# Patient Record
Sex: MALE | Race: BLACK OR AFRICAN AMERICAN | NOT HISPANIC OR LATINO
[De-identification: names, ages, dates, MRNs, and addresses within clinical notes are randomized per-mention and may not be internally consistent; named-entity substitution may affect disease eponyms.]

---

## 2017-01-24 ENCOUNTER — APPOINTMENT (OUTPATIENT)
Dept: NEUROLOGY | Facility: CLINIC | Age: 82
End: 2017-01-24

## 2017-01-25 ENCOUNTER — APPOINTMENT (OUTPATIENT)
Dept: NEUROLOGY | Facility: CLINIC | Age: 82
End: 2017-01-25

## 2017-02-03 LAB
INR PPP: 2.3
INR PPP: 3
PT BLD: 23.3

## 2017-03-24 LAB
INR PPP: 1.9
PT BLD: 18.7

## 2017-04-18 LAB
INR PPP: 2.2
PT BLD: 22.1

## 2017-05-23 ENCOUNTER — APPOINTMENT (OUTPATIENT)
Dept: HEART AND VASCULAR | Facility: CLINIC | Age: 82
End: 2017-05-23

## 2017-05-23 VITALS
SYSTOLIC BLOOD PRESSURE: 110 MMHG | HEART RATE: 56 BPM | OXYGEN SATURATION: 96 % | DIASTOLIC BLOOD PRESSURE: 70 MMHG | TEMPERATURE: 98.3 F

## 2017-05-23 LAB
INR PPP: 3.1 RATIO
POCT-PROTHROMBIN TIME: 36.9 SECS

## 2017-05-24 ENCOUNTER — CLINICAL ADVICE (OUTPATIENT)
Age: 82
End: 2017-05-24

## 2017-05-24 LAB
ALBUMIN SERPL ELPH-MCNC: 3.9 G/DL
ALP BLD-CCNC: 70 U/L
ALT SERPL-CCNC: 13 U/L
ANION GAP SERPL CALC-SCNC: 14 MMOL/L
AST SERPL-CCNC: 24 U/L
BASOPHILS # BLD AUTO: 0.03 K/UL
BASOPHILS NFR BLD AUTO: 0.5 %
BILIRUB SERPL-MCNC: 0.6 MG/DL
BUN SERPL-MCNC: 17 MG/DL
CALCIUM SERPL-MCNC: 10.2 MG/DL
CHLORIDE SERPL-SCNC: 103 MMOL/L
CHOLEST SERPL-MCNC: 154 MG/DL
CHOLEST/HDLC SERPL: 3.4 RATIO
CO2 SERPL-SCNC: 24 MMOL/L
CREAT SERPL-MCNC: 1.18 MG/DL
EOSINOPHIL # BLD AUTO: 0.22 K/UL
EOSINOPHIL NFR BLD AUTO: 3.4 %
GLUCOSE SERPL-MCNC: 106 MG/DL
HBA1C MFR BLD HPLC: 6 %
HCT VFR BLD CALC: 35.3 %
HDLC SERPL-MCNC: 45 MG/DL
HGB BLD-MCNC: 11.5 G/DL
IMM GRANULOCYTES NFR BLD AUTO: 0.2 %
LDLC SERPL CALC-MCNC: 85 MG/DL
LYMPHOCYTES # BLD AUTO: 2.1 K/UL
LYMPHOCYTES NFR BLD AUTO: 32.3 %
MAN DIFF?: NORMAL
MCHC RBC-ENTMCNC: 30.6 PG
MCHC RBC-ENTMCNC: 32.6 GM/DL
MCV RBC AUTO: 93.9 FL
MONOCYTES # BLD AUTO: 0.36 K/UL
MONOCYTES NFR BLD AUTO: 5.5 %
NEUTROPHILS # BLD AUTO: 3.78 K/UL
NEUTROPHILS NFR BLD AUTO: 58.1 %
PLATELET # BLD AUTO: 177 K/UL
POTASSIUM SERPL-SCNC: 4.7 MMOL/L
PROT SERPL-MCNC: 8 G/DL
RBC # BLD: 3.76 M/UL
RBC # FLD: 15.2 %
SODIUM SERPL-SCNC: 141 MMOL/L
TRIGL SERPL-MCNC: 118 MG/DL
TSH SERPL-ACNC: 3.43 UIU/ML
WBC # FLD AUTO: 6.5 K/UL

## 2017-06-09 ENCOUNTER — CLINICAL ADVICE (OUTPATIENT)
Age: 82
End: 2017-06-09

## 2017-06-09 LAB
INR PPP: 2
PT BLD: 20.7

## 2017-06-13 ENCOUNTER — OUTPATIENT (OUTPATIENT)
Dept: OUTPATIENT SERVICES | Facility: HOSPITAL | Age: 82
LOS: 1 days | End: 2017-06-13
Payer: MEDICARE

## 2017-06-13 PROCEDURE — 70551 MRI BRAIN STEM W/O DYE: CPT

## 2017-06-13 PROCEDURE — 72141 MRI NECK SPINE W/O DYE: CPT | Mod: 26

## 2017-06-13 PROCEDURE — 70551 MRI BRAIN STEM W/O DYE: CPT | Mod: 26

## 2017-06-13 PROCEDURE — 72141 MRI NECK SPINE W/O DYE: CPT

## 2017-07-13 ENCOUNTER — CLINICAL ADVICE (OUTPATIENT)
Age: 82
End: 2017-07-13

## 2017-07-13 LAB — INR PPP: 2.4

## 2017-07-25 ENCOUNTER — RX RENEWAL (OUTPATIENT)
Age: 82
End: 2017-07-25

## 2017-08-07 ENCOUNTER — RX RENEWAL (OUTPATIENT)
Age: 82
End: 2017-08-07

## 2017-08-22 ENCOUNTER — CLINICAL ADVICE (OUTPATIENT)
Age: 82
End: 2017-08-22

## 2017-09-06 ENCOUNTER — MEDICATION RENEWAL (OUTPATIENT)
Age: 82
End: 2017-09-06

## 2017-09-06 ENCOUNTER — RX RENEWAL (OUTPATIENT)
Age: 82
End: 2017-09-06

## 2017-09-08 ENCOUNTER — CLINICAL ADVICE (OUTPATIENT)
Age: 82
End: 2017-09-08

## 2017-09-08 LAB
INR PPP: 2.5
PT BLD: 25.5

## 2017-10-10 ENCOUNTER — APPOINTMENT (OUTPATIENT)
Dept: HEART AND VASCULAR | Facility: CLINIC | Age: 82
End: 2017-10-10
Payer: MEDICARE

## 2017-10-10 ENCOUNTER — MEDICATION RENEWAL (OUTPATIENT)
Age: 82
End: 2017-10-10

## 2017-10-10 VITALS
HEART RATE: 67 BPM | SYSTOLIC BLOOD PRESSURE: 132 MMHG | BODY MASS INDEX: 25.67 KG/M2 | HEIGHT: 74 IN | RESPIRATION RATE: 12 BRPM | DIASTOLIC BLOOD PRESSURE: 66 MMHG | OXYGEN SATURATION: 98 % | TEMPERATURE: 98.2 F | WEIGHT: 200 LBS

## 2017-10-10 DIAGNOSIS — E11.9 TYPE 2 DIABETES MELLITUS W/OUT COMPLICATIONS: ICD-10-CM

## 2017-10-10 PROCEDURE — 90662 IIV NO PRSV INCREASED AG IM: CPT

## 2017-10-10 PROCEDURE — 93306 TTE W/DOPPLER COMPLETE: CPT

## 2017-10-10 PROCEDURE — 99214 OFFICE O/P EST MOD 30 MIN: CPT | Mod: 25

## 2017-10-10 PROCEDURE — G0009: CPT

## 2017-10-10 PROCEDURE — 90732 PPSV23 VACC 2 YRS+ SUBQ/IM: CPT

## 2017-10-10 PROCEDURE — G0008: CPT

## 2017-10-10 PROCEDURE — 93880 EXTRACRANIAL BILAT STUDY: CPT | Mod: XE

## 2017-10-10 PROCEDURE — 36415 COLL VENOUS BLD VENIPUNCTURE: CPT

## 2017-10-11 LAB
25(OH)D3 SERPL-MCNC: 34.9 NG/ML
ALBUMIN SERPL ELPH-MCNC: 4.1 G/DL
ALP BLD-CCNC: 69 U/L
ALT SERPL-CCNC: 19 U/L
ANION GAP SERPL CALC-SCNC: 8 MMOL/L
AST SERPL-CCNC: 35 U/L
BASOPHILS # BLD AUTO: 0.02 K/UL
BASOPHILS NFR BLD AUTO: 0.4 %
BILIRUB SERPL-MCNC: 0.6 MG/DL
BUN SERPL-MCNC: 21 MG/DL
CALCIUM SERPL-MCNC: 9.8 MG/DL
CHLORIDE SERPL-SCNC: 101 MMOL/L
CHOLEST SERPL-MCNC: 171 MG/DL
CHOLEST/HDLC SERPL: 3.4 RATIO
CO2 SERPL-SCNC: 30 MMOL/L
CREAT SERPL-MCNC: 1.22 MG/DL
EOSINOPHIL # BLD AUTO: 0.22 K/UL
EOSINOPHIL NFR BLD AUTO: 4 %
GLUCOSE SERPL-MCNC: 101 MG/DL
HBA1C MFR BLD HPLC: 5.7 %
HCT VFR BLD CALC: 34.3 %
HDLC SERPL-MCNC: 51 MG/DL
HGB BLD-MCNC: 11.2 G/DL
IMM GRANULOCYTES NFR BLD AUTO: 0.2 %
INR PPP: 1.92 RATIO
LDLC SERPL CALC-MCNC: 91 MG/DL
LYMPHOCYTES # BLD AUTO: 1.77 K/UL
LYMPHOCYTES NFR BLD AUTO: 32.1 %
MAN DIFF?: NORMAL
MCHC RBC-ENTMCNC: 30.7 PG
MCHC RBC-ENTMCNC: 32.7 GM/DL
MCV RBC AUTO: 94 FL
MONOCYTES # BLD AUTO: 0.39 K/UL
MONOCYTES NFR BLD AUTO: 7.1 %
NEUTROPHILS # BLD AUTO: 3.11 K/UL
NEUTROPHILS NFR BLD AUTO: 56.2 %
PLATELET # BLD AUTO: 165 K/UL
POTASSIUM SERPL-SCNC: 4.1 MMOL/L
PROT SERPL-MCNC: 8.2 G/DL
PSA SERPL-MCNC: 0.02 NG/ML
PT BLD: 22 SEC
RBC # BLD: 3.65 M/UL
RBC # FLD: 15.4 %
SODIUM SERPL-SCNC: 139 MMOL/L
TRIGL SERPL-MCNC: 143 MG/DL
TSH SERPL-ACNC: 2.83 UIU/ML
WBC # FLD AUTO: 5.52 K/UL

## 2017-11-21 LAB
INR PPP: 2.8
PT BLD: 28.8

## 2018-01-10 ENCOUNTER — APPOINTMENT (OUTPATIENT)
Dept: HEART AND VASCULAR | Facility: CLINIC | Age: 83
End: 2018-01-10
Payer: MEDICARE

## 2018-01-10 VITALS
OXYGEN SATURATION: 97 % | WEIGHT: 186.03 LBS | HEART RATE: 69 BPM | TEMPERATURE: 97.3 F | HEIGHT: 74 IN | RESPIRATION RATE: 12 BRPM | DIASTOLIC BLOOD PRESSURE: 66 MMHG | BODY MASS INDEX: 23.87 KG/M2 | SYSTOLIC BLOOD PRESSURE: 108 MMHG

## 2018-01-10 PROCEDURE — 85610 PROTHROMBIN TIME: CPT | Mod: QW

## 2018-01-10 PROCEDURE — 99214 OFFICE O/P EST MOD 30 MIN: CPT | Mod: 25

## 2018-01-11 LAB
INR PPP: 2.8
PT BLD: 28.8

## 2018-03-01 LAB
INR PPP: 2.9
PT BLD: 29.1

## 2018-04-30 LAB
INR PPP: 2.5
PT BLD: 25.1

## 2018-06-25 LAB
INR PPP: 2.1
PT BLD: 21.2

## 2018-06-26 ENCOUNTER — APPOINTMENT (OUTPATIENT)
Dept: HEART AND VASCULAR | Facility: CLINIC | Age: 83
End: 2018-06-26

## 2018-07-10 ENCOUNTER — RX RENEWAL (OUTPATIENT)
Age: 83
End: 2018-07-10

## 2018-07-23 ENCOUNTER — RX RENEWAL (OUTPATIENT)
Age: 83
End: 2018-07-23

## 2018-07-24 ENCOUNTER — APPOINTMENT (OUTPATIENT)
Dept: HEART AND VASCULAR | Facility: CLINIC | Age: 83
End: 2018-07-24
Payer: MEDICARE

## 2018-07-24 VITALS
WEIGHT: 186 LBS | BODY MASS INDEX: 23.87 KG/M2 | RESPIRATION RATE: 12 BRPM | HEART RATE: 62 BPM | SYSTOLIC BLOOD PRESSURE: 120 MMHG | TEMPERATURE: 98.4 F | DIASTOLIC BLOOD PRESSURE: 64 MMHG | OXYGEN SATURATION: 99 % | HEIGHT: 74 IN

## 2018-07-24 DIAGNOSIS — R53.83 OTHER FATIGUE: ICD-10-CM

## 2018-07-24 LAB
INR PPP: 2.6 RATIO
POCT-PROTHROMBIN TIME: 31.2 SECS

## 2018-07-24 PROCEDURE — 85610 PROTHROMBIN TIME: CPT | Mod: QW

## 2018-07-24 PROCEDURE — 99214 OFFICE O/P EST MOD 30 MIN: CPT | Mod: 25

## 2018-07-24 PROCEDURE — 36415 COLL VENOUS BLD VENIPUNCTURE: CPT

## 2018-07-24 PROCEDURE — 86580 TB INTRADERMAL TEST: CPT

## 2018-07-24 PROCEDURE — 93000 ELECTROCARDIOGRAM COMPLETE: CPT

## 2018-07-25 LAB
25(OH)D3 SERPL-MCNC: 36 NG/ML
ALBUMIN SERPL ELPH-MCNC: 4.1 G/DL
ALP BLD-CCNC: 65 U/L
ALT SERPL-CCNC: 15 U/L
ANION GAP SERPL CALC-SCNC: 13 MMOL/L
AST SERPL-CCNC: 27 U/L
BASOPHILS # BLD AUTO: 0.02 K/UL
BASOPHILS NFR BLD AUTO: 0.3 %
BILIRUB SERPL-MCNC: 0.7 MG/DL
BUN SERPL-MCNC: 19 MG/DL
CALCIUM SERPL-MCNC: 9.7 MG/DL
CHLORIDE SERPL-SCNC: 100 MMOL/L
CHOLEST SERPL-MCNC: 155 MG/DL
CHOLEST/HDLC SERPL: 3.4 RATIO
CO2 SERPL-SCNC: 28 MMOL/L
CREAT SERPL-MCNC: 1.02 MG/DL
EOSINOPHIL # BLD AUTO: 0.26 K/UL
EOSINOPHIL NFR BLD AUTO: 4.5 %
FOLATE SERPL-MCNC: >20 NG/ML
GLUCOSE SERPL-MCNC: 96 MG/DL
HBA1C MFR BLD HPLC: 5.8 %
HCT VFR BLD CALC: 33.6 %
HDLC SERPL-MCNC: 46 MG/DL
HGB BLD-MCNC: 11.3 G/DL
IMM GRANULOCYTES NFR BLD AUTO: 0.2 %
LDLC SERPL CALC-MCNC: 87 MG/DL
LYMPHOCYTES # BLD AUTO: 2.24 K/UL
LYMPHOCYTES NFR BLD AUTO: 39 %
MAN DIFF?: NORMAL
MCHC RBC-ENTMCNC: 31.2 PG
MCHC RBC-ENTMCNC: 33.6 GM/DL
MCV RBC AUTO: 92.8 FL
MONOCYTES # BLD AUTO: 0.3 K/UL
MONOCYTES NFR BLD AUTO: 5.2 %
NEUTROPHILS # BLD AUTO: 2.91 K/UL
NEUTROPHILS NFR BLD AUTO: 50.8 %
PLATELET # BLD AUTO: 183 K/UL
POTASSIUM SERPL-SCNC: 4.4 MMOL/L
PROT SERPL-MCNC: 7.9 G/DL
PSA SERPL-MCNC: 0.02 NG/ML
RBC # BLD: 3.62 M/UL
RBC # FLD: 14.7 %
SODIUM SERPL-SCNC: 141 MMOL/L
TRIGL SERPL-MCNC: 111 MG/DL
TSH SERPL-ACNC: 3.45 UIU/ML
VIT B12 SERPL-MCNC: >2000 PG/ML
WBC # FLD AUTO: 5.74 K/UL

## 2018-07-26 ENCOUNTER — APPOINTMENT (OUTPATIENT)
Dept: INTERNAL MEDICINE | Facility: CLINIC | Age: 83
End: 2018-07-26
Payer: MEDICARE

## 2018-07-26 VITALS
WEIGHT: 180 LBS | OXYGEN SATURATION: 99 % | RESPIRATION RATE: 12 BRPM | HEART RATE: 64 BPM | HEIGHT: 74 IN | BODY MASS INDEX: 23.1 KG/M2 | DIASTOLIC BLOOD PRESSURE: 70 MMHG | TEMPERATURE: 97.4 F | SYSTOLIC BLOOD PRESSURE: 120 MMHG

## 2018-07-26 DIAGNOSIS — I65.29 OCCLUSION AND STENOSIS OF UNSPECIFIED CAROTID ARTERY: ICD-10-CM

## 2018-07-26 PROCEDURE — 99214 OFFICE O/P EST MOD 30 MIN: CPT

## 2018-09-04 ENCOUNTER — RX RENEWAL (OUTPATIENT)
Age: 83
End: 2018-09-04

## 2018-09-24 LAB
INR PPP: 2.1
PT BLD: 21.3

## 2018-11-26 VITALS
SYSTOLIC BLOOD PRESSURE: 182 MMHG | HEART RATE: 89 BPM | OXYGEN SATURATION: 98 % | TEMPERATURE: 99 F | RESPIRATION RATE: 18 BRPM | DIASTOLIC BLOOD PRESSURE: 73 MMHG

## 2018-11-26 LAB
APPEARANCE UR: CLEAR — SIGNIFICANT CHANGE UP
BACTERIA # UR AUTO: PRESENT /HPF
BILIRUB UR-MCNC: NEGATIVE — SIGNIFICANT CHANGE UP
COLOR SPEC: YELLOW — SIGNIFICANT CHANGE UP
DIFF PNL FLD: ABNORMAL
EPI CELLS # UR: SIGNIFICANT CHANGE UP /HPF (ref 0–5)
GLUCOSE UR QL: NEGATIVE — SIGNIFICANT CHANGE UP
KETONES UR-MCNC: NEGATIVE — SIGNIFICANT CHANGE UP
LEUKOCYTE ESTERASE UR-ACNC: NEGATIVE — SIGNIFICANT CHANGE UP
NITRITE UR-MCNC: NEGATIVE — SIGNIFICANT CHANGE UP
PH UR: 7 — SIGNIFICANT CHANGE UP (ref 5–8)
PROT UR-MCNC: NEGATIVE MG/DL — SIGNIFICANT CHANGE UP
RBC CASTS # UR COMP ASSIST: > 10 /HPF
SP GR SPEC: 1.02 — SIGNIFICANT CHANGE UP (ref 1–1.03)
TROPONIN T SERPL-MCNC: 0.06 NG/ML — CRITICAL HIGH (ref 0–0.01)
UROBILINOGEN FLD QL: 2 E.U./DL
WBC UR QL: < 5 /HPF — SIGNIFICANT CHANGE UP

## 2018-11-26 PROCEDURE — 70450 CT HEAD/BRAIN W/O DYE: CPT | Mod: 26

## 2018-11-26 PROCEDURE — 73630 X-RAY EXAM OF FOOT: CPT | Mod: 26,RT

## 2018-11-26 PROCEDURE — 72157 MRI CHEST SPINE W/O & W/DYE: CPT | Mod: 26

## 2018-11-26 PROCEDURE — 72158 MRI LUMBAR SPINE W/O & W/DYE: CPT | Mod: 26

## 2018-11-26 PROCEDURE — 73610 X-RAY EXAM OF ANKLE: CPT | Mod: 26,RT

## 2018-11-26 PROCEDURE — 72156 MRI NECK SPINE W/O & W/DYE: CPT | Mod: 26

## 2018-11-26 PROCEDURE — 93971 EXTREMITY STUDY: CPT | Mod: 26,RT

## 2018-11-26 RX ORDER — CEFAZOLIN SODIUM 1 G
1000 VIAL (EA) INJECTION ONCE
Qty: 0 | Refills: 0 | Status: COMPLETED | OUTPATIENT
Start: 2018-11-26 | End: 2018-11-26

## 2018-11-26 RX ORDER — ACETAMINOPHEN 500 MG
975 TABLET ORAL ONCE
Qty: 0 | Refills: 0 | Status: COMPLETED | OUTPATIENT
Start: 2018-11-26 | End: 2018-11-26

## 2018-11-26 RX ADMIN — Medication 975 MILLIGRAM(S): at 18:38

## 2018-11-26 RX ADMIN — Medication 975 MILLIGRAM(S): at 22:19

## 2018-11-26 RX ADMIN — Medication 1000 MILLIGRAM(S): at 21:15

## 2018-11-26 NOTE — ED PROVIDER NOTE - MUSCULOSKELETAL, MLM
Spine appears normal, neck/back nontender, R sided weakness at baseline, 2+ edema rle, 1+ edema lle, erythema and ttp R lateral foot and small shallow wound, dp 1+ bilat, lue/lle range of motion is not limited

## 2018-11-26 NOTE — ED PROVIDER NOTE - DIAGNOSTIC INTERPRETATION
ER Physician: Ada Director  INTERPRETATION:  foot, ankle - no acute fracture; no soft tissue swelling noted; normal bony alignment.

## 2018-11-26 NOTE — ED PROVIDER NOTE - PMH
Aphasia    CAD (coronary artery disease)    Carotid artery stenosis    Cervical stenosis of spine    CVA (cerebral vascular accident)    DM (diabetes mellitus)    HLD (hyperlipidemia)    Prostate cancer

## 2018-11-26 NOTE — ED ADULT NURSE NOTE - OTHER COMPLAINTS
Spouse reports she found patient on the floor this morning. Unknown LOC or head injury. Spouse reports patient is on Coumadin.

## 2018-11-26 NOTE — ED ADULT NURSE NOTE - NSIMPLEMENTINTERV_GEN_ALL_ED
Implemented All Universal Safety Interventions:  [place] to call system. Call bell, personal items and telephone within reach. Instruct patient to call for assistance. Room bathroom lighting operational. Non-slip footwear when patient is off stretcher. Physically safe environment: no spills, clutter or unnecessary equipment. Stretcher in lowest position, wheels locked, appropriate side rails in place.

## 2018-11-26 NOTE — ED PROVIDER NOTE - CARE PLAN
Principal Discharge DX:	Cellulitis of foot  Secondary Diagnosis:	Falls, initial encounter  Secondary Diagnosis:	Leg weakness

## 2018-11-26 NOTE — ED ADULT NURSE NOTE - OBJECTIVE STATEMENT
pt received in [facility] , Alert and active , pt has a history of dementia , CAD with multiple stents ,CVA with Right sided weakness (residual) , pt was found by wife in the bathroom on the floor , unknown etiology of fall , unknown LOC , no obvious injuries noted will continue to monitor

## 2018-11-26 NOTE — ED PROVIDER NOTE - MEDICAL DECISION MAKING DETAILS
Pt c/o mechanical fall w/o sig injury and recent falls x 4 this month.  ? 2/2 new cva (low suspicion), gen weakness/deconditioning, ? cervical stenosis, infection (afebrile).  Pt also c/o several days RLE pain/swelling w/o known injury but mild redness R lateral foot - ? cellulitis, injury, dvt.  Plan labs, ct head, ua, nsg (seen by Dr Phan in 2016 but refused surgery), xray foot/ankle, doppler, reassess.

## 2018-11-26 NOTE — ED PROVIDER NOTE - OBJECTIVE STATEMENT
82 yo male h/o DM, HLD, HTN, Mitral Regurg, Cervical Stenosis, prostate ca, CVA, aphasia, and CAD c/o 82 yo male h/o DM, HLD, HTN, Mitral Regurg, Cervical Stenosis, prostate ca, CVA w R sided weakness, carotid stenosis and CAD c/o fall.  Pt reports he lost his balance this am while in the bedroom walking w his walker.  He states he fell backwards and did not hit his head or lose consciousness.  No neck/back pain or injury, ext pain or injury from fall but notes R foot pain today w some swelling RLE x several days.  No change in vision/speech, new numbness, + worsening generalized and lle weakness and falls x 4 in 1 month.  No ha.  No cp, palpitations, sob.  No h/o dvt.  No fever.  Pt assisted up by family but unable to walk after on his own 2/2 increased lle weakness.

## 2018-11-26 NOTE — ED PROVIDER NOTE - PROGRESS NOTE DETAILS
Labs and ua neg, ct head w/o acute process, xray neg on my read; doppler pending.  NSG consulted 2/2 concern for cerv stenosis and h/o recommendation for surgery for pt's ataxia related to this - they request mri c/t/l w/o and w/ iv contrast and will eval.  MRI ordered.  Pt in doppler now. BOWEN: pt with high blood pressure, high cholesterol, diabetes, cervical stenosis increased falls. Took sign out from . Director. Pt had difficulty getting up after fall and difficulty walking. CT head neg UA neg. Two trop neg. NSG to eval, MRI of entire spine pending. [facility] to read MRI. NSG to evaluate. If surgical will admit to NSG, otherwise medicine for rehab placement. NSG in ed.  Pt pending mri result.  Pt signed out to Dr Guajardo and LEONARDO Sanford pending nsg eval, mri result.  If pt declined by nsg, plan admit to med for cellulitis, falls/weakness. as per nsg, pt has no acute changes on MRI pt and family not decided on surgery will admit to medicine for treatment of cellulitis and PT.

## 2018-11-27 ENCOUNTER — INPATIENT (INPATIENT)
Facility: HOSPITAL | Age: 83
LOS: 9 days | Discharge: NURSING, REHAB OR LONG-TERM CARE | DRG: 471 | End: 2018-12-07
Attending: NEUROLOGICAL SURGERY | Admitting: NEUROLOGICAL SURGERY
Payer: MEDICARE

## 2018-11-27 DIAGNOSIS — Z29.9 ENCOUNTER FOR PROPHYLACTIC MEASURES, UNSPECIFIED: ICD-10-CM

## 2018-11-27 DIAGNOSIS — R29.898 OTHER SYMPTOMS AND SIGNS INVOLVING THE MUSCULOSKELETAL SYSTEM: ICD-10-CM

## 2018-11-27 DIAGNOSIS — I25.10 ATHEROSCLEROTIC HEART DISEASE OF NATIVE CORONARY ARTERY WITHOUT ANGINA PECTORIS: ICD-10-CM

## 2018-11-27 DIAGNOSIS — L03.119 CELLULITIS OF UNSPECIFIED PART OF LIMB: ICD-10-CM
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
## 2018-11-27 DIAGNOSIS — E11.9 TYPE 2 DIABETES MELLITUS WITHOUT COMPLICATIONS: ICD-10-CM

## 2018-11-27 DIAGNOSIS — Z91.89 OTHER SPECIFIED PERSONAL RISK FACTORS, NOT ELSEWHERE CLASSIFIED: ICD-10-CM

## 2018-11-27 DIAGNOSIS — M48.02 SPINAL STENOSIS, CERVICAL REGION: ICD-10-CM

## 2018-11-27 DIAGNOSIS — I63.9 CEREBRAL INFARCTION, UNSPECIFIED: ICD-10-CM

## 2018-11-27 DIAGNOSIS — Z51.5 ENCOUNTER FOR PALLIATIVE CARE: ICD-10-CM

## 2018-11-27 DIAGNOSIS — I65.29 OCCLUSION AND STENOSIS OF UNSPECIFIED CAROTID ARTERY: ICD-10-CM

## 2018-11-27 LAB
ANION GAP SERPL CALC-SCNC: 13 MMOL/L — SIGNIFICANT CHANGE UP (ref 5–17)
APTT BLD: 32.5 SEC — SIGNIFICANT CHANGE UP (ref 27.5–36.3)
APTT BLD: 47.8 SEC — HIGH (ref 27.5–36.3)
BUN SERPL-MCNC: 24 MG/DL — HIGH (ref 7–23)
CALCIUM SERPL-MCNC: 9.2 MG/DL — SIGNIFICANT CHANGE UP (ref 8.4–10.5)
CHLORIDE SERPL-SCNC: 102 MMOL/L — SIGNIFICANT CHANGE UP (ref 96–108)
CK MB CFR SERPL CALC: 5.7 NG/ML — SIGNIFICANT CHANGE UP (ref 0–6.7)
CK SERPL-CCNC: 460 U/L — HIGH (ref 30–200)
CO2 SERPL-SCNC: 27 MMOL/L — SIGNIFICANT CHANGE UP (ref 22–31)
CREAT SERPL-MCNC: 1.06 MG/DL — SIGNIFICANT CHANGE UP (ref 0.5–1.3)
GLUCOSE BLDC GLUCOMTR-MCNC: 102 MG/DL — HIGH (ref 70–99)
GLUCOSE BLDC GLUCOMTR-MCNC: 138 MG/DL — HIGH (ref 70–99)
GLUCOSE BLDC GLUCOMTR-MCNC: 214 MG/DL — HIGH (ref 70–99)
GLUCOSE BLDC GLUCOMTR-MCNC: 219 MG/DL — HIGH (ref 70–99)
GLUCOSE BLDC GLUCOMTR-MCNC: 92 MG/DL — SIGNIFICANT CHANGE UP (ref 70–99)
GLUCOSE SERPL-MCNC: 104 MG/DL — HIGH (ref 70–99)
HBA1C BLD-MCNC: 5.8 % — HIGH (ref 4–5.6)
HCT VFR BLD CALC: 32.1 % — LOW (ref 39–50)
HGB BLD-MCNC: 10.5 G/DL — LOW (ref 13–17)
INR BLD: 1.83 — HIGH (ref 0.88–1.16)
INR PPP: 2.2
MAGNESIUM SERPL-MCNC: 1.8 MG/DL — SIGNIFICANT CHANGE UP (ref 1.6–2.6)
MCHC RBC-ENTMCNC: 30.4 PG — SIGNIFICANT CHANGE UP (ref 27–34)
MCHC RBC-ENTMCNC: 32.7 G/DL — SIGNIFICANT CHANGE UP (ref 32–36)
MCV RBC AUTO: 93 FL — SIGNIFICANT CHANGE UP (ref 80–100)
PLATELET # BLD AUTO: 144 K/UL — LOW (ref 150–400)
POTASSIUM SERPL-MCNC: 3.8 MMOL/L — SIGNIFICANT CHANGE UP (ref 3.5–5.3)
POTASSIUM SERPL-SCNC: 3.8 MMOL/L — SIGNIFICANT CHANGE UP (ref 3.5–5.3)
PROTHROM AB SERPL-ACNC: 21.1 SEC — HIGH (ref 10–12.9)
PT BLD: 22.6
RBC # BLD: 3.45 M/UL — LOW (ref 4.2–5.8)
RBC # FLD: 14.6 % — SIGNIFICANT CHANGE UP (ref 10.3–16.9)
SODIUM SERPL-SCNC: 142 MMOL/L — SIGNIFICANT CHANGE UP (ref 135–145)
TROPONIN T SERPL-MCNC: 0.05 NG/ML — CRITICAL HIGH (ref 0–0.01)
TROPONIN T SERPL-MCNC: 0.06 NG/ML — CRITICAL HIGH (ref 0–0.01)
WBC # BLD: 7.7 K/UL — SIGNIFICANT CHANGE UP (ref 3.8–10.5)
WBC # FLD AUTO: 7.7 K/UL — SIGNIFICANT CHANGE UP (ref 3.8–10.5)

## 2018-11-27 PROCEDURE — 73610 X-RAY EXAM OF ANKLE: CPT | Mod: 26,RT

## 2018-11-27 PROCEDURE — 73630 X-RAY EXAM OF FOOT: CPT | Mod: 26,RT

## 2018-11-27 PROCEDURE — 99285 EMERGENCY DEPT VISIT HI MDM: CPT | Mod: 25

## 2018-11-27 PROCEDURE — 99223 1ST HOSP IP/OBS HIGH 75: CPT

## 2018-11-27 PROCEDURE — 99223 1ST HOSP IP/OBS HIGH 75: CPT | Mod: GC

## 2018-11-27 RX ORDER — HEPARIN SODIUM 5000 [USP'U]/ML
1200 INJECTION INTRAVENOUS; SUBCUTANEOUS
Qty: 25000 | Refills: 0 | Status: DISCONTINUED | OUTPATIENT
Start: 2018-11-27 | End: 2018-11-27

## 2018-11-27 RX ORDER — HEPARIN SODIUM 5000 [USP'U]/ML
1400 INJECTION INTRAVENOUS; SUBCUTANEOUS
Qty: 25000 | Refills: 0 | Status: DISCONTINUED | OUTPATIENT
Start: 2018-11-27 | End: 2018-11-28

## 2018-11-27 RX ORDER — SODIUM CHLORIDE 9 MG/ML
1000 INJECTION, SOLUTION INTRAVENOUS
Qty: 0 | Refills: 0 | Status: DISCONTINUED | OUTPATIENT
Start: 2018-11-27 | End: 2018-12-04

## 2018-11-27 RX ORDER — HYDROCHLOROTHIAZIDE 25 MG
12.5 TABLET ORAL DAILY
Qty: 0 | Refills: 0 | Status: DISCONTINUED | OUTPATIENT
Start: 2018-11-27 | End: 2018-11-27

## 2018-11-27 RX ORDER — FERROUS SULFATE 325(65) MG
325 TABLET ORAL DAILY
Qty: 0 | Refills: 0 | Status: DISCONTINUED | OUTPATIENT
Start: 2018-11-27 | End: 2018-12-04

## 2018-11-27 RX ORDER — DEXTROSE 50 % IN WATER 50 %
12.5 SYRINGE (ML) INTRAVENOUS ONCE
Qty: 0 | Refills: 0 | Status: DISCONTINUED | OUTPATIENT
Start: 2018-11-27 | End: 2018-12-04

## 2018-11-27 RX ORDER — POTASSIUM CHLORIDE 20 MEQ
20 PACKET (EA) ORAL ONCE
Qty: 0 | Refills: 0 | Status: COMPLETED | OUTPATIENT
Start: 2018-11-27 | End: 2018-11-27

## 2018-11-27 RX ORDER — SODIUM CHLORIDE 9 MG/ML
1000 INJECTION INTRAMUSCULAR; INTRAVENOUS; SUBCUTANEOUS
Qty: 0 | Refills: 0 | Status: DISCONTINUED | OUTPATIENT
Start: 2018-11-27 | End: 2018-12-03

## 2018-11-27 RX ORDER — ATORVASTATIN CALCIUM 80 MG/1
40 TABLET, FILM COATED ORAL AT BEDTIME
Qty: 0 | Refills: 0 | Status: DISCONTINUED | OUTPATIENT
Start: 2018-11-27 | End: 2018-12-04

## 2018-11-27 RX ORDER — DEXTROSE 50 % IN WATER 50 %
15 SYRINGE (ML) INTRAVENOUS ONCE
Qty: 0 | Refills: 0 | Status: DISCONTINUED | OUTPATIENT
Start: 2018-11-27 | End: 2018-12-04

## 2018-11-27 RX ORDER — GLUCAGON INJECTION, SOLUTION 0.5 MG/.1ML
1 INJECTION, SOLUTION SUBCUTANEOUS ONCE
Qty: 0 | Refills: 0 | Status: DISCONTINUED | OUTPATIENT
Start: 2018-11-27 | End: 2018-12-04

## 2018-11-27 RX ORDER — INSULIN LISPRO 100/ML
VIAL (ML) SUBCUTANEOUS
Qty: 0 | Refills: 0 | Status: DISCONTINUED | OUTPATIENT
Start: 2018-11-27 | End: 2018-12-04

## 2018-11-27 RX ORDER — MAGNESIUM SULFATE 500 MG/ML
1 VIAL (ML) INJECTION ONCE
Qty: 0 | Refills: 0 | Status: COMPLETED | OUTPATIENT
Start: 2018-11-27 | End: 2018-11-27

## 2018-11-27 RX ORDER — CEPHALEXIN 500 MG
500 CAPSULE ORAL EVERY 6 HOURS
Qty: 0 | Refills: 0 | Status: DISCONTINUED | OUTPATIENT
Start: 2018-11-27 | End: 2018-11-27

## 2018-11-27 RX ORDER — CARVEDILOL PHOSPHATE 80 MG/1
3.12 CAPSULE, EXTENDED RELEASE ORAL EVERY 12 HOURS
Qty: 0 | Refills: 0 | Status: DISCONTINUED | OUTPATIENT
Start: 2018-11-27 | End: 2018-12-04

## 2018-11-27 RX ADMIN — SODIUM CHLORIDE 60 MILLILITER(S): 9 INJECTION INTRAMUSCULAR; INTRAVENOUS; SUBCUTANEOUS at 07:31

## 2018-11-27 RX ADMIN — Medication 116 MILLIGRAM(S): at 07:53

## 2018-11-27 RX ADMIN — Medication 500 MILLIGRAM(S): at 08:54

## 2018-11-27 RX ADMIN — Medication 325 MILLIGRAM(S): at 12:00

## 2018-11-27 RX ADMIN — CARVEDILOL PHOSPHATE 3.12 MILLIGRAM(S): 80 CAPSULE, EXTENDED RELEASE ORAL at 18:03

## 2018-11-27 RX ADMIN — Medication 4: at 16:43

## 2018-11-27 RX ADMIN — Medication 100 MILLIGRAM(S): at 00:27

## 2018-11-27 RX ADMIN — CARVEDILOL PHOSPHATE 3.12 MILLIGRAM(S): 80 CAPSULE, EXTENDED RELEASE ORAL at 08:54

## 2018-11-27 RX ADMIN — Medication 100 GRAM(S): at 09:55

## 2018-11-27 RX ADMIN — Medication 4: at 21:46

## 2018-11-27 RX ADMIN — Medication 5 MILLIGRAM(S): at 08:54

## 2018-11-27 RX ADMIN — Medication 1 TABLET(S): at 12:00

## 2018-11-27 RX ADMIN — ATORVASTATIN CALCIUM 40 MILLIGRAM(S): 80 TABLET, FILM COATED ORAL at 21:45

## 2018-11-27 RX ADMIN — Medication 20 MILLIEQUIVALENT(S): at 09:55

## 2018-11-27 RX ADMIN — HEPARIN SODIUM 12 UNIT(S)/HR: 5000 INJECTION INTRAVENOUS; SUBCUTANEOUS at 18:49

## 2018-11-27 NOTE — CONSULT NOTE ADULT - PROBLEM SELECTOR RECOMMENDATION 3
chronic problem, but new LLE weakness  Pt's family to discuss the role of surgery again with Dr Phan today.

## 2018-11-27 NOTE — PHYSICAL THERAPY INITIAL EVALUATION ADULT - ADDITIONAL COMMENTS
Patient reports being able to ambulate within his home and in the community with a RW prior to this admission. He denies any KAYLIN. He reports having his wife perform all the cooking and cleaning, although he did the dishes prior. His wife and daughter were able to confirm. Per his wife, he has a shower chair and grab bars and she assists him to get into the shower and wash his back. He also attends an adult day care program where he receives "maintenance physical therapy" 2 x/week and participate in daily activities. She also reports he has had many falls at home recently without any loss of consciousness or serious injuries. Patient reports being able to ambulate within his home and in the community with a RW prior to this admission. He denies any KAYLIN. He reports having his wife perform all the cooking and cleaning, although he did the dishes prior. His wife and daughter were able to confirm. Per his wife, he has a shower chair and grab bars and she assists him to get into tub shower and wash his back. He also attends an adult day care program where he receives "maintenance physical therapy" 2 x/week and participate in daily activities. She also reports he has had many falls at home recently without any loss of consciousness or serious injuries.

## 2018-11-27 NOTE — H&P ADULT - NSHPLABSRESULTS_GEN_ALL_CORE
IMPRESSION:  No right-sided deep vein thrombosis seen.    < end of copied text > .  LABS:                         11.2   9.0   )-----------( 162      ( 2018 17:26 )             33.8         143  |  102  |  23  ----------------------------<  92  4.8   |  28  |  1.18    Ca    9.9      2018 17:26    TPro  8.5<H>  /  Alb  3.8  /  TBili  0.9  /  DBili  x   /  AST  27  /  ALT  14  /  AlkPhos  64      PT/INR - ( 2018 17:26 )   PT: 19.9 sec;   INR: 1.73          PTT - ( 2018 17:26 )  PTT:33.8 sec  Urinalysis Basic - ( 2018 18:48 )    Color: Yellow / Appearance: Clear / S.020 / pH: x  Gluc: x / Ketone: NEGATIVE  / Bili: Negative / Urobili: 2.0 E.U./dL   Blood: x / Protein: NEGATIVE mg/dL / Nitrite: NEGATIVE   Leuk Esterase: NEGATIVE / RBC: > 10 /HPF / WBC < 5 /HPF   Sq Epi: x / Non Sq Epi: 0-5 /HPF / Bacteria: Present /HPF      CARDIAC MARKERS ( 2018 20:50 )  x     / 0.06 ng/mL / x     / x     / x      CARDIAC MARKERS ( 2018 17:26 )  x     / 0.05 ng/mL / 403 U/L / x     / 5.6 ng/mL            RADIOLOGY, EKG & ADDITIONAL TESTS:     < from: CT Head No Cont (18 @ 19:06) >      IMPRESSION:-    1.  Soft-tissue swelling in the right fronto-parietal scalp. Otherwise no   interval change.    2.  No intracranial hemorrhage or mass.    3.  Olf infarcts in right fronto-parietal lobe, left basal ganglia and   the left cerebellar hemisphere.  .     4.  Several patchy or confluent hypodense areas in the periventricular   white matter of bilateral cerebral hemispheres.    END OF IMPRESSION.    < end of copied text >    < from: MR Lumbar Spine w/wo IV Cont (18 @ 22:42) >    IMPRESSION:  1. No acute fracture or malalignment in the lumbar spine.  2. Multilevel chronic degenerative disc and facet osteoarthritis as   described above particularly  involving L2-3 and L4-5 levels with left greater right bilateral neural   foraminal narrowing.    < from: MR Cervical Spine w/wo IV Cont (. @ 22:42) >  IMPRESSION:  1. Large suspected disc protrusions/extrusions, suspect chronic, as   described aboveat the C3-4 and  C5-6 levels contributing to marked spinal canal narrowing/stenosis per   patient history.  2. Spinal canal markedly narrowedat C3-4 level, limiting evaluatoin of   the cord signal, however, cord  edema cannot be excluded.  3. Multilevel cervical spondylosis and facet osteoarthrosis as described   above.  4. No evidence of acute fracture.        < from: MR Thoracic Spine w/wo IV Cont (18 @ 22:41) >  IMPRESSION:  1. No acute fracture or malalignment in the thoracic spine.  2. Multilevel mild-moderate chronic degenerative disc of the thoracic   spine.

## 2018-11-27 NOTE — CONSULT NOTE ADULT - PROBLEM SELECTOR RECOMMENDATION 9
old residual weakness s/p CVA  New LLE weakness, presumably from spinal stenosis.  Pts family observes that pt is using is walker less and his WC more.

## 2018-11-27 NOTE — PHYSICAL THERAPY INITIAL EVALUATION ADULT - IMPAIRMENTS CONTRIBUTING TO GAIT DEVIATIONS, PT EVAL
pain in R ankle/pain/impaired balance/impaired coordination/decreased flexibility/decreased strength

## 2018-11-27 NOTE — PHYSICAL THERAPY INITIAL EVALUATION ADULT - ORIENTATION, REHAB EVAL
place/time able to correctly recall year when given options, able to recall which holiday just passed ("Thanksgiving")/person

## 2018-11-27 NOTE — CHART NOTE - NSCHARTNOTEFT_GEN_A_CORE
Home meds discussed with family. Pt has been taking coumadin 5mg daily 5 days a week, and 7 mg on Tuesday's and Saturday's, for prior CVA. Has INR checked regularly. Last Saturday INR was therapeutic at 2.2. Per family pt also has IVC filter. Holding home ASA and coumadin for now, given possibility of surgery for cord edema. Pt and family are open to discussing options for surgery--Dr. Phan will speak to them today or tomorrow. Palliative also consulted for further discussion on GOC.

## 2018-11-27 NOTE — H&P ADULT - PROBLEM SELECTOR PLAN 7
EKG w/nonspecific twave abnorm.   - c/w home lipitor  - holding home ASA in case of surgical intervention. EKG w/nonspecific twave abnormalities ; troponins peaked  - c/w home lipitor  - holding home ASA in case of surgical intervention.

## 2018-11-27 NOTE — CONSULT NOTE ADULT - ASSESSMENT
84 yo man, poor historian, with a PMH DM II, HLD, HTN, mitral regurgitation, cervical stenosis, prostate ca (s/p brachytherapy), CVA w R sided weakness (on coumadin-unclear if hx afib), carotid stenosis, CAD admitted for fall with new onset LLE weakness, MRI significant for cord compression in cervical region with neurosurgery consulted and recommending surgical intervention pending family's decision. Neurology consulted for additional recommendations and management.   - MRI significant for large suspected disc protrusions/extrusions, suspect chronic, as described aboveat the C3-4 and C5-6 levels contributing to marked spinal canal narrowing/stenosis per patient history.Spinal canal markedly narrowed at C3-4 level, limiting evaluatoin of the cord signal, however, cordedema cannot be excluded.Multilevel cervical spondylosis and facet osteoarthrosis as described above.No evidence of acute fracture.  - Neurosurgery consulted and is recommending surgery. When discussed yesterday, the family deferred however now they are considering intervention. Plan for discussion with Dr. Phan tomorrow. Would plan for surgery on this admission if family agrees.   - Final recommendations pending. 84 yo man, poor historian, with a PMH DM II, HLD, HTN, mitral regurgitation, cervical stenosis, prostate ca (s/p brachytherapy), CVA w R sided weakness (on coumadin-unclear if hx afib), carotid stenosis, CAD admitted for fall with new onset LLE weakness, MRI significant for cord compression in cervical region with neurosurgery consulted and recommending surgical intervention pending family's decision. Neurology consulted for additional recommendations and management.   - MRI significant for large suspected disc protrusions/extrusions, suspect chronic, as described aboveat the C3-4 and C5-6 levels contributing to marked spinal canal narrowing/stenosis per patient history.Spinal canal markedly narrowed at C3-4 level, limiting evaluatoin of the cord signal, however, cordedema cannot be excluded.Multilevel cervical spondylosis and facet osteoarthrosis as described above.No evidence of acute fracture.  - Neurosurgery consulted and is recommending surgery. When discussed yesterday, the family deferred however now they are considering intervention. Plan for discussion with Dr. Phan tomorrow. Would plan for surgery on this admission if family agrees.   - Would start a heparin gtt for anticoagulation in setting of high stroke risk and possible surgery.

## 2018-11-27 NOTE — PHYSICAL THERAPY INITIAL EVALUATION ADULT - MODALITIES TREATMENT COMMENTS
strength strong and symmetrical  strength 5/5 and symmetrical, Vision: able to track WNL, inconsistent reports with peripheral field testing, unable to fully follow formal testing this session; Auditory: Hearing in tact, bilaterally

## 2018-11-27 NOTE — PHYSICAL THERAPY INITIAL EVALUATION ADULT - MANUAL MUSCLE TESTING RESULTS, REHAB EVAL
grossly assessed due to/LUE/LLE MMT >3/5 for functional mobility against gravity, unable to extend R knee against gravity, but able to extend knee supine in bed

## 2018-11-27 NOTE — H&P ADULT - HISTORY OF PRESENT ILLNESS
Patient is an 84 yo man, poor historian, with a PMH DM II, HLD, HTN, mitral regurgitation, cervical stenosis, prostate ca (s/p brachytherapy), CVA w R sided weakness (on coumadin-unclear if hx afib), carotid stenosis, CAD admitted for fall with new onset LLE weakness. At time of admission patient's family no longer at bedside, patient is a poor historian and cannot provide much history besides events of the day. Patient states he was walking with his walker earlier today and fell backwards. No CP, SOB, light headedness, LOC, shaking, incontinence of urine, changes in vision prior to fall. Once he fell, he could not get up and family had to help him up. Per signout from ED provider, patient's family reported worsening LLE weakness over the past month. No bowel/bladder incontinence.  Also with RLE erythema, swelling, unclear how long this has been going on for. Attempted to call patient's wife and daughter X3 each with no answer, unable to obtain collateral from family.      In ED VS: Tm 99.1, HR 71-89, -182/73-80, RR 18, O2% 98 RA  RLE doppler negative for DVT, MRI done showing "Possible cord edema versus myelomalacia between C3/4." CT head with frontoparietal swelling, no acute intracranial hemorrhage or infarct. 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    Trop elevated 0.05 -> 0.06, EKG unchanged from previous with nonspecific T wave abnormalities. CK mildly elevated 403. INR 1.73, patient reportedly takes coumadin. Patient is an 82 yo man, poor historian, with a PMH DM II, HLD, HTN, mitral regurgitation, cervical stenosis, prostate ca (s/p brachytherapy), CVA w R sided weakness (on coumadin-unclear if hx afib), carotid stenosis, CAD admitted for fall with new onset LLE weakness. At time of admission patient's family no longer at bedside, patient is a poor historian and cannot provide much history besides events of the day. Patient states he was walking with his walker earlier today and fell backwards. No CP, SOB, light headedness, LOC, shaking, incontinence of urine, changes in vision prior to fall. Once he fell, he could not get up and family had to help him up. Per signout from ED provider, patient's family reported worsening LLE weakness over the past month. No bowel/bladder incontinence.  Also with RLE erythema, swelling, unclear how long this has been going on for. Attempted to call patient's wife and daughter X3 each with no answer, unable to obtain collateral from family.      In ED VS: Tm 99.1, HR 71-89, -182/73-80, RR 18, O2% 98 RA  RLE doppler negative for DVT, MRI done showing "Possible cord edema versus myelomalacia between C3/4." CT head with frontoparietal swelling, no acute intracranial hemorrhage or infarct. 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    Trop elevated 0.05 -> 0.06, EKG unchanged from previous with nonspecific T wave abnormalities. CK mildly elevated 403. INR 1.73, patient reportedly takes coumadin.     Neuosurg consulted in ED, recommended surgical intervention. Per documentation patient and family were not agreeable to surgery at this time. Attempted to call family 3X daughter and 3X wife overnight to clarify this, unable to reach either person.

## 2018-11-27 NOTE — H&P ADULT - PROBLEM SELECTOR PLAN 3
Patient presents s/p fall, CT head negative for acute intracranial pathology. Reportedly with frequent falls over the past month. Likely 2/2 LLE weakness.  - neurosurg re-eval as above  - continue to readdress surgical options with patient and family  - physical therapy eval

## 2018-11-27 NOTE — CONSULT NOTE ADULT - SUBJECTIVE AND OBJECTIVE BOX
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 Patient normally walks with a walker, however states he lost his balance today causing him to fall to the ground, and now has difficulty walking due to left leg weakness (did not hit head, no LOC). Patient denies leg pain or LBP, numbness/tingling, urinary/bowel incontinence. Of note, patient has history of cervical stenosis, in which he was seen by Dr. Phan in 2017, who at the time recommended cervical spinal surgery, however patient refused surgical intervention at the time. Patient currently has no neck/arm pain, weakness or numbness/tingling. CT head performed- negative. MRI of the full spine was performed and neurosurgery consulted.       PAST MEDICAL & SURGICAL HISTORY:  Prostate cancer  Carotid artery stenosis  Cervical stenosis of spine  Aphasia  CVA (cerebral vascular accident)  DM (diabetes mellitus)  CAD (coronary artery disease)  HLD (hyperlipidemia)  No significant past surgical history    FAMILY HISTORY:      SOCIAL HISTORY:  Tobacco Use:  EtOH use:   Substance:    Allergies    No Known Allergies    Intolerances        REVIEW OF SYSTEMS  See above HPI for pertinent ROS         Vital Signs Last 24 Hrs  T(C): 37.3 (2018 16:29), Max: 37.3 (2018 16:29)  T(F): 99.1 (2018 16:29), Max: 99.1 (2018 16:29)  HR: 71 (2018 16:29) (71 - 89)  BP: 168/80 (2018 16:29) (168/80 - 182/73)  BP(mean): --  RR: 18 (2018 16:29) (18 - 18)  SpO2: 98% (2018 16:29) (98% - 98%)      PHYSICAL EXAM:  Neuro:  Awake and alert, oriented x1-2 (name and hospital, however did not know name of hospital), NAD, coherent speech, following commands appropriately  PERRL, EOMI, face symmetric  MAEx4 with increased tone, UE 5/5 b/l, LLE 5/5, RLE 1/5 (baseline paresis)  SILT throughout  Normal reflexes throughout, no hyperreflexic signs  Gait not assessed        LABS:                        11.2   9.0   )-----------( 162      ( 2018 17:26 )             33.8         143  |  102  |  23  ----------------------------<  92  4.8   |  28  |  1.18    Ca    9.9      2018 17:26    TPro  8.5<H>  /  Alb  3.8  /  TBili  0.9  /  DBili  x   /  AST  27  /  ALT  14  /  AlkPhos  64      PT/INR - ( 2018 17:26 )   PT: 19.9 sec;   INR: 1.73          PTT - ( 2018 17:26 )  PTT:33.8 sec  Urinalysis Basic - ( 2018 18:48 )    Color: Yellow / Appearance: Clear / S.020 / pH: x  Gluc: x / Ketone: NEGATIVE  / Bili: Negative / Urobili: 2.0 E.U./dL   Blood: x / Protein: NEGATIVE mg/dL / Nitrite: NEGATIVE   Leuk Esterase: NEGATIVE / RBC: > 10 /HPF / WBC < 5 /HPF   Sq Epi: x / Non Sq Epi: 0-5 /HPF / Bacteria: Present /HPF      CULTURES:      RADIOLOGY & ADDITIONAL STUDIES:          Assessment:  84 yo male pmh cervical stenosis DM, HTN, HLD, carotid stenosis, CAD, mitral regurgitation, prostate CA, 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 Patient presenting with new onset left sided weakness today s/p fall, however denies pain or numbness/tingling. Patient also has known chronic cervical stenosis, last MRI 2017, with no new cervical spine symptoms. CTH performed- negative. MRI lumbar spine performed, which shows some chronic multilevel disc disease with spinal stenosis and L>R neural foraminal narrowing. MRI cervical spine performed, which shows a chronic severe spinal stenosis at C3-C4, essentially stable from previous MRI. All radiographic findings likely chronic.       Plan:  -No need for emergent neurosurgical intervention at this time. Family previously denied surgery, will continue to discuss possible surgical interventions.  -Recommend admit to medicine service due to right foot cellulitis, physical therapy and social concerns.   -Neurosurgery will continue to follow  -d/w Dr. 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 Patient normally walks with a walker, however states he lost his balance today causing him to fall to the ground, and now has difficulty walking due to left leg weakness (did not hit head, no LOC). Patient denies leg pain or LBP, numbness/tingling, urinary/bowel incontinence. Of note, patient has history of cervical stenosis, in which he was seen by Dr. Phan in 2017, who at the time recommended cervical spinal surgery, however patient refused surgical intervention at the time. Patient currently has no neck/arm pain, weakness or numbness/tingling. CT head performed- negative. MRI of the full spine was performed and neurosurgery consulted.       PAST MEDICAL & SURGICAL HISTORY:  Prostate cancer  Carotid artery stenosis  Cervical stenosis of spine  Aphasia  CVA (cerebral vascular accident)  DM (diabetes mellitus)  CAD (coronary artery disease)  HLD (hyperlipidemia)  No significant past surgical history    FAMILY HISTORY:      SOCIAL HISTORY:  Tobacco Use:  EtOH use:   Substance:    Allergies    No Known Allergies    Intolerances        REVIEW OF SYSTEMS  See above HPI for pertinent ROS         Vital Signs Last 24 Hrs  T(C): 37.3 (2018 16:29), Max: 37.3 (2018 16:29)  T(F): 99.1 (2018 16:29), Max: 99.1 (2018 16:29)  HR: 71 (2018 16:29) (71 - 89)  BP: 168/80 (2018 16:29) (168/80 - 182/73)  BP(mean): --  RR: 18 (2018 16:29) (18 - 18)  SpO2: 98% (2018 16:29) (98% - 98%)      PHYSICAL EXAM:  Neuro:  Awake and alert, oriented x1-2 (name and hospital, however did not know name of hospital), NAD, coherent speech, following commands appropriately  PERRL, EOMI, face symmetric  MAEx4 with increased tone, UE 5/5 b/l, LLE 5/5, RLE 1/5 (baseline paresis)  SILT throughout  Normal reflexes throughout, no hyperreflexic signs  Gait not assessed        LABS:                        11.2   9.0   )-----------( 162      ( 2018 17:26 )             33.8         143  |  102  |  23  ----------------------------<  92  4.8   |  28  |  1.18    Ca    9.9      2018 17:26    TPro  8.5<H>  /  Alb  3.8  /  TBili  0.9  /  DBili  x   /  AST  27  /  ALT  14  /  AlkPhos  64      PT/INR - ( 2018 17:26 )   PT: 19.9 sec;   INR: 1.73          PTT - ( 2018 17:26 )  PTT:33.8 sec  Urinalysis Basic - ( 2018 18:48 )    Color: Yellow / Appearance: Clear / S.020 / pH: x  Gluc: x / Ketone: NEGATIVE  / Bili: Negative / Urobili: 2.0 E.U./dL   Blood: x / Protein: NEGATIVE mg/dL / Nitrite: NEGATIVE   Leuk Esterase: NEGATIVE / RBC: > 10 /HPF / WBC < 5 /HPF   Sq Epi: x / Non Sq Epi: 0-5 /HPF / Bacteria: Present /HPF      CULTURES:      RADIOLOGY & ADDITIONAL STUDIES:          Assessment:  82 yo male pmh cervical stenosis DM, HTN, HLD, carotid stenosis, CAD, mitral regurgitation, prostate CA, 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 Patient presenting with new onset left sided weakness today s/p fall, however denies pain or numbness/tingling. Patient also has known chronic cervical stenosis, last MRI 2017, with no new cervical spine symptoms. CTH performed- negative. MRI lumbar spine performed, which shows some chronic multilevel disc disease with spinal stenosis and L>R neural foraminal narrowing. MRI cervical spine performed, which shows severe spinal stenosis at C3-C4.      Plan:  -Family previously declined surgery. Recommend cervical surgery for spinal decompression again at this time   -Dr. Phan to review imaging and discuss surgical options with family  -above discussed and reviewed with Dr. Phan

## 2018-11-27 NOTE — H&P ADULT - ATTENDING COMMENTS
patient seen and examined    reviewed vs, labs, available radiological reports/ studies, ekg     agree w/ PE findings as above w/ additions/ exceptions/ pertinent findings: pt awake, alert, in NAD, oriented to person, place not time; s1s2, lungs CTA b/l, abdomen nt/nd ; no sacral ulcers noted; right upper ext and R lower ext contracted (at knee); 5/5 upper motor b/l, 3/5 Right lower motor, 5/5 left lower motor strength. +right knee decreased ROM ; +right elbow decreased ROM    1.  lower ext weakness in setting of previous CVA deficits: follow up official MRI report, pt given solumedrol 1g IV x 1 given prelim report of possible edema at c3-c4 region; follow up neurosurgery recommendations, consult neurology for possible transfer to their service if not neurosurgical intervention; PT evaluation.  2. c/w keflex for foot cellulitis    3. clarify with Dr. Olmedo why pt was on coumadin and if patient if still on medication     rest of plan as above

## 2018-11-27 NOTE — PHYSICAL THERAPY INITIAL EVALUATION ADULT - GAIT DEVIATIONS NOTED, PT EVAL
decreased weight-shifting ability/increased time in double stance/decreased step length/decreased carolee/decreased velocity of limb motion

## 2018-11-27 NOTE — PHYSICAL THERAPY INITIAL EVALUATION ADULT - COORDINATION ASSESSED, REHAB EVAL
finger to nose/unable to assess RUE secondary to patient in contracture position; slight dysmetria with LUE

## 2018-11-27 NOTE — CONSULT NOTE ADULT - ATTENDING COMMENTS
Patient seen and examined with his wife at bedside.  Agree with above.  Patient has aphasia and right hemiparesis post previous stroke.  He's at high risk of clotting due to his history including DVT on present admission.  Therefore, can anticoagulate patient but observe for risk of hemorrhage.  He should also be on statin, if not contraindicated by primary team.    Cervical cord compression as per Neurosurgery team

## 2018-11-27 NOTE — H&P ADULT - FAMILY HISTORY
Father  Still living? Unknown  Family history of essential hypertension, Age at diagnosis: Age Unknown Father  Still living? Unknown  Family history of essential hypertension, Age at diagnosis: Age Unknown     Mother  Still living? Unknown  No family history of cardiovascular disease, Age at diagnosis: Age Unknown

## 2018-11-27 NOTE — PHYSICAL THERAPY INITIAL EVALUATION ADULT - ACTIVE RANGE OF MOTION EXAMINATION, REHAB EVAL
Left UE Active ROM was WFL (within functional limits)/R UE with increased difficulty performing flexion, elbow extension, and hand/wrist extenion; RLE unable to achieve full knee ext ROM, patient with shortened hamstring muscle/deficits as listed below/Left LE Active ROM was WFL (within functional limits) R UE with increased difficulty performing flexion, elbow extension, and hand/wrist extenion, in flexion synergy; RLE unable to achieve full knee ext ROM, patient with shortened hamstring muscle/Left UE Active ROM was WFL (within functional limits)/Left LE Active ROM was WFL (within functional limits)/deficits as listed below

## 2018-11-27 NOTE — H&P ADULT - PROBLEM SELECTOR PLAN 10
1) PCP Contacted on Admission: (Y/N) --> Name & Phone #: N. PMD Dr. Olmedo? Not contacted, late admission.   2) Date of Contact with PCP:  3) PCP Contacted at Discharge: (Y/N, N/A)  4) Summary of Handoff Given to PCP:   5) Post-Discharge Appointment Date and Location: 1) PCP Contacted on Admission: (Y/N) --> Name & Phone #: N. PMD Dr. Olmedo  [phone] Not contacted, late admission.   2) Date of Contact with PCP:  3) PCP Contacted at Discharge: (Y/N, N/A)  4) Summary of Handoff Given to PCP:   5) Post-Discharge Appointment Date and Location:

## 2018-11-27 NOTE — H&P ADULT - PROBLEM SELECTOR PLAN 5
Residual RLE weakness, RUE weakness. Formerly on coumadin however unable to find record of coumadin prescription after September, possibly discontinued due to falls? INR 1.73, so subtherapeutic if patient taking coumadin. Also unclear exactly why on coumadin, no documented afib and patient in NSR.   - f/u family in AM for collateral wether coumadin discontinued, also indication for coumadin.

## 2018-11-27 NOTE — H&P ADULT - NSHPPHYSICALEXAM_GEN_ALL_CORE
.  VITAL SIGNS:  T(F): 98.1 (11-27-18 @ 02:52), Max: 99.1 (11-26-18 @ 16:29)  HR: 100 (11-27-18 @ 02:52) (71 - 100)  BP: 170/75 (11-27-18 @ 02:52) (168/80 - 182/73)  BP(mean): --  RR: 18 (11-27-18 @ 02:52) (18 - 18)  SpO2: 100% (11-27-18 @ 02:52) (98% - 100%)    PHYSICAL EXAM:    Constitutional: WDWN resting comfortably in bed; NAD  HEENT: NC/AT, PERRL, EOMI, anicteric sclera, no nasal discharge; uvula midline, no oropharyngeal erythema or exudates; MMM  Neck: supple; no JVD or thyromegaly  Respiratory: CTA B/L; no W/R/R, no retractions  Cardiac: +S1/S2; RRR; no M/R/G; PMI non-displaced  Gastrointestinal: soft, NT/ND; no rebound or guarding; +BSx4  Back: spine midline, no bony tenderness or step-offs; no CVAT B/L  Extremities: WWP, no clubbing or cyanosis. RLE pitting edema 2+ at ankle, erythema at lateral aspect of ankle, tenderness.   Vascular: 2+ radial, femoral, DP/PT pulses B/L  Dermatologic: skin warm, dry and intact; no rashes, wounds, or scars  Lymphatic: no submandibular or cervical LAD  Neurologic: AAOx3; CNII-XII grossly intact; LUE strength 5/5, RUE stregnth 4/5. RLE strength 3/5, LLE strength 4/5. No sensory deficits elicited. No saddle anesthesia. .  VITAL SIGNS:  T(F): 98.1 (11-27-18 @ 02:52), Max: 99.1 (11-26-18 @ 16:29)  HR: 100 (11-27-18 @ 02:52) (71 - 100)  BP: 170/75 (11-27-18 @ 02:52) (168/80 - 182/73)  BP(mean): --  RR: 18 (11-27-18 @ 02:52) (18 - 18)  SpO2: 100% (11-27-18 @ 02:52) (98% - 100%)    PHYSICAL EXAM:    Constitutional: WDWN resting comfortably in bed; NAD  HEENT: NC/AT, PERRL, EOMI, anicteric sclera, no nasal discharge; uvula midline, no oropharyngeal erythema or exudates; MMM  Neck: supple; no JVD or thyromegaly  Respiratory: CTA B/L; no W/R/R, no retractions  Cardiac: +S1/S2; RRR; no M/R/G; PMI non-displaced  Gastrointestinal: soft, NT/ND; no rebound or guarding; +BSx4  Back: spine midline, no bony tenderness or step-offs; no CVAT B/L  Extremities: WWP, no clubbing or cyanosis. RLE pitting edema 2+ at ankle, erythema at lateral aspect of ankle, tenderness.   Vascular: 2+ radial, femoral, DP/PT pulses B/L  Dermatologic: skin warm, dry and intact; no rashes, wounds, or scars  Lymphatic: no submandibular or cervical LAD  Neurologic: Awake and alert; CNII-XII grossly intact; LUE strength 5/5, RUE stregnth 4/5. RLE strength 3/5, LLE strength 4/5. No sensory deficits elicited. No saddle anesthesia.

## 2018-11-27 NOTE — H&P ADULT - PROBLEM SELECTOR PLAN 1
Patient presenting with reported LLE new weakness worsening over the past month. RLE chronic weakness due to history of stroke. MRI C-spine read as "Possible cord edema versus myelomalacia between C3/4."  - neurosurgery consulted, recommend surgery however family deferred surgery when speaking with neurosurgery in ED  - unable to reach family, called wife and daughter X3 each with no response  - Spoke with neurosurgery after MRI read returned with cord edema. State still no intervention emergently and rec no steroids since unclear if edema of cord represents acute finding.   - will give 1 dose of solumedrol in case of acute cord compression with plans to f/u repeat neurosurg eval in AM. Patient presenting with reported LLE new weakness worsening over the past month. RLE chronic weakness due to history of stroke. MRI C-spine read as "Possible cord edema versus myelomalacia between C3/4."  - neurosurgery consulted, recommend surgery however family deferred surgery when speaking with neurosurgery in ED  - unable to reach family, called wife and daughter X3 each with no response  - Spoke with neurosurgery after MRI read returned with cord edema. State still no intervention emergently and rec no steroids since unclear if edema of cord represents acute finding.   - will give 1 dose of solumedrol in case of acute cord compression with plans to f/u repeat neurosurg eval in AM.    ADDENDUM: call neurology for possible transfer if no further intervention by neurosurgery; closely monitor glucose. follow up official MRI report.

## 2018-11-27 NOTE — CONSULT NOTE ADULT - SUBJECTIVE AND OBJECTIVE BOX
SARAH SÁNCHEZ   [id number]     (1935):     HPI:  Patient is an 82 yo man, poor historian, with a PMH DM II, HLD, HTN, mitral regurgitation, cervical stenosis, prostate ca (s/p brachytherapy), CVA w R sided weakness (on coumadin-unclear if hx afib), carotid stenosis, CAD admitted for fall with new onset LLE weakness. At time of admission patient's family no longer at bedside, patient is a poor historian and cannot provide much history besides events of the day. Patient states he was walking with his walker earlier today and fell backwards. No CP, SOB, light headedness, LOC, shaking, incontinence of urine, changes in vision prior to fall. Once he fell, he could not get up and family had to help him up. Per signout from ED provider, patient's family reported worsening LLE weakness over the past month. No bowel/bladder incontinence.  Also with RLE erythema, swelling, unclear how long this has been going on for. Attempted to call patient's wife and daughter X3 each with no answer, unable to obtain collateral from family.      In ED VS: Tm 99.1, HR 71-89, -182/73-80, RR 18, O2% 98 RA  RLE doppler negative for DVT, MRI done showing "Possible cord edema versus myelomalacia between C3/4." CT head with frontoparietal swelling, no acute intracranial hemorrhage or infarct. 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    Trop elevated 0.05 -> 0.06, EKG unchanged from previous with nonspecific T wave abnormalities. CK mildly elevated 403. INR 1.73, patient reportedly takes coumadin.     Neuosurg consulted in ED, recommended surgical intervention. Per documentation patient and family were not agreeable to surgery at this time. Attempted to call family 3X daughter and 3X wife overnight to clarify this, unable to reach either person. (2018 03:21)    Palliative Medicine asked to consult for this pt who has a possible cord edema due to stenosis and had refused surgical intervention last year.  At this time, NS is recommending surgery and family has declined same.  Pt, family to discuss with NS again tomorrow.     Pts family reports that pts functional status has recently been declining, ambulating less, using WC more in the past few weeks.  He is not having a hard time hold his urine-- unsure if the mechanics of toileting take longer and pt cant hold it vs ye incontinence. Pt recognizers the urge to void.  PT had declined surgery last ear bc :nothing hurt" and he didnt want to take on pain when nothing felt wrong.  Additionally. pt family was very concerned about pts willingness to comply with the required post-op care (including physical therapy) as he is not willing to accept instruction from his family re: same.    PAST MEDICAL & SURGICAL HISTORY:  Prostate cancer  Carotid artery stenosis  Cervical stenosis of spine  Aphasia  CVA (cerebral vascular accident)  DM (diabetes mellitus)  CAD (coronary artery disease)  HLD (hyperlipidemia)  No significant past surgical history      FAMILY HISTORY:  No family history of cardiovascular disease (Mother)  Family history of essential hypertension (Father)    Reviewed and not pertinent     ROS:  + pain in R foot- new onset x a few days (not related to pts fall)  Unable to attain due to:                      Dyspnea (Joe 0-10):   0/10  N/V (Y/N):   n     Secretions (Y/N):  no               Agitation(Y/N): no  Pain (Y/N):     yes  -Provocation/Palliation: unable to report  -Quality/Quantity:  unable to characterize  -Radiating:  no  -Severity:  moderate  -Timing/Frequency:  ongoing x a few days  -Impact on ADLs:  none as pt is currently bedbound    General:  weight stable, good appetite  HEENT:    Denied  Neck:  Denied  CVS:  Denied  Resp:  Denied  GI:  bm q d or qod  :  starting to have urinary accidents  Musc:  R sided weakness post stroke, new L sided LE weakness during this fall  Neuro:  R sided weakenss post cva  Psych:  Denied  Skin:  Denied  Lymph:  Denied    Allergies    No Known Allergies    Intolerances        Opiate Naive (Y/N):  yes  -iStop reviewed (Y/N):m yes ref #[id number]    Medications:      MEDICATIONS  (STANDING):  atorvastatin 40 milliGRAM(s) Oral at bedtime  carvedilol 3.125 milliGRAM(s) Oral every 12 hours  dextrose 5%. 1000 milliLiter(s) (50 mL/Hr) IV Continuous <Continuous>  dextrose 50% Injectable 12.5 Gram(s) IV Push once  enalapril 5 milliGRAM(s) Oral daily  ferrous    sulfate 325 milliGRAM(s) Oral daily  insulin lispro (HumaLOG) corrective regimen sliding scale   SubCutaneous Before meals and at bedtime  multivitamin 1 Tablet(s) Oral daily  sodium chloride 0.9%. 1000 milliLiter(s) (60 mL/Hr) IV Continuous <Continuous>    MEDICATIONS  (PRN):  dextrose 40% Gel 15 Gram(s) Oral once PRN Blood Glucose LESS THAN 70 milliGRAM(s)/deciliter  glucagon  Injectable 1 milliGRAM(s) IntraMuscular once PRN Glucose LESS THAN 70 milligrams/deciliter      Labs:    CBC:                        10.5   7.7   )-----------( 144      ( 2018 07:04 )             32.1     CMP:        142  |  102  |  24<H>  ----------------------------<  104<H>  3.8   |  27  |  1.06    Ca    9.2      2018 07:04  Mg     1.8         TPro  8.5<H>  /  Alb  3.8  /  TBili  0.9  /  DBili  x   /  AST  27  /  ALT  14  /  AlkPhos  64      PT/INR - ( 2018 07:04 )   PT: 21.1 sec;   INR: 1.83     PTT - ( 2018 07:04 )  PTT:32.5 sec  Urinalysis Basic - ( 2018 18:48 )    Color: Yellow / Appearance: Clear / S.020 / pH: x  Gluc: x / Ketone: NEGATIVE  / Bili: Negative / Urobili: 2.0 E.U./dL   Blood: x / Protein: NEGATIVE mg/dL / Nitrite: NEGATIVE   Leuk Esterase: NEGATIVE / RBC: > 10 /HPF / WBC < 5 /HPF   Sq Epi: x / Non Sq Epi: 0-5 /HPF / Bacteria: Present /HPF      Imaging:  Reviewed    PEx:  T(C): 36.7 (18 @ 16:45), Max: 37.1 (18 @ 09:13)  HR: 68 (18 @ 16:45) (64 - 100)  BP: 185/94 (18 @ 16:45) (137/67 - 185/94)  RR: 16 (18 @ 16:45) (16 - 18)  SpO2: 97% (18 @ 16:45) (96% - 100%)  Wt(kg): 80 kgs      POCT Blood Glucose.: 219 mg/dL (2018 16:17)    I&O's Summary    2018 07:01  -  2018 17:23  --------------------------------------------------------  IN: 300 mL / OUT: 0 mL / NET: 300 mL    General: not ill appearing, not distressed  HEENT:  nc/at, moist oral cavity  Neck: supple  CVS: regular  Resp:   clear, nonlabored  GI:  large, soft, non-tender, +BS  :  voids  Musc:   R UE weakness,   Neuro: awake, alert, confused to person, place, events  Psych:   calm, not distressed, although pt cries intermittently s/p CVA  Skin:  warm, dry  Lymph:  neg  Preadmit Karnofsky:  40  %           Current Karnofsky:   30    %  Cachexia (Y/N):   no  BMI:      Advanced Directives:     Full Code        Decision maker:  Pt does not appears to have full decision capacity.  Legal surrogate:  Pt's wife is his surrogate decision maker in the absence of a documented HCP.    Social History: , 4 adults dgts, worked for [employer], reited, not spiritual, pt was in the [employer] x 3 years and has medical benefits form the [employer]    GOALS OF CARE DISCUSSION:       Palliative care info/counseling provided	           Family meeting-   with Dr Phan       Advanced Directives addressed please see Advance Care Planning Note	           See previous Palliative Medicine Note       Documentation of GOC: 	          REFERRALS:	            Unit SW/Case Mgmt       - declined          Patient/Family Support       Massage Therapy       Music Therapy                    PT/OT SARAH SÁNCHEZ   [id number]     (1935):     HPI:  Patient is an 82 yo man, poor historian, with a PMH DM II, HLD, HTN, mitral regurgitation, cervical stenosis, prostate ca (s/p brachytherapy), CVA w R sided weakness (on coumadin-unclear if hx afib), carotid stenosis, CAD admitted for fall with new onset LLE weakness. At time of admission patient's family no longer at bedside, patient is a poor historian and cannot provide much history besides events of the day. Patient states he was walking with his walker earlier today and fell backwards. No CP, SOB, light headedness, LOC, shaking, incontinence of urine, changes in vision prior to fall. Once he fell, he could not get up and family had to help him up. Per signout from ED provider, patient's family reported worsening LLE weakness over the past month. No bowel/bladder incontinence.  Also with RLE erythema, swelling, unclear how long this has been going on for. Attempted to call patient's wife and daughter X3 each with no answer, unable to obtain collateral from family.      In ED VS: Tm 99.1, HR 71-89, -182/73-80, RR 18, O2% 98 RA  RLE doppler negative for DVT, MRI done showing "Possible cord edema versus myelomalacia between C3/4." CT head with frontoparietal swelling, no acute intracranial hemorrhage or infarct. 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    Trop elevated 0.05 -> 0.06, EKG unchanged from previous with nonspecific T wave abnormalities. CK mildly elevated 403. INR 1.73, patient reportedly takes coumadin.     Neuosurg consulted in ED, recommended surgical intervention. Per documentation patient and family were not agreeable to surgery at this time. Attempted to call family 3X daughter and 3X wife overnight to clarify this, unable to reach either person. (2018 03:21)    Palliative Medicine asked to consult for this pt who has a possible cord edema due to stenosis and had refused surgical intervention last year.  At this time, NS is recommending surgery and family has declined same.  Pt, family to discuss with NS again tomorrow.     Pts family reports that pts functional status has recently been declining, ambulating less, using WC more in the past few weeks.  He is  having urinary accidents -- unclear if the mechanics of toileting take longer and pt cant hold it vs ye incontinence. Pt recognizers the urge to void.  PT had declined spinal  surgery last year bc "nothing hurt" and he didn't want to take on pain when nothing felt wrong.  Additionally. pt family was very concerned about pts willingness to comply with the required post-op care (including physical therapy) as he is not willing to accept instruction from his family re: same.  Pts wife reports that pt has fallen 4 times in the last month and she has been able to get pt up off the ground, but this time was different due to the weakness of pts LLE.    PAST MEDICAL & SURGICAL HISTORY:  Prostate cancer  Carotid artery stenosis  Cervical stenosis of spine  Aphasia  CVA (cerebral vascular accident)  DM (diabetes mellitus)  CAD (coronary artery disease)  HLD (hyperlipidemia)  No significant past surgical history      FAMILY HISTORY:  No family history of cardiovascular disease (Mother)  Family history of essential hypertension (Father)    Reviewed and not pertinent     ROS:  + pain in R foot- new onset x a few days (not related to pts fall)  Unable to attain due to:                      Dyspnea (Joe 0-10):   0/10  N/V (Y/N):   n     Secretions (Y/N):  no               Agitation(Y/N): no  Pain (Y/N):     yes  -Provocation/Palliation: unable to report  -Quality/Quantity:  unable to characterize  -Radiating:  no  -Severity:  moderate  -Timing/Frequency:  ongoing x a few days  -Impact on ADLs:  none as pt is currently bedbound    General:  weight stable, good appetite  HEENT:    Denied  Neck:  Denied  CVS:  Denied  Resp:  Denied  GI:  bm q d or qod  :  starting to have urinary accidents  Musc:  R sided weakness post stroke, new L sided LE weakness during this fall; new onset R LE pain and "swelling"  Neuro:  R sided weakness post cva  Psych:  Denied  Skin:  Denied  Lymph:  Denied    Allergies    No Known Allergies    Intolerances        Opiate Naive (Y/N):  yes  -iStop reviewed (Y/N):m yes ref #[id number]    Medications:      MEDICATIONS  (STANDING):  atorvastatin 40 milliGRAM(s) Oral at bedtime  carvedilol 3.125 milliGRAM(s) Oral every 12 hours  dextrose 5%. 1000 milliLiter(s) (50 mL/Hr) IV Continuous <Continuous>  dextrose 50% Injectable 12.5 Gram(s) IV Push once  enalapril 5 milliGRAM(s) Oral daily  ferrous    sulfate 325 milliGRAM(s) Oral daily  insulin lispro (HumaLOG) corrective regimen sliding scale   SubCutaneous Before meals and at bedtime  multivitamin 1 Tablet(s) Oral daily  sodium chloride 0.9%. 1000 milliLiter(s) (60 mL/Hr) IV Continuous <Continuous>    MEDICATIONS  (PRN):  dextrose 40% Gel 15 Gram(s) Oral once PRN Blood Glucose LESS THAN 70 milliGRAM(s)/deciliter  glucagon  Injectable 1 milliGRAM(s) IntraMuscular once PRN Glucose LESS THAN 70 milligrams/deciliter      Labs:    CBC:                        10.5   7.7   )-----------( 144      ( 2018 07:04 )             32.1     CMP:        142  |  102  |  24<H>  ----------------------------<  104<H>  3.8   |  27  |  1.06    Ca    9.2      2018 07:04  Mg     1.8         TPro  8.5<H>  /  Alb  3.8  /  TBili  0.9  /  DBili  x   /  AST  27  /  ALT  14  /  AlkPhos  64      PT/INR - ( 2018 07:04 )   PT: 21.1 sec;   INR: 1.83     PTT - ( 2018 07:04 )  PTT:32.5 sec  Urinalysis Basic - ( 2018 18:48 )    Color: Yellow / Appearance: Clear / S.020 / pH: x  Gluc: x / Ketone: NEGATIVE  / Bili: Negative / Urobili: 2.0 E.U./dL   Blood: x / Protein: NEGATIVE mg/dL / Nitrite: NEGATIVE   Leuk Esterase: NEGATIVE / RBC: > 10 /HPF / WBC < 5 /HPF   Sq Epi: x / Non Sq Epi: 0-5 /HPF / Bacteria: Present /HPF      Imaging:  Reviewed    PEx:  T(C): 36.7 (18 @ 16:45), Max: 37.1 (18 @ 09:13)  HR: 68 (18 @ 16:45) (64 - 100)  BP: 185/94 (18 @ 16:45) (137/67 - 185/94)  RR: 16 (18 @ 16:45) (16 - 18)  SpO2: 97% (18 @ 16:45) (96% - 100%)  Wt(kg): 80 kgs      POCT Blood Glucose.: 219 mg/dL (2018 16:17)    I&O's Summary    2018 07:01  -  2018 17:23  --------------------------------------------------------  IN: 300 mL / OUT: 0 mL / NET: 300 mL    General: not ill appearing, not distressed  HEENT:  nc/at, moist oral cavity  Neck: supple  CVS: regular  Resp:   clear, nonlabored  GI:  large, soft, non-tender, +BS  :  voids  Musc:   R UE weakness,   Neuro: awake, alert, confused to person, place, events  Psych:   calm, not distressed, although pt cries intermittently s/p CVA  Skin:  warm, dry  Lymph:  neg  Preadmit Karnofsky:  40  %           Current Karnofsky:   30    %  Cachexia (Y/N):   no  BMI:      Advanced Directives:     Full Code        Decision maker:  Pt does not appears to have full medical decision making  capacity.  Legal surrogate:  Pt's wife is his surrogate decision maker in the absence of a documented HCP.    Social History: , 4 adults dgts, worked for [employer], retired, not spiritual, pt was in the [employer] x 3 years and has medical benefits form the [employer]  Pt attends adult day care 2 days a week at the [facility] in [facility] by the [facility].    GOALS OF CARE DISCUSSION:       Palliative care info/counseling provided	           Family meeting-   with Dr Phan       Advanced Directives addressed please see Advance Care Planning Note	           See previous Palliative Medicine Note       Documentation of GOC:  unclear at this time          REFERRALS:	            Unit SW/Case Mgmt       - declined          Patient/Family Support       Massage Therapy       Music Therapy                    PT/OT

## 2018-11-27 NOTE — H&P ADULT - PROBLEM SELECTOR PLAN 9
F: None  E: replete PRN  N: NPO in case of surgical intervention    VTE ppx: IMPROVE 3, high risk, require VTE ppx w/HSQ. Unclear if still taking coumadin. Will hold for now in the setting of possible surgery. SCD's given no DVT on RLE.

## 2018-11-27 NOTE — PHYSICAL THERAPY INITIAL EVALUATION ADULT - GENERAL OBSERVATIONS, REHAB EVAL
Patient cleared by ADELSO Coker to participate in physical therapy eval. Received semi supine in bed, +[place], +IV, no acute distress. Tolerated session fairly. Able to stand with max A x 2 and side shuffle to the L with a RW x 2.

## 2018-11-27 NOTE — PHYSICAL THERAPY INITIAL EVALUATION ADULT - GAIT DISTANCE, PT EVAL
2 side steps shuffling to L x 2 2 side steps shuffling to L x 2, poor terminal hip extn despite manual cues

## 2018-11-27 NOTE — PHYSICAL THERAPY INITIAL EVALUATION ADULT - CRITERIA FOR SKILLED THERAPEUTIC INTERVENTIONS
predicted duration of therapy intervention/anticipated discharge recommendation/risk reduction/prevention/rehab potential/impairments found/therapy frequency/functional limitations in following categories

## 2018-11-27 NOTE — H&P ADULT - PROBLEM SELECTOR PLAN 6
DMII, glucose wnl on BMP. No DMII medications documented on allscripts.   - ISS while inpatient.   - likely to have high FS in the setting of steroids as above  - f/u HbA1c in AM

## 2018-11-27 NOTE — PHYSICAL THERAPY INITIAL EVALUATION ADULT - PERTINENT HX OF CURRENT PROBLEM, REHAB EVAL
Patient is an 84 yo man, poor historian, with a PMH DM II, HLD, HTN, mitral regurgitation, cervical stenosis, prostate ca (s/p brachytherapy), CVA w R sided weakness (on coumadin-unclear if hx afib), carotid stenosis, CAD admitted for fall with new onset LLE weakness.

## 2018-11-27 NOTE — CONSULT NOTE ADULT - PROBLEM SELECTOR RECOMMENDATION 5
Pt has been on AC in the past, unclear if it was stopped in light of recent falls.  Pt on heparin gtt.  The possibility of surgery is still being decided

## 2018-11-27 NOTE — H&P ADULT - ASSESSMENT
84 yo man, poor historian, with a PMH DM II, HLD, HTN, mitral regurgitation, cervical stenosis, prostate ca (s/p brachytherapy), CVA w R sided weakness (on coumadin-unclear if hx afib), carotid stenosis, CAD admitted for fall with new onset LLE weakness, found to have 4/5 LLE strength on exam and 84 yo man, poor historian, with a PMH DM II, HLD, HTN, mitral regurgitation, cervical stenosis, prostate ca (s/p brachytherapy), CVA w R sided weakness (on coumadin-unclear if hx afib), carotid stenosis, CAD admitted for fall with new onset LLE weakness, found to have 4/5 LLE strength on exam and MRI C-spine read as "Possible cord edema versus myelomalacia between C3/4." Per neurosurgery, patient and family deferring surgery at this time.

## 2018-11-27 NOTE — H&P ADULT - PROBLEM SELECTOR PLAN 2
Incidentally found to have a mild RLE non-purulent cellulitis. No systemic signs/symptoms. Given ancef in ED.  - c/w keflex 500 mg 4 times/day   - RLE doppler negative for DVT in ED    #Elevated troponin- Trop elevated in ED 0.05 -> 0.06. CKMB negative. EKG unchanged from previous with nonspecific wave abnormality. Patient denies CP.  - f/u repeat trop in AM. Trend to peak.

## 2018-11-27 NOTE — CONSULT NOTE ADULT - PROBLEM SELECTOR RECOMMENDATION 6
Support provided to patient and family. Patient to have access to supportive services during rest of hospital stay as the patient/family deemed necessary ie.  Massage therapy, Music therapy, Patient and family supportive services, Palliative SW, etc.

## 2018-11-27 NOTE — CONSULT NOTE ADULT - ASSESSMENT
82 yo man, poor historian, with a PMH DM II, HLD, HTN, mitral regurgitation, cervical stenosis, prostate ca (s/p brachytherapy), CVA w R sided weakness (on coumadin-unclear if hx afib), carotid stenosis, CAD admitted for fall with new onset LLE weakness, found to have 4/5 LLE strength on exam and MRI C-spine read as "Possible cord edema versus myelomalacia between C3/4." Per neurosurgery, patient and family deferring surgery at this time.

## 2018-11-28 ENCOUNTER — OTHER (OUTPATIENT)
Age: 83
End: 2018-11-28

## 2018-11-28 DIAGNOSIS — D72.828 OTHER ELEVATED WHITE BLOOD CELL COUNT: ICD-10-CM

## 2018-11-28 DIAGNOSIS — I48.91 UNSPECIFIED ATRIAL FIBRILLATION: ICD-10-CM

## 2018-11-28 DIAGNOSIS — R29.898 OTHER SYMPTOMS AND SIGNS INVOLVING THE MUSCULOSKELETAL SYSTEM: ICD-10-CM

## 2018-11-28 DIAGNOSIS — F03.90 UNSPECIFIED DEMENTIA WITHOUT BEHAVIORAL DISTURBANCE: ICD-10-CM

## 2018-11-28 DIAGNOSIS — I10 ESSENTIAL (PRIMARY) HYPERTENSION: ICD-10-CM

## 2018-11-28 DIAGNOSIS — Z86.73 PERSONAL HISTORY OF TRANSIENT ISCHEMIC ATTACK (TIA), AND CEREBRAL INFARCTION WITHOUT RESIDUAL DEFICITS: ICD-10-CM

## 2018-11-28 DIAGNOSIS — E11.9 TYPE 2 DIABETES MELLITUS WITHOUT COMPLICATIONS: ICD-10-CM

## 2018-11-28 LAB
ANION GAP SERPL CALC-SCNC: 8 MMOL/L — SIGNIFICANT CHANGE UP (ref 5–17)
APTT BLD: 107.8 SEC — HIGH (ref 27.5–36.3)
APTT BLD: 121 SEC — CRITICAL HIGH (ref 27.5–36.3)
APTT BLD: 67.2 SEC — HIGH (ref 27.5–36.3)
APTT BLD: 72.4 SEC — HIGH (ref 27.5–36.3)
BUN SERPL-MCNC: 26 MG/DL — HIGH (ref 7–23)
CALCIUM SERPL-MCNC: 8.8 MG/DL — SIGNIFICANT CHANGE UP (ref 8.4–10.5)
CHLORIDE SERPL-SCNC: 104 MMOL/L — SIGNIFICANT CHANGE UP (ref 96–108)
CO2 SERPL-SCNC: 26 MMOL/L — SIGNIFICANT CHANGE UP (ref 22–31)
CREAT SERPL-MCNC: 1.05 MG/DL — SIGNIFICANT CHANGE UP (ref 0.5–1.3)
GLUCOSE BLDC GLUCOMTR-MCNC: 133 MG/DL — HIGH (ref 70–99)
GLUCOSE BLDC GLUCOMTR-MCNC: 134 MG/DL — HIGH (ref 70–99)
GLUCOSE BLDC GLUCOMTR-MCNC: 180 MG/DL — HIGH (ref 70–99)
GLUCOSE BLDC GLUCOMTR-MCNC: 92 MG/DL — SIGNIFICANT CHANGE UP (ref 70–99)
GLUCOSE SERPL-MCNC: 164 MG/DL — HIGH (ref 70–99)
HCT VFR BLD CALC: 31.4 % — LOW (ref 39–50)
HGB BLD-MCNC: 10.6 G/DL — LOW (ref 13–17)
INR BLD: 1.6 — HIGH (ref 0.88–1.16)
MAGNESIUM SERPL-MCNC: 2 MG/DL — SIGNIFICANT CHANGE UP (ref 1.6–2.6)
MCHC RBC-ENTMCNC: 30.8 PG — SIGNIFICANT CHANGE UP (ref 27–34)
MCHC RBC-ENTMCNC: 33.8 G/DL — SIGNIFICANT CHANGE UP (ref 32–36)
MCV RBC AUTO: 91.3 FL — SIGNIFICANT CHANGE UP (ref 80–100)
PLATELET # BLD AUTO: 146 K/UL — LOW (ref 150–400)
POTASSIUM SERPL-MCNC: 4.1 MMOL/L — SIGNIFICANT CHANGE UP (ref 3.5–5.3)
POTASSIUM SERPL-SCNC: 4.1 MMOL/L — SIGNIFICANT CHANGE UP (ref 3.5–5.3)
PROTHROM AB SERPL-ACNC: 18.4 SEC — HIGH (ref 10–12.9)
RBC # BLD: 3.44 M/UL — LOW (ref 4.2–5.8)
RBC # FLD: 13.8 % — SIGNIFICANT CHANGE UP (ref 10.3–16.9)
SODIUM SERPL-SCNC: 138 MMOL/L — SIGNIFICANT CHANGE UP (ref 135–145)
WBC # BLD: 16.2 K/UL — HIGH (ref 3.8–10.5)
WBC # FLD AUTO: 16.2 K/UL — HIGH (ref 3.8–10.5)

## 2018-11-28 PROCEDURE — 99233 SBSQ HOSP IP/OBS HIGH 50: CPT

## 2018-11-28 PROCEDURE — 72125 CT NECK SPINE W/O DYE: CPT | Mod: 26

## 2018-11-28 PROCEDURE — 99223 1ST HOSP IP/OBS HIGH 75: CPT

## 2018-11-28 RX ORDER — CEPHALEXIN 500 MG
500 CAPSULE ORAL EVERY 6 HOURS
Qty: 0 | Refills: 0 | Status: DISCONTINUED | OUTPATIENT
Start: 2018-11-28 | End: 2018-12-04

## 2018-11-28 RX ORDER — HEPARIN SODIUM 5000 [USP'U]/ML
1200 INJECTION INTRAVENOUS; SUBCUTANEOUS
Qty: 25000 | Refills: 0 | Status: DISCONTINUED | OUTPATIENT
Start: 2018-11-28 | End: 2018-11-29

## 2018-11-28 RX ADMIN — Medication 500 MILLIGRAM(S): at 17:52

## 2018-11-28 RX ADMIN — CARVEDILOL PHOSPHATE 3.12 MILLIGRAM(S): 80 CAPSULE, EXTENDED RELEASE ORAL at 06:00

## 2018-11-28 RX ADMIN — ATORVASTATIN CALCIUM 40 MILLIGRAM(S): 80 TABLET, FILM COATED ORAL at 23:34

## 2018-11-28 RX ADMIN — Medication 1 TABLET(S): at 11:32

## 2018-11-28 RX ADMIN — HEPARIN SODIUM 13.5 UNIT(S)/HR: 5000 INJECTION INTRAVENOUS; SUBCUTANEOUS at 18:00

## 2018-11-28 RX ADMIN — Medication 500 MILLIGRAM(S): at 11:32

## 2018-11-28 RX ADMIN — Medication 325 MILLIGRAM(S): at 11:32

## 2018-11-28 RX ADMIN — Medication 500 MILLIGRAM(S): at 23:34

## 2018-11-28 RX ADMIN — CARVEDILOL PHOSPHATE 3.12 MILLIGRAM(S): 80 CAPSULE, EXTENDED RELEASE ORAL at 17:52

## 2018-11-28 RX ADMIN — Medication 5 MILLIGRAM(S): at 06:00

## 2018-11-28 RX ADMIN — Medication 2: at 11:32

## 2018-11-28 NOTE — PROGRESS NOTE ADULT - PROBLEM SELECTOR PLAN 1
New onset LLE weakness in the setting of spinal cord edema  Pt with increasing frequency of falls at home  Pt and family deliberating whether to do surgery or not.

## 2018-11-28 NOTE — PROGRESS NOTE ADULT - ASSESSMENT
84 yo man, poor historian, with a PMH DM II, HLD, HTN, mitral regurgitation, cervical stenosis, prostate ca (s/p brachytherapy), CVA w R sided weakness (on coumadin), carotid stenosis, CAD admitted for fall with new onset LLE weakness, found to have 4/5 LLE strength on exam and MRI C-spine with spinal canal narrowing at C3-4 and C5-6 with possible cord edema, awaiting discussion between NSGY and family regarding surgery.

## 2018-11-28 NOTE — PROGRESS NOTE ADULT - SUBJECTIVE AND OBJECTIVE BOX
Patient is a 83y old  Male who presents with a chief complaint of LE weakness s/p fall (2018 11:54)      INTERVAL HPI/OVERNIGHT EVENTS:    Pt. seen and examined at 10:45AM  Pt. has no complaints; denies F/C, CP, SOB, back/neck pain  Ate breakfast    Review of Systems: 12 point review of systems otherwise negative    MEDICATIONS  (STANDING):  atorvastatin 40 milliGRAM(s) Oral at bedtime  carvedilol 3.125 milliGRAM(s) Oral every 12 hours  cephalexin 500 milliGRAM(s) Oral every 6 hours  dextrose 5%. 1000 milliLiter(s) (50 mL/Hr) IV Continuous <Continuous>  dextrose 50% Injectable 12.5 Gram(s) IV Push once  enalapril 5 milliGRAM(s) Oral daily  ferrous    sulfate 325 milliGRAM(s) Oral daily  heparin  Infusion 1400 Unit(s)/Hr (14 mL/Hr) IV Continuous <Continuous>  insulin lispro (HumaLOG) corrective regimen sliding scale   SubCutaneous Before meals and at bedtime  multivitamin 1 Tablet(s) Oral daily  sodium chloride 0.9%. 1000 milliLiter(s) (60 mL/Hr) IV Continuous <Continuous>    MEDICATIONS  (PRN):  dextrose 40% Gel 15 Gram(s) Oral once PRN Blood Glucose LESS THAN 70 milliGRAM(s)/deciliter  glucagon  Injectable 1 milliGRAM(s) IntraMuscular once PRN Glucose LESS THAN 70 milligrams/deciliter      Allergies    No Known Allergies    Intolerances          Vital Signs Last 24 Hrs  T(C): 36.7 (2018 12:47), Max: 36.9 (2018 20:42)  T(F): 98 (2018 12:47), Max: 98.5 (2018 20:42)  HR: 80 (2018 12:47) (47 - 80)  BP: 154/80 (2018 12:47) (153/73 - 185/94)  BP(mean): --  RR: 17 (2018 12:47) (16 - 18)  SpO2: 98% (2018 12:47) (95% - 98%)  CAPILLARY BLOOD GLUCOSE      POCT Blood Glucose.: 180 mg/dL (2018 10:56)  POCT Blood Glucose.: 133 mg/dL (2018 07:35)  POCT Blood Glucose.: 214 mg/dL (2018 21:26)  POCT Blood Glucose.: 219 mg/dL (2018 16:17)       @ : @ 07:00  --------------------------------------------------------  IN: 448 mL / OUT: 675 mL / NET: -227 mL     @ 07: @ 15:33  --------------------------------------------------------  IN: 564 mL / OUT: 0 mL / NET: 564 mL        Physical Exam:  (at 10:45AM)  Daily     Daily   General: comfortable-appearing in NAD  HEENT: MMM  CV:  RRR, no JVD  Lungs:  CTA B/L  Abdomen:  soft NT ND  Extremities:  R foot w/ less edema and erythema, non-tender, no pus  Skin:  WWP  :  +[place] cath  Neuro:  AAOx1-2, extremely forgetful; chronic R-sided weakness -- see Neuro note    LABS:                        10.6   16.2  )-----------( 146      ( 2018 07:05 )             31.4         138  |  104  |  26<H>  ----------------------------<  164<H>  4.1   |  26  |  1.05    Ca    8.8      2018 07:05  Mg     2.0         TPro  8.5<H>  /  Alb  3.8  /  TBili  0.9  /  DBili  x   /  AST  27  /  ALT  14  /  AlkPhos  64      PT/INR - ( 2018 07:05 )   PT: 18.4 sec;   INR: 1.60          PTT - ( 2018 12:23 )  PTT:67.2 sec  Urinalysis Basic - ( 2018 18:48 )    Color: Yellow / Appearance: Clear / S.020 / pH: x  Gluc: x / Ketone: NEGATIVE  / Bili: Negative / Urobili: 2.0 E.U./dL   Blood: x / Protein: NEGATIVE mg/dL / Nitrite: NEGATIVE   Leuk Esterase: NEGATIVE / RBC: > 10 /HPF / WBC < 5 /HPF   Sq Epi: x / Non Sq Epi: 0-5 /HPF / Bacteria: Present /HPF          RADIOLOGY & ADDITIONAL TESTS:    ---------------------------------------------------------------------------  I personally reviewed: [  ]EKG   [  ]CXR    [  ] CT    [  ]Other  ---------------------------------------------------------------------------  PLEASE CHECK WHEN PRESENT:     [  ]Heart Failure     [  ] Acute     [  ] Acute on Chronic     [  ] Chronic  -------------------------------------------------------------------     [  ]Diastolic [HFpEF]     [  ]Systolic [HFrEF]     [  ]Combined [HFpEF & HFrEF]     [  ]Other:  -------------------------------------------------------------------  [  ]AGUSTÍN     [  ]ATN     [  ]Reneal Medullary Necrosis     [  ]Renal Cortical Necrosis     [  ]Other Pathological Lesions:    [  ]CKD 1  [  ]CKD 2  [  ]CKD 3  [  ]CKD 4  [  ]CKD 5  [  ]Other  -------------------------------------------------------------------  [  ]Other/Unspecified:    --------------------------------------------------------------------    Abdominal Nutritional Status  [  ]Malnutrition: See Nutrition Note  [  ]Cachexia  [  ]Other:   [  ]Supplement Ordered:  [  ]Morbid Obesity (BMI >=40]

## 2018-11-28 NOTE — PROGRESS NOTE ADULT - SUBJECTIVE AND OBJECTIVE BOX
Subjective: Seen/evaluated at bedside with Dr. Phan.  Discussions with surgery had with wife, who was present at bedside.      T(C): 36.7 (11-28-18 @ 12:47), Max: 36.9 (11-27-18 @ 20:42)  HR: 80 (11-28-18 @ 12:47) (47 - 80)  BP: 154/80 (11-28-18 @ 12:47) (153/73 - 185/94)  RR: 17 (11-28-18 @ 12:47) (16 - 18)  SpO2: 98% (11-28-18 @ 12:47) (95% - 98%)  Wt(kg): --    Exam:   Awake and alert, oriented x1-2 (name and hospital, however did not know name of hospital), NAD, coherent speech, following commands appropriately  PERRL, EOMI, face symmetric  MAEx4 with increased tone, UE 5/5 b/l, LLE 5/5, RLE 1/5 (baseline paresis)  SILT throughout  Normal reflexes throughout, no hyperreflexic signs  Gait not assessed    CBC Full  -  ( 28 Nov 2018 07:05 )  WBC Count : 16.2 K/uL  Hemoglobin : 10.6 g/dL  Hematocrit : 31.4 %  Platelet Count - Automated : 146 K/uL  Mean Cell Volume : 91.3 fL  Mean Cell Hemoglobin : 30.8 pg  Mean Cell Hemoglobin Concentration : 33.8 g/dL  Auto Neutrophil # : x  Auto Lymphocyte # : x  Auto Monocyte # : x  Auto Eosinophil # : x  Auto Basophil # : x  Auto Neutrophil % : x  Auto Lymphocyte % : x  Auto Monocyte % : x  Auto Eosinophil % : x  Auto Basophil % : x    11-28    138  |  104  |  26<H>  ----------------------------<  164<H>  4.1   |  26  |  1.05    Ca    8.8      28 Nov 2018 07:05  Mg     2.0     11-28    TPro  8.5<H>  /  Alb  3.8  /  TBili  0.9  /  DBili  x   /  AST  27  /  ALT  14  /  AlkPhos  64  11-26    PT/INR - ( 28 Nov 2018 07:05 )   PT: 18.4 sec;   INR: 1.60          PTT - ( 28 Nov 2018 12:23 )  PTT:67.2 sec    Assessment/Plan:  -Discussion had between Dr. Phan and wife, who was present at bedside.  Agreeing to pursue surgery at this time.  Risks/benefits of surgery explained to patient and wife.  Wife verbalizes understanding.  Dr Phan also to reach out to patient's daughter, who is a physician and discuss risks/benefits of surgery.  Explained to wife/patient that surgery may not improve patient's motor strength and gait instability, however decompression of neural elements will prevent worsening of patient's symptoms.  -Given patient's medical comorbidities and now RLE cellulitis (currently being treated with Keflex), surgery will be tentatively scheduled for Tuesday of next week.  -ENT to consult on patient for assistance with approach for surgery.  Surgery will be anterior cervical discectomy and fusion at C3-4, possibly corpectomy of C4.  -Will order non-contrast cervical spine CT for surgical planning  -Patient will require medical clearance documentation at some point in anticipation for surgery  -Continue Heparin gtt.  Trend and monitor INR while off coumadin.  INR goal<1.4 for surgery  -Will discuss case and plan with primary team.

## 2018-11-28 NOTE — PROGRESS NOTE ADULT - SUBJECTIVE AND OBJECTIVE BOX
Incomplete    NEUROLOGY CONSULT PROGRESS NOTE    INTERVAL HISTORY:      REVIEW OF SYSTEMS:  As per HPI, otherwise negative for Constitutional, Eyes, Ears/Nose/Mouth/Throat, Neck, Cardiovascular, Respiratory, Gastrointestinal, Genitourinary, Skin, Endocrine, Musculoskeletal, Psychiatric, and Hematologic/Lymphatic.    MEDICATIONS:  atorvastatin 40 milliGRAM(s) Oral at bedtime  carvedilol 3.125 milliGRAM(s) Oral every 12 hours  dextrose 40% Gel 15 Gram(s) Oral once PRN  dextrose 5%. 1000 milliLiter(s) IV Continuous <Continuous>  dextrose 50% Injectable 12.5 Gram(s) IV Push once  enalapril 5 milliGRAM(s) Oral daily  ferrous    sulfate 325 milliGRAM(s) Oral daily  glucagon  Injectable 1 milliGRAM(s) IntraMuscular once PRN  heparin  Infusion 1400 Unit(s)/Hr IV Continuous <Continuous>  insulin lispro (HumaLOG) corrective regimen sliding scale   SubCutaneous Before meals and at bedtime  multivitamin 1 Tablet(s) Oral daily  sodium chloride 0.9%. 1000 milliLiter(s) IV Continuous <Continuous>    VITAL SIGNS:  Vital Signs Last 24 Hrs  T(C): 36.3 (2018 06:07), Max: 37.1 (2018 09:13)  T(F): 97.4 (2018 06:07), Max: 98.7 (2018 09:13)  HR: 47 (2018 06:07) (47 - 69)  BP: 177/78 (2018 06:07) (137/67 - 185/94)  BP(mean): --  RR: 18 (2018 06:07) (16 - 18)  SpO2: 96% (2018 06:07) (95% - 98%)    PHYSICAL EXAMINATION:  Constitutional: WDWN; NAD  Eyes: anicteric sclera  Cardiovascular: +S1/S2, RRR; no M/R/G  Neurologic:  - Mental Status:  AAOx3; speech is fluent with intact naming, repetition, and comprehension  - Cranial Nerves II-XII:    II:  PERRLA; visual fields are full to confrontation  III, IV, VI:  EOMI, no nystagmus  V:  facial sensation is intact in the V1-V3 distribution bilaterally.  VII:  face is symmetric with normal eye closure and smile  VIII:  hearing is intact to finger rub  IX, X:  uvula is midline and soft palate rises symmetrically  XI:  head turning and shoulder shrug are intact bilaterally  XII:  tongue protrudes in the midline  - Motor:  strength is 5/5 throughout; normal muscle bulk and tone throughout; no pronator drift  - Reflexes:  2+ and symmetric at the biceps, triceps, brachioradialis, knees, and ankles;  plantar reflexes downgoing bilaterally  - Sensory:  intact to light touch, pin prick, vibration, and joint-position sense throughout  - Coordination:  finger-nose-finger and heel-knee-shin intact without dysmetria; rapid alternating hand movements intact  - Gait:  normal steps, base, arm swing, and turning; heel and toe walking are normal; tandem gait is normal; Romberg testing is negative    LABS:                          10.5   7.7   )-----------( 144      ( 2018 07:04 )             32.1         142  |  102  |  24<H>  ----------------------------<  104<H>  3.8   |  27  |  1.06    Ca    9.2      2018 07:04  Mg     1.8         TPro  8.5<H>  /  Alb  3.8  /  TBili  0.9  /  DBili  x   /  AST  27  /  ALT  14  /  AlkPhos  64      PT/INR - ( 2018 07:05 )   PT: 18.4 sec;   INR: 1.60          PTT - ( 2018 07:05 )  PTT:72.4 sec  Urinalysis Basic - ( 2018 18:48 )    Color: Yellow / Appearance: Clear / S.020 / pH: x  Gluc: x / Ketone: NEGATIVE  / Bili: Negative / Urobili: 2.0 E.U./dL   Blood: x / Protein: NEGATIVE mg/dL / Nitrite: NEGATIVE   Leuk Esterase: NEGATIVE / RBC: > 10 /HPF / WBC < 5 /HPF   Sq Epi: x / Non Sq Epi: 0-5 /HPF / Bacteria: Present /HPF        RADIOLOGY & ADDITIONAL STUDIES:      IMPRESSION & RECOMMENDATIONS:

## 2018-11-28 NOTE — PROGRESS NOTE ADULT - SUBJECTIVE AND OBJECTIVE BOX
OVERNIGHT EVENTS: No acute events overnight.     SUBJECTIVE / INTERVAL HPI: Patient seen and examined at bedside. He has no complaints, denying CP, cough, SOB, n/v/d, abd pain, changes in weakness, worsening of R foot tenderness, leg swelling.     VITAL SIGNS:  Vital Signs Last 24 Hrs  T(C): 36.7 (2018 08:30), Max: 36.9 (2018 12:46)  T(F): 98 (2018 08:30), Max: 98.5 (2018 12:46)  HR: 65 (2018 08:30) (47 - 68)  BP: 157/70 (2018 08:30) (137/67 - 185/94)  BP(mean): --  RR: 18 (2018 08:30) (16 - 18)  SpO2: 97% (2018 08:30) (95% - 97%)    PHYSICAL EXAM:  General: WDWN  HEENT: NC/AT; PERRL, anicteric sclera; MMM  Cardiovascular: +S1/S2, RRR, no m/r/g  Respiratory: CTA B/L; no W/R/R  Gastrointestinal: soft, NT/ND  Extremities: WWP; no edema, clubbing or cyanosis. +swelling, tenderness, warmth with possible mild erythema on dorsal R lateral foot  Vascular: 2+ radial, DP/PT pulses B/L  Neurological: AAOx1-2 (doesnt know year, says we are in [facility]). 4+/5 strength in R upper extremity, 4/5 strength in R lower extremity, most pronounced proximally on thigh flexion. Unable to lift arms above 30 degrees. Sensation to light touch intact throughout. CN II-XII grossly intact.     MEDICATIONS:  MEDICATIONS  (STANDING):  atorvastatin 40 milliGRAM(s) Oral at bedtime  carvedilol 3.125 milliGRAM(s) Oral every 12 hours  cephalexin 500 milliGRAM(s) Oral every 6 hours  dextrose 5%. 1000 milliLiter(s) (50 mL/Hr) IV Continuous <Continuous>  dextrose 50% Injectable 12.5 Gram(s) IV Push once  enalapril 5 milliGRAM(s) Oral daily  ferrous    sulfate 325 milliGRAM(s) Oral daily  heparin  Infusion 1400 Unit(s)/Hr (14 mL/Hr) IV Continuous <Continuous>  insulin lispro (HumaLOG) corrective regimen sliding scale   SubCutaneous Before meals and at bedtime  multivitamin 1 Tablet(s) Oral daily  sodium chloride 0.9%. 1000 milliLiter(s) (60 mL/Hr) IV Continuous <Continuous>    MEDICATIONS  (PRN):  dextrose 40% Gel 15 Gram(s) Oral once PRN Blood Glucose LESS THAN 70 milliGRAM(s)/deciliter  glucagon  Injectable 1 milliGRAM(s) IntraMuscular once PRN Glucose LESS THAN 70 milligrams/deciliter    ALLERGIES:  Allergies  No Known Allergies    LABS:                        10.6   16.2  )-----------( 146      ( 2018 07:05 )             31.4         138  |  104  |  26<H>  ----------------------------<  164<H>  4.1   |  26  |  1.05    Ca    8.8      2018 07:05  Mg     2.0         TPro  8.5<H>  /  Alb  3.8  /  TBili  0.9  /  DBili  x   /  AST  27  /  ALT  14  /  AlkPhos  64      PT/INR - ( 2018 07:05 )   PT: 18.4 sec;   INR: 1.60     PTT - ( 2018 07:05 )  PTT:72.4 sec    Urinalysis Basic - ( 2018 18:48 )  Color: Yellow / Appearance: Clear / S.020 / pH: x  Gluc: x / Ketone: NEGATIVE  / Bili: Negative / Urobili: 2.0 E.U./dL   Blood: x / Protein: NEGATIVE mg/dL / Nitrite: NEGATIVE   Leuk Esterase: NEGATIVE / RBC: > 10 /HPF / WBC < 5 /HPF   Sq Epi: x / Non Sq Epi: 0-5 /HPF / Bacteria: Present /HPF    CAPILLARY BLOOD GLUCOSE  POCT Blood Glucose.: 180 mg/dL (2018 10:56)    RADIOLOGY & ADDITIONAL TESTS: Reviewed.

## 2018-11-28 NOTE — PROGRESS NOTE ADULT - PROBLEM SELECTOR PLAN 3
c/b R-sided weakness; cont. statin; holding ASA, Coumadin in case of possible surgical intervention; on heparin gtt in meantime per Neuro/Stroke recs

## 2018-11-28 NOTE — PROGRESS NOTE ADULT - PROBLEM SELECTOR PLAN 1
Patient presenting with reported LLE new weakness worsening over the past month. RLE chronic weakness due to history of stroke. MRI C-spine with spinal canal narrowing at C3-4 and C5-6 with possible cord edema  - neurosurgery consulted, recommend surgery. Family initially deferred surgery when speaking with neurosurgery in ED, now reconsidering.  - s/p 1 dose of solumedrol on admission in case of acute cord compression  - per NSGY no need for continued steroids  - per NGSY, patient tentatively on schedule for surgery on Friday in case family would like to pursue surgery. Attending plans to discuss with family today.  - palliative consulted given age and function, further discussion pending decision regarding surgery

## 2018-11-28 NOTE — PROGRESS NOTE ADULT - PROBLEM SELECTOR PLAN 1
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

## 2018-11-28 NOTE — CONSULT NOTE ADULT - SUBJECTIVE AND OBJECTIVE BOX
ENT Consult Note    83M who initially presented for LE weakness, with imaging showing severe spinal stenosis at C3/C4. Plans for ACDF next week. ENT called to eval vocal cord function prior to OR. Patient denies dyspahgia or hoarseness. no problems with voice or breathing at this time. Tolerating regular diet.     PE:  NAD. comfortable   no increased WOB. no stridor    LARYNGOSCOPY EXAM:     Flexible laryngoscopy was performed and revealed the following:    -- Nasopharynx had no mass or exudate.    -- Base of tongue was symmetric and not enlarged.    -- Vallecula was clear    -- Epiglottis, both aryepiglottic folds and both false vocal folds were normal    -- Arytenoids both without edema and erythema     -- True vocal folds were fully mobile and without lesions.     -- Post cricoid area was clear.    -- Interarytenoid edema was absent    The patient tolerated the procedure well.    A/P: 83M pre op for ACDF. normal upper airway and vocal fold motion   - can proceed with OR as planned.   - page with questions.

## 2018-11-28 NOTE — PROGRESS NOTE ADULT - PROBLEM SELECTOR PLAN 3
Patient presents s/p fall, CT head negative for acute intracranial pathology. Reportedly with frequent falls over the past month. Likely 2/2 LLE weakness.  - neurosurg discussion as above, possible surgery pending family discussion   - physical therapy brittni

## 2018-11-29 DIAGNOSIS — Z01.810 ENCOUNTER FOR PREPROCEDURAL CARDIOVASCULAR EXAMINATION: ICD-10-CM

## 2018-11-29 LAB
ANION GAP SERPL CALC-SCNC: 7 MMOL/L — SIGNIFICANT CHANGE UP (ref 5–17)
APTT BLD: 102.2 SEC — HIGH (ref 27.5–36.3)
APTT BLD: 53.9 SEC — HIGH (ref 27.5–36.3)
APTT BLD: 63.2 SEC — HIGH (ref 27.5–36.3)
APTT BLD: 70.7 SEC — HIGH (ref 27.5–36.3)
BASOPHILS NFR BLD AUTO: 0.1 % — SIGNIFICANT CHANGE UP (ref 0–2)
BUN SERPL-MCNC: 24 MG/DL — HIGH (ref 7–23)
CALCIUM SERPL-MCNC: 8.6 MG/DL — SIGNIFICANT CHANGE UP (ref 8.4–10.5)
CHLORIDE SERPL-SCNC: 104 MMOL/L — SIGNIFICANT CHANGE UP (ref 96–108)
CO2 SERPL-SCNC: 27 MMOL/L — SIGNIFICANT CHANGE UP (ref 22–31)
CREAT SERPL-MCNC: 0.95 MG/DL — SIGNIFICANT CHANGE UP (ref 0.5–1.3)
EOSINOPHIL NFR BLD AUTO: 0.2 % — SIGNIFICANT CHANGE UP (ref 0–6)
GLUCOSE BLDC GLUCOMTR-MCNC: 113 MG/DL — HIGH (ref 70–99)
GLUCOSE BLDC GLUCOMTR-MCNC: 146 MG/DL — HIGH (ref 70–99)
GLUCOSE BLDC GLUCOMTR-MCNC: 148 MG/DL — HIGH (ref 70–99)
GLUCOSE BLDC GLUCOMTR-MCNC: 164 MG/DL — HIGH (ref 70–99)
GLUCOSE SERPL-MCNC: 109 MG/DL — HIGH (ref 70–99)
HCT VFR BLD CALC: 30.4 % — LOW (ref 39–50)
HGB BLD-MCNC: 10 G/DL — LOW (ref 13–17)
INR BLD: 1.49 — HIGH (ref 0.88–1.16)
LYMPHOCYTES # BLD AUTO: 18.1 % — SIGNIFICANT CHANGE UP (ref 13–44)
MCHC RBC-ENTMCNC: 30 PG — SIGNIFICANT CHANGE UP (ref 27–34)
MCHC RBC-ENTMCNC: 32.9 G/DL — SIGNIFICANT CHANGE UP (ref 32–36)
MCV RBC AUTO: 91.3 FL — SIGNIFICANT CHANGE UP (ref 80–100)
MONOCYTES NFR BLD AUTO: 5.6 % — SIGNIFICANT CHANGE UP (ref 2–14)
NEUTROPHILS NFR BLD AUTO: 76 % — SIGNIFICANT CHANGE UP (ref 43–77)
PLATELET # BLD AUTO: 157 K/UL — SIGNIFICANT CHANGE UP (ref 150–400)
POTASSIUM SERPL-MCNC: 4.1 MMOL/L — SIGNIFICANT CHANGE UP (ref 3.5–5.3)
POTASSIUM SERPL-SCNC: 4.1 MMOL/L — SIGNIFICANT CHANGE UP (ref 3.5–5.3)
PROTHROM AB SERPL-ACNC: 17 SEC — HIGH (ref 10–12.9)
RBC # BLD: 3.33 M/UL — LOW (ref 4.2–5.8)
RBC # FLD: 14.2 % — SIGNIFICANT CHANGE UP (ref 10.3–16.9)
SODIUM SERPL-SCNC: 138 MMOL/L — SIGNIFICANT CHANGE UP (ref 135–145)
WBC # BLD: 11.9 K/UL — HIGH (ref 3.8–10.5)
WBC # FLD AUTO: 11.9 K/UL — HIGH (ref 3.8–10.5)

## 2018-11-29 PROCEDURE — 93306 TTE W/DOPPLER COMPLETE: CPT | Mod: 26

## 2018-11-29 PROCEDURE — 99222 1ST HOSP IP/OBS MODERATE 55: CPT

## 2018-11-29 PROCEDURE — 99232 SBSQ HOSP IP/OBS MODERATE 35: CPT

## 2018-11-29 RX ORDER — HEPARIN SODIUM 5000 [USP'U]/ML
1100 INJECTION INTRAVENOUS; SUBCUTANEOUS
Qty: 25000 | Refills: 0 | Status: DISCONTINUED | OUTPATIENT
Start: 2018-11-29 | End: 2018-12-04

## 2018-11-29 RX ADMIN — CARVEDILOL PHOSPHATE 3.12 MILLIGRAM(S): 80 CAPSULE, EXTENDED RELEASE ORAL at 17:40

## 2018-11-29 RX ADMIN — HEPARIN SODIUM 11 UNIT(S)/HR: 5000 INJECTION INTRAVENOUS; SUBCUTANEOUS at 23:10

## 2018-11-29 RX ADMIN — HEPARIN SODIUM 10 UNIT(S)/HR: 5000 INJECTION INTRAVENOUS; SUBCUTANEOUS at 11:33

## 2018-11-29 RX ADMIN — Medication 500 MILLIGRAM(S): at 23:10

## 2018-11-29 RX ADMIN — CARVEDILOL PHOSPHATE 3.12 MILLIGRAM(S): 80 CAPSULE, EXTENDED RELEASE ORAL at 07:10

## 2018-11-29 RX ADMIN — Medication 5 MILLIGRAM(S): at 07:10

## 2018-11-29 RX ADMIN — Medication 1 TABLET(S): at 11:33

## 2018-11-29 RX ADMIN — Medication 325 MILLIGRAM(S): at 11:33

## 2018-11-29 RX ADMIN — Medication 2: at 17:39

## 2018-11-29 RX ADMIN — Medication 500 MILLIGRAM(S): at 11:34

## 2018-11-29 RX ADMIN — Medication 500 MILLIGRAM(S): at 17:40

## 2018-11-29 RX ADMIN — ATORVASTATIN CALCIUM 40 MILLIGRAM(S): 80 TABLET, FILM COATED ORAL at 22:32

## 2018-11-29 RX ADMIN — Medication 500 MILLIGRAM(S): at 07:10

## 2018-11-29 NOTE — CONSULT NOTE ADULT - SUBJECTIVE AND OBJECTIVE BOX
REASON FOR CONSULT:    HISTORY OF PRESENT ILLNESS:    PAST MEDICAL & SURGICAL HISTORY:  Prostate cancer  Carotid artery stenosis  Cervical stenosis of spine  Aphasia  CVA (cerebral vascular accident)  DM (diabetes mellitus)  CAD (coronary artery disease)  HLD (hyperlipidemia)  No significant past surgical history      [ ] Diabetes   [ ] Hypertension  [ ] Hyperlipidemia  [ ] CAD  [ ] PCI  [ ] CABG    PREVIOUS DIAGNOSTIC TESTING:    [ ] Echocardiogram:  [ ]  Catheterization:  [ ] Stress Test:  	    MEDICATIONS:  carvedilol 3.125 milliGRAM(s) Oral every 12 hours  enalapril 5 milliGRAM(s) Oral daily    cephalexin 500 milliGRAM(s) Oral every 6 hours          atorvastatin 40 milliGRAM(s) Oral at bedtime  dextrose 40% Gel 15 Gram(s) Oral once PRN  dextrose 50% Injectable 12.5 Gram(s) IV Push once  glucagon  Injectable 1 milliGRAM(s) IntraMuscular once PRN  insulin lispro (HumaLOG) corrective regimen sliding scale   SubCutaneous Before meals and at bedtime    dextrose 5%. 1000 milliLiter(s) IV Continuous <Continuous>  ferrous    sulfate 325 milliGRAM(s) Oral daily  heparin  Infusion 1200 Unit(s)/Hr IV Continuous <Continuous>  multivitamin 1 Tablet(s) Oral daily  sodium chloride 0.9%. 1000 milliLiter(s) IV Continuous <Continuous>      FAMILY HISTORY:  No family history of cardiovascular disease (Mother)  Family history of essential hypertension (Father)      SOCIAL HISTORY:    [ x] Non-smoker  [ ] Smoker  [ ] Alcohol    FAMILY HX: NC    Allergies    No Known Allergies    Intolerances    	    REVIEW OF SYSTEMS:    [x] as per HPI  CONSTITUTIONAL: No fever, weight loss, or fatigue  ENT:  No difficulty hearing, tinnitus, vertigo; No sinus or throat pain  RESPIRATORY: No cough, wheezing, chills or hemoptysis; No Shortness of Breath  CARDIOVASCULAR: No chest pain, palpitations, dizziness, or leg swelling  GASTROINTESTINAL: No abdominal or epigastric pain. No nausea, vomiting, or hematemesis; No diarrhea or constipation. No melena or hematochezia.  GENITOURINARY: No dysuria, frequency, hematuria, or incontinence  NEUROLOGICAL: No headaches, memory loss, loss of strength, numbness, or tremors  MUSCULOSKELETAL: No joint pain or swelling; No muscle, back, or extremity pain  [x] All others negative	  [ ] Unable to obtain    PHYSICAL EXAM:  T(C): 36.7 (11-29-18 @ 08:55), Max: 37.3 (11-29-18 @ 06:26)  HR: 60 (11-29-18 @ 08:55) (60 - 70)  BP: 140/58 (11-29-18 @ 08:55) (140/58 - 183/85)  RR: 18 (11-29-18 @ 08:55) (17 - 18)  SpO2: 96% (11-29-18 @ 08:55) (96% - 100%)  Wt(kg): --  I&O's Summary    28 Nov 2018 07:01  -  29 Nov 2018 07:00  --------------------------------------------------------  IN: 1119.5 mL / OUT: 0 mL / NET: 1119.5 mL        Appearance: Normal	  HEENT:   Normal oral mucosa, PERRL, EOMI	  Lymphatic: No lymphadenopathy  Cardiovascular: Normal S1 S2, No JVD, No murmurs, No edema  Respiratory: Lungs clear to auscultation	  Psychiatry: A & O x 3, Mood & affect appropriate  Gastrointestinal:  Soft, Non-tender, + BS	  Skin: No rashes, No ecchymoses, No cyanosis	  Neurologic: Non-focal  Extremities: Normal range of motion, No clubbing, cyanosis or edema  Vascular: Peripheral pulses palpable 2+ bilaterally    TELEMETRY: 	    ECG:  < from: 12 Lead ECG (11.27.18 @ 01:11) >  Diagnosis Line Normal sinus rhythm  Nonspecific T wave abnormality    < end of copied text >    ECHO: < from: Echocardiogram (11.29.18 @ 09:39) >  Mild to moderate concentric left ventricular hypertrophy.The left   ventricular   wall motion is normal.The left ventricular ejection fraction is   normal.The   left ventricular ejection fraction is 65%.The right ventricle is normal   in   size and function.The left atrial size is normal.The mitral inflow   pattern is   consistent with impaired left ventricular relaxation with mildly   elevated left   atrial pressure (8-14mmHg).  Right atrial size is normal.No evidence for   any   hemodynamically significant valvular disease.The pulmonary artery   systolic   pressure is estimated to be 29 mmHg.There is no pericardial effusion.Mild   aortic root dilatation.The aortic root measures 4.1 cm at sinuses   (normal less  than 4 cm for men, less than 3.6 cm for women).    < end of copied text >    STRESS:  CATH:  	  RADIOLOGY:  CXR:  CT:  US:   	  	  LABS:	 	    CARDIAC MARKERS:                                  10.0   11.9  )-----------( 157      ( 29 Nov 2018 06:48 )             30.4     11-29    138  |  104  |  24<H>  ----------------------------<  109<H>  4.1   |  27  |  0.95    Ca    8.6      29 Nov 2018 06:48  Mg     2.0     11-28      proBNP:   Lipid Profile:   HgA1c:   TSH:     ASSESSMENT/PLAN: 	    #    #CAD    #HTN    #CV Prevention  q3mo Fasting Lipid Profile, Goal LDL<100, statin as tolerated  q6week TSH  q3mo 25-OH Vitamin D Level, Goal 50, supplement as tolerated REASON FOR CONSULT:    HISTORY OF PRESENT ILLNESS:  82 yo man, poor historian, with a PMH DM II, HLD, HTN, mitral regurgitation, cervical stenosis, prostate ca (s/p brachytherapy), CVA w R sided weakness (on coumadin-unclear if hx afib), carotid stenosis, CAD admitted for fall with new onset LLE weakness. At time of admission patient's family no longer at bedside, patient is a poor historian and cannot provide much history besides events of the day. Patient states he was walking with his walker earlier today and fell backwards. No CP, SOB, light headedness, LOC, shaking, incontinence of urine, changes in vision prior to fall. Once he fell, he could not get up and family had to help him up. Per signout from ED provider, patient's family reported worsening LLE weakness over the past month. No bowel/bladder incontinence.  Also with RLE erythema, swelling, unclear how long this has been going on for. Attempted to call patient's wife and daughter X3 each with no answer, unable to obtain collateral from family.      In ED VS: Tm 99.1, HR 71-89, -182/73-80, RR 18, O2% 98 RA  RLE doppler negative for DVT, MRI done showing "Possible cord edema versus myelomalacia between C3/4." CT head with frontoparietal swelling, no acute intracranial hemorrhage or infarct. 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    Trop elevated 0.05 -> 0.06, EKG unchanged from previous with nonspecific T wave abnormalities. CK mildly elevated 403. INR 1.73, patient reportedly takes coumadin.     Neuosurg consulted in ED, recommended surgical intervention.    PAST MEDICAL & SURGICAL HISTORY:  Prostate cancer  Carotid artery stenosis  Cervical stenosis of spine  Aphasia  CVA (cerebral vascular accident)  DM (diabetes mellitus)  CAD (coronary artery disease)  HLD (hyperlipidemia)  No significant past surgical history      [ ] Diabetes   [ ] Hypertension  [ ] Hyperlipidemia  [ ] CAD  [ ] PCI  [ ] CABG    PREVIOUS DIAGNOSTIC TESTING:    [ ] Echocardiogram:  [ ]  Catheterization:  [ ] Stress Test:  	    MEDICATIONS:  carvedilol 3.125 milliGRAM(s) Oral every 12 hours  enalapril 5 milliGRAM(s) Oral daily    cephalexin 500 milliGRAM(s) Oral every 6 hours          atorvastatin 40 milliGRAM(s) Oral at bedtime  dextrose 40% Gel 15 Gram(s) Oral once PRN  dextrose 50% Injectable 12.5 Gram(s) IV Push once  glucagon  Injectable 1 milliGRAM(s) IntraMuscular once PRN  insulin lispro (HumaLOG) corrective regimen sliding scale   SubCutaneous Before meals and at bedtime    dextrose 5%. 1000 milliLiter(s) IV Continuous <Continuous>  ferrous    sulfate 325 milliGRAM(s) Oral daily  heparin  Infusion 1200 Unit(s)/Hr IV Continuous <Continuous>  multivitamin 1 Tablet(s) Oral daily  sodium chloride 0.9%. 1000 milliLiter(s) IV Continuous <Continuous>      FAMILY HISTORY:  No family history of cardiovascular disease (Mother)  Family history of essential hypertension (Father)      SOCIAL HISTORY:    [ x] Non-smoker  [ ] Smoker  [ ] Alcohol    FAMILY HX: NC    Allergies    No Known Allergies    Intolerances    	    REVIEW OF SYSTEMS:    [x] as per HPI  CONSTITUTIONAL: No fever, weight loss, or fatigue  ENT:  No difficulty hearing, tinnitus, vertigo; No sinus or throat pain  RESPIRATORY: No cough, wheezing, chills or hemoptysis; No Shortness of Breath  CARDIOVASCULAR: No chest pain, palpitations, dizziness, or leg swelling  GASTROINTESTINAL: No abdominal or epigastric pain. No nausea, vomiting, or hematemesis; No diarrhea or constipation. No melena or hematochezia.  GENITOURINARY: No dysuria, frequency, hematuria, or incontinence  NEUROLOGICAL: No headaches, memory loss, loss of strength, numbness, or tremors  MUSCULOSKELETAL: No joint pain or swelling; No muscle, back, or extremity pain  [x] All others negative	  [ ] Unable to obtain    PHYSICAL EXAM:  T(C): 36.7 (11-29-18 @ 08:55), Max: 37.3 (11-29-18 @ 06:26)  HR: 60 (11-29-18 @ 08:55) (60 - 70)  BP: 140/58 (11-29-18 @ 08:55) (140/58 - 183/85)  RR: 18 (11-29-18 @ 08:55) (17 - 18)  SpO2: 96% (11-29-18 @ 08:55) (96% - 100%)  Wt(kg): --  I&O's Summary    28 Nov 2018 07:01  -  29 Nov 2018 07:00  --------------------------------------------------------  IN: 1119.5 mL / OUT: 0 mL / NET: 1119.5 mL        Appearance: Normal	  HEENT:   Normal oral mucosa, PERRL, EOMI	  Lymphatic: No lymphadenopathy  Cardiovascular: Normal S1 S2, No JVD, No murmurs, No edema  Respiratory: Lungs clear to auscultation	  Psychiatry: A & O x 3, Mood & affect appropriate  Gastrointestinal:  Soft, Non-tender, + BS	  Skin: No rashes, No ecchymoses, No cyanosis	  Neurologic: Non-focal  Extremities: Normal range of motion, No clubbing, cyanosis or edema  Vascular: Peripheral pulses palpable 2+ bilaterally    TELEMETRY: 	    ECG:  < from: 12 Lead ECG (11.27.18 @ 01:11) >  Diagnosis Line Normal sinus rhythm  Nonspecific T wave abnormality    < end of copied text >    ECHO: < from: Echocardiogram (11.29.18 @ 09:39) >  Mild to moderate concentric left ventricular hypertrophy.The left   ventricular   wall motion is normal.The left ventricular ejection fraction is   normal.The   left ventricular ejection fraction is 65%.The right ventricle is normal   in   size and function.The left atrial size is normal.The mitral inflow   pattern is   consistent with impaired left ventricular relaxation with mildly   elevated left   atrial pressure (8-14mmHg).  Right atrial size is normal.No evidence for   any   hemodynamically significant valvular disease.The pulmonary artery   systolic   pressure is estimated to be 29 mmHg.There is no pericardial effusion.Mild   aortic root dilatation.The aortic root measures 4.1 cm at sinuses   (normal less  than 4 cm for men, less than 3.6 cm for women).    < end of copied text >    STRESS:  CATH:  	  RADIOLOGY:  CXR:  CT:  US:   	  	  LABS:	 	    CARDIAC MARKERS:                                  10.0   11.9  )-----------( 157      ( 29 Nov 2018 06:48 )             30.4     11-29    138  |  104  |  24<H>  ----------------------------<  109<H>  4.1   |  27  |  0.95    Ca    8.6      29 Nov 2018 06:48  Mg     2.0     11-28      proBNP:   Lipid Profile:   HgA1c:   TSH:     ASSESSMENT/PLAN: 	    #    #CAD    #HTN    #CV Prevention  q3mo Fasting Lipid Profile, Goal LDL<100, statin as tolerated  q6week TSH  q3mo 25-OH Vitamin D Level, Goal 50, supplement as tolerated REASON FOR CONSULT:    HISTORY OF PRESENT ILLNESS:  84 yo man, poor historian, with a PMH DM II, HLD, HTN, mitral regurgitation, cervical stenosis, prostate ca (s/p brachytherapy), CVA w R sided weakness (on coumadin-unclear if hx afib), carotid stenosis, CAD admitted for fall with new onset LLE weakness. At time of admission patient's family no longer at bedside, patient is a poor historian and cannot provide much history besides events of the day. Patient states he was walking with his walker earlier today and fell backwards. No CP, SOB, light headedness, LOC, shaking, incontinence of urine, changes in vision prior to fall. Once he fell, he could not get up and family had to help him up. Per signout from ED provider, patient's family reported worsening LLE weakness over the past month. No bowel/bladder incontinence.  Also with RLE erythema, swelling, unclear how long this has been going on for. Attempted to call patient's wife and daughter X3 each with no answer, unable to obtain collateral from family.      In ED VS: Tm 99.1, HR 71-89, -182/73-80, RR 18, O2% 98 RA  RLE doppler negative for DVT, MRI done showing "Possible cord edema versus myelomalacia between C3/4." CT head with frontoparietal swelling, no acute intracranial hemorrhage or infarct. 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    Trop elevated 0.05 -> 0.06, EKG unchanged from previous with nonspecific T wave abnormalities. CK mildly elevated 403. INR 1.73, patient reportedly takes coumadin.     Neuosurg consulted in ED, recommended surgical intervention.    PAST MEDICAL & SURGICAL HISTORY:  Prostate cancer  Carotid artery stenosis  Cervical stenosis of spine  Aphasia  CVA (cerebral vascular accident)  DM (diabetes mellitus)  CAD (coronary artery disease)  HLD (hyperlipidemia)  No significant past surgical history      [ ] Diabetes   [ ] Hypertension  [ ] Hyperlipidemia  [ ] CAD  [ ] PCI  [ ] CABG    PREVIOUS DIAGNOSTIC TESTING:    [ ] Echocardiogram:  [ ]  Catheterization:  [ ] Stress Test:  	    MEDICATIONS:  carvedilol 3.125 milliGRAM(s) Oral every 12 hours  enalapril 5 milliGRAM(s) Oral daily    cephalexin 500 milliGRAM(s) Oral every 6 hours          atorvastatin 40 milliGRAM(s) Oral at bedtime  dextrose 40% Gel 15 Gram(s) Oral once PRN  dextrose 50% Injectable 12.5 Gram(s) IV Push once  glucagon  Injectable 1 milliGRAM(s) IntraMuscular once PRN  insulin lispro (HumaLOG) corrective regimen sliding scale   SubCutaneous Before meals and at bedtime    dextrose 5%. 1000 milliLiter(s) IV Continuous <Continuous>  ferrous    sulfate 325 milliGRAM(s) Oral daily  heparin  Infusion 1200 Unit(s)/Hr IV Continuous <Continuous>  multivitamin 1 Tablet(s) Oral daily  sodium chloride 0.9%. 1000 milliLiter(s) IV Continuous <Continuous>      FAMILY HISTORY:  No family history of cardiovascular disease (Mother)  Family history of essential hypertension (Father)      SOCIAL HISTORY:    [ x] Non-smoker  [ ] Smoker  [ ] Alcohol    FAMILY HX: NC    Allergies    No Known Allergies    Intolerances    	    REVIEW OF SYSTEMS:    [x] as per HPI  CONSTITUTIONAL: No fever, weight loss, or fatigue  ENT:  No difficulty hearing, tinnitus, vertigo; No sinus or throat pain  RESPIRATORY: No cough, wheezing, chills or hemoptysis; No Shortness of Breath  CARDIOVASCULAR: No chest pain, palpitations, dizziness, or leg swelling  GASTROINTESTINAL: No abdominal or epigastric pain. No nausea, vomiting, or hematemesis; No diarrhea or constipation. No melena or hematochezia.  GENITOURINARY: No dysuria, frequency, hematuria, or incontinence  NEUROLOGICAL: No headaches, memory loss, loss of strength, numbness, or tremors  MUSCULOSKELETAL: No joint pain or swelling; No muscle, back, or extremity pain  [x] All others negative	  [ ] Unable to obtain    PHYSICAL EXAM:  T(C): 36.7 (11-29-18 @ 08:55), Max: 37.3 (11-29-18 @ 06:26)  HR: 60 (11-29-18 @ 08:55) (60 - 70)  BP: 140/58 (11-29-18 @ 08:55) (140/58 - 183/85)  RR: 18 (11-29-18 @ 08:55) (17 - 18)  SpO2: 96% (11-29-18 @ 08:55) (96% - 100%)  Wt(kg): --  I&O's Summary    28 Nov 2018 07:01  -  29 Nov 2018 07:00  --------------------------------------------------------  IN: 1119.5 mL / OUT: 0 mL / NET: 1119.5 mL        Appearance: Normal	  HEENT:   Normal oral mucosa, PERRL, EOMI	  Lymphatic: No lymphadenopathy  Cardiovascular: Normal S1 S2, No JVD, No murmurs, No edema  Respiratory: Lungs clear to auscultation	  Psychiatry: A & O x 3, Mood & affect appropriate  Gastrointestinal:  Soft, Non-tender, + BS	  Skin: No rashes, No ecchymoses, No cyanosis	  Neurologic: Non-focal  Extremities: Normal range of motion, No clubbing, cyanosis or edema  Vascular: Peripheral pulses palpable 2+ bilaterally    TELEMETRY: 	    ECG:  < from: 12 Lead ECG (11.27.18 @ 01:11) >  Diagnosis Line Normal sinus rhythm  Nonspecific T wave abnormality    < end of copied text >    ECHO: < from: Echocardiogram (11.29.18 @ 09:39) >  Mild to moderate concentric left ventricular hypertrophy.The left   ventricular   wall motion is normal.The left ventricular ejection fraction is   normal.The   left ventricular ejection fraction is 65%.The right ventricle is normal   in   size and function.The left atrial size is normal.The mitral inflow   pattern is   consistent with impaired left ventricular relaxation with mildly   elevated left   atrial pressure (8-14mmHg).  Right atrial size is normal.No evidence for   any   hemodynamically significant valvular disease.The pulmonary artery   systolic   pressure is estimated to be 29 mmHg.There is no pericardial effusion.Mild   aortic root dilatation.The aortic root measures 4.1 cm at sinuses   (normal less  than 4 cm for men, less than 3.6 cm for women).    < end of copied text >    STRESS:  CATH:  	  RADIOLOGY:  CXR:  CT:  US:   	  	  LABS:	 	    CARDIAC MARKERS:                                  10.0   11.9  )-----------( 157      ( 29 Nov 2018 06:48 )             30.4     11-29    138  |  104  |  24<H>  ----------------------------<  109<H>  4.1   |  27  |  0.95    Ca    8.6      29 Nov 2018 06:48  Mg     2.0     11-28      proBNP:   Lipid Profile:   HgA1c:   TSH:     ASSESSMENT/PLAN: 	    #Pre op CV eval - cardiac optimized  on BB - continue periop  Crum CV Score 1.35% MACE (age, asa class 3, dependent)  Echo and EKG unremarkable  no further cardiac work up necessarry  proceed to surgery    #CAD- echo with preserved EF no MR mild impaired relaxation  ekg nsr non specific st changes  on BB and statin  hold ASA  on GDMT    #HTN - at goal,  continue   carvedilol 3.125 milliGRAM(s) Oral every 12 hours  enalapril 5 milliGRAM(s) Oral daily

## 2018-11-29 NOTE — CONSULT NOTE ADULT - SUBJECTIVE AND OBJECTIVE BOX
Patient is a 83y old  Male who presents with a chief complaint of LE weakness s/p fall (28 Nov 2018 19:22)       HPI:  Patient is an 82 yo man, poor historian, with a PMH DM II, HLD, HTN, mitral regurgitation, cervical stenosis, prostate ca (s/p brachytherapy), CVA w R sided weakness (on coumadin-unclear if hx afib), carotid stenosis, CAD admitted for fall with new onset LLE weakness. At time of admission patient's family no longer at bedside, patient is a poor historian and cannot provide much history besides events of the day. Patient states he was walking with his walker earlier today and fell backwards. No CP, SOB, light headedness, LOC, shaking, incontinence of urine, changes in vision prior to fall. Once he fell, he could not get up and family had to help him up. Per signout from ED provider, patient's family reported worsening LLE weakness over the past month. No bowel/bladder incontinence.  Also with RLE erythema, swelling, unclear how long this has been going on for. Attempted to call patient's wife and daughter X3 each with no answer, unable to obtain collateral from family.      In ED VS: Tm 99.1, HR 71-89, -182/73-80, RR 18, O2% 98 RA  RLE doppler negative for DVT, MRI done showing "Possible cord edema versus myelomalacia between C3/4." CT head with frontoparietal swelling, no acute intracranial hemorrhage or infarct. 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    Trop elevated 0.05 -> 0.06, EKG unchanged from previous with nonspecific T wave abnormalities. CK mildly elevated 403. INR 1.73, patient reportedly takes coumadin.     Neuosurg consulted in ED, recommended surgical intervention. Per documentation patient and family were not agreeable to surgery at this time. Attempted to call family 3X daughter and 3X wife overnight to clarify this, unable to reach either person. (27 Nov 2018 03:21)      PAST MEDICAL & SURGICAL HISTORY:  Prostate cancer  Carotid artery stenosis  Cervical stenosis of spine  Aphasia  CVA (cerebral vascular accident)  DM (diabetes mellitus)  CAD (coronary artery disease)  HLD (hyperlipidemia)  No significant past surgical history      MEDICATIONS  (STANDING):  atorvastatin 40 milliGRAM(s) Oral at bedtime  carvedilol 3.125 milliGRAM(s) Oral every 12 hours  cephalexin 500 milliGRAM(s) Oral every 6 hours  dextrose 5%. 1000 milliLiter(s) (50 mL/Hr) IV Continuous <Continuous>  dextrose 50% Injectable 12.5 Gram(s) IV Push once  enalapril 5 milliGRAM(s) Oral daily  ferrous    sulfate 325 milliGRAM(s) Oral daily  heparin  Infusion 1200 Unit(s)/Hr (10 mL/Hr) IV Continuous <Continuous>  insulin lispro (HumaLOG) corrective regimen sliding scale   SubCutaneous Before meals and at bedtime  multivitamin 1 Tablet(s) Oral daily  sodium chloride 0.9%. 1000 milliLiter(s) (60 mL/Hr) IV Continuous <Continuous>    MEDICATIONS  (PRN):  dextrose 40% Gel 15 Gram(s) Oral once PRN Blood Glucose LESS THAN 70 milliGRAM(s)/deciliter  glucagon  Injectable 1 milliGRAM(s) IntraMuscular once PRN Glucose LESS THAN 70 milligrams/deciliter      Social History: lives with his wife in an elevator accessible apartment building, no home care services    Functional Level Prior to Admission: requires assistance with ADL's ( bathing, dressing, toileting), walks with a rolling walker    FAMILY HISTORY:  No family history of cardiovascular disease (Mother)  Family history of essential hypertension (Father)      CBC Full  -  ( 29 Nov 2018 06:48 )  WBC Count : 11.9 K/uL  Hemoglobin : 10.0 g/dL  Hematocrit : 30.4 %  Platelet Count - Automated : 157 K/uL  Mean Cell Volume : 91.3 fL  Mean Cell Hemoglobin : 30.0 pg  Mean Cell Hemoglobin Concentration : 32.9 g/dL  Auto Neutrophil # : x  Auto Lymphocyte # : x  Auto Monocyte # : x  Auto Eosinophil # : x  Auto Basophil # : x  Auto Neutrophil % : 76.0 %  Auto Lymphocyte % : 18.1 %  Auto Monocyte % : 5.6 %  Auto Eosinophil % : 0.2 %  Auto Basophil % : 0.1 %      11-29    138  |  104  |  24<H>  ----------------------------<  109<H>  4.1   |  27  |  0.95    Ca    8.6      29 Nov 2018 06:48  Mg     2.0     11-28              Radiology:    < from: Xray Ankle Complete 3 Views, Right (11.26.18 @ 18:56) >  EXAM:  XR FOOT-RIGHT MIN 3 VIEWS                          EXAM:  XR ANKLE-RIGHT MIN 3 VIEWS                          PROCEDURE DATE:  11/26/2018          INTERPRETATION:  X-ray of the right ankle and right foot    Indication: Pain    3 views of theright ankle are submitted along with 3 views of the right   foot. There is marked soft tissue swelling about the ankle and overlying   the dorsum of the foot. No acute fracture or dislocation is visible.         Impression: Large soft tissue swelling. No fracture identified.      < from: CT Head No Cont (11.26.18 @ 19:06) >  EXAM:  CT BRAIN                          PROCEDURE DATE:  11/26/2018          INTERPRETATION:  -  CT OF THE HEAD:    Clinical information:  84 y/o male.  S/P fall, H/O CVA, right sided   weakness.      Multiple axial scans of the head were obtainedwithout intravenous   contrast medium administration. Sagittal and coronal reformatted images   were created. The previous head CT scan dated 10/12/16 is retrieved for   comparison.    Soft-tissue swelling is now noted in the right fronto-parietal scalp.   Otherwise no interval change is seen.    The ventricles and subarachnoid spaces are slightly dilated.  No   intracranial hemorrhage or mass is seen.      A large hypodense area is seen in right fronto-parietal lobe with focal   atrophy presumably representing an old infarct. Several small old   infarcts are noted in the left basal ganglia. A small old infarct is   noted in the left cerebellar hemisphere There are also patchy or   confluent hypodense areas in the periventricular white matterof   bilateral cerebral hemispheres, probably representing ischemic changes or   aging demyelination.     Calcifications are seen in bilateral carotid siphons presumably secondary   to atherosclerotic changes.     The bony structures are intact.  Both mastoid bones are well pneumatized   and appear normal.  The sella turcica is normal in size.  Both visualized   orbits are unremarkable.  Mucosal thickening is seen in the ethmoid   sinuses. The other visualized paranasal sinuses are clear.    IMPRESSION:-    1.  Soft-tissue swelling in the right fronto-parietal scalp. Otherwise no   interval change.    2.  No intracranial hemorrhage or mass.    3.  Olf infarcts in right fronto-parietal lobe, left basal ganglia and   the left cerebellar hemisphere.  .     4.  Several patchy or confluent hypodense areas in the periventricular   white matter of bilateral cerebral hemispheres.      < from: MR Cervical Spine w/wo IV Cont (11.26.18 @ 22:42) >  EXAM:  MR SPINE CERVICAL WAW IC                          *** ADDENDUM 11/29/2018  ***    C3/C4 large disc extrusion causing severe spinal cord compression and   abnormal T2 signal hyperintensity within the spinal cord consistent with   edema and/ormyelomalacia. C5/C6 diffuse disc bulge with a superimposed   central disc protrusion causing mild to moderate spinal cord compression.   The possibility of minimal edema and/or myelomalacia cannot be excluded   at this level (sagittal image #8 [id number]). Abnormal signal changes at   C5/C6 are not seen on the axial images.      *** END OF ADDENDUM 11/29/2018  ***      PROCEDURE DATE:  11/26/2018          INTERPRETATION:  IRosa MD, have reviewed the images and the report   and agree with thefindings with the additional modification: Left   inferior cerebellar chronic infarction is seen. There are Modic endplate   changes seen at C3, C4, C5, C6, and C7.    The cervical cord demonstrates abnormal T2 signal hyperintensity at  the C3/C4 level where there is severe severe spinal cord compression.   There  is mild spinal cord compression at C5/C6. The remaining cord is within  normal limits there is no evidence of abnormal enhancement within the   spinal cord.        C2-3: There is a milddiffuse disc bulge flattening the ventral thecal   sac.  There is bilateral uncovertebral and facet hypertrophy. There is no   evidence  of foraminal narrowing. There is no spinal cord compression.    C3-4: There is disc osteophyte ridge and superimposed central disc  extrusion unchanged in size when compared to the prior study. This disc   extrusion mildly  migrating superiorly and inferiorly along the dorsal aspects of the C3 and  C4 vertebral bodies. There is marked degenerative changes of thefacets.  There is bilateral uncovertebral hypertrophy.    There is severe bilateral foraminal narrowing. There is severe spinal cord  compression.    C4-5: There is a disc osteophyte with a superimposed central disc   protrusion  mildly migrating superiorly and inferiorly along the anterior. Bilateral  uncovertebral and  facet degenerative change contributes to moderate to severe bilateral   neural  foraminal stenosis.    C5-6: There is a diffuse disc bulge with a central shallow disc protrusion  indenting the ventral spinal cord. There is bilateral uncovertebral and  facet hypertrophy. There is severe bilateral foraminal narrowing  constellation of findings is causing mild spinal cord compression.    C6-7: Disc osteophyte complex flattening the ventral spinal cord and  compresses the bilateral foraminal nerve roots. There is severe bilateral  foraminal narrowing. There is bilateral uncovertebral hypertrophy.    C7-T1: There is a diffuse disc bulge    without spinal cord compression.  There is moderate to severe bilateral neural  foraminal narrowing.     Possible cord edema versus myelomalacia between C3/4.        < from: MR Thoracic Spine w/wo IV Cont (11.26.18 @ 22:41) >  MR Thoracic Spine With Contrast  EXAM DATE/TIME:  11/26/2018 9:20 PM  CLINICAL HISTORY:  83 years old, male; Pain; Other: Increased weaknesss on the left. History   cervical stenosis.  TECHNIQUE:  Multiplanar magnetic resonance images of the thoracic spine with   intravenous contrast.  COMPARISON:  No relevant prior studies available.  FINDINGS:  Vertebrae: No acute fracture or malalignment in the thoracic spine.  Epidural space: No epidural or intrathecal mass or fluid collection.  Spinal cord: The distal cord and conus and filum terminale appear normal.  Discs/Spinal canal/Neural foramina: Multilevel mild-moderate degenerative   disc disease. AP  spinal canal appears maintained at all levels.  Retroperitoneal space: The visualized thoracic retroperitoneum   unremarkable.  Soft tissues: No paraspinal or intraspinal mass or hematoma.  Vasculature: Normal vascular enhancement.  IMPRESSION:  1. No acute fracture or malalignment in the thoracic spine.  2. Multilevel mild-moderate chronic degenerative disc of the thoracic   spine.        < from: MR Lumbar Spine w/wo IV Cont (11.26.18 @ 22:42) >  EXAM:  MR SPINE LUMBAR WAW IC                          PROCEDURE DATE:  11/26/2018          INTERPRETATION:  Nick DREW MD, have reviewed the images and   the report and agree with the findings, with the following modifications:     This is MRI lumbar spine without and with IV contrast. 10 cc of Gadavist   administered.    IMPRESSION:    No abnormal enhancement involves the bony lumbar spine or spinal canal.   No compression deformity or other acute osseous process.    Degenerativefindings:  At L4-5, there is slight anterolisthesis large disc bulge showing   superimposed Central disc herniation. Along with ligamentum flavum   thickening, findings causing moderate central spinal canal stenosis.  At L2-3, there is disc bulge andepidural lipomatosis with mild spinal   canal stenosis. There are bilateral disc protrusions in the   subarticular/foraminal zones as well.  At L3-4 and L5-S1, there small disc bulges which may contact the   descending nerve roots in the subarticularzones.    There is advanced facet arthropathy at L5-S1, right greater than left,   and at L4-5 left greater than right, contributing to moderate narrowing   of their respective neural foramina. Disc protrusion at L2-3 results in   left greater than right neural foraminal narrowing. Mild neural foraminal   narrowing is shown at L3-4 and L4-5.    In the abdomen, there are several bilateral renal cysts. In addition,   there is cholelithiasis as well as intra and extrahepatic dilated   dilatation. The distal common bile duct measures 7 mm diameter. Correlate   with LFTs and consider MRCP for further evaluation.        Vital Signs Last 24 Hrs  T(C): 37.3 (29 Nov 2018 06:26), Max: 37.3 (29 Nov 2018 06:26)  T(F): 99.1 (29 Nov 2018 06:26), Max: 99.1 (29 Nov 2018 06:26)  HR: 68 (29 Nov 2018 06:26) (65 - 80)  BP: 183/85 (29 Nov 2018 06:26) (154/80 - 183/85)  BP(mean): --  RR: 18 (29 Nov 2018 06:26) (17 - 18)  SpO2: 98% (29 Nov 2018 06:26) (98% - 100%)    REVIEW OF SYSTEMS:    CONSTITUTIONAL: No fever, weight loss, or fatigue  EYES: No eye pain, visual disturbances, or discharge  ENMT:  No difficulty hearing, tinnitus, vertigo; No sinus or throat pain  NECK: No pain or stiffness  BREASTS: No pain, masses, or nipple discharge  RESPIRATORY: No cough, wheezing, chills or hemoptysis; No shortness of breath  CARDIOVASCULAR: No chest pain, palpitations, dizziness, or leg swelling  GASTROINTESTINAL: No abdominal or epigastric pain. No nausea, vomiting, or hematemesis; No diarrhea or constipation. No melena or hematochezia.  GENITOURINARY: No dysuria, frequency, hematuria, or incontinence  NEUROLOGICAL: right leg weakness  SKIN: No itching, burning, rashes, or lesions   LYMPH NODES: No enlarged glands  ENDOCRINE: No heat or cold intolerance; No hair loss  MUSCULOSKELETAL: No joint pain or swelling; No muscle, back, or extremity pain  PSYCHIATRIC: No depression, anxiety, mood swings, or difficulty sleeping  HEME/LYMPH: No easy bruising, or bleeding gums  ALLERGY AND IMMUNOLOGIC: No hives or eczema  VASCULAR: no swelling, erythema      Physical Exam:   82 yo AA gentleman lying in semi Urena's position, c/o right leg weakness    Head: normocephalic, atraumatic    Eyes: PERRLA, EOMI, no nystagmus, sclera anicteric    ENT: nasal discharge, uvula midline, no oropharyngeal erythema/exudate    Neck: supple, negative JVD, negative carotid bruits, no thyromegaly    Chest: CTA bilaterally, neg wheeze, rhonchi, rales, crackles, egophany    Cardiovascular: regular rate and rhythm, neg murmurs/rubs/gallops    Abdomen: soft, non distended, non tender, negative rebound/guarding, normal bowel sounds, neg hepatosplenomegaly    Extremities: WWP, neg cyanosis/clubbing/edema, negative calf tenderness to palpation, negative Michael's sign, right knee extension lag, right ankle soft tissue swelling    :     Neurologic Exam:    Alert and oriented  x 2 to person, place, speech fluent w/o dysarthria,    Cranial Nerves:     II:                       pupils equal, round and reactive to light, visual fields intact   III/ IV/VI:            extraocular movements intact, neg nystagmus, ptosis  V:                       facial sensation intact, V1-3 normal  VII:                     face symmetric, no droop, normal eye closure and smile  VIII:                    hearing intact to finger rub bilaterally  IX/ X:                 soft palate rise symmetrical  XI:                      head turning, shoulder shrug normal  XII:                     tongue midline    Motor Exam:    Upper Extremities:              Right:    poor effort > 3+/5                                                          negative pronator drift                                                Left:     poor effort > 3+/5                                                          negative pronator drift      Lower Extremities:             Right:         3-/5 hip flexors/adductors/abductors                                                           2+/5 quadriceps/hamstrings                                                           3-/5 dorsiflexors/plantar flexors/invertors-evertors                                               Left:        >3/5    Sensory:              intact to LT/PP in all UE/LE dermatomes    DTR:                   = biceps/     triceps/     brachioradialis                            = patella/   medial hamstring/    ankle                            neg clonus                            neg Babinski                            neg Hoffmans      Romberg:  not tested    Tandem Walking:  not tested    Gait:  not tested        PM&R Impression:    1) cervical cord compression  2) R LE paresis  3) deconditioned  4) gait dysfunction  5) plan for cervical spine surgery    Recommendations:    1) Physical therapy focusing on therapeutic exercises, bed mobility/transfer out of bed evaluation, progressive ambulation with assistive devices.    2) Anticipated Disposition Plan/Recs: subacute rehab placement

## 2018-11-29 NOTE — CONSULT NOTE ADULT - REASON FOR ADMISSION
LE weakness s/p fall

## 2018-11-29 NOTE — PROGRESS NOTE ADULT - ASSESSMENT
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

## 2018-11-29 NOTE — PROGRESS NOTE ADULT - PROBLEM SELECTOR PLAN 3
Patient presents s/p fall, CT head negative for acute intracranial pathology. Reportedly with frequent falls over the past month. Likely 2/2 LLE weakness.  - NSGY as above  - physical therapy

## 2018-11-29 NOTE — PROGRESS NOTE ADULT - PROBLEM SELECTOR PLAN 1
MRI L-spine shows chronic multilevel disc disease w/ spinal stenosis and L > R neural foraminal narrowing; MRI C-spine shows severe spinal stenosis at C3-C4, possible cord edema; Neurology and Neurosurgery following; plan for surgical intervention (anterior cervical discectomy and fusion at C3-4, possibly corpectomy of C4, possibly), per Neurosurgery, who have been in discussions w/ Pt.'s family

## 2018-11-29 NOTE — PROGRESS NOTE ADULT - SUBJECTIVE AND OBJECTIVE BOX
Patient is a 83y old  Male who presents with a chief complaint of LE weakness s/p fall (29 Nov 2018 09:16)      INTERVAL HPI/OVERNIGHT EVENTS:    Pt. seen and examined at 10AM  Pt. has no complaints  Denies pain    Review of Systems: 12 point review of systems otherwise negative    MEDICATIONS  (STANDING):  atorvastatin 40 milliGRAM(s) Oral at bedtime  carvedilol 3.125 milliGRAM(s) Oral every 12 hours  cephalexin 500 milliGRAM(s) Oral every 6 hours  dextrose 5%. 1000 milliLiter(s) (50 mL/Hr) IV Continuous <Continuous>  dextrose 50% Injectable 12.5 Gram(s) IV Push once  enalapril 5 milliGRAM(s) Oral daily  ferrous    sulfate 325 milliGRAM(s) Oral daily  heparin  Infusion 1200 Unit(s)/Hr (10 mL/Hr) IV Continuous <Continuous>  insulin lispro (HumaLOG) corrective regimen sliding scale   SubCutaneous Before meals and at bedtime  multivitamin 1 Tablet(s) Oral daily  sodium chloride 0.9%. 1000 milliLiter(s) (60 mL/Hr) IV Continuous <Continuous>    MEDICATIONS  (PRN):  dextrose 40% Gel 15 Gram(s) Oral once PRN Blood Glucose LESS THAN 70 milliGRAM(s)/deciliter  glucagon  Injectable 1 milliGRAM(s) IntraMuscular once PRN Glucose LESS THAN 70 milligrams/deciliter      Allergies    No Known Allergies    Intolerances          Vital Signs Last 24 Hrs  T(C): 36.7 (29 Nov 2018 08:55), Max: 37.3 (29 Nov 2018 06:26)  T(F): 98.1 (29 Nov 2018 08:55), Max: 99.1 (29 Nov 2018 06:26)  HR: 60 (29 Nov 2018 08:55) (60 - 70)  BP: 140/58 (29 Nov 2018 08:55) (140/58 - 183/85)  BP(mean): --  RR: 18 (29 Nov 2018 08:55) (17 - 18)  SpO2: 96% (29 Nov 2018 08:55) (96% - 100%)  CAPILLARY BLOOD GLUCOSE      POCT Blood Glucose.: 146 mg/dL (29 Nov 2018 12:23)  POCT Blood Glucose.: 113 mg/dL (29 Nov 2018 08:38)  POCT Blood Glucose.: 134 mg/dL (28 Nov 2018 22:04)  POCT Blood Glucose.: 92 mg/dL (28 Nov 2018 16:58)      11-28 @ 07:01  -  11-29 @ 07:00  --------------------------------------------------------  IN: 1119.5 mL / OUT: 0 mL / NET: 1119.5 mL        Physical Exam:  (at 10AM)  Daily Height in cm: 180.34 (29 Nov 2018 08:55)    Daily   General: comfortable-appearing in NAD  HEENT: MMM  Abdomen:  soft NT ND  Extremities:  R foot w/ resolving edema and erythema   Skin:  WWP  :  +[place] cath  Neuro:  AAOx1-2, extremely forgetful; chronic R-sided weakness -- see Neuro note    LABS:                        10.0   11.9  )-----------( 157      ( 29 Nov 2018 06:48 )             30.4     11-29    138  |  104  |  24<H>  ----------------------------<  109<H>  4.1   |  27  |  0.95    Ca    8.6      29 Nov 2018 06:48  Mg     2.0     11-28      PT/INR - ( 29 Nov 2018 06:48 )   PT: 17.0 sec;   INR: 1.49          PTT - ( 29 Nov 2018 12:43 )  PTT:63.2 sec        RADIOLOGY & ADDITIONAL TESTS:    ---------------------------------------------------------------------------  I personally reviewed: [  ]EKG   [  ]CXR    [  ] CT    [  ]Other  ---------------------------------------------------------------------------  PLEASE CHECK WHEN PRESENT:     [  ]Heart Failure     [  ] Acute     [  ] Acute on Chronic     [  ] Chronic  -------------------------------------------------------------------     [  ]Diastolic [HFpEF]     [  ]Systolic [HFrEF]     [  ]Combined [HFpEF & HFrEF]     [  ]Other:  -------------------------------------------------------------------  [  ]AGUSTÍN     [  ]ATN     [  ]Reneal Medullary Necrosis     [  ]Renal Cortical Necrosis     [  ]Other Pathological Lesions:    [  ]CKD 1  [  ]CKD 2  [  ]CKD 3  [  ]CKD 4  [  ]CKD 5  [  ]Other  -------------------------------------------------------------------  [  ]Other/Unspecified:    --------------------------------------------------------------------    Abdominal Nutritional Status  [  ]Malnutrition: See Nutrition Note  [  ]Cachexia  [  ]Other:   [  ]Supplement Ordered:  [  ]Morbid Obesity (BMI >=40]

## 2018-11-29 NOTE — PROGRESS NOTE ADULT - PROBLEM SELECTOR PLAN 1
Patient presenting with reported LLE new weakness worsening over the past month. RLE chronic weakness due to history of stroke. MRI C-spine with spinal canal narrowing at C3-4 and C5-6 with possible cord edema  - neurosurgery consulted, recommend surgery. Plan for surgery 12/4.   - s/p 1 dose of solumedrol on admission in case of acute cord compression  - per NSGY no need for continued steroids  - palliative consulted given age and function

## 2018-11-29 NOTE — CONSULT NOTE ADULT - ASSESSMENT
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     Problem/Plan - 1:  ·  Problem: Weakness of both lower extremities.  Plan: Patient presenting with reported LLE new weakness worsening over the past month. RLE chronic weakness due to history of stroke. MRI C-spine with spinal canal narrowing at C3-4 and C5-6 with possible cord edema  - neurosurgery consulted, recommend surgery. Family initially deferred surgery when speaking with neurosurgery in ED, now reconsidering.  - s/p 1 dose of solumedrol on admission in case of acute cord compression  - per NSGY no need for continued steroids  - per [facility], patient tentatively on schedule for surgery on Friday in case family would like to pursue surgery. Attending plans to discuss with family today.  - palliative consulted given age and function, further discussion pending decision regarding surgery.     Problem/Plan - 2:  ·  Problem: Cellulitis of foot.  Plan: Incidentally found to have a mild RLE non-purulent cellulitis. No systemic signs/symptoms. Given ancef in ED.  - c/w keflex 500 mg 4 times/day, initially d/c'd but now restarted  - RLE doppler negative for DVT in ED  - XR with soft tissue edema but no fracture    #leukocytosis; likely 2/2 steroid use vs. less likely from cellulitis  - continue to monitor    #Elevated troponin- Trop stable elevation between 0.05 and 0.06, peaked 0.06. CKMB negative. EKG unchanged from previous with nonspecific wave abnormality. Patient denies CP.  - no need for further trending.     Problem/Plan - 3:  ·  Problem: Falls, initial encounter.  Plan: Patient presents s/p fall, CT head negative for acute intracranial pathology. Reportedly with frequent falls over the past month. Likely 2/2 LLE weakness.  - neurosurg discussion as above, possible surgery pending family discussion     Problem/Plan - 4:  ·  Problem: Cervical stenosis of spine.  Plan: Management as above.     Problem/Plan - 5:  ·  Problem: Cerebrovascular accident (CVA), unspecified mechanism.  Plan: Residual RLE weakness, RUE weakness. Formerly on coumadin however unable to find record of coumadin prescription after September, possibly discontinued due to falls? INR 1.73, so subtherapeutic if patient taking coumadin. On coumadin given his past strokes.   - per neurology, patient should continue to be anticoagulated given past stroke  - heparin gtt, goal PTT 59-99  - holding home coumadin.

## 2018-11-29 NOTE — PROGRESS NOTE ADULT - SUBJECTIVE AND OBJECTIVE BOX
OVERNIGHT EVENTS: No acute events overnight. PTT was high at 121, heparin gtt decreased to 12.     SUBJECTIVE / INTERVAL HPI: Patient seen and examined at bedside. He currently has no new complaints, denying CP, SOB, abd pain, n/v/d/c, changes in weakness, f/c, bleeding. He is tentatively planned for surgery next Tuesday.     VITAL SIGNS:  Vital Signs Last 24 Hrs  T(C): 37.3 (29 Nov 2018 06:26), Max: 37.3 (29 Nov 2018 06:26)  T(F): 99.1 (29 Nov 2018 06:26), Max: 99.1 (29 Nov 2018 06:26)  HR: 68 (29 Nov 2018 06:26) (65 - 80)  BP: 183/85 (29 Nov 2018 06:26) (154/80 - 183/85)  BP(mean): --  RR: 18 (29 Nov 2018 06:26) (17 - 18)  SpO2: 98% (29 Nov 2018 06:26) (98% - 100%)    PHYSICAL EXAM:    General: WDWN  HEENT: NC/AT; PERRL, anicteric sclera; MMM  Neck: supple  Cardiovascular: +S1/S2, RRR  Respiratory: CTA B/L; no W/R/R  Gastrointestinal: soft, NT/ND; +BSx4  Extremities: WWP; no edema, clubbing or cyanosis  Vascular: 2+ radial, DP/PT pulses B/L  Neurological: AAOx3; no focal deficits    MEDICATIONS:  MEDICATIONS  (STANDING):  atorvastatin 40 milliGRAM(s) Oral at bedtime  carvedilol 3.125 milliGRAM(s) Oral every 12 hours  cephalexin 500 milliGRAM(s) Oral every 6 hours  dextrose 5%. 1000 milliLiter(s) (50 mL/Hr) IV Continuous <Continuous>  dextrose 50% Injectable 12.5 Gram(s) IV Push once  enalapril 5 milliGRAM(s) Oral daily  ferrous    sulfate 325 milliGRAM(s) Oral daily  heparin  Infusion 1200 Unit(s)/Hr (10 mL/Hr) IV Continuous <Continuous>  insulin lispro (HumaLOG) corrective regimen sliding scale   SubCutaneous Before meals and at bedtime  multivitamin 1 Tablet(s) Oral daily  sodium chloride 0.9%. 1000 milliLiter(s) (60 mL/Hr) IV Continuous <Continuous>    MEDICATIONS  (PRN):  dextrose 40% Gel 15 Gram(s) Oral once PRN Blood Glucose LESS THAN 70 milliGRAM(s)/deciliter  glucagon  Injectable 1 milliGRAM(s) IntraMuscular once PRN Glucose LESS THAN 70 milligrams/deciliter      ALLERGIES:  Allergies    No Known Allergies    Intolerances        LABS:                        10.0   11.9  )-----------( 157      ( 29 Nov 2018 06:48 )             30.4     11-29    138  |  104  |  24<H>  ----------------------------<  109<H>  4.1   |  27  |  0.95    Ca    8.6      29 Nov 2018 06:48  Mg     2.0     11-28      PT/INR - ( 29 Nov 2018 06:48 )   PT: 17.0 sec;   INR: 1.49          PTT - ( 29 Nov 2018 06:48 )  PTT:102.2 sec    CAPILLARY BLOOD GLUCOSE      POCT Blood Glucose.: 113 mg/dL (29 Nov 2018 08:38)      RADIOLOGY & ADDITIONAL TESTS: Reviewed.    ASSESSMENT:    PLAN: OVERNIGHT EVENTS: No acute events overnight. PTT was high at 121, heparin gtt decreased to 12.     SUBJECTIVE / INTERVAL HPI: Patient seen and examined at bedside. He currently has no new complaints, denying CP, SOB, abd pain, n/v/d/c, changes in weakness, f/c, bleeding. After discussion with patient and family yesterday, he is tentatively planned for surgery next Tuesday.     VITAL SIGNS:  Vital Signs Last 24 Hrs  T(C): 37.3 (29 Nov 2018 06:26), Max: 37.3 (29 Nov 2018 06:26)  T(F): 99.1 (29 Nov 2018 06:26), Max: 99.1 (29 Nov 2018 06:26)  HR: 68 (29 Nov 2018 06:26) (65 - 80)  BP: 183/85 (29 Nov 2018 06:26) (154/80 - 183/85)  BP(mean): --  RR: 18 (29 Nov 2018 06:26) (17 - 18)  SpO2: 98% (29 Nov 2018 06:26) (98% - 100%)    PHYSICAL EXAM:  General: WDWN  HEENT: NC/AT; PERRL, anicteric sclera; MMM  Cardiovascular: +S1/S2, RRR, no m/r/g  Respiratory: CTA B/L; no W/R/R  Gastrointestinal: soft, NT/ND; +BSx4  Extremities: WWP; clubbing or cyanosis. R foot dorsal lateral edema and erythema appears similar to slightly improved from prior, improving tenderness.   Vascular: 2+ radial, DP/PT pulses B/L  Neurological: AAOx2, 3/5 strength in RLE thigh extension/flexion, 4/5 in remainder of RLE. 4+/5 strength RUE finger grasp, elbow extension/flexion. 5/5 strength in LUE, LLE.     MEDICATIONS:  MEDICATIONS  (STANDING):  atorvastatin 40 milliGRAM(s) Oral at bedtime  carvedilol 3.125 milliGRAM(s) Oral every 12 hours  cephalexin 500 milliGRAM(s) Oral every 6 hours  dextrose 5%. 1000 milliLiter(s) (50 mL/Hr) IV Continuous <Continuous>  dextrose 50% Injectable 12.5 Gram(s) IV Push once  enalapril 5 milliGRAM(s) Oral daily  ferrous    sulfate 325 milliGRAM(s) Oral daily  heparin  Infusion 1200 Unit(s)/Hr (10 mL/Hr) IV Continuous <Continuous>  insulin lispro (HumaLOG) corrective regimen sliding scale   SubCutaneous Before meals and at bedtime  multivitamin 1 Tablet(s) Oral daily  sodium chloride 0.9%. 1000 milliLiter(s) (60 mL/Hr) IV Continuous <Continuous>    MEDICATIONS  (PRN):  dextrose 40% Gel 15 Gram(s) Oral once PRN Blood Glucose LESS THAN 70 milliGRAM(s)/deciliter  glucagon  Injectable 1 milliGRAM(s) IntraMuscular once PRN Glucose LESS THAN 70 milligrams/deciliter    ALLERGIES:  Allergies  No Known Allergies    LABS:                        10.0   11.9  )-----------( 157      ( 29 Nov 2018 06:48 )             30.4     11-29    138  |  104  |  24<H>  ----------------------------<  109<H>  4.1   |  27  |  0.95    Ca    8.6      29 Nov 2018 06:48  Mg     2.0     11-28    PT/INR - ( 29 Nov 2018 06:48 )   PT: 17.0 sec;   INR: 1.49     PTT - ( 29 Nov 2018 06:48 )  PTT:102.2 sec    CAPILLARY BLOOD GLUCOSE  POCT Blood Glucose.: 113 mg/dL (29 Nov 2018 08:38)    RADIOLOGY & ADDITIONAL TESTS: Reviewed.

## 2018-11-30 LAB
APTT BLD: 61.7 SEC — HIGH (ref 27.5–36.3)
GLUCOSE BLDC GLUCOMTR-MCNC: 122 MG/DL — HIGH (ref 70–99)
GLUCOSE BLDC GLUCOMTR-MCNC: 140 MG/DL — HIGH (ref 70–99)
GLUCOSE BLDC GLUCOMTR-MCNC: 154 MG/DL — HIGH (ref 70–99)
GLUCOSE BLDC GLUCOMTR-MCNC: 93 MG/DL — SIGNIFICANT CHANGE UP (ref 70–99)
HCT VFR BLD CALC: 31.7 % — LOW (ref 39–50)
HGB BLD-MCNC: 10.5 G/DL — LOW (ref 13–17)
MCHC RBC-ENTMCNC: 30.4 PG — SIGNIFICANT CHANGE UP (ref 27–34)
MCHC RBC-ENTMCNC: 33.1 G/DL — SIGNIFICANT CHANGE UP (ref 32–36)
MCV RBC AUTO: 91.9 FL — SIGNIFICANT CHANGE UP (ref 80–100)
PLATELET # BLD AUTO: 155 K/UL — SIGNIFICANT CHANGE UP (ref 150–400)
RBC # BLD: 3.45 M/UL — LOW (ref 4.2–5.8)
RBC # FLD: 14.5 % — SIGNIFICANT CHANGE UP (ref 10.3–16.9)
WBC # BLD: 9.4 K/UL — SIGNIFICANT CHANGE UP (ref 3.8–10.5)
WBC # FLD AUTO: 9.4 K/UL — SIGNIFICANT CHANGE UP (ref 3.8–10.5)

## 2018-11-30 PROCEDURE — 99232 SBSQ HOSP IP/OBS MODERATE 35: CPT

## 2018-11-30 PROCEDURE — 99233 SBSQ HOSP IP/OBS HIGH 50: CPT

## 2018-11-30 RX ADMIN — Medication 2: at 17:36

## 2018-11-30 RX ADMIN — Medication 1 TABLET(S): at 12:10

## 2018-11-30 RX ADMIN — Medication 500 MILLIGRAM(S): at 23:13

## 2018-11-30 RX ADMIN — Medication 325 MILLIGRAM(S): at 12:10

## 2018-11-30 RX ADMIN — CARVEDILOL PHOSPHATE 3.12 MILLIGRAM(S): 80 CAPSULE, EXTENDED RELEASE ORAL at 05:45

## 2018-11-30 RX ADMIN — Medication 5 MILLIGRAM(S): at 05:45

## 2018-11-30 RX ADMIN — ATORVASTATIN CALCIUM 40 MILLIGRAM(S): 80 TABLET, FILM COATED ORAL at 21:29

## 2018-11-30 RX ADMIN — Medication 500 MILLIGRAM(S): at 17:37

## 2018-11-30 RX ADMIN — Medication 500 MILLIGRAM(S): at 05:45

## 2018-11-30 RX ADMIN — Medication 500 MILLIGRAM(S): at 12:10

## 2018-11-30 RX ADMIN — HEPARIN SODIUM 11 UNIT(S)/HR: 5000 INJECTION INTRAVENOUS; SUBCUTANEOUS at 12:10

## 2018-11-30 RX ADMIN — CARVEDILOL PHOSPHATE 3.12 MILLIGRAM(S): 80 CAPSULE, EXTENDED RELEASE ORAL at 17:37

## 2018-11-30 NOTE — PROGRESS NOTE ADULT - SUBJECTIVE AND OBJECTIVE BOX
Patient is a 83y old  Male who presents with a chief complaint of LE weakness s/p fall (30 Nov 2018 12:22)      INTERVAL HPI/OVERNIGHT EVENTS:    Pt. seen and examined at 10:15AM  Pt. feels well; no complaints, denies pain    Review of Systems: 12 point review of systems otherwise negative    MEDICATIONS  (STANDING):  atorvastatin 40 milliGRAM(s) Oral at bedtime  carvedilol 3.125 milliGRAM(s) Oral every 12 hours  cephalexin 500 milliGRAM(s) Oral every 6 hours  dextrose 5%. 1000 milliLiter(s) (50 mL/Hr) IV Continuous <Continuous>  dextrose 50% Injectable 12.5 Gram(s) IV Push once  enalapril 5 milliGRAM(s) Oral daily  ferrous    sulfate 325 milliGRAM(s) Oral daily  heparin  Infusion 1100 Unit(s)/Hr (11 mL/Hr) IV Continuous <Continuous>  insulin lispro (HumaLOG) corrective regimen sliding scale   SubCutaneous Before meals and at bedtime  multivitamin 1 Tablet(s) Oral daily  sodium chloride 0.9%. 1000 milliLiter(s) (60 mL/Hr) IV Continuous <Continuous>    MEDICATIONS  (PRN):  dextrose 40% Gel 15 Gram(s) Oral once PRN Blood Glucose LESS THAN 70 milliGRAM(s)/deciliter  glucagon  Injectable 1 milliGRAM(s) IntraMuscular once PRN Glucose LESS THAN 70 milligrams/deciliter      Allergies    No Known Allergies    Intolerances          Vital Signs Last 24 Hrs  T(C): 37.1 (30 Nov 2018 08:40), Max: 37.3 (29 Nov 2018 20:40)  T(F): 98.8 (30 Nov 2018 08:40), Max: 99.2 (29 Nov 2018 20:40)  HR: 63 (30 Nov 2018 08:40) (62 - 68)  BP: 158/72 (30 Nov 2018 08:40) (135/64 - 174/80)  BP(mean): --  RR: 18 (30 Nov 2018 08:40) (17 - 18)  SpO2: 97% (30 Nov 2018 08:40) (96% - 97%)  CAPILLARY BLOOD GLUCOSE      POCT Blood Glucose.: 93 mg/dL (30 Nov 2018 12:17)  POCT Blood Glucose.: 122 mg/dL (30 Nov 2018 08:43)  POCT Blood Glucose.: 148 mg/dL (29 Nov 2018 21:44)  POCT Blood Glucose.: 164 mg/dL (29 Nov 2018 17:20)      11-29 @ 07:01  -  11-30 @ 07:00  --------------------------------------------------------  IN: 129 mL / OUT: 0 mL / NET: 129 mL        Physical Exam:  (at 10:15AM)  Daily     Daily   General: comfortable-appearing in NAD  Extremities: R foot w/ resolving erythema and edema; non-tender  :  +[place] cath  Neuro:  AAOx2, forgetful; chronic R-sided weakness     LABS:                        10.5   9.4   )-----------( 155      ( 30 Nov 2018 06:47 )             31.7     11-29    138  |  104  |  24<H>  ----------------------------<  109<H>  4.1   |  27  |  0.95    Ca    8.6      29 Nov 2018 06:48      PT/INR - ( 29 Nov 2018 06:48 )   PT: 17.0 sec;   INR: 1.49          PTT - ( 30 Nov 2018 06:47 )  PTT:61.7 sec        RADIOLOGY & ADDITIONAL TESTS:    ---------------------------------------------------------------------------  I personally reviewed: [  ]EKG   [  ]CXR    [  ] CT    [  ]Other  ---------------------------------------------------------------------------  PLEASE CHECK WHEN PRESENT:     [  ]Heart Failure     [  ] Acute     [  ] Acute on Chronic     [  ] Chronic  -------------------------------------------------------------------     [  ]Diastolic [HFpEF]     [  ]Systolic [HFrEF]     [  ]Combined [HFpEF & HFrEF]     [  ]Other:  -------------------------------------------------------------------  [  ]AGUSTÍN     [  ]ATN     [  ]Reneal Medullary Necrosis     [  ]Renal Cortical Necrosis     [  ]Other Pathological Lesions:    [  ]CKD 1  [  ]CKD 2  [  ]CKD 3  [  ]CKD 4  [  ]CKD 5  [  ]Other  -------------------------------------------------------------------  [  ]Other/Unspecified:    --------------------------------------------------------------------    Abdominal Nutritional Status  [  ]Malnutrition: See Nutrition Note  [  ]Cachexia  [  ]Other:   [  ]Supplement Ordered:  [  ]Morbid Obesity (BMI >=40]

## 2018-11-30 NOTE — PROGRESS NOTE ADULT - ASSESSMENT
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     Problem/Plan - 1:  ·  Problem: Weakness of both lower extremities.  Plan: Patient presenting with reported LLE new weakness worsening over the past month. RLE chronic weakness due to history of stroke. MRI C-spine with spinal canal narrowing at C3-4 and C5-6 with possible cord edema  - neurosurgery consulted, recommend surgery. Plan for surgery 12/4.   - s/p 1 dose of solumedrol on admission in case of acute cord compression  - per NSGY no need for continued steroids  - palliative consulted given age and function  - Cardiac cleared for surgery, no further testing needed.    Problem/Plan - 2:  ·  Problem: Cellulitis of foot.  Plan: Incidentally found to have a mild RLE non-purulent cellulitis. No systemic signs/symptoms. Given ancef in ED.  - c/w keflex 500 mg 4 times/day, initially d/c'd but now restarted  - RLE doppler negative for DVT in ED  - XR with soft tissue edema but no fracture    #leukocytosis; likely 2/2 steroid use vs. less likely from cellulitis. Now improving.   - continue to monitor    #Elevated troponin- Trop stable elevation between 0.05 and 0.06, peaked 0.06. CKMB negative. EKG unchanged from previous with nonspecific wave abnormality. Patient denies CP.  - no need for further trending.     Problem/Plan - 3:  ·  Problem: Falls, initial encounter.  Plan: Patient presents s/p fall, CT head negative for acute intracranial pathology. Reportedly with frequent falls over the past month. Likely 2/2 LLE weakness.  - NSGY as above  - physical therapy.     Problem/Plan - 4:  ·  Problem: Cervical stenosis of spine.  Plan: Management as above.     Problem/Plan - 5:  ·  Problem: Cerebrovascular accident (CVA), unspecified mechanism.  Plan: Residual RLE weakness, RUE weakness. Formerly on coumadin however unable to find record of coumadin prescription after September, possibly discontinued due to falls? INR 1.73, so subtherapeutic if patient taking coumadin. On coumadin given his past strokes.   - per neurology, patient should continue to be anticoagulated given past stroke  - heparin gtt, goal PTT 58-99  - holding home coumadin.

## 2018-11-30 NOTE — PROGRESS NOTE ADULT - SUBJECTIVE AND OBJECTIVE BOX
SARAH SÁNCHEZ   [id number]     (1935):     HPI:  Patient is an 82 yo man, poor historian, with a PMH DM II, HLD, HTN, mitral regurgitation, cervical stenosis, prostate ca (s/p brachytherapy), CVA w R sided weakness (on coumadin-unclear if hx afib), carotid stenosis, CAD admitted for fall with new onset LLE weakness. At time of admission patient's family no longer at bedside, patient is a poor historian and cannot provide much history besides events of the day. Patient states he was walking with his walker earlier today and fell backwards. No CP, SOB, light headedness, LOC, shaking, incontinence of urine, changes in vision prior to fall. Once he fell, he could not get up and family had to help him up. Per signout from ED provider, patient's family reported worsening LLE weakness over the past month. No bowel/bladder incontinence.  Also with RLE erythema, swelling, unclear how long this has been going on for. Attempted to call patient's wife and daughter X3 each with no answer, unable to obtain collateral from family.        Lexi consulted in ED, recommended surgical intervention. Per documentation patient and family were not agreeable to surgery at this time. Attempted to call family 3X daughter and 3X wife overnight to clarify this, unable to reach either person. (2018 03:21)    Palliative Medicine asked to consult for this pt who has a possible cord edema due to stenosis and had refused surgical intervention last year.  At this time, NS is recommending surgery and family has declined same.  Pt, family to discuss with NS again tomorrow.     Pts family reports that pts functional status has recently been declining, ambulating less, using WC more in the past few weeks.  He is  having urinary accidents -- unclear if the mechanics of toileting take longer and pt cant hold it vs ye incontinence. Pt recognizers the urge to void.  PT had declined spinal  surgery last year bc "nothing hurt" and he didn't want to take on pain when nothing felt wrong.  Additionally. pt family was very concerned about pts willingness to comply with the required post-op care (including physical therapy) as he is not willing to accept instruction from his family re: same.  Pts wife reports that pt has fallen 4 times in the last month and she has been able to get pt up off the ground, but this time was different due to the weakness of pts LLE.      Interval History: no changes      ROS:  less pain in R foot  Unable to attain due to:                      Dyspnea (Joe 0-10):   0/10  N/V (Y/N):   n     Secretions (Y/N):  no               Agitation(Y/N): no  Pain (Y/N):     yes  -Provocation/Palliation: unable to report  -Quality/Quantity:  unable to characterize  -Radiating:  no  -Severity:  moderate  -Timing/Frequency:  ongoing x a few days  -Impact on ADLs:  none as pt is currently bedbound      Allergies:  No Known Allergies    Intolerances    Opiate Naive (Y/N):  yes  -iStop reviewed (Y/N): yes ref #[id number]    Medications:      MEDICATIONS  (STANDING):  atorvastatin 40 milliGRAM(s) Oral at bedtime  carvedilol 3.125 milliGRAM(s) Oral every 12 hours  cephalexin 500 milliGRAM(s) Oral every 6 hours  dextrose 5%. 1000 milliLiter(s) (50 mL/Hr) IV Continuous <Continuous>  dextrose 50% Injectable 12.5 Gram(s) IV Push once  enalapril 5 milliGRAM(s) Oral daily  ferrous    sulfate 325 milliGRAM(s) Oral daily  heparin  Infusion 1400 Unit(s)/Hr (14 mL/Hr) IV Continuous <Continuous>  insulin lispro (HumaLOG) corrective regimen sliding scale   SubCutaneous Before meals and at bedtime  multivitamin 1 Tablet(s) Oral daily  sodium chloride 0.9%. 1000 milliLiter(s) (60 mL/Hr) IV Continuous <Continuous>    MEDICATIONS  (PRN):  dextrose 40% Gel 15 Gram(s) Oral once PRN Blood Glucose LESS THAN 70 milliGRAM(s)/deciliter  glucagon  Injectable 1 milliGRAM(s) IntraMuscular once PRN Glucose LESS THAN 70 milligrams/deciliter      Labs:      CBC:                                   10.6   16.2  )-----------( 146      ( 2018 07:05 )             31.4     11-28    138  |  104  |  26<H>  ----------------------------<  164<H>  4.1   |  26  |  1.05    Ca    8.8      2018 07:05  Mg     2.0         TPro  8.5<H>  /  Alb  3.8  /  TBili  0.9  /  DBili  x   /  AST  27  /  ALT  14  /  AlkPhos  64      CAPILLARY BLOOD GLUCOSE  POCT Blood Glucose.: 180 mg/dL (2018 10:56)    PT/INR - ( 2018 07:05 )   PT: 18.4 sec;   INR: 1.60     PTT - ( 2018 07:05 )  PTT:72.4 sec  Urinalysis Basic - ( 2018 18:48 )    Color: Yellow / Appearance: Clear / S.020 / pH: x  Gluc: x / Ketone: NEGATIVE  / Bili: Negative / Urobili: 2.0 E.U./dL   Blood: x / Protein: NEGATIVE mg/dL / Nitrite: NEGATIVE   Leuk Esterase: NEGATIVE / RBC: > 10 /HPF / WBC < 5 /HPF   Sq Epi: x / Non Sq Epi: 0-5 /HPF / Bacteria: Present /HPF      Imaging:  Reviewed        < from: Xray Ankle Complete 3 Views, Right (18 @ 18:56) >  EXAM:  XR FOOT-RIGHT MIN 3 VIEWS                          EXAM:  XR ANKLE-RIGHT MIN 3 VIEWS                          PROCEDURE DATE:  2018      INTERPRETATION:  X-ray of the right ankle and right foot    Indication: Pain    3 views of the right ankle are submitted along with 3 views of the right   foot. There is marked soft tissue swelling about the ankle and overlying   the dorsum of the foot. No acute fracture or dislocation is visible.   Ankle mortise is congruent.    Impression: Large soft tissue swelling. No fracture identified.  < end of copied text >        < from: US Duplex Venous Lower Ext Ltd, Right (18 @ 20:06) >  EXAM:  US DPLX LWR EXT VEINS LTD RT                          PROCEDURE DATE:  2018      INTERPRETATION:  VENOUS DUPLEX DOPPLER OF THE RIGHT LOWER EXTREMITY dated   2018 8:06 PM    INDICATION: Right lower extremity pain and edema.    TECHNIQUE: Duplex Doppler evaluation including gray-scale ultrasound   imaging, color flow Doppler imaging, and Doppler spectral analysis of the   veins of the right lower extremity was performed.     COMPARISON: Venous duplex of the lower extremities from 2008.    FINDINGS:    Thigh veins: The right common femoral, femoral, popliteal, proximal   greater saphenous, and proximal deep femoral veins are patent and free of   thrombus. The veins are normally compressible and have normal phasic flow   and augmentation response.    Calf veins: Right lower extremity edema. Limited evaluation of the calf   veins. Within that limitation, the paired peroneal and posterior tibial   calf veins appear patent.    Contralateral CFV: The contralateral (left) common femoral vein is patent   and free of thrombus.    IMPRESSION:  No right-sided deep vein thrombosis seen.    < end of copied text >          < from: MR Lumbar Spine w/wo IV Cont (18 @ 22:42) >  EXAM:  MR SPINE LUMBAR WAW IC                          PROCEDURE DATE:  2018      INTERPRETATION:  Nick DREW MD, have reviewed the images and   the report and agree with the findings, with the following modifications:     This is MRI lumbar spine without and with IV contrast. 10 cc of Gadavist   administered.    IMPRESSION:    No abnormal enhancement involves the bony lumbar spine or spinal canal.   No compression deformity or other acute osseous process.    Degenerative findings:  At L4-5, there is slight anterolisthesis large disc bulge showing   superimposed Central disc herniation. Along with ligamentum flavum   thickening, findings causing moderate central spinal canal stenosis.  At L2-3, there is disc bulge andepidural lipomatosis with mild spinal   canal stenosis. There are bilateral disc protrusions in the   subarticular/foraminal zones as well.  At L3-4 and L5-S1, there small disc bulges which may contact the   descending nerve roots in the subarticularzones.    There is advanced facet arthropathy at L5-S1, right greater than left,   and at L4-5 left greater than right, contributing to moderate narrowing   of their respective neural foramina. Disc protrusion at L2-3 results in   left greater than right neural foraminal narrowing. Mild neural foraminal   narrowing is shown at L3-4 and L4-5.    In the abdomen, there are several bilateral renal cysts. In addition,   there is cholelithiasis as well as intra and extrahepatic dilated   dilatation. The distal common bile duct measures 7 mm diameter. Correlate   with LFTs and consider MRCP for further evaluation.    Please refer to cervical spine MRI report for findings regarding cord   compression.    < end of copied text >        PEx:  Vital Signs Last 24 Hrs  T(C): 36.7 (2018 12:47), Max: 36.9 (2018 20:42)  T(F): 98 (2018 12:47), Max: 98.5 (2018 20:42)  HR: 80 (2018 12:47) (47 - 80)  BP: 154/80 (2018 12:47) (153/73 - 185/94)  BP(mean): --  RR: 17 (2018 12:47) (16 - 18)  SpO2: 98% (2018 12:47) (95% - 98%)  Wt(kg): 80 kgs      General: not ill appearing, not distressed  HEENT:  nc/at, moist oral cavity  Neck: supple  CVS:  regular  Resp:   clear, nonlabored  GI:  large, soft, non-tender, +BS  :  voids  Musc:   R UE weakness, RLE weakness, mild weakness of LLE  Neuro: awake, alert, confused to person, place, events  Psych:   calm, not distressed, although pt cries intermittently s/p CVA  Skin:  warm, dry  Lymph:  neg  Preadmit Karnofsky:  40  %           Current Karnofsky:   30    %  Cachexia (Y/N):   no  BMI:      Advanced Directives:     Full Code        Decision maker:  Pt does not appears to have full medical decision making  capacity.  Legal surrogate:  Pt's wife is his surrogate decision maker in the absence of a documented HCP.    Social History: , 4 adults dgts, worked for [employer], retired, not spiritual, pt was in the [employer] x 3 years and has medical benefits form the [facility]  Pt attends adult day care 2 days a week at the [facility] in [facility] paid for by the [facility].    GOALS OF CARE DISCUSSION:       Palliative care info/counseling provided	           Family meeting-   with Dr Phan       Advanced Directives addressed please see Advance Care Planning Note	           See previous Palliative Medicine Note       Documentation of GOC:  unclear at this time          REFERRALS:	        Unit SW/Case Mgmt       - declined       Patient/Family Support       Massage Therapy       Music Therapy       PT/OT

## 2018-11-30 NOTE — DIETITIAN INITIAL EVALUATION ADULT. - PERTINENT LABORATORY DATA
11/30: hemoglobin 10.5, hematocrit 31.7, 11/29: BUN 24, 11/27: Hgb A1c 5.8%, POCT: 11/30: 122, 11/29: 133-164, 11/28:

## 2018-11-30 NOTE — PROGRESS NOTE ADULT - PROBLEM SELECTOR PLAN 3
progressive.  Pt is on the OR schedule for Friday, in case he chooses to have surgery. progressive.  Spinal stenosis surgery planned for Tues Dec 4.

## 2018-11-30 NOTE — DIETITIAN INITIAL EVALUATION ADULT. - OTHER INFO
Pt seen for initial assessment. Admit: fall, new onset LLE weakness, found w/ mild RLE cellulitis, S/P MRI c-spine w/ spinal canal narrowing at C3-4 and C5-6 w/ cord edema, now awaiting surgery planned 12/4. Per last palliative note 11/28, pt is full code. Note: at home use of coumadin confirmed by family 11/27, education provided. PMH: poor historian, T2DM, HLD, HTN, MR, cervical stenosis, prostate cancer (S/P brachytherapy), CVA w/ R sided weakness (on coumadin), carotid stenosis, CAD, aphasia, dementia. Skin: 2+ edema R ankle, 3+ edema L ankle, intact. Pt seen resting in bed, awake, alert. Pt is on DASH/TLC diet with ~30% PO intake, which pt reports is similar to baseline intake. Reported UBW ~81.8kg which has been stable over the past year, current wt 80kg. NKFA. Denies N/V/C/D with last BM today. Not reporting pain at this time. RD to follow up per protocol.

## 2018-11-30 NOTE — PROGRESS NOTE ADULT - SUBJECTIVE AND OBJECTIVE BOX
Physical Medicine and Rehabilitation Progress Note:    Patient is a 83y old  Male who presents with a chief complaint of LE weakness s/p fall (30 Nov 2018 13:08)      HPI:  Patient is an 84 yo man, poor historian, with a PMH DM II, HLD, HTN, mitral regurgitation, cervical stenosis, prostate ca (s/p brachytherapy), CVA w R sided weakness (on coumadin-unclear if hx afib), carotid stenosis, CAD admitted for fall with new onset LLE weakness. At time of admission patient's family no longer at bedside, patient is a poor historian and cannot provide much history besides events of the day. Patient states he was walking with his walker earlier today and fell backwards. No CP, SOB, light headedness, LOC, shaking, incontinence of urine, changes in vision prior to fall. Once he fell, he could not get up and family had to help him up. Per signout from ED provider, patient's family reported worsening LLE weakness over the past month. No bowel/bladder incontinence.  Also with RLE erythema, swelling, unclear how long this has been going on for. Attempted to call patient's wife and daughter X3 each with no answer, unable to obtain collateral from family.      In ED VS: Tm 99.1, HR 71-89, -182/73-80, RR 18, O2% 98 RA  RLE doppler negative for DVT, MRI done showing "Possible cord edema versus myelomalacia between C3/4." CT head with frontoparietal swelling, no acute intracranial hemorrhage or infarct. 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    Trop elevated 0.05 -> 0.06, EKG unchanged from previous with nonspecific T wave abnormalities. CK mildly elevated 403. INR 1.73, patient reportedly takes coumadin.     Neuosurg consulted in ED, recommended surgical intervention. Per documentation patient and family were not agreeable to surgery at this time. Attempted to call family 3X daughter and 3X wife overnight to clarify this, unable to reach either person. (27 Nov 2018 03:21)                            10.5   9.4   )-----------( 155      ( 30 Nov 2018 06:47 )             31.7       11-29    138  |  104  |  24<H>  ----------------------------<  109<H>  4.1   |  27  |  0.95    Ca    8.6      29 Nov 2018 06:48      Vital Signs Last 24 Hrs  T(C): 36.3 (30 Nov 2018 15:40), Max: 37.3 (29 Nov 2018 20:40)  T(F): 97.3 (30 Nov 2018 15:40), Max: 99.2 (29 Nov 2018 20:40)  HR: 72 (30 Nov 2018 15:40) (63 - 72)  BP: 113/67 (30 Nov 2018 15:40) (113/67 - 174/80)  BP(mean): --  RR: 18 (30 Nov 2018 15:40) (18 - 18)  SpO2: 100% (30 Nov 2018 15:40) (96% - 100%)    MEDICATIONS  (STANDING):  atorvastatin 40 milliGRAM(s) Oral at bedtime  carvedilol 3.125 milliGRAM(s) Oral every 12 hours  cephalexin 500 milliGRAM(s) Oral every 6 hours  dextrose 5%. 1000 milliLiter(s) (50 mL/Hr) IV Continuous <Continuous>  dextrose 50% Injectable 12.5 Gram(s) IV Push once  enalapril 5 milliGRAM(s) Oral daily  ferrous    sulfate 325 milliGRAM(s) Oral daily  heparin  Infusion 1100 Unit(s)/Hr (11 mL/Hr) IV Continuous <Continuous>  insulin lispro (HumaLOG) corrective regimen sliding scale   SubCutaneous Before meals and at bedtime  multivitamin 1 Tablet(s) Oral daily  sodium chloride 0.9%. 1000 milliLiter(s) (60 mL/Hr) IV Continuous <Continuous>    MEDICATIONS  (PRN):  dextrose 40% Gel 15 Gram(s) Oral once PRN Blood Glucose LESS THAN 70 milliGRAM(s)/deciliter  glucagon  Injectable 1 milliGRAM(s) IntraMuscular once PRN Glucose LESS THAN 70 milligrams/deciliter    Currently Undergoing Physical Therapy at bedside.    Functional Status Assessment:    Coping  Observed Emotional State: accepting;  calm;  cooperative  Verbalized Emotional State: acceptance    Plan of Care Reviewed with: patient    Therapeutic Interventions    Bed Mobility  Bed Mobility Training Rolling/Turning: moderate assist (50% patient effort);  1 person assist;  verbal cues;  for RLE/RUE placement, sequencing;  bed rails;  HOB elevated 30 deg  Bed Mobility Training Scooting: moderate assist (50% patient effort);  2 person assist  Bed Mobility Training Sit-to-Supine: not assessed secondary to patient left in bedside chair  Bed Mobility Training Supine-to-Sit: moderate assist (50% patient effort);  2 person assist;  bed rails;  HOB elevated to 30 deg;  verbal cues  Bed Mobility Training Limitations: decreased ability to use legs for bridging/pushing;  decreased ability to use arms for pushing/pulling;  impaired ability to control trunk for mobility;  decreased strength;  impaired balance;  impaired postural control;  RUE in flexion synergy contracture, unable to use fully    Sit-Stand Transfer Training  Transfer Training Sit-to-Stand Transfer: maximum assist (25% patient effort);  2 person assist;  verbal cues;  full weight-bearing   rolling walker  Transfer Training Stand-to-Sit Transfer: maximum assist (25% patient effort);  2 person assist;  verbal cues;  sequencing and hand placement;  full weight-bearing   rolling walker  Sit-to-Stand Transfer Training Transfer Safety Analysis: decreased balance;  decreased step length;  decreased weight-shifting ability;  impaired balance;  decreased strength;  impaired postural control;  shortened R hamstring, unable to achieve full knee ext;  rolling walker    Gait Training  Gait Training: maximum assist (25% patient effort);  2 person assist;  verbal cues;  full weight-bearing   rolling walker;  bed to chair;  2 feet  Gait Analysis: 2-point gait   decreased carolee;  shuffling;  decreased step length;  decreased toe clearance;  decreased weight-shifting ability;  decreased strength;  impaired balance;  impaired postural control;  Bed to Chair;  2 feet;  rolling walker  Gait Number of Times:: x 1    Therapeutic Exercise  Therapeutic Exercise Detail: Seated at edge of bed - dangle with mod-max A x 1 dependent upon task, patient unable to use RUE to hold trunk up with increase lateral lean to L and posterior. long arc quad x 5 on LLE, passive stretching of RLE into knee ext 2 x 30 sec. Seated in bedside chair - long arc quad x 5 LLE, marches x 5 LLE, passive stretching of RLE into knee ext, reaching outside SHERMAN with LUE including crossing midline without LOB.           PM&R Impression: as above    Disposition Plan Recommendations: subacute rehab placement

## 2018-11-30 NOTE — PROGRESS NOTE ADULT - PROBLEM SELECTOR PLAN 3
c/b R-sided weakness; cont. statin; holding ASA and Coumadin perioperatively; on heparin gtt in meantime, per Neuro/Stroke recs

## 2018-11-30 NOTE — PROGRESS NOTE ADULT - PROBLEM SELECTOR PLAN 1
Patient presenting with reported LLE new weakness worsening over the past month. RLE chronic weakness due to history of stroke. MRI C-spine with spinal canal narrowing at C3-4 and C5-6 with possible cord edema  - neurosurgery consulted, recommend surgery. Plan for surgery 12/4.   - s/p 1 dose of solumedrol on admission in case of acute cord compression  - per NSGY no need for continued steroids  - palliative consulted given age and function Patient presenting with reported LLE new weakness worsening over the past month. RLE chronic weakness due to history of stroke. MRI C-spine with spinal canal narrowing at C3-4 and C5-6 with possible cord edema  - neurosurgery consulted, recommend surgery. Plan for surgery 12/4.   - s/p 1 dose of solumedrol on admission in case of acute cord compression  - per NSGY no need for continued steroids  - palliative consulted given age and function  - Cardiac cleared for surgery, no further testing needed

## 2018-11-30 NOTE — DIETITIAN INITIAL EVALUATION ADULT. - NS AS NUTRI INTERV ED CONTENT
Consistent carbohydrate diet education: 1. minimal consumption of concentrated sweets, discussed sugar free dessert options, 2. balanced meals, plate method. Vitamin K/Coumadin diet education: 1. Foods high in vitamin K, 2. Consistent daily intake Vitamin K.

## 2018-11-30 NOTE — DIETITIAN INITIAL EVALUATION ADULT. - PROBLEM SELECTOR PLAN 1
Patient presenting with reported LLE new weakness worsening over the past month. RLE chronic weakness due to history of stroke. MRI C-spine read as "Possible cord edema versus myelomalacia between C3/4."  - neurosurgery consulted, recommend surgery however family deferred surgery when speaking with neurosurgery in ED  - unable to reach family, called wife and daughter X3 each with no response  - Spoke with neurosurgery after MRI read returned with cord edema. State still no intervention emergently and rec no steroids since unclear if edema of cord represents acute finding.   - will give 1 dose of solumedrol in case of acute cord compression with plans to f/u repeat neurosurg eval in AM.    ADDENDUM: call neurology for possible transfer if no further intervention by neurosurgery; closely monitor glucose. follow up official MRI report.

## 2018-11-30 NOTE — PROGRESS NOTE ADULT - PROBLEM SELECTOR PLAN 1
New onset LLE weakness in the setting of spinal cord edema  Pt with increasing frequency of falls at home  Pt and family deliberating whether to do surgery or not. New onset LLE weakness in the setting of spinal cord edema  Pt with increasing frequency of falls at home  Surgery scheduled for Tues Dec 4.

## 2018-11-30 NOTE — PROGRESS NOTE ADULT - SUBJECTIVE AND OBJECTIVE BOX
OVERNIGHT EVENTS: no acute events overnight    SUBJECTIVE / INTERVAL HPI: Patient seen and examined at bedside. Pt is a pleasant man. He continues to complains of mild LLE weakness, but says that it is not as weak as his chronic R-sided weakness. He denies chest pain, shortness of breath, fever, chills, headache, nausea, abdominal pain, leg pain.    VITAL SIGNS:  Vital Signs Last 24 Hrs  T(C): 37.1 (30 Nov 2018 08:40), Max: 37.3 (29 Nov 2018 20:40)  T(F): 98.8 (30 Nov 2018 08:40), Max: 99.2 (29 Nov 2018 20:40)  HR: 63 (30 Nov 2018 08:40) (62 - 68)  BP: 158/72 (30 Nov 2018 08:40) (135/64 - 174/80)  BP(mean): --  RR: 18 (30 Nov 2018 08:40) (17 - 18)  SpO2: 97% (30 Nov 2018 08:40) (96% - 97%)    PHYSICAL EXAM:    General: NAD, lying comfortably in bed  HEENT: NC/AT; anicteric sclera; MMM  Neck: supple, no JVD  Cardiovascular: +S1/S2, RRR, no murmurs, rubs, gallops  Respiratory: CTA B/L; no W/R/R, no crackles  Gastrointestinal: soft, NT/ND; bowel sounds intact x4  Extremities: warm, well perfused; no edema, clubbing or cyanosis  Vascular: 2+ radial, DP/PT pulses B/L  Neurological: AAOx2 self and place; RLE 2/5 strength hip flexion, 2/5 knee extension, 3/5 ankle dorsi and plantar flexion, LLE 4/5 hip flexion, 4/5 knee extension, 5/5 ankle dorsi and plantar flexion    MEDICATIONS:  MEDICATIONS  (STANDING):  atorvastatin 40 milliGRAM(s) Oral at bedtime  carvedilol 3.125 milliGRAM(s) Oral every 12 hours  cephalexin 500 milliGRAM(s) Oral every 6 hours  dextrose 5%. 1000 milliLiter(s) (50 mL/Hr) IV Continuous <Continuous>  dextrose 50% Injectable 12.5 Gram(s) IV Push once  enalapril 5 milliGRAM(s) Oral daily  ferrous    sulfate 325 milliGRAM(s) Oral daily  heparin  Infusion 1100 Unit(s)/Hr (11 mL/Hr) IV Continuous <Continuous>  insulin lispro (HumaLOG) corrective regimen sliding scale   SubCutaneous Before meals and at bedtime  multivitamin 1 Tablet(s) Oral daily  sodium chloride 0.9%. 1000 milliLiter(s) (60 mL/Hr) IV Continuous <Continuous>    MEDICATIONS  (PRN):  dextrose 40% Gel 15 Gram(s) Oral once PRN Blood Glucose LESS THAN 70 milliGRAM(s)/deciliter  glucagon  Injectable 1 milliGRAM(s) IntraMuscular once PRN Glucose LESS THAN 70 milligrams/deciliter      ALLERGIES:  Allergies    No Known Allergies    Intolerances        LABS:                        10.5   9.4   )-----------( 155      ( 30 Nov 2018 06:47 )             31.7     11-29    138  |  104  |  24<H>  ----------------------------<  109<H>  4.1   |  27  |  0.95    Ca    8.6      29 Nov 2018 06:48      PT/INR - ( 29 Nov 2018 06:48 )   PT: 17.0 sec;   INR: 1.49          PTT - ( 30 Nov 2018 06:47 )  PTT:61.7 sec    CAPILLARY BLOOD GLUCOSE      POCT Blood Glucose.: 122 mg/dL (30 Nov 2018 08:43)      RADIOLOGY & ADDITIONAL TESTS: Reviewed.

## 2018-11-30 NOTE — DIETITIAN INITIAL EVALUATION ADULT. - PROBLEM SELECTOR PLAN 7
EKG w/nonspecific twave abnormalities ; troponins peaked  - c/w home lipitor  - holding home ASA in case of surgical intervention.

## 2018-11-30 NOTE — DIETITIAN INITIAL EVALUATION ADULT. - PROBLEM SELECTOR PLAN 10
1) PCP Contacted on Admission: (Y/N) --> Name & Phone #: N. PMD Dr. Olmedo  [phone] Not contacted, late admission.   2) Date of Contact with PCP:  3) PCP Contacted at Discharge: (Y/N, N/A)  4) Summary of Handoff Given to PCP:   5) Post-Discharge Appointment Date and Location:

## 2018-11-30 NOTE — DIETITIAN INITIAL EVALUATION ADULT. - ENERGY NEEDS
Ht: 180.3cm, Wt: 80kg, IBW: 78.1kg, 102.4%IBW.   Needs calculated based on [facility] standards of care. Needs adjusted for age. Needs to be adjusted post surgical intervention.

## 2018-11-30 NOTE — DIETITIAN INITIAL EVALUATION ADULT. - ORAL INTAKE PTA
Pt consumes 2 meals/day consisting of pro, CHO, fruit and veg. Pt reports regular consumption of desserts./good

## 2018-12-01 LAB
ANION GAP SERPL CALC-SCNC: 10 MMOL/L — SIGNIFICANT CHANGE UP (ref 5–17)
APTT BLD: 75 SEC — HIGH (ref 27.5–36.3)
BUN SERPL-MCNC: 22 MG/DL — SIGNIFICANT CHANGE UP (ref 7–23)
CALCIUM SERPL-MCNC: 8.8 MG/DL — SIGNIFICANT CHANGE UP (ref 8.4–10.5)
CHLORIDE SERPL-SCNC: 102 MMOL/L — SIGNIFICANT CHANGE UP (ref 96–108)
CO2 SERPL-SCNC: 26 MMOL/L — SIGNIFICANT CHANGE UP (ref 22–31)
CREAT SERPL-MCNC: 0.97 MG/DL — SIGNIFICANT CHANGE UP (ref 0.5–1.3)
GLUCOSE BLDC GLUCOMTR-MCNC: 104 MG/DL — HIGH (ref 70–99)
GLUCOSE BLDC GLUCOMTR-MCNC: 133 MG/DL — HIGH (ref 70–99)
GLUCOSE BLDC GLUCOMTR-MCNC: 193 MG/DL — HIGH (ref 70–99)
GLUCOSE BLDC GLUCOMTR-MCNC: 93 MG/DL — SIGNIFICANT CHANGE UP (ref 70–99)
GLUCOSE SERPL-MCNC: 100 MG/DL — HIGH (ref 70–99)
HCT VFR BLD CALC: 33.2 % — LOW (ref 39–50)
HGB BLD-MCNC: 10.8 G/DL — LOW (ref 13–17)
MAGNESIUM SERPL-MCNC: 1.9 MG/DL — SIGNIFICANT CHANGE UP (ref 1.6–2.6)
MCHC RBC-ENTMCNC: 30 PG — SIGNIFICANT CHANGE UP (ref 27–34)
MCHC RBC-ENTMCNC: 32.5 G/DL — SIGNIFICANT CHANGE UP (ref 32–36)
MCV RBC AUTO: 92.2 FL — SIGNIFICANT CHANGE UP (ref 80–100)
PLATELET # BLD AUTO: 157 K/UL — SIGNIFICANT CHANGE UP (ref 150–400)
POTASSIUM SERPL-MCNC: 4.4 MMOL/L — SIGNIFICANT CHANGE UP (ref 3.5–5.3)
POTASSIUM SERPL-SCNC: 4.4 MMOL/L — SIGNIFICANT CHANGE UP (ref 3.5–5.3)
RBC # BLD: 3.6 M/UL — LOW (ref 4.2–5.8)
RBC # FLD: 14.6 % — SIGNIFICANT CHANGE UP (ref 10.3–16.9)
SODIUM SERPL-SCNC: 138 MMOL/L — SIGNIFICANT CHANGE UP (ref 135–145)
WBC # BLD: 8.9 K/UL — SIGNIFICANT CHANGE UP (ref 3.8–10.5)
WBC # FLD AUTO: 8.9 K/UL — SIGNIFICANT CHANGE UP (ref 3.8–10.5)

## 2018-12-01 PROCEDURE — 99233 SBSQ HOSP IP/OBS HIGH 50: CPT | Mod: GC

## 2018-12-01 RX ADMIN — Medication 325 MILLIGRAM(S): at 11:43

## 2018-12-01 RX ADMIN — Medication 500 MILLIGRAM(S): at 17:52

## 2018-12-01 RX ADMIN — Medication 1 TABLET(S): at 11:43

## 2018-12-01 RX ADMIN — CARVEDILOL PHOSPHATE 3.12 MILLIGRAM(S): 80 CAPSULE, EXTENDED RELEASE ORAL at 05:03

## 2018-12-01 RX ADMIN — CARVEDILOL PHOSPHATE 3.12 MILLIGRAM(S): 80 CAPSULE, EXTENDED RELEASE ORAL at 17:52

## 2018-12-01 RX ADMIN — Medication 5 MILLIGRAM(S): at 05:03

## 2018-12-01 RX ADMIN — Medication 500 MILLIGRAM(S): at 05:03

## 2018-12-01 RX ADMIN — Medication 500 MILLIGRAM(S): at 11:42

## 2018-12-01 RX ADMIN — Medication 2: at 21:56

## 2018-12-01 RX ADMIN — ATORVASTATIN CALCIUM 40 MILLIGRAM(S): 80 TABLET, FILM COATED ORAL at 21:26

## 2018-12-01 NOTE — PROGRESS NOTE ADULT - SUBJECTIVE AND OBJECTIVE BOX
pt feeling well no complaints.    VITAL SIGNS: Vital Signs Last 24 Hrs  T(C): 36.9 (01 Dec 2018 15:43), Max: 37.1 (01 Dec 2018 08:39)  T(F): 98.5 (01 Dec 2018 15:43), Max: 98.7 (01 Dec 2018 08:39)  HR: 73 (01 Dec 2018 15:43) (61 - 73)  BP: 121/71 (01 Dec 2018 15:43) (121/71 - 180/85)  BP(mean): --  RR: 18 (01 Dec 2018 15:43) (18 - 19)  SpO2: 98% (01 Dec 2018 15:43) (97% - 98%)    PHYSICAL EXAM:    General: NAD, lying comfortably in bed  HEENT: NC/AT; anicteric sclera; MMM  Neck: supple, no JVD  Cardiovascular: +S1/S2, RRR, no murmurs, rubs, gallops  Respiratory: CTA B/L; no W/R/R, no crackles  Gastrointestinal: soft, NT/ND; bowel sounds intact x4  Extremities: warm, well perfused; no edema, clubbing or cyanosis  Vascular: 2+ radial, DP/PT pulses B/L  Neurological: AAOx2 self and place; RLE 2/5 strength hip flexion, 2/5 knee extension, 3/5 ankle dorsi and plantar flexion, LLE 4/5 hip flexion, 4/5 knee extension, 5/5 ankle dorsi and plantar flexion  MEDICATIONS  (PRN):  dextrose 40% Gel 15 Gram(s) Oral once PRN Blood Glucose LESS THAN 70 milliGRAM(s)/deciliter  glucagon  Injectable 1 milliGRAM(s) IntraMuscular once PRN Glucose LESS THAN 70 milligrams/deciliter      ALLERGIES:  Allergies    No Known Allergies    Intolerances        LABS:                        10.5   9.4   )-----------( 155      ( 30 Nov 2018 06:47 )             31.7     11-29    138  |  104  |  24<H>  ----------------------------<  109<H>  4.1   |  27  |  0.95    Ca    8.6      29 Nov 2018 06:48      PT/INR - ( 29 Nov 2018 06:48 )   PT: 17.0 sec;   INR: 1.49          PTT - ( 30 Nov 2018 06:47 )  PTT:61.7 sec    CAPILLARY BLOOD GLUCOSE      POCT Blood Glucose.: 122 mg/dL (30 Nov 2018 08:43)      RADIOLOGY & ADDITIONAL TESTS: Reviewed.

## 2018-12-01 NOTE — PROGRESS NOTE ADULT - PROBLEM SELECTOR PLAN 1
RLE chronic weakness due to history of stroke. MRI C-spine with spinal canal narrowing at C3-4 and C5-6 with possible cord edema  - neurosurgery consulted, recommend surgery. Plan for surgery 12/4.   - s/p 1 dose of solumedrol on admission in case of acute cord compression  - per NSGY no need for continued steroids

## 2018-12-02 LAB
ANION GAP SERPL CALC-SCNC: 13 MMOL/L — SIGNIFICANT CHANGE UP (ref 5–17)
APTT BLD: 62.2 SEC — HIGH (ref 27.5–36.3)
BUN SERPL-MCNC: 21 MG/DL — SIGNIFICANT CHANGE UP (ref 7–23)
CALCIUM SERPL-MCNC: 9.2 MG/DL — SIGNIFICANT CHANGE UP (ref 8.4–10.5)
CHLORIDE SERPL-SCNC: 99 MMOL/L — SIGNIFICANT CHANGE UP (ref 96–108)
CO2 SERPL-SCNC: 25 MMOL/L — SIGNIFICANT CHANGE UP (ref 22–31)
CREAT SERPL-MCNC: 1.07 MG/DL — SIGNIFICANT CHANGE UP (ref 0.5–1.3)
GLUCOSE BLDC GLUCOMTR-MCNC: 102 MG/DL — HIGH (ref 70–99)
GLUCOSE BLDC GLUCOMTR-MCNC: 111 MG/DL — HIGH (ref 70–99)
GLUCOSE BLDC GLUCOMTR-MCNC: 134 MG/DL — HIGH (ref 70–99)
GLUCOSE BLDC GLUCOMTR-MCNC: 152 MG/DL — HIGH (ref 70–99)
GLUCOSE SERPL-MCNC: 97 MG/DL — SIGNIFICANT CHANGE UP (ref 70–99)
HCT VFR BLD CALC: 33.9 % — LOW (ref 39–50)
HGB BLD-MCNC: 11.1 G/DL — LOW (ref 13–17)
MAGNESIUM SERPL-MCNC: 1.9 MG/DL — SIGNIFICANT CHANGE UP (ref 1.6–2.6)
MCHC RBC-ENTMCNC: 30.1 PG — SIGNIFICANT CHANGE UP (ref 27–34)
MCHC RBC-ENTMCNC: 32.7 G/DL — SIGNIFICANT CHANGE UP (ref 32–36)
MCV RBC AUTO: 91.9 FL — SIGNIFICANT CHANGE UP (ref 80–100)
PLATELET # BLD AUTO: 172 K/UL — SIGNIFICANT CHANGE UP (ref 150–400)
POTASSIUM SERPL-MCNC: 4.8 MMOL/L — SIGNIFICANT CHANGE UP (ref 3.5–5.3)
POTASSIUM SERPL-SCNC: 4.8 MMOL/L — SIGNIFICANT CHANGE UP (ref 3.5–5.3)
RBC # BLD: 3.69 M/UL — LOW (ref 4.2–5.8)
RBC # FLD: 14.4 % — SIGNIFICANT CHANGE UP (ref 10.3–16.9)
SODIUM SERPL-SCNC: 137 MMOL/L — SIGNIFICANT CHANGE UP (ref 135–145)
WBC # BLD: 9.5 K/UL — SIGNIFICANT CHANGE UP (ref 3.8–10.5)
WBC # FLD AUTO: 9.5 K/UL — SIGNIFICANT CHANGE UP (ref 3.8–10.5)

## 2018-12-02 PROCEDURE — 99232 SBSQ HOSP IP/OBS MODERATE 35: CPT | Mod: GC

## 2018-12-02 RX ORDER — MAGNESIUM SULFATE 500 MG/ML
1 VIAL (ML) INJECTION ONCE
Qty: 0 | Refills: 0 | Status: COMPLETED | OUTPATIENT
Start: 2018-12-02 | End: 2018-12-02

## 2018-12-02 RX ADMIN — Medication 325 MILLIGRAM(S): at 12:04

## 2018-12-02 RX ADMIN — Medication 500 MILLIGRAM(S): at 12:05

## 2018-12-02 RX ADMIN — Medication 500 MILLIGRAM(S): at 00:00

## 2018-12-02 RX ADMIN — Medication 500 MILLIGRAM(S): at 18:56

## 2018-12-02 RX ADMIN — Medication 1 TABLET(S): at 12:04

## 2018-12-02 RX ADMIN — Medication 100 GRAM(S): at 12:03

## 2018-12-02 RX ADMIN — ATORVASTATIN CALCIUM 40 MILLIGRAM(S): 80 TABLET, FILM COATED ORAL at 21:28

## 2018-12-02 RX ADMIN — Medication 500 MILLIGRAM(S): at 06:15

## 2018-12-02 RX ADMIN — Medication 5 MILLIGRAM(S): at 06:15

## 2018-12-02 RX ADMIN — Medication 2: at 22:53

## 2018-12-02 RX ADMIN — CARVEDILOL PHOSPHATE 3.12 MILLIGRAM(S): 80 CAPSULE, EXTENDED RELEASE ORAL at 06:15

## 2018-12-02 RX ADMIN — CARVEDILOL PHOSPHATE 3.12 MILLIGRAM(S): 80 CAPSULE, EXTENDED RELEASE ORAL at 17:13

## 2018-12-02 NOTE — PROGRESS NOTE ADULT - PROBLEM SELECTOR PLAN 1
Patient presenting with reported LLE new weakness worsening over the past month. RLE chronic weakness due to history of stroke. MRI C-spine with spinal canal narrowing at C3-4 and C5-6 with possible cord edema  - neurosurgery consulted, recommend surgery. Plan for surgery 12/4.   - s/p 1 dose of solumedrol on admission in case of acute cord compression  - per NSGY no need for continued steroids  - palliative consulted given age and function  - Cardiac cleared for surgery, no further testing needed

## 2018-12-02 NOTE — PROGRESS NOTE ADULT - SUBJECTIVE AND OBJECTIVE BOX
OVERNIGHT EVENTS: no acute events overnight    SUBJECTIVE / INTERVAL HPI: Patient seen and examined at bedside. Pt is a pleasant man. He continues to complains of mild LLE weakness, but says that it is not as weak as his chronic R-sided weakness. He denies chest pain, shortness of breath, fever, chills, headache, nausea, abdominal pain, leg pain.    Vital Signs Last 24 Hrs  T(C): 36.8 (02 Dec 2018 08:35), Max: 37.1 (01 Dec 2018 21:04)  T(F): 98.3 (02 Dec 2018 08:35), Max: 98.7 (01 Dec 2018 21:04)  HR: 60 (02 Dec 2018 08:35) (60 - 73)  BP: 149/69 (02 Dec 2018 08:35) (121/71 - 166/78)  BP(mean): --  RR: 19 (02 Dec 2018 08:35) (18 - 19)  SpO2: 97% (02 Dec 2018 08:35) (97% - 98%)    PHYSICAL EXAM:    General: NAD, lying comfortably in bed  HEENT: NC/AT; anicteric sclera; MMM  Neck: supple, no JVD  Cardiovascular: +S1/S2, RRR, no murmurs, rubs, gallops  Respiratory: CTA B/L; no W/R/R, no crackles  Gastrointestinal: soft, NT/ND; bowel sounds intact x4  Extremities: warm, well perfused; no edema, clubbing or cyanosis  Vascular: 2+ radial, DP/PT pulses B/L  Neurological: AAOx2 self and place; RLE 2/5 strength hip flexion, 2/5 knee extension, 3/5 ankle dorsi and plantar flexion, LLE 4/5 hip flexion, 4/5 knee extension, 5/5 ankle dorsi and plantar flexion    MEDICATIONS  (STANDING):  atorvastatin 40 milliGRAM(s) Oral at bedtime  carvedilol 3.125 milliGRAM(s) Oral every 12 hours  cephalexin 500 milliGRAM(s) Oral every 6 hours  dextrose 5%. 1000 milliLiter(s) (50 mL/Hr) IV Continuous <Continuous>  dextrose 50% Injectable 12.5 Gram(s) IV Push once  enalapril 5 milliGRAM(s) Oral daily  ferrous    sulfate 325 milliGRAM(s) Oral daily  heparin  Infusion 1100 Unit(s)/Hr (11 mL/Hr) IV Continuous <Continuous>  insulin lispro (HumaLOG) corrective regimen sliding scale   SubCutaneous Before meals and at bedtime  multivitamin 1 Tablet(s) Oral daily  sodium chloride 0.9%. 1000 milliLiter(s) (60 mL/Hr) IV Continuous <Continuous>    MEDICATIONS  (PRN):  dextrose 40% Gel 15 Gram(s) Oral once PRN Blood Glucose LESS THAN 70 milliGRAM(s)/deciliter  glucagon  Injectable 1 milliGRAM(s) IntraMuscular once PRN Glucose LESS THAN 70 milligrams/deciliter      LABS:                         11.1   9.5   )-----------( 172      ( 02 Dec 2018 07:04 )             33.9     12-02    137  |  99  |  21  ----------------------------<  97  4.8   |  25  |  1.07    Ca    9.2      02 Dec 2018 07:04  Mg     1.9     12-02      PTT - ( 02 Dec 2018 07:04 )  PTT:62.2 sec

## 2018-12-02 NOTE — PROGRESS NOTE ADULT - ASSESSMENT
84 yo man, poor historian, with a PMH DM II, HLD, HTN, mitral regurgitation, cervical stenosis, prostate ca (s/p brachytherapy), CVA w R sided weakness (on coumadin), carotid stenosis, CAD admitted for fall with new onset LLE weakness, found to have 4/5 LLE strength on exam and MRI C-spine with spinal canal narrowing at C3-4 and C5-6 with cord edema, now awaiting surgery planned for Tuesday 12/4.

## 2018-12-03 LAB
ALBUMIN SERPL ELPH-MCNC: 3 G/DL — LOW (ref 3.3–5)
ALP SERPL-CCNC: 48 U/L — SIGNIFICANT CHANGE UP (ref 40–120)
ALT FLD-CCNC: 17 U/L — SIGNIFICANT CHANGE UP (ref 10–45)
ANION GAP SERPL CALC-SCNC: 12 MMOL/L — SIGNIFICANT CHANGE UP (ref 5–17)
APTT BLD: 65.2 SEC — HIGH (ref 27.5–36.3)
AST SERPL-CCNC: 20 U/L — SIGNIFICANT CHANGE UP (ref 10–40)
BASOPHILS NFR BLD AUTO: 0.1 % — SIGNIFICANT CHANGE UP (ref 0–2)
BILIRUB SERPL-MCNC: 0.6 MG/DL — SIGNIFICANT CHANGE UP (ref 0.2–1.2)
BUN SERPL-MCNC: 24 MG/DL — HIGH (ref 7–23)
CALCIUM SERPL-MCNC: 9 MG/DL — SIGNIFICANT CHANGE UP (ref 8.4–10.5)
CHLORIDE SERPL-SCNC: 101 MMOL/L — SIGNIFICANT CHANGE UP (ref 96–108)
CO2 SERPL-SCNC: 25 MMOL/L — SIGNIFICANT CHANGE UP (ref 22–31)
CREAT SERPL-MCNC: 1.08 MG/DL — SIGNIFICANT CHANGE UP (ref 0.5–1.3)
EOSINOPHIL NFR BLD AUTO: 2.5 % — SIGNIFICANT CHANGE UP (ref 0–6)
GLUCOSE BLDC GLUCOMTR-MCNC: 104 MG/DL — HIGH (ref 70–99)
GLUCOSE BLDC GLUCOMTR-MCNC: 120 MG/DL — HIGH (ref 70–99)
GLUCOSE BLDC GLUCOMTR-MCNC: 128 MG/DL — HIGH (ref 70–99)
GLUCOSE BLDC GLUCOMTR-MCNC: 132 MG/DL — HIGH (ref 70–99)
GLUCOSE SERPL-MCNC: 101 MG/DL — HIGH (ref 70–99)
HCT VFR BLD CALC: 31.5 % — LOW (ref 39–50)
HGB BLD-MCNC: 10.4 G/DL — LOW (ref 13–17)
LYMPHOCYTES # BLD AUTO: 26.8 % — SIGNIFICANT CHANGE UP (ref 13–44)
MAGNESIUM SERPL-MCNC: 2 MG/DL — SIGNIFICANT CHANGE UP (ref 1.6–2.6)
MCHC RBC-ENTMCNC: 30.1 PG — SIGNIFICANT CHANGE UP (ref 27–34)
MCHC RBC-ENTMCNC: 33 G/DL — SIGNIFICANT CHANGE UP (ref 32–36)
MCV RBC AUTO: 91 FL — SIGNIFICANT CHANGE UP (ref 80–100)
MONOCYTES NFR BLD AUTO: 8.1 % — SIGNIFICANT CHANGE UP (ref 2–14)
NEUTROPHILS NFR BLD AUTO: 62.5 % — SIGNIFICANT CHANGE UP (ref 43–77)
PLATELET # BLD AUTO: 189 K/UL — SIGNIFICANT CHANGE UP (ref 150–400)
POTASSIUM SERPL-MCNC: 4.2 MMOL/L — SIGNIFICANT CHANGE UP (ref 3.5–5.3)
POTASSIUM SERPL-SCNC: 4.2 MMOL/L — SIGNIFICANT CHANGE UP (ref 3.5–5.3)
PROT SERPL-MCNC: 7.2 G/DL — SIGNIFICANT CHANGE UP (ref 6–8.3)
RBC # BLD: 3.46 M/UL — LOW (ref 4.2–5.8)
RBC # FLD: 14.4 % — SIGNIFICANT CHANGE UP (ref 10.3–16.9)
SODIUM SERPL-SCNC: 138 MMOL/L — SIGNIFICANT CHANGE UP (ref 135–145)
WBC # BLD: 9.4 K/UL — SIGNIFICANT CHANGE UP (ref 3.8–10.5)
WBC # FLD AUTO: 9.4 K/UL — SIGNIFICANT CHANGE UP (ref 3.8–10.5)

## 2018-12-03 PROCEDURE — 99232 SBSQ HOSP IP/OBS MODERATE 35: CPT

## 2018-12-03 RX ADMIN — Medication 500 MILLIGRAM(S): at 17:34

## 2018-12-03 RX ADMIN — CARVEDILOL PHOSPHATE 3.12 MILLIGRAM(S): 80 CAPSULE, EXTENDED RELEASE ORAL at 06:22

## 2018-12-03 RX ADMIN — Medication 500 MILLIGRAM(S): at 01:52

## 2018-12-03 RX ADMIN — HEPARIN SODIUM 11 UNIT(S)/HR: 5000 INJECTION INTRAVENOUS; SUBCUTANEOUS at 10:13

## 2018-12-03 RX ADMIN — Medication 325 MILLIGRAM(S): at 12:54

## 2018-12-03 RX ADMIN — Medication 500 MILLIGRAM(S): at 12:53

## 2018-12-03 RX ADMIN — Medication 5 MILLIGRAM(S): at 06:22

## 2018-12-03 RX ADMIN — Medication 1 TABLET(S): at 12:54

## 2018-12-03 RX ADMIN — CARVEDILOL PHOSPHATE 3.12 MILLIGRAM(S): 80 CAPSULE, EXTENDED RELEASE ORAL at 17:34

## 2018-12-03 RX ADMIN — Medication 500 MILLIGRAM(S): at 06:22

## 2018-12-03 RX ADMIN — ATORVASTATIN CALCIUM 40 MILLIGRAM(S): 80 TABLET, FILM COATED ORAL at 22:17

## 2018-12-03 NOTE — PROGRESS NOTE ADULT - PROBLEM SELECTOR PLAN 1
MRI L-spine shows chronic multilevel disc disease w/ spinal stenosis and L > R neural foraminal narrowing; MRI C-spine shows severe spinal stenosis at C3-C4, possible cord edema; Neurology and Neurosurgery following; plan for surgical intervention (anterior cervical discectomy and fusion at C3-4, possibly corpectomy of C4, possibly), per Neurosurgery, who have been in discussions w/ Pt.'s family; ENT input noted

## 2018-12-03 NOTE — PROGRESS NOTE ADULT - PROBLEM SELECTOR PLAN 1
Patient presenting with reported LLE new weakness worsening over the past month. RLE chronic weakness due to history of stroke. MRI C-spine with spinal canal narrowing at C3-4 and C5-6 with possible cord edema  - neurosurgery consulted, recommended surgery. Plan for surgery 12/4.   - s/p 1 dose of solumedrol on admission in case of acute cord compression  - per NSGY no need for continued steroids  - palliative consulted given age and function  - Cardiac cleared for surgery, no further testing needed

## 2018-12-03 NOTE — PROGRESS NOTE ADULT - SUBJECTIVE AND OBJECTIVE BOX
OVERNIGHT EVENTS: no acute events overnight    SUBJECTIVE / INTERVAL HPI: Patient seen and examined at bedside. Pt continues to do well. He does not complain of weakness in his L arm or leg. He is awaiting surgery on Tuesday. He denies chest pain, shortness of breath, fever, chills, nausea, abdominal pain, headache, lightheadedness.    VITAL SIGNS:  Vital Signs Last 24 Hrs  T(C): 36.9 (03 Dec 2018 05:58), Max: 36.9 (02 Dec 2018 21:45)  T(F): 98.4 (03 Dec 2018 05:58), Max: 98.4 (02 Dec 2018 21:45)  HR: 66 (03 Dec 2018 05:58) (63 - 66)  BP: 141/75 (03 Dec 2018 05:58) (132/70 - 152/74)  BP(mean): --  RR: 17 (03 Dec 2018 05:58) (17 - 18)  SpO2: 97% (03 Dec 2018 05:58) (97% - 98%)    PHYSICAL EXAM:    General: NAD, lying comfortably in bed  HEENT: NC/AT; anicteric sclera; MMM  Neck: supple, no JVD  Cardiovascular: +S1/S2, RRR, no m/r/g  Respiratory: CTA B/L; no wheezes, rales, or rhonchi, no crackles  Gastrointestinal: soft, NT/ND; bowel sounds present x4  Extremities: warm, well perfused; no edema, clubbing or cyanosis  Vascular: 2+ radial, DP/PT pulses B/L  Neurological: AAOx2 to self and place, RUE limited ROM at shoulder, RLE 3/5 strength hip flexion, 3/5 knee extension, 3/5 dorsi and plantar flexion, LLE 4/5 hip flexion and knee extension, 5/5 dorsi and plantar flexion    MEDICATIONS:  MEDICATIONS  (STANDING):  atorvastatin 40 milliGRAM(s) Oral at bedtime  carvedilol 3.125 milliGRAM(s) Oral every 12 hours  cephalexin 500 milliGRAM(s) Oral every 6 hours  dextrose 5%. 1000 milliLiter(s) (50 mL/Hr) IV Continuous <Continuous>  dextrose 50% Injectable 12.5 Gram(s) IV Push once  enalapril 5 milliGRAM(s) Oral daily  ferrous    sulfate 325 milliGRAM(s) Oral daily  heparin  Infusion 1100 Unit(s)/Hr (11 mL/Hr) IV Continuous <Continuous>  insulin lispro (HumaLOG) corrective regimen sliding scale   SubCutaneous Before meals and at bedtime  multivitamin 1 Tablet(s) Oral daily  sodium chloride 0.9%. 1000 milliLiter(s) (60 mL/Hr) IV Continuous <Continuous>    MEDICATIONS  (PRN):  dextrose 40% Gel 15 Gram(s) Oral once PRN Blood Glucose LESS THAN 70 milliGRAM(s)/deciliter  glucagon  Injectable 1 milliGRAM(s) IntraMuscular once PRN Glucose LESS THAN 70 milligrams/deciliter      ALLERGIES:  Allergies    No Known Allergies    Intolerances        LABS:                        10.4   9.4   )-----------( 189      ( 03 Dec 2018 06:44 )             31.5     12-03    138  |  101  |  24<H>  ----------------------------<  101<H>  4.2   |  25  |  1.08    Ca    9.0      03 Dec 2018 06:44  Mg     2.0     12-03    TPro  7.2  /  Alb  3.0<L>  /  TBili  0.6  /  DBili  x   /  AST  20  /  ALT  17  /  AlkPhos  48  12-03    PTT - ( 03 Dec 2018 06:44 )  PTT:65.2 sec    CAPILLARY BLOOD GLUCOSE      POCT Blood Glucose.: 132 mg/dL (03 Dec 2018 08:07)      RADIOLOGY & ADDITIONAL TESTS: Reviewed.

## 2018-12-03 NOTE — PROGRESS NOTE ADULT - SUBJECTIVE AND OBJECTIVE BOX
Dx: cervical cord compression  Sx: Discectomy C3-C4, possible C4 corpectomy, C3-C5 fusion anterior  Surgeon: Dr. Phan      Labs:                       10.4   9.4   )-----------( 189      ( 03 Dec 2018 06:44 )             31.5   12-03    138  |  101  |  24<H>  ----------------------------<  101<H>  4.2   |  25  |  1.08    Ca    9.0      03 Dec 2018 06:44  Mg     2.0     12-03    TPro  7.2  /  Alb  3.0<L>  /  TBili  0.6  /  DBili  x   /  AST  20  /  ALT  17  /  AlkPhos  48  12-03      Exam:  Awake and alert, oriented x1-2 (name and hospital, however did not know name of hospital), NAD, coherent speech, following commands appropriately  PERRL, EOMI, face symmetric  MAEx4 with increased tone, UE 5/5 b/l, LLE 5/5, RLE 1/5 (baseline paresis)  SILT throughout  Normal reflexes throughout, no hyperreflexic signs    - Card clearance is obtained  - EKG 60BMP NSR  - Echo- EF 65%  - Hold heparin gtt at 1AM  - NPO, IVF at midnight  - MRSA swab - pending  - 2 pRBC on Hold for OR  - D/w Dr. Phan Dx: cervical cord compression  Sx: Discectomy C3-C4, possible C4 corpectomy, C3-C5 fusion anterior  Surgeon: Dr. Phan      Labs:                       10.4   9.4   )-----------( 189      ( 03 Dec 2018 06:44 )             31.5   12-03    138  |  101  |  24<H>  ----------------------------<  101<H>  4.2   |  25  |  1.08    Ca    9.0      03 Dec 2018 06:44  Mg     2.0     12-03    TPro  7.2  /  Alb  3.0<L>  /  TBili  0.6  /  DBili  x   /  AST  20  /  ALT  17  /  AlkPhos  48  12-03      Exam:  Awake and alert, oriented x1-2 (name and hospital, however did not know name of hospital), NAD, coherent speech, following commands appropriately  PERRL, EOMI, face symmetric  MAEx4 with increased tone, UE 5/5 b/l, LLE 5/5, RLE 1/5 (baseline paresis)  SILT throughout  Normal reflexes throughout, no hyperreflexic signs    - Consent signed by the patient, as well as Vendor Consent  - Card clearance is obtained  - EKG 60BMP NSR  - Echo- EF 65%  - Hold heparin gtt at 1AM  - NPO, IVF at midnight  - MRSA swab - pending  - 2 pRBC on Hold for OR  - D/w Dr. Phan Dx: cervical cord compression  Sx: Discectomy C3-C4, possible C4 corpectomy, C3-C5 fusion anterior  Surgeon: Dr. Phan      Labs:                       10.4   9.4   )-----------( 189      ( 03 Dec 2018 06:44 )             31.5   12-03    138  |  101  |  24<H>  ----------------------------<  101<H>  4.2   |  25  |  1.08    Ca    9.0      03 Dec 2018 06:44  Mg     2.0     12-03    TPro  7.2  /  Alb  3.0<L>  /  TBili  0.6  /  DBili  x   /  AST  20  /  ALT  17  /  AlkPhos  48  12-03      Exam:  Awake and alert, oriented x1-2 (name and hospital, however did not know name of hospital), NAD, coherent speech, following commands appropriately  PERRL, EOMI, face symmetric  MAEx4 with increased tone, UE 5/5 b/l, LLE 5/5, RLE 1/5 (baseline paresis)  SILT throughout  Normal reflexes throughout, no hyperreflexic signs    - Consent signed by the patient, as well as Vendor Consent  - Card clearance is obtained  - EKG 60BMP NSR  - Echo- EF 65%  - Hold heparin gtt at 7AM, repeat Coags 1 hour later  - NPO, IVF at midnight  - MRSA swab - pending  - 2 pRBC on Hold for OR  - D/w Dr. Phan

## 2018-12-03 NOTE — PROGRESS NOTE ADULT - SUBJECTIVE AND OBJECTIVE BOX
Chief Complaint/Reason for Consult: cad  INTERVAL HPI: periop cv eval nad no cp sob palp   	  MEDICATIONS:  carvedilol 3.125 milliGRAM(s) Oral every 12 hours  enalapril 5 milliGRAM(s) Oral daily    cephalexin 500 milliGRAM(s) Oral every 6 hours          atorvastatin 40 milliGRAM(s) Oral at bedtime  dextrose 40% Gel 15 Gram(s) Oral once PRN  dextrose 50% Injectable 12.5 Gram(s) IV Push once  glucagon  Injectable 1 milliGRAM(s) IntraMuscular once PRN  insulin lispro (HumaLOG) corrective regimen sliding scale   SubCutaneous Before meals and at bedtime    dextrose 5%. 1000 milliLiter(s) IV Continuous <Continuous>  ferrous    sulfate 325 milliGRAM(s) Oral daily  heparin  Infusion 1100 Unit(s)/Hr IV Continuous <Continuous>  multivitamin 1 Tablet(s) Oral daily      REVIEW OF SYSTEMS:  [x] As per HPI  CONSTITUTIONAL: No fever, weight loss, or fatigue  RESPIRATORY: No cough, wheezing, chills or hemoptysis; No Shortness of Breath  CARDIOVASCULAR: No chest pain, palpitations, dizziness, or leg swelling  GASTROINTESTINAL: No abdominal or epigastric pain. No nausea, vomiting, or hematemesis; No diarrhea or constipation. No melena or hematochezia.  MUSCULOSKELETAL: No joint pain or swelling; No muscle, back, or extremity pain  [x] All others negative	  [ ] Unable to obtain    PHYSICAL EXAM:  T(C): 36.8 (12-03-18 @ 09:12), Max: 36.9 (12-02-18 @ 21:45)  HR: 60 (12-03-18 @ 09:12) (60 - 66)  BP: 124/68 (12-03-18 @ 09:12) (124/68 - 152/74)  RR: 18 (12-03-18 @ 09:12) (17 - 18)  SpO2: 96% (12-03-18 @ 09:12) (96% - 98%)  Wt(kg): --  I&O's Summary    02 Dec 2018 07:01  -  03 Dec 2018 07:00  --------------------------------------------------------  IN: 242 mL / OUT: 700 mL / NET: -458 mL          Appearance: Normal	  HEENT:   Normal oral mucosa  Cardiovascular: Normal S1 S2, No JVD, No murmurs, No edema  Respiratory: Lungs clear to auscultation	  Gastrointestinal:  Soft, Non-tender, + BS	  Extremities: Normal range of motion, No clubbing, cyanosis or edema  Vascular: Peripheral pulses palpable 2+ bilaterally    TELEMETRY: 	    ECG:   	  RADIOLOGY:   CXR:  CT:  US:    CARDIAC TESTING:  Echocardiogram:  Catheterization:  Stress Test:      LABS:	 	    CARDIAC MARKERS:                                  10.4   9.4   )-----------( 189      ( 03 Dec 2018 06:44 )             31.5     12-03    138  |  101  |  24<H>  ----------------------------<  101<H>  4.2   |  25  |  1.08    Ca    9.0      03 Dec 2018 06:44  Mg     2.0     12-03    TPro  7.2  /  Alb  3.0<L>  /  TBili  0.6  /  DBili  x   /  AST  20  /  ALT  17  /  AlkPhos  48  12-03    proBNP:   Lipid Profile:   HgA1c:   TSH:     ASSESSMENT/PLAN: 	    #Pre op CV eval - cardiac optimized  on BB - continue periop  Crum CV Score 1.35% MACE (age, asa class 3, dependent)  Echo and EKG unremarkable  no further cardiac work up necessarry  proceed to surgery    #CAD- echo with preserved EF no MR mild impaired relaxation  ekg nsr non specific st changes  on BB and statin  hold ASA  on GDMT    #HTN - at goal,  continue   carvedilol 3.125 milliGRAM(s) Oral every 12 hours  enalapril 5 milliGRAM(s) Oral daily

## 2018-12-03 NOTE — PROGRESS NOTE ADULT - SUBJECTIVE AND OBJECTIVE BOX
Patient is a 83y old  Male who presents with a chief complaint of LE weakness s/p fall (03 Dec 2018 12:17)      INTERVAL HPI/OVERNIGHT EVENTS:    Pt. seen and examined at 11:15AM  Pt. has no complaints    Review of Systems: 12 point review of systems otherwise negative    MEDICATIONS  (STANDING):  atorvastatin 40 milliGRAM(s) Oral at bedtime  carvedilol 3.125 milliGRAM(s) Oral every 12 hours  cephalexin 500 milliGRAM(s) Oral every 6 hours  dextrose 5%. 1000 milliLiter(s) (50 mL/Hr) IV Continuous <Continuous>  dextrose 50% Injectable 12.5 Gram(s) IV Push once  enalapril 5 milliGRAM(s) Oral daily  ferrous    sulfate 325 milliGRAM(s) Oral daily  heparin  Infusion 1100 Unit(s)/Hr (11 mL/Hr) IV Continuous <Continuous>  insulin lispro (HumaLOG) corrective regimen sliding scale   SubCutaneous Before meals and at bedtime  multivitamin 1 Tablet(s) Oral daily    MEDICATIONS  (PRN):  dextrose 40% Gel 15 Gram(s) Oral once PRN Blood Glucose LESS THAN 70 milliGRAM(s)/deciliter  glucagon  Injectable 1 milliGRAM(s) IntraMuscular once PRN Glucose LESS THAN 70 milligrams/deciliter      Allergies    No Known Allergies    Intolerances          Vital Signs Last 24 Hrs  T(C): 36.8 (03 Dec 2018 09:12), Max: 36.9 (02 Dec 2018 21:45)  T(F): 98.3 (03 Dec 2018 09:12), Max: 98.4 (02 Dec 2018 21:45)  HR: 60 (03 Dec 2018 09:12) (60 - 66)  BP: 124/68 (03 Dec 2018 09:12) (124/68 - 152/74)  BP(mean): --  RR: 18 (03 Dec 2018 09:12) (17 - 18)  SpO2: 96% (03 Dec 2018 09:12) (96% - 98%)  CAPILLARY BLOOD GLUCOSE      POCT Blood Glucose.: 120 mg/dL (03 Dec 2018 12:14)  POCT Blood Glucose.: 132 mg/dL (03 Dec 2018 08:07)  POCT Blood Glucose.: 152 mg/dL (02 Dec 2018 22:01)  POCT Blood Glucose.: 134 mg/dL (02 Dec 2018 17:35)      12-02 @ 07:01  -  12-03 @ 07:00  --------------------------------------------------------  IN: 242 mL / OUT: 700 mL / NET: -458 mL        Physical Exam:  (at 11:15AM)  Daily     Daily   General: comfortable-appearing in NAD  Extremities: R foot w/out signs of cellulitis  Neuro:  AAOx2, forgetful; chronic R-sided weakness   LABS:                        10.4   9.4   )-----------( 189      ( 03 Dec 2018 06:44 )             31.5     12-03    138  |  101  |  24<H>  ----------------------------<  101<H>  4.2   |  25  |  1.08    Ca    9.0      03 Dec 2018 06:44  Mg     2.0     12-03    TPro  7.2  /  Alb  3.0<L>  /  TBili  0.6  /  DBili  x   /  AST  20  /  ALT  17  /  AlkPhos  48  12-03    PTT - ( 03 Dec 2018 06:44 )  PTT:65.2 sec        RADIOLOGY & ADDITIONAL TESTS:    ---------------------------------------------------------------------------  I personally reviewed: [  ]EKG   [  ]CXR    [  ] CT    [  ]Other  ---------------------------------------------------------------------------  PLEASE CHECK WHEN PRESENT:     [  ]Heart Failure     [  ] Acute     [  ] Acute on Chronic     [  ] Chronic  -------------------------------------------------------------------     [  ]Diastolic [HFpEF]     [  ]Systolic [HFrEF]     [  ]Combined [HFpEF & HFrEF]     [  ]Other:  -------------------------------------------------------------------  [  ]AGUSTÍN     [  ]ATN     [  ]Reneal Medullary Necrosis     [  ]Renal Cortical Necrosis     [  ]Other Pathological Lesions:    [  ]CKD 1  [  ]CKD 2  [  ]CKD 3  [  ]CKD 4  [  ]CKD 5  [  ]Other  -------------------------------------------------------------------  [  ]Other/Unspecified:    --------------------------------------------------------------------    Abdominal Nutritional Status  [  ]Malnutrition: See Nutrition Note  [  ]Cachexia  [  ]Other:   [  ]Supplement Ordered:  [  ]Morbid Obesity (BMI >=40]

## 2018-12-04 ENCOUNTER — APPOINTMENT (OUTPATIENT)
Dept: HEART AND VASCULAR | Facility: CLINIC | Age: 83
End: 2018-12-04

## 2018-12-04 LAB
ANION GAP SERPL CALC-SCNC: 14 MMOL/L — SIGNIFICANT CHANGE UP (ref 5–17)
ANION GAP SERPL CALC-SCNC: 9 MMOL/L — SIGNIFICANT CHANGE UP (ref 5–17)
APTT BLD: 31.6 SEC — SIGNIFICANT CHANGE UP (ref 27.5–36.3)
BLD GP AB SCN SERPL QL: NEGATIVE — SIGNIFICANT CHANGE UP
BUN SERPL-MCNC: 21 MG/DL — SIGNIFICANT CHANGE UP (ref 7–23)
BUN SERPL-MCNC: 22 MG/DL — SIGNIFICANT CHANGE UP (ref 7–23)
CALCIUM SERPL-MCNC: 9 MG/DL — SIGNIFICANT CHANGE UP (ref 8.4–10.5)
CALCIUM SERPL-MCNC: 9.2 MG/DL — SIGNIFICANT CHANGE UP (ref 8.4–10.5)
CHLORIDE SERPL-SCNC: 102 MMOL/L — SIGNIFICANT CHANGE UP (ref 96–108)
CHLORIDE SERPL-SCNC: 105 MMOL/L — SIGNIFICANT CHANGE UP (ref 96–108)
CO2 SERPL-SCNC: 22 MMOL/L — SIGNIFICANT CHANGE UP (ref 22–31)
CO2 SERPL-SCNC: 25 MMOL/L — SIGNIFICANT CHANGE UP (ref 22–31)
CREAT SERPL-MCNC: 0.94 MG/DL — SIGNIFICANT CHANGE UP (ref 0.5–1.3)
CREAT SERPL-MCNC: 1.03 MG/DL — SIGNIFICANT CHANGE UP (ref 0.5–1.3)
GLUCOSE BLDC GLUCOMTR-MCNC: 112 MG/DL — HIGH (ref 70–99)
GLUCOSE BLDC GLUCOMTR-MCNC: 87 MG/DL — SIGNIFICANT CHANGE UP (ref 70–99)
GLUCOSE BLDC GLUCOMTR-MCNC: 92 MG/DL — SIGNIFICANT CHANGE UP (ref 70–99)
GLUCOSE SERPL-MCNC: 132 MG/DL — HIGH (ref 70–99)
GLUCOSE SERPL-MCNC: 99 MG/DL — SIGNIFICANT CHANGE UP (ref 70–99)
HCT VFR BLD CALC: 30.5 % — LOW (ref 39–50)
HCT VFR BLD CALC: 32 % — LOW (ref 39–50)
HGB BLD-MCNC: 10.1 G/DL — LOW (ref 13–17)
HGB BLD-MCNC: 10.5 G/DL — LOW (ref 13–17)
INR BLD: 1.18 — HIGH (ref 0.88–1.16)
MAGNESIUM SERPL-MCNC: 2 MG/DL — SIGNIFICANT CHANGE UP (ref 1.6–2.6)
MCHC RBC-ENTMCNC: 30.3 PG — SIGNIFICANT CHANGE UP (ref 27–34)
MCHC RBC-ENTMCNC: 30.4 PG — SIGNIFICANT CHANGE UP (ref 27–34)
MCHC RBC-ENTMCNC: 32.8 G/DL — SIGNIFICANT CHANGE UP (ref 32–36)
MCHC RBC-ENTMCNC: 33.1 G/DL — SIGNIFICANT CHANGE UP (ref 32–36)
MCV RBC AUTO: 91.9 FL — SIGNIFICANT CHANGE UP (ref 80–100)
MCV RBC AUTO: 92.2 FL — SIGNIFICANT CHANGE UP (ref 80–100)
PHOSPHATE SERPL-MCNC: 3.1 MG/DL — SIGNIFICANT CHANGE UP (ref 2.5–4.5)
PLATELET # BLD AUTO: 181 K/UL — SIGNIFICANT CHANGE UP (ref 150–400)
PLATELET # BLD AUTO: 186 K/UL — SIGNIFICANT CHANGE UP (ref 150–400)
POTASSIUM SERPL-MCNC: 4.4 MMOL/L — SIGNIFICANT CHANGE UP (ref 3.5–5.3)
POTASSIUM SERPL-MCNC: 4.8 MMOL/L — SIGNIFICANT CHANGE UP (ref 3.5–5.3)
POTASSIUM SERPL-SCNC: 4.4 MMOL/L — SIGNIFICANT CHANGE UP (ref 3.5–5.3)
POTASSIUM SERPL-SCNC: 4.8 MMOL/L — SIGNIFICANT CHANGE UP (ref 3.5–5.3)
PROTHROM AB SERPL-ACNC: 13.4 SEC — HIGH (ref 10–12.9)
RBC # BLD: 3.32 M/UL — LOW (ref 4.2–5.8)
RBC # BLD: 3.47 M/UL — LOW (ref 4.2–5.8)
RBC # FLD: 14.3 % — SIGNIFICANT CHANGE UP (ref 10.3–16.9)
RBC # FLD: 14.7 % — SIGNIFICANT CHANGE UP (ref 10.3–16.9)
RH IG SCN BLD-IMP: POSITIVE — SIGNIFICANT CHANGE UP
SODIUM SERPL-SCNC: 138 MMOL/L — SIGNIFICANT CHANGE UP (ref 135–145)
SODIUM SERPL-SCNC: 139 MMOL/L — SIGNIFICANT CHANGE UP (ref 135–145)
WBC # BLD: 7.6 K/UL — SIGNIFICANT CHANGE UP (ref 3.8–10.5)
WBC # BLD: 9 K/UL — SIGNIFICANT CHANGE UP (ref 3.8–10.5)
WBC # FLD AUTO: 7.6 K/UL — SIGNIFICANT CHANGE UP (ref 3.8–10.5)
WBC # FLD AUTO: 9 K/UL — SIGNIFICANT CHANGE UP (ref 3.8–10.5)

## 2018-12-04 PROCEDURE — 22854 INSJ BIOMECHANICAL DEVICE: CPT | Mod: 80

## 2018-12-04 PROCEDURE — 22854 INSJ BIOMECHANICAL DEVICE: CPT

## 2018-12-04 PROCEDURE — 22845 INSERT SPINE FIXATION DEVICE: CPT | Mod: 80,59

## 2018-12-04 PROCEDURE — 93010 ELECTROCARDIOGRAM REPORT: CPT

## 2018-12-04 PROCEDURE — 63081 REMOVE VERT BODY DCMPRN CRVL: CPT | Mod: 62

## 2018-12-04 PROCEDURE — 22585 ARTHRD ANT NTRBD MIN DSC EA: CPT | Mod: 62

## 2018-12-04 PROCEDURE — 22554 ARTHRD ANT NTRBD MIN DSC CRV: CPT | Mod: 62

## 2018-12-04 PROCEDURE — 99233 SBSQ HOSP IP/OBS HIGH 50: CPT

## 2018-12-04 PROCEDURE — 22845 INSERT SPINE FIXATION DEVICE: CPT | Mod: 59

## 2018-12-04 PROCEDURE — 99232 SBSQ HOSP IP/OBS MODERATE 35: CPT

## 2018-12-04 RX ORDER — FERROUS SULFATE 325(65) MG
325 TABLET ORAL DAILY
Qty: 0 | Refills: 0 | Status: DISCONTINUED | OUTPATIENT
Start: 2018-12-04 | End: 2018-12-07

## 2018-12-04 RX ORDER — DEXTROSE 50 % IN WATER 50 %
12.5 SYRINGE (ML) INTRAVENOUS ONCE
Qty: 0 | Refills: 0 | Status: DISCONTINUED | OUTPATIENT
Start: 2018-12-04 | End: 2018-12-07

## 2018-12-04 RX ORDER — SODIUM CHLORIDE 9 MG/ML
1000 INJECTION INTRAMUSCULAR; INTRAVENOUS; SUBCUTANEOUS
Qty: 0 | Refills: 0 | Status: DISCONTINUED | OUTPATIENT
Start: 2018-12-04 | End: 2018-12-05

## 2018-12-04 RX ORDER — HYDRALAZINE HCL 50 MG
10 TABLET ORAL ONCE
Qty: 0 | Refills: 0 | Status: COMPLETED | OUTPATIENT
Start: 2018-12-04 | End: 2018-12-04

## 2018-12-04 RX ORDER — ATORVASTATIN CALCIUM 80 MG/1
40 TABLET, FILM COATED ORAL AT BEDTIME
Qty: 0 | Refills: 0 | Status: DISCONTINUED | OUTPATIENT
Start: 2018-12-04 | End: 2018-12-07

## 2018-12-04 RX ORDER — CARVEDILOL PHOSPHATE 80 MG/1
3.12 CAPSULE, EXTENDED RELEASE ORAL EVERY 12 HOURS
Qty: 0 | Refills: 0 | Status: DISCONTINUED | OUTPATIENT
Start: 2018-12-04 | End: 2018-12-07

## 2018-12-04 RX ORDER — DEXTROSE 50 % IN WATER 50 %
15 SYRINGE (ML) INTRAVENOUS ONCE
Qty: 0 | Refills: 0 | Status: DISCONTINUED | OUTPATIENT
Start: 2018-12-04 | End: 2018-12-07

## 2018-12-04 RX ORDER — SODIUM CHLORIDE 9 MG/ML
1000 INJECTION, SOLUTION INTRAVENOUS
Qty: 0 | Refills: 0 | Status: DISCONTINUED | OUTPATIENT
Start: 2018-12-04 | End: 2018-12-07

## 2018-12-04 RX ORDER — SODIUM CHLORIDE 9 MG/ML
1000 INJECTION INTRAMUSCULAR; INTRAVENOUS; SUBCUTANEOUS
Qty: 0 | Refills: 0 | Status: DISCONTINUED | OUTPATIENT
Start: 2018-12-04 | End: 2018-12-04

## 2018-12-04 RX ORDER — GLUCAGON INJECTION, SOLUTION 0.5 MG/.1ML
1 INJECTION, SOLUTION SUBCUTANEOUS ONCE
Qty: 0 | Refills: 0 | Status: DISCONTINUED | OUTPATIENT
Start: 2018-12-04 | End: 2018-12-07

## 2018-12-04 RX ORDER — HYDRALAZINE HCL 50 MG
10 TABLET ORAL
Qty: 0 | Refills: 0 | Status: DISCONTINUED | OUTPATIENT
Start: 2018-12-04 | End: 2018-12-07

## 2018-12-04 RX ORDER — CEFAZOLIN SODIUM 1 G
2000 VIAL (EA) INJECTION EVERY 8 HOURS
Qty: 0 | Refills: 0 | Status: COMPLETED | OUTPATIENT
Start: 2018-12-04 | End: 2018-12-05

## 2018-12-04 RX ORDER — INSULIN LISPRO 100/ML
VIAL (ML) SUBCUTANEOUS
Qty: 0 | Refills: 0 | Status: DISCONTINUED | OUTPATIENT
Start: 2018-12-04 | End: 2018-12-07

## 2018-12-04 RX ADMIN — Medication 5 MILLIGRAM(S): at 06:12

## 2018-12-04 RX ADMIN — CARVEDILOL PHOSPHATE 3.12 MILLIGRAM(S): 80 CAPSULE, EXTENDED RELEASE ORAL at 05:59

## 2018-12-04 RX ADMIN — Medication 500 MILLIGRAM(S): at 00:53

## 2018-12-04 RX ADMIN — Medication 325 MILLIGRAM(S): at 11:53

## 2018-12-04 RX ADMIN — Medication 1 TABLET(S): at 11:53

## 2018-12-04 RX ADMIN — SODIUM CHLORIDE 100 MILLILITER(S): 9 INJECTION INTRAMUSCULAR; INTRAVENOUS; SUBCUTANEOUS at 08:24

## 2018-12-04 RX ADMIN — Medication 10 MILLIGRAM(S): at 21:52

## 2018-12-04 RX ADMIN — Medication 100 MILLIGRAM(S): at 23:25

## 2018-12-04 RX ADMIN — Medication 500 MILLIGRAM(S): at 05:59

## 2018-12-04 NOTE — PROGRESS NOTE ADULT - PROBLEM SELECTOR PLAN 5
-On coumadin given his past strokes at home  - per neurology, patient should continue to be anticoagulated given past stroke  - heparin gtt, goal PTT 58-99  - holding home coumadin
c/w heparin gtt
-On coumadin given his past strokes at home  - per neurology, patient should continue to be anticoagulated given past stroke  - heparin gtt, goal PTT 58-99  - holding home coumadin
-On coumadin given his past strokes at home  - per neurology, patient should continue to be anticoagulated given past stroke  - heparin gtt, goal PTT 58-99  - holding home coumadin
Residual RLE weakness, RUE weakness. Formerly on coumadin however unable to find record of coumadin prescription after September, possibly discontinued due to falls? INR 1.73, so subtherapeutic if patient taking coumadin. On coumadin given his past strokes.   - per neurology, patient should continue to be anticoagulated given past stroke  - heparin gtt, goal PTT 58-99  - holding home coumadin
Residual RLE weakness, RUE weakness. Formerly on coumadin however unable to find record of coumadin prescription after September, possibly discontinued due to falls? INR 1.73, so subtherapeutic if patient taking coumadin. On coumadin given his past strokes.   - per neurology, patient should continue to be anticoagulated given past stroke  - heparin gtt, goal PTT 59-99  - holding home coumadin
cont. statin, beta-blocker; holding ASA in case of possible surgical intervention, as above; troponin mildly-elevated, but down-trending -- no CP and EKG non-ischemic
cont. statin, beta-blocker; holding ASA in case of possible surgical intervention, as above; troponin mildly-elevated, but down-trending -- no CP and EKG non-ischemic; Cardiology consulted for pre-op clearance
cont. statin, beta-blocker; holding ASA perioperatively; as above; Cardiology following
cont. statin, beta-blocker; holding ASA perioperatively; as above; Cardiology following
cont. statin, beta-blocker; holding ASA perioperatively; as above; troponin mildly-elevated, but down-trending -- no CP and EKG non-ischemic; Cardiology following
pt on heparin due to the possibility of upcoming surgery.
Residual RLE weakness, RUE weakness. Formerly on coumadin however unable to find record of coumadin prescription after September, possibly discontinued due to falls? INR 1.73, so subtherapeutic if patient taking coumadin. On coumadin given his past strokes.   - per neurology, patient should continue to be anticoagulated given past stroke  - heparin gtt, goal PTT 58-99  - holding home coumadin
pt on heparin due to the possibility of upcoming surgery.

## 2018-12-04 NOTE — PROGRESS NOTE ADULT - SUBJECTIVE AND OBJECTIVE BOX
Patient is a 83y old  Male who presents with a chief complaint of LE weakness s/p fall (04 Dec 2018 10:59)      INTERVAL HPI/OVERNIGHT EVENTS:    Pt. seen and examined at 8AM  Pt. has no complaints; denies CP, SOB    Review of Systems: 12 point review of systems otherwise negative    MEDICATIONS  (STANDING):  atorvastatin 40 milliGRAM(s) Oral at bedtime  carvedilol 3.125 milliGRAM(s) Oral every 12 hours  dextrose 5%. 1000 milliLiter(s) (50 mL/Hr) IV Continuous <Continuous>  dextrose 50% Injectable 12.5 Gram(s) IV Push once  enalapril 5 milliGRAM(s) Oral daily  ferrous    sulfate 325 milliGRAM(s) Oral daily  insulin lispro (HumaLOG) corrective regimen sliding scale   SubCutaneous Before meals and at bedtime  multivitamin 1 Tablet(s) Oral daily  sodium chloride 0.9%. 1000 milliLiter(s) (100 mL/Hr) IV Continuous <Continuous>    MEDICATIONS  (PRN):  dextrose 40% Gel 15 Gram(s) Oral once PRN Blood Glucose LESS THAN 70 milliGRAM(s)/deciliter  glucagon  Injectable 1 milliGRAM(s) IntraMuscular once PRN Glucose LESS THAN 70 milligrams/deciliter      Allergies    No Known Allergies    Intolerances          Vital Signs Last 24 Hrs  T(C): 36.8 (04 Dec 2018 08:38), Max: 37.1 (03 Dec 2018 23:27)  T(F): 98.3 (04 Dec 2018 08:38), Max: 98.7 (03 Dec 2018 23:27)  HR: 65 (04 Dec 2018 08:38) (64 - 70)  BP: 138/66 (04 Dec 2018 08:38) (133/70 - 149/81)  BP(mean): --  RR: 18 (04 Dec 2018 08:38) (18 - 20)  SpO2: 96% (04 Dec 2018 08:38) (96% - 98%)  CAPILLARY BLOOD GLUCOSE      POCT Blood Glucose.: 87 mg/dL (04 Dec 2018 12:08)  POCT Blood Glucose.: 92 mg/dL (04 Dec 2018 08:05)  POCT Blood Glucose.: 128 mg/dL (03 Dec 2018 21:47)  POCT Blood Glucose.: 104 mg/dL (03 Dec 2018 17:37)      12-03 @ 07:01  -  12-04 @ 07:00  --------------------------------------------------------  IN: 0 mL / OUT: 400 mL / NET: -400 mL    12-04 @ 07:01  -  12-04 @ 13:10  --------------------------------------------------------  IN: 0 mL / OUT: 400 mL / NET: -400 mL        Physical Exam:  (at 8AM)  Daily Height in cm: 180.34 (04 Dec 2018 10:27)    Daily   General:  comfortable-appearing in NAD  HEENT:  MMM  CV:  RRR, no JVD  Lungs:  CTA B/L  Abdomen:  soft NT ND  Extremities:  R foot cellulitis resolved  Skin:  WWP  :  No Angel  Neuro:  AAOx2, chronic R-sided weakness    LABS:                        10.5   9.0   )-----------( 186      ( 04 Dec 2018 06:26 )             32.0     12-04    139  |  105  |  22  ----------------------------<  99  4.4   |  25  |  1.03    Ca    9.2      04 Dec 2018 06:26  Phos  3.1     12-04  Mg     2.0     12-04    TPro  7.2  /  Alb  3.0<L>  /  TBili  0.6  /  DBili  x   /  AST  20  /  ALT  17  /  AlkPhos  48  12-03    PT/INR - ( 04 Dec 2018 10:22 )   PT: 13.4 sec;   INR: 1.18          PTT - ( 04 Dec 2018 10:22 )  PTT:31.6 sec        RADIOLOGY & ADDITIONAL TESTS:    ---------------------------------------------------------------------------  I personally reviewed: [  ]EKG   [  ]CXR    [  ] CT    [  ]Other  ---------------------------------------------------------------------------  PLEASE CHECK WHEN PRESENT:     [  ]Heart Failure     [  ] Acute     [  ] Acute on Chronic     [  ] Chronic  -------------------------------------------------------------------     [  ]Diastolic [HFpEF]     [  ]Systolic [HFrEF]     [  ]Combined [HFpEF & HFrEF]     [  ]Other:  -------------------------------------------------------------------  [  ]AGUSTÍN     [  ]ATN     [  ]Reneal Medullary Necrosis     [  ]Renal Cortical Necrosis     [  ]Other Pathological Lesions:    [  ]CKD 1  [  ]CKD 2  [  ]CKD 3  [  ]CKD 4  [  ]CKD 5  [  ]Other  -------------------------------------------------------------------  [  ]Other/Unspecified:    --------------------------------------------------------------------    Abdominal Nutritional Status  [  ]Malnutrition: See Nutrition Note  [  ]Cachexia  [  ]Other:   [  ]Supplement Ordered:  [  ]Morbid Obesity (BMI >=40]

## 2018-12-04 NOTE — PROGRESS NOTE ADULT - PROBLEM SELECTOR PROBLEM 5
Cerebrovascular accident (CVA), unspecified mechanism
Atrial fibrillation, unspecified type
Cerebrovascular accident (CVA), unspecified mechanism
Atrial fibrillation, unspecified type
Cerebrovascular accident (CVA), unspecified mechanism
Coronary artery disease, angina presence unspecified, unspecified vessel or lesion type, unspecified whether native or transplanted heart
Cerebrovascular accident (CVA), unspecified mechanism

## 2018-12-04 NOTE — PROGRESS NOTE ADULT - PROBLEM SELECTOR PLAN 8
Holding home ASA in the setting of surgical intervention.
Holding home ASA in the setting of surgical intervention.
Cardiology consulted, Pt. optimized; can proceed to OR without need for further cardiac testing, per Cardiology
Holding home ASA in the setting of surgical intervention.
likely d/t steroid dose, but tx'ing cellulitis as above
likely d/t steroid dose, but tx'ing cellulitis as above; WBC normalizing, monitor CBC
per Cardiology, Pt. optimized for surgery; can proceed to OR without need for further cardiac testing
per Cardiology, Pt. optimized; can proceed to OR without need for further cardiac testing
Holding home ASA in the setting of surgical intervention.

## 2018-12-04 NOTE — PROGRESS NOTE ADULT - PROBLEM SELECTOR PROBLEM 2
Cellulitis of foot

## 2018-12-04 NOTE — PROGRESS NOTE ADULT - PROBLEM SELECTOR PROBLEM 8
Stenosis of carotid artery, unspecified laterality
Stenosis of carotid artery, unspecified laterality
Other elevated white blood cell (WBC) count
Other elevated white blood cell (WBC) count
Pre-operative cardiovascular examination
Stenosis of carotid artery, unspecified laterality

## 2018-12-04 NOTE — PROGRESS NOTE ADULT - PROBLEM SELECTOR PLAN 6
-DMII, glucose wnl on BMP. N  - ISS for now  - A1C 5.8%
c/w current regimen
-DMII, glucose wnl on BMP. N  - ISS for now  - A1C 5.8%
-DMII, glucose wnl on BMP. N  - ISS for now  - A1C 5.8%
DMII, glucose wnl on BMP. No DMII medications documented on allscripts.   - ISS for now  - A1C 5.8%
DMII, glucose wnl on BMP. No DMII medications documented on allscripts.   - ISS while inpatient.   - likely to have high FS in the setting of steroids as above  - f/u HbA1c in AM
Support provided to patient and family. Patient to have access to supportive services during rest of hospital stay as the patient/family deemed necessary ie. Chaplaincy, Massage therapy, Music therapy, Patient and family supportive services, Palliative SW, etc.
cont. ACE-I, low-salt diet
cont. EVERARDO, diabetic diet
DMII, glucose wnl on BMP. No DMII medications documented on allscripts.   - ISS for now  - A1C 5.8%
Support provided to patient and family. Patient to have access to supportive services during rest of hospital stay as the patient/family deemed necessary ie. Chaplaincy, Massage therapy, Music therapy, Patient and family supportive services, Palliative SW, etc.

## 2018-12-04 NOTE — PROGRESS NOTE ADULT - PROBLEM SELECTOR PROBLEM 1
Weakness of both lower extremities
Left leg weakness
Weakness of both lower extremities

## 2018-12-04 NOTE — PROGRESS NOTE ADULT - SUBJECTIVE AND OBJECTIVE BOX
OVERNIGHT EVENTS: no acute events overnight    SUBJECTIVE / INTERVAL HPI: Patient seen and examined at bedside. Pt doing well this morning. Pt ready for surgery. Pt denies chest pain, shortness of breath, nausea, abdominal pain, headache, fever, chills.    VITAL SIGNS:  Vital Signs Last 24 Hrs  T(C): 36.7 (04 Dec 2018 05:08), Max: 37.1 (03 Dec 2018 23:27)  T(F): 98 (04 Dec 2018 05:08), Max: 98.7 (03 Dec 2018 23:27)  HR: 66 (04 Dec 2018 05:08) (60 - 70)  BP: 149/81 (04 Dec 2018 05:08) (124/68 - 149/81)  BP(mean): --  RR: 18 (04 Dec 2018 05:08) (18 - 20)  SpO2: 98% (04 Dec 2018 05:08) (96% - 98%)    PHYSICAL EXAM:    General: NAD, lying comfortable in bed  HEENT: NC/AT; PERRL, anicteric sclera; MMM  Neck: supple, no JVD  Cardiovascular: +S1/S2, RRR, no murmurs, rubs, or gallops  Respiratory: CTA B/L; no W/R/R, no crackles  Gastrointestinal: soft, NT/ND; bowel sounds x4  Extremities: warm, well perfused; no edema, clubbing or cyanosis  Vascular: 2+ radial, DP/PT pulses B/L  Neurological: AAOx3; RLE 1/5 strength (chronic from previous CVA), LLE 5/5 strength, UE 5/5 strength     MEDICATIONS:  MEDICATIONS  (STANDING):  atorvastatin 40 milliGRAM(s) Oral at bedtime  carvedilol 3.125 milliGRAM(s) Oral every 12 hours  dextrose 5%. 1000 milliLiter(s) (50 mL/Hr) IV Continuous <Continuous>  dextrose 50% Injectable 12.5 Gram(s) IV Push once  enalapril 5 milliGRAM(s) Oral daily  ferrous    sulfate 325 milliGRAM(s) Oral daily  insulin lispro (HumaLOG) corrective regimen sliding scale   SubCutaneous Before meals and at bedtime  multivitamin 1 Tablet(s) Oral daily  sodium chloride 0.9%. 1000 milliLiter(s) (100 mL/Hr) IV Continuous <Continuous>    MEDICATIONS  (PRN):  dextrose 40% Gel 15 Gram(s) Oral once PRN Blood Glucose LESS THAN 70 milliGRAM(s)/deciliter  glucagon  Injectable 1 milliGRAM(s) IntraMuscular once PRN Glucose LESS THAN 70 milligrams/deciliter      ALLERGIES:  Allergies    No Known Allergies    Intolerances        LABS:                        10.5   9.0   )-----------( 186      ( 04 Dec 2018 06:26 )             32.0     12-04    139  |  105  |  22  ----------------------------<  99  4.4   |  25  |  1.03    Ca    9.2      04 Dec 2018 06:26  Phos  3.1     12-04  Mg     2.0     12-04    TPro  7.2  /  Alb  3.0<L>  /  TBili  0.6  /  DBili  x   /  AST  20  /  ALT  17  /  AlkPhos  48  12-03    PTT - ( 03 Dec 2018 06:44 )  PTT:65.2 sec    CAPILLARY BLOOD GLUCOSE      POCT Blood Glucose.: 92 mg/dL (04 Dec 2018 08:05)      RADIOLOGY & ADDITIONAL TESTS: Reviewed.

## 2018-12-04 NOTE — PROGRESS NOTE ADULT - PROBLEM SELECTOR PLAN 2
Incidentally found to have a mild RLE non-purulent cellulitis. No systemic signs/symptoms. Given ancef in ED.  - c/w keflex 500 mg 4 times/day, initially d/c'd but now restarted  - RLE doppler negative for DVT in ED  - XR with soft tissue edema but no fracture    #leukocytosis; likely 2/2 steroid use vs. less likely from cellulitis  - continue to monitor    #Elevated troponin- Trop stable elevation between 0.05 and 0.06, peaked 0.06. CKMB negative. EKG unchanged from previous with nonspecific wave abnormality. Patient denies CP.  - no need for further trending
Incidentally found to have a mild RLE non-purulent cellulitis. No systemic signs/symptoms. Given ancef in ED.  - c/w keflex 500 mg 4 times/day, initially d/c'd but now restarted  - RLE doppler negative for DVT in ED  - XR with soft tissue edema but no fracture    #leukocytosis; likely 2/2 steroid use vs. less likely from cellulitis. Now improving.   - continue to monitor    #Elevated troponin- Trop stable elevation between 0.05 and 0.06, peaked 0.06. CKMB negative. EKG unchanged from previous with nonspecific wave abnormality. Patient denies CP.  - no need for further trending
Incidentally found to have a mild RLE non-purulent cellulitis. No systemic signs/symptoms. Given ancef in ED.  - c/w keflex 500 mg 4 times/day, initially d/c'd but now restarted (day 5 of antibiotics so far)  - RLE doppler negative for DVT in ED  - XR with soft tissue edema but no fracture    #leukocytosis; likely 2/2 steroid use vs. less likely from cellulitis. Now improving.   - continue to monitor    #Elevated troponin- Trop stable elevation between 0.05 and 0.06, peaked 0.06. CKMB negative. EKG unchanged from previous with nonspecific wave abnormality. Patient denies CP.  - no need for further trending
Incidentally found to have a mild RLE non-purulent cellulitis. No systemic signs/symptoms. Given ancef in ED.  - completed keflex 500 mg 4 times/day, initially d/c'd but now restarted (day 7 of antibiotics so far)  - RLE doppler negative for DVT in ED  - XR with soft tissue edema but no fracture    #leukocytosis; likely 2/2 steroid use vs. less likely from cellulitis. Now improving.   - continue to monitor    #Elevated troponin- Trop stable elevation between 0.05 and 0.06, peaked 0.06. CKMB negative. EKG unchanged from previous with nonspecific wave abnormality. Patient denies CP.  - no need for further trending
R foot; cont. Keflex (day #2/at least 5), elevate leg, f/u cultures
R foot; resolved on Keflex
R foot; resolved s/p course of Keflex
R foot; resolving; non-purulent; cont. Keflex (day #4/5), elevate leg, f/u cultures
c/w ancef
improving with Abx.  Remains mildly edematous on the ventral aspect
resolving; R foot; non-purulent; cont. Keflex (day #3/at least 5), elevate leg, f/u cultures
Incidentally found to have a mild RLE non-purulent cellulitis. No systemic signs/symptoms. Given ancef in ED.  - c/w keflex 500 mg 4 times/day, initially d/c'd but now restarted  - RLE doppler negative for DVT in ED  - XR with soft tissue edema but no fracture    #leukocytosis; likely 2/2 steroid use vs. less likely from cellulitis. Now improving.   - continue to monitor    #Elevated troponin- Trop stable elevation between 0.05 and 0.06, peaked 0.06. CKMB negative. EKG unchanged from previous with nonspecific wave abnormality. Patient denies CP.  - no need for further trending
improving with Abx.  Remains mildly edematous on the ventral aspect
Incidentally found to have a mild RLE non-purulent cellulitis. No systemic signs/symptoms. Given ancef in ED.  - c/w keflex 500 mg 4 times/day, initially d/c'd but now restarted (day 7 of antibiotics so far)  - RLE doppler negative for DVT in ED  - XR with soft tissue edema but no fracture    #leukocytosis; likely 2/2 steroid use vs. less likely from cellulitis. Now improving.   - continue to monitor    #Elevated troponin- Trop stable elevation between 0.05 and 0.06, peaked 0.06. CKMB negative. EKG unchanged from previous with nonspecific wave abnormality. Patient denies CP.  - no need for further trending

## 2018-12-04 NOTE — PROGRESS NOTE ADULT - SUBJECTIVE AND OBJECTIVE BOX
NEUROSURGERY POST OP NOTE:    POD# 0 s/p Anterior cervical corpectomy C4, discectomy and fusion C3-C5    S: Patient denies any significant pain. Denies any extremity numbness.      T(C): 36.1 (12-04-18 @ 20:39), Max: 37.1 (12-03-18 @ 23:27)  HR: 70 (12-04-18 @ 22:09) (56 - 76)  BP: 140/62 (12-04-18 @ 22:09) (133/70 - 223/98)  RR: 17 (12-04-18 @ 22:09) (10 - 19)  SpO2: 100% (12-04-18 @ 22:09) (91% - 100%)      12-03-18 @ 07:01  -  12-04-18 @ 07:00  --------------------------------------------------------  IN: 0 mL / OUT: 400 mL / NET: -400 mL    12-04-18 @ 07:01  -  12-04-18 @ 22:28  --------------------------------------------------------  IN: 200 mL / OUT: 910 mL / NET: -710 mL        atorvastatin 40 milliGRAM(s) Oral at bedtime  carvedilol 3.125 milliGRAM(s) Oral every 12 hours  ceFAZolin   IVPB 2000 milliGRAM(s) IV Intermittent every 8 hours  dextrose 40% Gel 15 Gram(s) Oral once PRN  dextrose 5%. 1000 milliLiter(s) IV Continuous <Continuous>  dextrose 50% Injectable 12.5 Gram(s) IV Push once  enalapril 5 milliGRAM(s) Oral daily  ferrous    sulfate 325 milliGRAM(s) Oral daily  glucagon  Injectable 1 milliGRAM(s) IntraMuscular once PRN  hydrALAZINE Injectable 10 milliGRAM(s) IV Push every 2 hours PRN  insulin lispro (HumaLOG) corrective regimen sliding scale   SubCutaneous Before meals and at bedtime  multivitamin 1 Tablet(s) Oral daily  sodium chloride 0.9%. 1000 milliLiter(s) IV Continuous <Continuous>      Labs:                        10.1   7.6   )-----------( 181      ( 04 Dec 2018 20:52 )             30.5       138  |  102  |  21  ----------------------------<  132<H>  4.8   |  22  |  0.94    Ca    9.0      04 Dec 2018 20:52  Phos  3.1     12-04  Mg     2.0     12-04    TPro  7.2  /  Alb  3.0<L>  /  TBili  0.6  /  DBili  x   /  AST  20  /  ALT  17  /  AlkPhos  48  12-03      Exam: Gen: NAD, AAOx2  HEENT: PERRL. EOMI. NC/AT.  Neck: Right anterior incision C/D/I, +drain, +soft collar,  Lungs: Clear b/l  Heart: S1, S2. NSR.  Abd: Soft, hypoactive BS, nontender  Exts: Pulses 2+ throughout,   Neuro: CNs II-XII intact. Sensation to LT intact throughout. Right hemiparesis (baseline) - RUE 2/5 proximal w/ ridigity, 4+/5 , RLE 1/5 proximal, 2/5 dorsiflexion, 3/5 plantarflexion. Left UE/LE 5/5 throughout. Following commands. Speech clear.      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      Plan:  -Transfer to Neurosurgery, Dr. Phan, telemetry,  -PT/OT evaluation,  -Pain meds PRN,  -Soft collar for comfort,  -Post-op drain, monitor I&Os,  -CT cervical spine post-op when tolerated,  -AM labs,  -Continue home medications and ISS as ordered,  -D/w Dr. Phan

## 2018-12-04 NOTE — PROGRESS NOTE ADULT - PROBLEM SELECTOR PROBLEM 4
Falls, initial encounter
Cervical stenosis of spine
Dementia without behavioral disturbance, unspecified dementia type
Falls, initial encounter
Cervical stenosis of spine
Cervical stenosis of spine

## 2018-12-04 NOTE — PROGRESS NOTE ADULT - PROBLEM SELECTOR PLAN 10
1) PCP Contacted on Admission: (Y/N) --> Name & Phone #: N. PMD Dr. Olmedo  [phone]   2) Date of Contact with PCP:  3) PCP Contacted at Discharge: (Y/N, N/A)  4) Summary of Handoff Given to PCP:   5) Post-Discharge Appointment Date and Location:
1) PCP Contacted on Admission: (Y/N) --> Name & Phone #: N. PMD Dr. Olmedo  [phone]   2) Date of Contact with PCP:  3) PCP Contacted at Discharge: (Y/N, N/A)  4) Summary of Handoff Given to PCP:   5) Post-Discharge Appointment Date and Location:
1) PCP Contacted on Admission: (Y/N) --> Name & Phone #: N. PMD Dr. Olmedo  [phone]   2) Date of Contact with PCP:  3) PCP Contacted at Discharge: (Y/N, N/A)  4) Summary of Handoff Given to PCP:   5) Post-Discharge Appointment Date and Location:
1) PCP Contacted on Admission: (Y/N) --> Name & Phone #: N. PMD Dr. Olmedo  [phone]   2) Date of Contact with PCP:  3) PCP Contacted at Discharge: (Y/N, N/A)  4) Summary of Handoff Given to PCP:   5) Post-Discharge Appointment Date and Location:
1) PCP Contacted on Admission: (Y/N) --> Name & Phone #: N. PMD Dr. Olmedo  [phone] Not contacted, late admission.   2) Date of Contact with PCP:  3) PCP Contacted at Discharge: (Y/N, N/A)  4) Summary of Handoff Given to PCP:   5) Post-Discharge Appointment Date and Location:
1) PCP Contacted on Admission: (Y/N) --> Name & Phone #: N. PMD Dr. Olmedo  [phone]   2) Date of Contact with PCP:  3) PCP Contacted at Discharge: (Y/N, N/A)  4) Summary of Handoff Given to PCP:   5) Post-Discharge Appointment Date and Location:
1) PCP Contacted on Admission: (Y/N) --> Name & Phone #: N. PMD Dr. Olmeod  [phone]   2) Date of Contact with PCP:  3) PCP Contacted at Discharge: (Y/N, N/A)  4) Summary of Handoff Given to PCP:   5) Post-Discharge Appointment Date and Location:

## 2018-12-04 NOTE — PROGRESS NOTE ADULT - PROBLEM SELECTOR PROBLEM 3
Cervical stenosis of spine
Cervical stenosis of spine
Falls, initial encounter
History of CVA (cerebrovascular accident)
Falls, initial encounter
Falls, initial encounter

## 2018-12-04 NOTE — PROGRESS NOTE ADULT - SUBJECTIVE AND OBJECTIVE BOX
Chief Complaint/Reason for Consult: cad  INTERVAL HPI: periop cv eval nad no cp sob palp   	  MEDICATIONS:  carvedilol 3.125 milliGRAM(s) Oral every 12 hours  enalapril 5 milliGRAM(s) Oral daily            atorvastatin 40 milliGRAM(s) Oral at bedtime  dextrose 40% Gel 15 Gram(s) Oral once PRN  dextrose 50% Injectable 12.5 Gram(s) IV Push once  glucagon  Injectable 1 milliGRAM(s) IntraMuscular once PRN  insulin lispro (HumaLOG) corrective regimen sliding scale   SubCutaneous Before meals and at bedtime    dextrose 5%. 1000 milliLiter(s) IV Continuous <Continuous>  ferrous    sulfate 325 milliGRAM(s) Oral daily  multivitamin 1 Tablet(s) Oral daily  sodium chloride 0.9%. 1000 milliLiter(s) IV Continuous <Continuous>      REVIEW OF SYSTEMS:  [x] As per HPI  CONSTITUTIONAL: No fever, weight loss, or fatigue  RESPIRATORY: No cough, wheezing, chills or hemoptysis; No Shortness of Breath  CARDIOVASCULAR: No chest pain, palpitations, dizziness, or leg swelling  GASTROINTESTINAL: No abdominal or epigastric pain. No nausea, vomiting, or hematemesis; No diarrhea or constipation. No melena or hematochezia.  MUSCULOSKELETAL: No joint pain or swelling; No muscle, back, or extremity pain  [x] All others negative	  [ ] Unable to obtain    PHYSICAL EXAM:  T(C): 36.8 (12-04-18 @ 08:38), Max: 37.1 (12-03-18 @ 23:27)  HR: 65 (12-04-18 @ 08:38) (64 - 70)  BP: 138/66 (12-04-18 @ 08:38) (133/70 - 149/81)  RR: 18 (12-04-18 @ 08:38) (18 - 20)  SpO2: 96% (12-04-18 @ 08:38) (96% - 98%)  Wt(kg): --  I&O's Summary    03 Dec 2018 07:01  -  04 Dec 2018 07:00  --------------------------------------------------------  IN: 0 mL / OUT: 400 mL / NET: -400 mL    04 Dec 2018 07:01  -  04 Dec 2018 10:59  --------------------------------------------------------  IN: 0 mL / OUT: 400 mL / NET: -400 mL      Height (cm): 180.34 (12-04 @ 10:27)  Weight (kg): 80 (12-04 @ 10:27)  BMI (kg/m2): 24.6 (12-04 @ 10:27)  BSA (m2): 2 (12-04 @ 10:27)    Appearance: Normal	  HEENT:   Normal oral mucosa  Cardiovascular: Normal S1 S2, No JVD, No murmurs, No edema  Respiratory: Lungs clear to auscultation	  Gastrointestinal:  Soft, Non-tender, + BS	  Extremities: Normal range of motion, No clubbing, cyanosis or edema  Vascular: Peripheral pulses palpable 2+ bilaterally    TELEMETRY: 	    ECG:   	  RADIOLOGY:   CXR:  CT:  US:    CARDIAC TESTING:  Echocardiogram:  Catheterization:  Stress Test:      LABS:	 	    CARDIAC MARKERS:                                  10.5   9.0   )-----------( 186      ( 04 Dec 2018 06:26 )             32.0     12-04    139  |  105  |  22  ----------------------------<  99  4.4   |  25  |  1.03    Ca    9.2      04 Dec 2018 06:26  Phos  3.1     12-04  Mg     2.0     12-04    TPro  7.2  /  Alb  3.0<L>  /  TBili  0.6  /  DBili  x   /  AST  20  /  ALT  17  /  AlkPhos  48  12-03    proBNP:   Lipid Profile:   HgA1c:   TSH:     ASSESSMENT/PLAN: 	    #Pre op CV eval - cardiac optimized  on BB - continue periop  Crum CV Score 1.35% MACE (age, asa class 3, dependent)  Echo and EKG unremarkable  no further cardiac work up necessarry  proceed to surgery    #CAD- echo with preserved EF no MR mild impaired relaxation  ekg nsr non specific st changes  on BB and statin  hold ASA  on GDMT    #HTN - at goal,  continue   carvedilol 3.125 milliGRAM(s) Oral every 12 hours  enalapril 5 milliGRAM(s) Oral daily

## 2018-12-04 NOTE — PROGRESS NOTE ADULT - PROBLEM SELECTOR PROBLEM 10
Transition of care performed with sharing of clinical summary

## 2018-12-04 NOTE — PROGRESS NOTE ADULT - PROBLEM SELECTOR PLAN 1
Patient presenting with reported LLE new weakness worsening over the past month. RLE chronic weakness due to history of stroke. MRI C-spine with spinal canal narrowing at C3-4 and C5-6 with possible cord edema  - neurosurgery consulted, recommended surgery. Surgery today 12/4.   - s/p 1 dose of solumedrol on admission in case of acute cord compression  - per NSGY no need for continued steroids  - palliative consulted given age and function  - Cardiac cleared for surgery, no further testing needed  - Heparin gtt stopped per Nsx, NPO  - IVF NS 100cc/hr

## 2018-12-04 NOTE — PROGRESS NOTE ADULT - ASSESSMENT
82 yo man, poor historian, with a PMH DM II, HLD, HTN, mitral regurgitation, cervical stenosis, prostate ca (s/p brachytherapy), CVA w R sided weakness (on coumadin), carotid stenosis, CAD admitted for fall with new onset LLE weakness, found to have 4/5 LLE strength on exam and MRI C-spine with spinal canal narrowing at C3-4 and C5-6 with cord edema.    Ready for surgery this afternoon.

## 2018-12-04 NOTE — PROGRESS NOTE ADULT - PROBLEM SELECTOR PLAN 7
EKG w/nonspecific t-wave abnormalities ; troponins peaked  - c/w home lipitor  - holding home ASA in case of surgical intervention.
asa held  for surgery  statin continued
EKG w/nonspecific t-wave abnormalities ; troponins peaked  - c/w home lipitor  - holding home ASA in case of surgical intervention.
EKG w/nonspecific t-wave abnormalities ; troponins peaked  - c/w home lipitor  - holding home ASA in case of surgical intervention.  - Cards clearance obtained
cont. ACE-I, low-salt diet
cont. EVERARDO, diabetic diet
EKG w/nonspecific t-wave abnormalities ; troponins peaked  - c/w home lipitor  - holding home ASA in case of surgical intervention.

## 2018-12-04 NOTE — PROGRESS NOTE ADULT - PROBLEM SELECTOR PLAN 4
Management as above
Pt with 4 falls in the past month but pt unable to get up from this last one as his strong left leg was weak.
cont. frequent re-orientation, assist w/ ADLs; family involved in decision-making
Management as above
Pt with 4 falls in the past month but pt unable to get up from this last one as his strong left leg was weak.

## 2018-12-04 NOTE — PROGRESS NOTE ADULT - PROBLEM SELECTOR PLAN 1
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

## 2018-12-04 NOTE — PROGRESS NOTE ADULT - PROBLEM SELECTOR PROBLEM 9
Need for prophylactic measure
Pre-operative cardiovascular examination
Need for prophylactic measure

## 2018-12-04 NOTE — PROGRESS NOTE ADULT - PROBLEM SELECTOR PROBLEM 6
Palliative care by specialist
Type 2 diabetes mellitus without complication, unspecified whether long term insulin use
Type 2 diabetes mellitus without complication, unspecified whether long term insulin use
Essential hypertension
Palliative care by specialist
Type 2 diabetes mellitus without complication, unspecified whether long term insulin use
Type 2 diabetes mellitus without complication, without long-term current use of insulin
Type 2 diabetes mellitus without complication, unspecified whether long term insulin use

## 2018-12-04 NOTE — PROGRESS NOTE ADULT - PROBLEM SELECTOR PLAN 9
F: None  E: replete PRN  N: DASH     VTE ppx: on heparin gtt
F: None  E: replete PRN  N: DASH     VTE ppx: on heparin gtt
Cardiology consulted
F: NS 100cc/hr  E: replete PRN  N: DASH     VTE ppx: none for pre-op
F: None  E: replete PRN  N: DASH     VTE ppx: on heparin gtt
F: None  E: replete PRN  N: DASH     VTE ppx: on heparin gtt
F: None  E: replete PRN  N: NPO in case of surgical intervention    VTE ppx: IMPROVE 3, high risk, require VTE ppx w/HSQ. Unclear if still taking coumadin. Will hold for now in the setting of possible surgery. SCD's given no DVT on RLE.
F: None  E: replete PRN  N: DASH     VTE ppx: on heparin gtt

## 2018-12-05 LAB
ANION GAP SERPL CALC-SCNC: 9 MMOL/L — SIGNIFICANT CHANGE UP (ref 5–17)
ANION GAP SERPL CALC-SCNC: 9 MMOL/L — SIGNIFICANT CHANGE UP (ref 5–17)
BUN SERPL-MCNC: 22 MG/DL — SIGNIFICANT CHANGE UP (ref 7–23)
BUN SERPL-MCNC: 23 MG/DL — SIGNIFICANT CHANGE UP (ref 7–23)
CALCIUM SERPL-MCNC: 9.2 MG/DL — SIGNIFICANT CHANGE UP (ref 8.4–10.5)
CALCIUM SERPL-MCNC: 9.2 MG/DL — SIGNIFICANT CHANGE UP (ref 8.4–10.5)
CHLORIDE SERPL-SCNC: 104 MMOL/L — SIGNIFICANT CHANGE UP (ref 96–108)
CHLORIDE SERPL-SCNC: 105 MMOL/L — SIGNIFICANT CHANGE UP (ref 96–108)
CO2 SERPL-SCNC: 25 MMOL/L — SIGNIFICANT CHANGE UP (ref 22–31)
CO2 SERPL-SCNC: 28 MMOL/L — SIGNIFICANT CHANGE UP (ref 22–31)
CREAT SERPL-MCNC: 1.02 MG/DL — SIGNIFICANT CHANGE UP (ref 0.5–1.3)
CREAT SERPL-MCNC: 1.06 MG/DL — SIGNIFICANT CHANGE UP (ref 0.5–1.3)
GLUCOSE BLDC GLUCOMTR-MCNC: 102 MG/DL — HIGH (ref 70–99)
GLUCOSE BLDC GLUCOMTR-MCNC: 137 MG/DL — HIGH (ref 70–99)
GLUCOSE BLDC GLUCOMTR-MCNC: 157 MG/DL — HIGH (ref 70–99)
GLUCOSE SERPL-MCNC: 131 MG/DL — HIGH (ref 70–99)
GLUCOSE SERPL-MCNC: 141 MG/DL — HIGH (ref 70–99)
HCT VFR BLD CALC: 31.8 % — LOW (ref 39–50)
HGB BLD-MCNC: 10.5 G/DL — LOW (ref 13–17)
MAGNESIUM SERPL-MCNC: 1.9 MG/DL — SIGNIFICANT CHANGE UP (ref 1.6–2.6)
MCHC RBC-ENTMCNC: 30.8 PG — SIGNIFICANT CHANGE UP (ref 27–34)
MCHC RBC-ENTMCNC: 33 G/DL — SIGNIFICANT CHANGE UP (ref 32–36)
MCV RBC AUTO: 93.3 FL — SIGNIFICANT CHANGE UP (ref 80–100)
PHOSPHATE SERPL-MCNC: 3.5 MG/DL — SIGNIFICANT CHANGE UP (ref 2.5–4.5)
PLATELET # BLD AUTO: 189 K/UL — SIGNIFICANT CHANGE UP (ref 150–400)
POTASSIUM SERPL-MCNC: 4.5 MMOL/L — SIGNIFICANT CHANGE UP (ref 3.5–5.3)
POTASSIUM SERPL-MCNC: 5.3 MMOL/L — SIGNIFICANT CHANGE UP (ref 3.5–5.3)
POTASSIUM SERPL-SCNC: 4.5 MMOL/L — SIGNIFICANT CHANGE UP (ref 3.5–5.3)
POTASSIUM SERPL-SCNC: 5.3 MMOL/L — SIGNIFICANT CHANGE UP (ref 3.5–5.3)
RBC # BLD: 3.41 M/UL — LOW (ref 4.2–5.8)
RBC # FLD: 14.7 % — SIGNIFICANT CHANGE UP (ref 10.3–16.9)
SODIUM SERPL-SCNC: 139 MMOL/L — SIGNIFICANT CHANGE UP (ref 135–145)
SODIUM SERPL-SCNC: 141 MMOL/L — SIGNIFICANT CHANGE UP (ref 135–145)
WBC # BLD: 12.1 K/UL — HIGH (ref 3.8–10.5)
WBC # FLD AUTO: 12.1 K/UL — HIGH (ref 3.8–10.5)

## 2018-12-05 PROCEDURE — 99232 SBSQ HOSP IP/OBS MODERATE 35: CPT

## 2018-12-05 PROCEDURE — 72125 CT NECK SPINE W/O DYE: CPT | Mod: 26

## 2018-12-05 RX ORDER — SENNA PLUS 8.6 MG/1
2 TABLET ORAL AT BEDTIME
Qty: 0 | Refills: 0 | Status: DISCONTINUED | OUTPATIENT
Start: 2018-12-05 | End: 2018-12-07

## 2018-12-05 RX ORDER — ACETAMINOPHEN 500 MG
650 TABLET ORAL EVERY 6 HOURS
Qty: 0 | Refills: 0 | Status: DISCONTINUED | OUTPATIENT
Start: 2018-12-05 | End: 2018-12-07

## 2018-12-05 RX ORDER — DOCUSATE SODIUM 100 MG
100 CAPSULE ORAL THREE TIMES A DAY
Qty: 0 | Refills: 0 | Status: DISCONTINUED | OUTPATIENT
Start: 2018-12-05 | End: 2018-12-07

## 2018-12-05 RX ADMIN — Medication 325 MILLIGRAM(S): at 12:57

## 2018-12-05 RX ADMIN — Medication 1 TABLET(S): at 12:57

## 2018-12-05 RX ADMIN — Medication 100 MILLIGRAM(S): at 07:04

## 2018-12-05 RX ADMIN — SENNA PLUS 2 TABLET(S): 8.6 TABLET ORAL at 21:51

## 2018-12-05 RX ADMIN — Medication 100 MILLIGRAM(S): at 17:08

## 2018-12-05 RX ADMIN — CARVEDILOL PHOSPHATE 3.12 MILLIGRAM(S): 80 CAPSULE, EXTENDED RELEASE ORAL at 17:08

## 2018-12-05 RX ADMIN — CARVEDILOL PHOSPHATE 3.12 MILLIGRAM(S): 80 CAPSULE, EXTENDED RELEASE ORAL at 06:55

## 2018-12-05 RX ADMIN — ATORVASTATIN CALCIUM 40 MILLIGRAM(S): 80 TABLET, FILM COATED ORAL at 21:51

## 2018-12-05 RX ADMIN — Medication 100 MILLIGRAM(S): at 21:51

## 2018-12-05 RX ADMIN — Medication 100 MILLIGRAM(S): at 12:58

## 2018-12-05 RX ADMIN — Medication 5 MILLIGRAM(S): at 06:55

## 2018-12-05 RX ADMIN — Medication 2: at 21:51

## 2018-12-05 NOTE — OCCUPATIONAL THERAPY INITIAL EVALUATION ADULT - PERTINENT HX OF CURRENT PROBLEM, REHAB EVAL
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 S/P C3-5 anterior cervical disc +fusion, C4 corpectomy. MRI (+) cord edema vs. myelomalacia C3/4. CTH (+) frontoparietal swelling, no ICH.

## 2018-12-05 NOTE — OCCUPATIONAL THERAPY INITIAL EVALUATION ADULT - VISUAL ASSESSMENT: TRACKING
required frequent verbal cues throughout H-test to remain looking at OT nose instead of turning head

## 2018-12-05 NOTE — OCCUPATIONAL THERAPY INITIAL EVALUATION ADULT - RANGE OF MOTION EXAMINATION, UPPER EXTREMITY
Left Upper Extremity limited at shoulder flex to 90 degrees. elbow, wrist,  WFL. Right Upper Extremity: AROM limitations throughout all joints 2/2 CVA 2008. Right shoulder flexion 40 degrees, elbow flex/ext 10 degrees, no supin/pronation, wrist ext 10 degrees, wrist flex 5 degrees, thumb abduction limited, no thumb opposition

## 2018-12-05 NOTE — OCCUPATIONAL THERAPY INITIAL EVALUATION ADULT - CONTRACTURES, REHAB EVAL
Patient presents with limited ROM in right shoulder flexion, elbow flex/ext, wrist flex/ext and  2/2 CVA in 2008.

## 2018-12-05 NOTE — OCCUPATIONAL THERAPY INITIAL EVALUATION ADULT - ADDITIONAL COMMENTS
Per chart, patient reports being able to ambulate within his home and in the community with a RW prior to this admission. He denies any KAYLIN. He reports having his wife perform all the cooking and cleaning, although he did the dishes prior. His wife and daughter were able to confirm. Per his wife, he has a shower chair, raised toilet seat with grab bars and she assists him to get into tub shower and wash his back. He also attends an adult day care program where he receives "maintenance physical therapy" 2 x/week and participate in daily activities. She also reports he has had many falls at home recently without any loss of consciousness or serious injuries.

## 2018-12-05 NOTE — OCCUPATIONAL THERAPY INITIAL EVALUATION ADULT - IMPAIRMENTS CONTRIBUTING IMPAIRED BED MOBILITY, REHAB EVAL
decreased strength/impaired postural control/impaired motor control/abnormal muscle tone/impaired balance/cognition/decreased ROM

## 2018-12-05 NOTE — PROCEDURE NOTE - GENERAL PROCEDURE DETAILS
Site cleaned and prepped with chlorhexidine. Anchoring sutures removed. TREVIN drain taken off suction. Drain removed without resistance, tip of catheter visualized. Manual pressure held. Steri strips placed over drain sites x2. Patient tolerated procedure well, no complaints. Site cleaned and prepped with chlorhexidine. Anchoring sutures removed. TREVIN drain taken off suction. Drain removed without resistance, tip of catheter visualized. Manual pressure held. Steri strips placed over drain site. Patient tolerated procedure well, no complaints.

## 2018-12-05 NOTE — OCCUPATIONAL THERAPY INITIAL EVALUATION ADULT - MANUAL MUSCLE TESTING RESULTS, REHAB EVAL
Right Upper Extremity 2/5 throughout, Left Upper Extremity: shoulder 3-/5, elbow flex 4-/5, elbow ext 4-5/, wrist flex/ext 4-/5,  4/5

## 2018-12-05 NOTE — OCCUPATIONAL THERAPY INITIAL EVALUATION ADULT - PLANNED THERAPY INTERVENTIONS, OT EVAL
ROM/cognitive, visual perceptual/fine motor coordination training/neuromuscular re-education/transfer training/ADL retraining/bed mobility training/strengthening/IADL retraining/balance training/joint mobilization/motor coordination training

## 2018-12-05 NOTE — PROGRESS NOTE ADULT - SUBJECTIVE AND OBJECTIVE BOX
Physical Medicine and Rehabilitation Progress Note:    Patient is a 83y old  Male who presents with a chief complaint of LE weakness s/p fall (05 Dec 2018 10:37)      HPI:  Patient is an 82 yo man, poor historian, with a PMH DM II, HLD, HTN, mitral regurgitation, cervical stenosis, prostate ca (s/p brachytherapy), CVA w R sided weakness (on coumadin-unclear if hx afib), carotid stenosis, CAD admitted for fall with new onset LLE weakness. At time of admission patient's family no longer at bedside, patient is a poor historian and cannot provide much history besides events of the day. Patient states he was walking with his walker earlier today and fell backwards. No CP, SOB, light headedness, LOC, shaking, incontinence of urine, changes in vision prior to fall. Once he fell, he could not get up and family had to help him up. Per signout from ED provider, patient's family reported worsening LLE weakness over the past month. No bowel/bladder incontinence.  Also with RLE erythema, swelling, unclear how long this has been going on for. Attempted to call patient's wife and daughter X3 each with no answer, unable to obtain collateral from family.      In ED VS: Tm 99.1, HR 71-89, -182/73-80, RR 18, O2% 98 RA  RLE doppler negative for DVT, MRI done showing "Possible cord edema versus myelomalacia between C3/4." CT head with frontoparietal swelling, no acute intracranial hemorrhage or infarct. 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    Trop elevated 0.05 -> 0.06, EKG unchanged from previous with nonspecific T wave abnormalities. CK mildly elevated 403. INR 1.73, patient reportedly takes coumadin.     Neuosurg consulted in ED, recommended surgical intervention. Per documentation patient and family were not agreeable to surgery at this time. Attempted to call family 3X daughter and 3X wife overnight to clarify this, unable to reach either person. (27 Nov 2018 03:21)                            10.5   12.1  )-----------( 189      ( 05 Dec 2018 06:04 )             31.8       12-05    139  |  105  |  22  ----------------------------<  141<H>  5.3   |  25  |  1.02    Ca    9.2      05 Dec 2018 06:04  Phos  3.5     12-05  Mg     1.9     12-05      Vital Signs Last 24 Hrs  T(C): 36.7 (05 Dec 2018 08:38), Max: 36.7 (05 Dec 2018 08:38)  T(F): 98 (05 Dec 2018 08:38), Max: 98 (05 Dec 2018 08:38)  HR: 74 (05 Dec 2018 08:38) (55 - 76)  BP: 165/77 (05 Dec 2018 08:38) (138/72 - 223/98)  BP(mean): 91 (04 Dec 2018 22:39) (82 - 141)  RR: 18 (05 Dec 2018 08:38) (10 - 18)  SpO2: 96% (05 Dec 2018 08:38) (91% - 100%)    MEDICATIONS  (STANDING):  atorvastatin 40 milliGRAM(s) Oral at bedtime  carvedilol 3.125 milliGRAM(s) Oral every 12 hours  ceFAZolin   IVPB 2000 milliGRAM(s) IV Intermittent every 8 hours  dextrose 5%. 1000 milliLiter(s) (50 mL/Hr) IV Continuous <Continuous>  dextrose 50% Injectable 12.5 Gram(s) IV Push once  docusate sodium 100 milliGRAM(s) Oral three times a day  ferrous    sulfate 325 milliGRAM(s) Oral daily  insulin lispro (HumaLOG) corrective regimen sliding scale   SubCutaneous Before meals and at bedtime  multivitamin 1 Tablet(s) Oral daily  senna 2 Tablet(s) Oral at bedtime    MEDICATIONS  (PRN):  acetaminophen   Tablet .. 650 milliGRAM(s) Oral every 6 hours PRN Mild Pain (1 - 3)  dextrose 40% Gel 15 Gram(s) Oral once PRN Blood Glucose LESS THAN 70 milliGRAM(s)/deciliter  glucagon  Injectable 1 milliGRAM(s) IntraMuscular once PRN Glucose LESS THAN 70 milligrams/deciliter  hydrALAZINE Injectable 10 milliGRAM(s) IV Push every 2 hours PRN SBP > 170    Currently Undergoing Physical Therapy at bedside.    Functional Status Assessment: on 12/3/2018    Pain Assessment/Number Scale (0-10) Adult  Presence of Pain: denies pain/discomfort    Therapeutic Interventions      Sit-Stand Transfer Training  Sit-to-Stand Transfer Training Rehab Potential: fair, will monitor progress closely  Sit-to-Stand Transfer Training Symptoms Noted During/After Treatment: fatigue  Transfer Training Sit-to-Stand Transfer: maximum assist (25% patient effort);  2 person assist;  full weight-bearing   rolling walker;  patient requires [facility] to place hands on and off of walker  Transfer Training Stand-to-Sit Transfer: maximum assist (25% patient effort);  2 person assist;  full weight-bearing   rolling walker  Sit-to-Stand Transfer Training Transfer Safety Analysis: decreased weight-shifting ability;  decreased step length;  decreased balance;  decreased ROM;  decreased strength;  impaired balance;  rolling walker    Gait Training  Gait Training Rehab Potential: poor  Gait Training Symptoms Noted During/After Treatment: fatigue  Gait Training: maximum assist (25% patient effort);  2 person assist;  full weight-bearing   rolling walker;  3 steps from seated at the edge of the bed to commode  Gait Analysis: 3-point gait   decreased carolee;  shuffling;  increased time in double stance;  crouch;  decreased step length;  impaired balance;  decreased strength;  decreased ROM;  impaired postural control;  max assist for weight shifts and advancing LLE;  3 steps;  rolling walker    Therapeutic Exercise  Therapeutic Exercise Detail: deferred due to use of commode           PM&R Impression: as above    Disposition Plan Recommendations: subacute rehab placement

## 2018-12-05 NOTE — OCCUPATIONAL THERAPY INITIAL EVALUATION ADULT - ORIENTATION, REHAB EVAL
person/Patient reports in [facility] hospital when asked, however during session asks his wife if he's at a hotel and need cues to re-orient. Patient unable to state month, date or year with multiple choice cues./situation

## 2018-12-05 NOTE — PROGRESS NOTE ADULT - SUBJECTIVE AND OBJECTIVE BOX
HPI:  Patient is an 82 yo man, poor historian, with a PMH DM II, HLD, HTN, mitral regurgitation, cervical stenosis, prostate ca (s/p brachytherapy), CVA w R sided weakness (on coumadin-unclear if hx afib), carotid stenosis, CAD admitted for fall with new onset LLE weakness. At time of admission patient's family no longer at bedside, patient is a poor historian and cannot provide much history besides events of the day. Patient states he was walking with his walker earlier today and fell backwards. No CP, SOB, light headedness, LOC, shaking, incontinence of urine, changes in vision prior to fall. Once he fell, he could not get up and family had to help him up. Per signout from ED provider, patient's family reported worsening LLE weakness over the past month. No bowel/bladder incontinence.  Also with RLE erythema, swelling, unclear how long this has been going on for. Attempted to call patient's wife and daughter X3 each with no answer, unable to obtain collateral from family.      In ED VS: Tm 99.1, HR 71-89, -182/73-80, RR 18, O2% 98 RA  RLE doppler negative for DVT, MRI done showing "Possible cord edema versus myelomalacia between C3/4." CT head with frontoparietal swelling, no acute intracranial hemorrhage or infarct. 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    Trop elevated 0.05 -> 0.06, EKG unchanged from previous with nonspecific T wave abnormalities. CK mildly elevated 403. INR 1.73, patient reportedly takes coumadin.     Neuosurg consulted in ED, recommended surgical intervention. Per documentation patient and family were not agreeable to surgery at this time. Attempted to call family 3X daughter and 3X wife overnight to clarify this, unable to reach either person. (27 Nov 2018 03:21)      Hospital Course:   12/4, POD#0: s/p C3-C5 ACDF with C4 corpectomy  12/5, POD#1: No acute events overnight, HMV drain in place, pending post op CT cervical. No complaints of dysphagia, tolerating PO diet     OVERNIGHT EVENTS:  Vital Signs Last 24 Hrs  T(C): 36.7 (05 Dec 2018 08:38), Max: 36.7 (05 Dec 2018 08:38)  T(F): 98 (05 Dec 2018 08:38), Max: 98 (05 Dec 2018 08:38)  HR: 74 (05 Dec 2018 08:38) (55 - 76)  BP: 165/77 (05 Dec 2018 08:38) (138/72 - 223/98)  BP(mean): 91 (04 Dec 2018 22:39) (82 - 141)  RR: 18 (05 Dec 2018 08:38) (10 - 18)  SpO2: 96% (05 Dec 2018 08:38) (91% - 100%)    I&O's Summary    04 Dec 2018 07:01  -  05 Dec 2018 07:00  --------------------------------------------------------  IN: 1000 mL / OUT: 1315 mL / NET: -315 mL    05 Dec 2018 07:01  -  05 Dec 2018 10:37  --------------------------------------------------------  IN: 0 mL / OUT: 300 mL / NET: -300 mL        PHYSICAL EXAM:  Neurological:  Awake and alert, oriented x2 (baseline), NAD, coherent speech, following commands  CNII-XII grossly intact  MAEx4, RUE spastic (4+/5 , o/w 2-3/5), RLE (2-3/5 DF/PF, 1/5 proximal), LUE/LLE 5/5 throughout  SILT throughout b/l  Incision/wound: anterior cervical incision clean, dry and intact  EENT: neck supple, non tender, normal ROM, no palpable collection noted, trachea midline  +TREVIN drain in place      TUBES/LINES:  [x] Santoro  [] Lumbar Drain  [] Wound Drains  [x] Others  -TREVIN      DIET:  [] NPO  [] Mechanical  [] Tube feeds    LABS:                        10.5   12.1  )-----------( 189      ( 05 Dec 2018 06:04 )             31.8     12-05    139  |  105  |  22  ----------------------------<  141<H>  5.3   |  25  |  1.02    Ca    9.2      05 Dec 2018 06:04  Phos  3.5     12-05  Mg     1.9     12-05      PT/INR - ( 04 Dec 2018 10:22 )   PT: 13.4 sec;   INR: 1.18          PTT - ( 04 Dec 2018 10:22 )  PTT:31.6 sec        CAPILLARY BLOOD GLUCOSE      POCT Blood Glucose.: 112 mg/dL (04 Dec 2018 20:41)  POCT Blood Glucose.: 87 mg/dL (04 Dec 2018 12:08)      Drug Levels: [] N/A    CSF Analysis: [] N/A      Allergies    No Known Allergies    Intolerances      MEDICATIONS:  Antibiotics:  ceFAZolin   IVPB 2000 milliGRAM(s) IV Intermittent every 8 hours    Neuro:    Anticoagulation:    OTHER:  atorvastatin 40 milliGRAM(s) Oral at bedtime  carvedilol 3.125 milliGRAM(s) Oral every 12 hours  dextrose 40% Gel 15 Gram(s) Oral once PRN  dextrose 50% Injectable 12.5 Gram(s) IV Push once  enalapril 5 milliGRAM(s) Oral daily  glucagon  Injectable 1 milliGRAM(s) IntraMuscular once PRN  hydrALAZINE Injectable 10 milliGRAM(s) IV Push every 2 hours PRN  insulin lispro (HumaLOG) corrective regimen sliding scale   SubCutaneous Before meals and at bedtime    IVF:  dextrose 5%. 1000 milliLiter(s) IV Continuous <Continuous>  ferrous    sulfate 325 milliGRAM(s) Oral daily  multivitamin 1 Tablet(s) Oral daily  sodium chloride 0.9%. 1000 milliLiter(s) IV Continuous <Continuous>    CULTURES:    RADIOLOGY & ADDITIONAL TESTS:      ASSESSMENT:  83y Male s/p C3-C5 ACDF with C4 corpectomy       CELLULITIS OF FOOT, INITIAL ENCOUNTER; LEG WEAKN  No family history of cardiovascular disease (Mother)  Family history of essential hypertension (Father)  Handoff  MEWS Score  Prostate cancer  Carotid artery stenosis  Cervical stenosis of spine  Aphasia  CVA (cerebral vascular accident)  DM (diabetes mellitus)  CAD (coronary artery disease)  HLD (hyperlipidemia)  Aphasia  Aphagia  CVA (cerebral vascular accident)  DM (diabetes mellitus)  CAD (coronary artery disease)  HLD (hyperlipidemia)  Myelopathy  Cellulitis of foot  Pre-operative cardiovascular examination  Other elevated white blood cell (WBC) count  Essential hypertension  Type 2 diabetes mellitus without complication, without long-term current use of insulin  Dementia without behavioral disturbance, unspecified dementia type  History of CVA (cerebrovascular accident)  Left leg weakness  Atrial fibrillation, unspecified type  Palliative care by specialist  Transition of care performed with sharing of clinical summary  Need for prophylactic measure  Stenosis of carotid artery, unspecified laterality  Coronary artery disease, angina presence unspecified, unspecified vessel or lesion type, unspecified whether native or transplanted heart  Type 2 diabetes mellitus without complication, unspecified whether long term insulin use  Cerebrovascular accident (CVA), unspecified mechanism  Cervical stenosis of spine  Falls, initial encounter  Cellulitis of foot  Weakness of both lower extremities  No significant past surgical history  FALL  90+  Leg weakness  Falls, initial encounter      PLAN:  -neuro/spinal checks  -pain control prn  -remove TREVIN drain  -soft collar prn  -pending post op CT cervical spine  -f/u Neurology regarding need to resume AC  -regular diet  -bowel regimen  -remove santoro, f/u TOV   -post op abx  -PT/OT  -d/w Dr. Phan HPI:  Patient is an 84 yo man, poor historian, with a PMH DM II, HLD, HTN, mitral regurgitation, cervical stenosis, prostate ca (s/p brachytherapy), CVA w R sided weakness (on coumadin-unclear if hx afib), carotid stenosis, CAD admitted for fall with new onset LLE weakness. At time of admission patient's family no longer at bedside, patient is a poor historian and cannot provide much history besides events of the day. Patient states he was walking with his walker earlier today and fell backwards. No CP, SOB, light headedness, LOC, shaking, incontinence of urine, changes in vision prior to fall. Once he fell, he could not get up and family had to help him up. Per signout from ED provider, patient's family reported worsening LLE weakness over the past month. No bowel/bladder incontinence.  Also with RLE erythema, swelling, unclear how long this has been going on for. Attempted to call patient's wife and daughter X3 each with no answer, unable to obtain collateral from family.      In ED VS: Tm 99.1, HR 71-89, -182/73-80, RR 18, O2% 98 RA  RLE doppler negative for DVT, MRI done showing "Possible cord edema versus myelomalacia between C3/4." CT head with frontoparietal swelling, no acute intracranial hemorrhage or infarct. 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    Trop elevated 0.05 -> 0.06, EKG unchanged from previous with nonspecific T wave abnormalities. CK mildly elevated 403. INR 1.73, patient reportedly takes coumadin.     Neuosurg consulted in ED, recommended surgical intervention. Per documentation patient and family were not agreeable to surgery at this time. Attempted to call family 3X daughter and 3X wife overnight to clarify this, unable to reach either person. (27 Nov 2018 03:21)      Hospital Course:   12/4, POD#0: s/p C3-C5 ACDF with C4 corpectomy  12/5, POD#1: No acute events overnight, HMV drain in place, pending post op CT cervical. No complaints of dysphagia, tolerating PO diet     OVERNIGHT EVENTS:  Vital Signs Last 24 Hrs  T(C): 36.7 (05 Dec 2018 08:38), Max: 36.7 (05 Dec 2018 08:38)  T(F): 98 (05 Dec 2018 08:38), Max: 98 (05 Dec 2018 08:38)  HR: 74 (05 Dec 2018 08:38) (55 - 76)  BP: 165/77 (05 Dec 2018 08:38) (138/72 - 223/98)  BP(mean): 91 (04 Dec 2018 22:39) (82 - 141)  RR: 18 (05 Dec 2018 08:38) (10 - 18)  SpO2: 96% (05 Dec 2018 08:38) (91% - 100%)    I&O's Summary    04 Dec 2018 07:01  -  05 Dec 2018 07:00  --------------------------------------------------------  IN: 1000 mL / OUT: 1315 mL / NET: -315 mL    05 Dec 2018 07:01  -  05 Dec 2018 10:37  --------------------------------------------------------  IN: 0 mL / OUT: 300 mL / NET: -300 mL        PHYSICAL EXAM:  Neurological:  Awake and alert, oriented x2 (baseline), NAD, coherent speech, following commands  CNII-XII grossly intact  MAEx4, RUE spastic (4+/5 , o/w 2-3/5), RLE (2-3/5 DF/PF, 1/5 proximal), LUE/LLE 5/5 throughout  SILT throughout b/l  Incision/wound: anterior cervical incision clean, dry and intact  EENT: neck supple, non tender, normal ROM, no palpable collection noted, trachea midline  +TREVIN drain in place      TUBES/LINES:  [x] Santoro  [] Lumbar Drain  [] Wound Drains  [x] Others  -TREVIN      DIET:  [] NPO  [] Mechanical  [] Tube feeds    LABS:                        10.5   12.1  )-----------( 189      ( 05 Dec 2018 06:04 )             31.8     12-05    139  |  105  |  22  ----------------------------<  141<H>  5.3   |  25  |  1.02    Ca    9.2      05 Dec 2018 06:04  Phos  3.5     12-05  Mg     1.9     12-05      PT/INR - ( 04 Dec 2018 10:22 )   PT: 13.4 sec;   INR: 1.18          PTT - ( 04 Dec 2018 10:22 )  PTT:31.6 sec        CAPILLARY BLOOD GLUCOSE      POCT Blood Glucose.: 112 mg/dL (04 Dec 2018 20:41)  POCT Blood Glucose.: 87 mg/dL (04 Dec 2018 12:08)      Drug Levels: [] N/A    CSF Analysis: [] N/A      Allergies    No Known Allergies    Intolerances      MEDICATIONS:  Antibiotics:  ceFAZolin   IVPB 2000 milliGRAM(s) IV Intermittent every 8 hours    Neuro:    Anticoagulation:    OTHER:  atorvastatin 40 milliGRAM(s) Oral at bedtime  carvedilol 3.125 milliGRAM(s) Oral every 12 hours  dextrose 40% Gel 15 Gram(s) Oral once PRN  dextrose 50% Injectable 12.5 Gram(s) IV Push once  enalapril 5 milliGRAM(s) Oral daily  glucagon  Injectable 1 milliGRAM(s) IntraMuscular once PRN  hydrALAZINE Injectable 10 milliGRAM(s) IV Push every 2 hours PRN  insulin lispro (HumaLOG) corrective regimen sliding scale   SubCutaneous Before meals and at bedtime    IVF:  dextrose 5%. 1000 milliLiter(s) IV Continuous <Continuous>  ferrous    sulfate 325 milliGRAM(s) Oral daily  multivitamin 1 Tablet(s) Oral daily  sodium chloride 0.9%. 1000 milliLiter(s) IV Continuous <Continuous>    CULTURES:    RADIOLOGY & ADDITIONAL TESTS:      ASSESSMENT:  83y Male s/p C3-C5 ACDF with C4 corpectomy       CELLULITIS OF FOOT, INITIAL ENCOUNTER; LEG WEAKN  No family history of cardiovascular disease (Mother)  Family history of essential hypertension (Father)  Handoff  MEWS Score  Prostate cancer  Carotid artery stenosis  Cervical stenosis of spine  Aphasia  CVA (cerebral vascular accident)  DM (diabetes mellitus)  CAD (coronary artery disease)  HLD (hyperlipidemia)  Aphasia  Aphagia  CVA (cerebral vascular accident)  DM (diabetes mellitus)  CAD (coronary artery disease)  HLD (hyperlipidemia)  Myelopathy  Cellulitis of foot  Pre-operative cardiovascular examination  Other elevated white blood cell (WBC) count  Essential hypertension  Type 2 diabetes mellitus without complication, without long-term current use of insulin  Dementia without behavioral disturbance, unspecified dementia type  History of CVA (cerebrovascular accident)  Left leg weakness  Atrial fibrillation, unspecified type  Palliative care by specialist  Transition of care performed with sharing of clinical summary  Need for prophylactic measure  Stenosis of carotid artery, unspecified laterality  Coronary artery disease, angina presence unspecified, unspecified vessel or lesion type, unspecified whether native or transplanted heart  Type 2 diabetes mellitus without complication, unspecified whether long term insulin use  Cerebrovascular accident (CVA), unspecified mechanism  Cervical stenosis of spine  Falls, initial encounter  Cellulitis of foot  Weakness of both lower extremities  No significant past surgical history  FALL  90+  Leg weakness  Falls, initial encounter      PLAN:  -neuro/spinal checks  -pain control prn  -remove TREVIN drain  -soft collar prn  -pending post op CT cervical spine  -f/u Neurology regarding need to resume AC  -continue diabetic diet  -bowel regimen  -remove santoro, f/u TOV   -post op abx  -PT/OT  -d/w Dr. Phan

## 2018-12-05 NOTE — PROGRESS NOTE ADULT - SUBJECTIVE AND OBJECTIVE BOX
Chief Complaint/Reason for Consult: periop cv mgmt  INTERVAL HPI: post op s complications  	  MEDICATIONS:  carvedilol 3.125 milliGRAM(s) Oral every 12 hours  enalapril 5 milliGRAM(s) Oral daily  hydrALAZINE Injectable 10 milliGRAM(s) IV Push every 2 hours PRN    ceFAZolin   IVPB 2000 milliGRAM(s) IV Intermittent every 8 hours          atorvastatin 40 milliGRAM(s) Oral at bedtime  dextrose 40% Gel 15 Gram(s) Oral once PRN  dextrose 50% Injectable 12.5 Gram(s) IV Push once  glucagon  Injectable 1 milliGRAM(s) IntraMuscular once PRN  insulin lispro (HumaLOG) corrective regimen sliding scale   SubCutaneous Before meals and at bedtime    dextrose 5%. 1000 milliLiter(s) IV Continuous <Continuous>  ferrous    sulfate 325 milliGRAM(s) Oral daily  multivitamin 1 Tablet(s) Oral daily  sodium chloride 0.9%. 1000 milliLiter(s) IV Continuous <Continuous>      REVIEW OF SYSTEMS:  [x] As per HPI  CONSTITUTIONAL: No fever, weight loss, or fatigue  RESPIRATORY: No cough, wheezing, chills or hemoptysis; No Shortness of Breath  CARDIOVASCULAR: No chest pain, palpitations, dizziness, or leg swelling  GASTROINTESTINAL: No abdominal or epigastric pain. No nausea, vomiting, or hematemesis; No diarrhea or constipation. No melena or hematochezia.  MUSCULOSKELETAL: No joint pain or swelling; No muscle, back, or extremity pain  [x] All others negative	  [ ] Unable to obtain    PHYSICAL EXAM:  T(C): 35.9 (12-05-18 @ 00:04), Max: 36.5 (12-04-18 @ 10:27)  HR: 55 (12-05-18 @ 01:12) (55 - 76)  BP: 159/68 (12-05-18 @ 01:12) (138/72 - 223/98)  RR: 18 (12-05-18 @ 01:12) (10 - 18)  SpO2: 98% (12-05-18 @ 01:12) (91% - 100%)  Wt(kg): --  I&O's Summary    04 Dec 2018 07:01  -  05 Dec 2018 07:00  --------------------------------------------------------  IN: 1000 mL / OUT: 1315 mL / NET: -315 mL    05 Dec 2018 07:01  -  05 Dec 2018 09:22  --------------------------------------------------------  IN: 0 mL / OUT: 300 mL / NET: -300 mL      Height (cm): 180.34 (12-04 @ 10:27)  Weight (kg): 80 (12-04 @ 10:27)  BMI (kg/m2): 24.6 (12-04 @ 10:27)  BSA (m2): 2 (12-04 @ 10:27)    Appearance: Normal	  HEENT:   Normal oral mucosa  Cardiovascular: Normal S1 S2, No JVD, No murmurs, No edema  Respiratory: Lungs clear to auscultation	  Gastrointestinal:  Soft, Non-tender, + BS	  Extremities: Normal range of motion, No clubbing, cyanosis or edema  Vascular: Peripheral pulses palpable 2+ bilaterally    TELEMETRY: 	    ECG:   	  RADIOLOGY:   CXR:  CT:  US:    CARDIAC TESTING:  Echocardiogram:  Catheterization:  Stress Test:      LABS:	 	    CARDIAC MARKERS:                                  10.5   12.1  )-----------( 189      ( 05 Dec 2018 06:04 )             31.8     12-05    139  |  105  |  22  ----------------------------<  141<H>  5.3   |  25  |  1.02    Ca    9.2      05 Dec 2018 06:04  Phos  3.5     12-05  Mg     1.9     12-05      proBNP:   Lipid Profile:   HgA1c:   TSH:     ASSESSMENT/PLAN: 	    #Post op s complications  on BB - continue periop  Echo and EKG unremarkable    #CAD- echo with preserved EF no MR mild impaired relaxation  ekg nsr non specific st changes  on BB and statin  resume ASA when clear by neurosurgery  on GDMT    #HTN - not at goal at goal,  pain control and augment SBP with PRN Hydralazine, if SBP>150 persists would increase enalapril to 10mg  continue   carvedilol 3.125 milliGRAM(s) Oral every 12 hours  enalapril 5 milliGRAM(s) Oral daily

## 2018-12-05 NOTE — OCCUPATIONAL THERAPY INITIAL EVALUATION ADULT - GENERAL OBSERVATIONS, REHAB EVAL
Patient cleared for OT evaluation by ADELSO Jenkins. Patient received semi-pompa, NAD, +wife, +b sequential compression devices, +tele, +soft cervical collar, +IV heplock.

## 2018-12-06 LAB
ALBUMIN SERPL ELPH-MCNC: 3.3 G/DL — SIGNIFICANT CHANGE UP (ref 3.3–5)
ALP SERPL-CCNC: 56 U/L — SIGNIFICANT CHANGE UP (ref 40–120)
ALT FLD-CCNC: 10 U/L — SIGNIFICANT CHANGE UP (ref 10–45)
ANION GAP SERPL CALC-SCNC: 15 MMOL/L — SIGNIFICANT CHANGE UP (ref 5–17)
ANION GAP SERPL CALC-SCNC: 8 MMOL/L — SIGNIFICANT CHANGE UP (ref 5–17)
APPEARANCE UR: CLEAR — SIGNIFICANT CHANGE UP
AST SERPL-CCNC: 23 U/L — SIGNIFICANT CHANGE UP (ref 10–40)
BASOPHILS NFR BLD AUTO: 0.1 % — SIGNIFICANT CHANGE UP (ref 0–2)
BILIRUB SERPL-MCNC: 0.7 MG/DL — SIGNIFICANT CHANGE UP (ref 0.2–1.2)
BILIRUB UR-MCNC: NEGATIVE — SIGNIFICANT CHANGE UP
BUN SERPL-MCNC: 19 MG/DL — SIGNIFICANT CHANGE UP (ref 7–23)
BUN SERPL-MCNC: 20 MG/DL — SIGNIFICANT CHANGE UP (ref 7–23)
CALCIUM SERPL-MCNC: 8.8 MG/DL — SIGNIFICANT CHANGE UP (ref 8.4–10.5)
CALCIUM SERPL-MCNC: 9 MG/DL — SIGNIFICANT CHANGE UP (ref 8.4–10.5)
CHLORIDE SERPL-SCNC: 100 MMOL/L — SIGNIFICANT CHANGE UP (ref 96–108)
CHLORIDE SERPL-SCNC: 105 MMOL/L — SIGNIFICANT CHANGE UP (ref 96–108)
CK MB CFR SERPL CALC: 2.3 NG/ML — SIGNIFICANT CHANGE UP (ref 0–6.7)
CK SERPL-CCNC: 269 U/L — HIGH (ref 30–200)
CO2 SERPL-SCNC: 23 MMOL/L — SIGNIFICANT CHANGE UP (ref 22–31)
CO2 SERPL-SCNC: 26 MMOL/L — SIGNIFICANT CHANGE UP (ref 22–31)
COLOR SPEC: YELLOW — SIGNIFICANT CHANGE UP
CREAT SERPL-MCNC: 1.07 MG/DL — SIGNIFICANT CHANGE UP (ref 0.5–1.3)
CREAT SERPL-MCNC: 1.13 MG/DL — SIGNIFICANT CHANGE UP (ref 0.5–1.3)
DIFF PNL FLD: NEGATIVE — SIGNIFICANT CHANGE UP
EOSINOPHIL NFR BLD AUTO: 0.2 % — SIGNIFICANT CHANGE UP (ref 0–6)
GLUCOSE BLDC GLUCOMTR-MCNC: 114 MG/DL — HIGH (ref 70–99)
GLUCOSE BLDC GLUCOMTR-MCNC: 134 MG/DL — HIGH (ref 70–99)
GLUCOSE BLDC GLUCOMTR-MCNC: 148 MG/DL — HIGH (ref 70–99)
GLUCOSE BLDC GLUCOMTR-MCNC: 155 MG/DL — HIGH (ref 70–99)
GLUCOSE BLDC GLUCOMTR-MCNC: 206 MG/DL — HIGH (ref 70–99)
GLUCOSE SERPL-MCNC: 123 MG/DL — HIGH (ref 70–99)
GLUCOSE SERPL-MCNC: 127 MG/DL — HIGH (ref 70–99)
GLUCOSE UR QL: NEGATIVE — SIGNIFICANT CHANGE UP
HCT VFR BLD CALC: 30 % — LOW (ref 39–50)
HCT VFR BLD CALC: 31.2 % — LOW (ref 39–50)
HGB BLD-MCNC: 10.5 G/DL — LOW (ref 13–17)
HGB BLD-MCNC: 9.9 G/DL — LOW (ref 13–17)
KETONES UR-MCNC: NEGATIVE — SIGNIFICANT CHANGE UP
LACTATE SERPL-SCNC: 0.7 MMOL/L — SIGNIFICANT CHANGE UP (ref 0.5–2)
LACTATE SERPL-SCNC: 2.2 MMOL/L — HIGH (ref 0.5–2)
LEUKOCYTE ESTERASE UR-ACNC: NEGATIVE — SIGNIFICANT CHANGE UP
LYMPHOCYTES # BLD AUTO: 18.7 % — SIGNIFICANT CHANGE UP (ref 13–44)
MAGNESIUM SERPL-MCNC: 2 MG/DL — SIGNIFICANT CHANGE UP (ref 1.6–2.6)
MCHC RBC-ENTMCNC: 30.2 PG — SIGNIFICANT CHANGE UP (ref 27–34)
MCHC RBC-ENTMCNC: 31.1 PG — SIGNIFICANT CHANGE UP (ref 27–34)
MCHC RBC-ENTMCNC: 33 G/DL — SIGNIFICANT CHANGE UP (ref 32–36)
MCHC RBC-ENTMCNC: 33.7 G/DL — SIGNIFICANT CHANGE UP (ref 32–36)
MCV RBC AUTO: 91.5 FL — SIGNIFICANT CHANGE UP (ref 80–100)
MCV RBC AUTO: 92.3 FL — SIGNIFICANT CHANGE UP (ref 80–100)
MONOCYTES NFR BLD AUTO: 8.7 % — SIGNIFICANT CHANGE UP (ref 2–14)
NEUTROPHILS NFR BLD AUTO: 72.3 % — SIGNIFICANT CHANGE UP (ref 43–77)
NITRITE UR-MCNC: NEGATIVE — SIGNIFICANT CHANGE UP
PH UR: 6 — SIGNIFICANT CHANGE UP (ref 5–8)
PLATELET # BLD AUTO: 174 K/UL — SIGNIFICANT CHANGE UP (ref 150–400)
PLATELET # BLD AUTO: 183 K/UL — SIGNIFICANT CHANGE UP (ref 150–400)
POTASSIUM SERPL-MCNC: 4.2 MMOL/L — SIGNIFICANT CHANGE UP (ref 3.5–5.3)
POTASSIUM SERPL-MCNC: 4.4 MMOL/L — SIGNIFICANT CHANGE UP (ref 3.5–5.3)
POTASSIUM SERPL-SCNC: 4.2 MMOL/L — SIGNIFICANT CHANGE UP (ref 3.5–5.3)
POTASSIUM SERPL-SCNC: 4.4 MMOL/L — SIGNIFICANT CHANGE UP (ref 3.5–5.3)
PROT SERPL-MCNC: 7.8 G/DL — SIGNIFICANT CHANGE UP (ref 6–8.3)
PROT UR-MCNC: ABNORMAL MG/DL
RAPID RVP RESULT: SIGNIFICANT CHANGE UP
RBC # BLD: 3.28 M/UL — LOW (ref 4.2–5.8)
RBC # BLD: 3.38 M/UL — LOW (ref 4.2–5.8)
RBC # FLD: 14.6 % — SIGNIFICANT CHANGE UP (ref 10.3–16.9)
RBC # FLD: 14.7 % — SIGNIFICANT CHANGE UP (ref 10.3–16.9)
SODIUM SERPL-SCNC: 138 MMOL/L — SIGNIFICANT CHANGE UP (ref 135–145)
SODIUM SERPL-SCNC: 139 MMOL/L — SIGNIFICANT CHANGE UP (ref 135–145)
SP GR SPEC: 1.02 — SIGNIFICANT CHANGE UP (ref 1–1.03)
TROPONIN T SERPL-MCNC: 0.02 NG/ML — HIGH (ref 0–0.01)
UROBILINOGEN FLD QL: 0.2 E.U./DL — SIGNIFICANT CHANGE UP
WBC # BLD: 12.8 K/UL — HIGH (ref 3.8–10.5)
WBC # BLD: 12.9 K/UL — HIGH (ref 3.8–10.5)
WBC # FLD AUTO: 12.8 K/UL — HIGH (ref 3.8–10.5)
WBC # FLD AUTO: 12.9 K/UL — HIGH (ref 3.8–10.5)

## 2018-12-06 PROCEDURE — 99291 CRITICAL CARE FIRST HOUR: CPT

## 2018-12-06 PROCEDURE — 71045 X-RAY EXAM CHEST 1 VIEW: CPT | Mod: 26

## 2018-12-06 RX ORDER — SODIUM CHLORIDE 9 MG/ML
500 INJECTION INTRAMUSCULAR; INTRAVENOUS; SUBCUTANEOUS ONCE
Qty: 0 | Refills: 0 | Status: COMPLETED | OUTPATIENT
Start: 2018-12-06 | End: 2018-12-06

## 2018-12-06 RX ORDER — ASPIRIN/CALCIUM CARB/MAGNESIUM 324 MG
81 TABLET ORAL DAILY
Qty: 0 | Refills: 0 | Status: DISCONTINUED | OUTPATIENT
Start: 2018-12-06 | End: 2018-12-07

## 2018-12-06 RX ORDER — ACETAMINOPHEN 500 MG
1000 TABLET ORAL ONCE
Qty: 0 | Refills: 0 | Status: COMPLETED | OUTPATIENT
Start: 2018-12-06 | End: 2018-12-06

## 2018-12-06 RX ADMIN — SENNA PLUS 2 TABLET(S): 8.6 TABLET ORAL at 22:33

## 2018-12-06 RX ADMIN — Medication 4: at 12:50

## 2018-12-06 RX ADMIN — Medication 400 MILLIGRAM(S): at 16:11

## 2018-12-06 RX ADMIN — Medication 100 MILLIGRAM(S): at 22:33

## 2018-12-06 RX ADMIN — CARVEDILOL PHOSPHATE 3.12 MILLIGRAM(S): 80 CAPSULE, EXTENDED RELEASE ORAL at 18:02

## 2018-12-06 RX ADMIN — SODIUM CHLORIDE 1000 MILLILITER(S): 9 INJECTION INTRAMUSCULAR; INTRAVENOUS; SUBCUTANEOUS at 15:30

## 2018-12-06 RX ADMIN — Medication 325 MILLIGRAM(S): at 13:28

## 2018-12-06 RX ADMIN — CARVEDILOL PHOSPHATE 3.12 MILLIGRAM(S): 80 CAPSULE, EXTENDED RELEASE ORAL at 04:18

## 2018-12-06 RX ADMIN — Medication 100 MILLIGRAM(S): at 05:57

## 2018-12-06 RX ADMIN — ATORVASTATIN CALCIUM 40 MILLIGRAM(S): 80 TABLET, FILM COATED ORAL at 22:33

## 2018-12-06 RX ADMIN — Medication 10 MILLIGRAM(S): at 13:28

## 2018-12-06 RX ADMIN — Medication 5 MILLIGRAM(S): at 05:57

## 2018-12-06 RX ADMIN — Medication 1 TABLET(S): at 13:28

## 2018-12-06 RX ADMIN — Medication 81 MILLIGRAM(S): at 13:28

## 2018-12-06 RX ADMIN — Medication 100 MILLIGRAM(S): at 13:30

## 2018-12-06 RX ADMIN — Medication 1000 MILLIGRAM(S): at 16:48

## 2018-12-06 RX ADMIN — Medication 2: at 18:01

## 2018-12-06 NOTE — PROGRESS NOTE ADULT - ASSESSMENT
82 yo man, poor historian, with a PMH DM II, HLD, HTN, mitral regurgitation, cervical stenosis, prostate ca (s/p brachytherapy), CVA w R sided weakness (on coumadin), carotid stenosis, CAD admitted for fall with new onset LLE weakness, found to have 4/5 LLE strength on exam and MRI C-spine with spinal canal narrowing at C3-4 and C5-6 with cord edema. Patient is post C3-5 anterior cervical discectomy and fusion and C4 corpectomy, now POD #2. Patient with episode of syncope while ambulating in setting of severe sepsis.     Syncope: Patient with no arrhythmias on tele during event. HD stable with tachycardia. Patient found to be septic during work up but with unknown source. EKG without changes but with slight elevation in Trop 0.02. Likely type II demand from sepsis. Echo normal.   -Can continue to trend troponins.  -Daily EKGs.  -Can consider holding BB in setting of sepsis, unless HD stable can continue.   -Continue septic work up.   -Will continue to follow.    HTN: well controlled.  -Can consider holding if patient becomes more septic.  -Otherwise, continue Coreg 3.25mg BID and Enalapril 5mg daily.    CAD: Patient without CP. Trop elevated 0.02 likely due to demand from sepsis.  -Continue to trend Trop to peak.  -Continue Aspirin, BB and ACE-i.     To discuss with attending.

## 2018-12-06 NOTE — CHART NOTE - NSCHARTNOTEFT_GEN_A_CORE
***Rapid Response Clinical Impact Nurse Practitioner Note ***    Patient is an 82 yo man, poor historian, with a PMH DM II, HLD, HTN, mitral regurgitation, cervical stenosis, prostate ca (s/p brachytherapy), CVA w R sided weakness (on coumadin-unclear if hx afib), carotid stenosis, CAD admitted for fall with new onset LLE weakness.     Patient status post operative day 3 status post ADCF   Hospital course:   12/4, POD#0: s/p C3-C5 ACDF with C4 corpectomy  12/5, POD#1: No acute events overnight, HMV drain in place, pending post op CT cervical. No complaints of dysphagia, tolerating PO diet     Rapid response team called because patient had episode of unresponsiveness status post ambulation with physical therapy after feeling fatigued.     Patient was seen and examined at the bedside by the rapid response team.    Allergies    No Known Allergies    Intolerances      PAST MEDICAL & SURGICAL HISTORY:  Prostate cancer  Carotid artery stenosis  Cervical stenosis of spine  Aphasia  CVA (cerebral vascular accident)  DM (diabetes mellitus)  CAD (coronary artery disease)  HLD (hyperlipidemia)  No significant past surgical history      Vital Signs Last 24 Hrs  T(C): 38 (06 Dec 2018 17:39), Max: 38 (06 Dec 2018 03:59)  T(F): 100.4 (06 Dec 2018 17:39), Max: 100.4 (06 Dec 2018 03:59)  HR: 74 (06 Dec 2018 17:39) (74 - 103)  BP: 148/66 (06 Dec 2018 17:39) (144/65 - 180/81)  BP(mean): --  RR: 16 (06 Dec 2018 17:39) (15 - 17)  SpO2: 100% (06 Dec 2018 17:39) (96% - 100%)        GENERAL: The patient is awake and alert X 2 which is baseline per patient's family. No trauma.   HEENT: Head is normocephalic and atraumatic. Extraocular muscles are intact. Mucous membranes are moist. No throat erythema/exudates no lymphadenopathy, no JVD,   NECK: Supple. Incision site is CDI  LUNGS: Clear to auscultation bilaterally without wheezing, rales or rhonchi; respirations unlabored  HEART: Regular rate and rhythm ,+S1/+S2, no murmurs, rubs, gallops  ABDOMEN: Soft, nontender, and nondistended, no rebound, guarding rigidity, bowel sounds in all 4 quadrants  EXTREMITIES: Without any cyanosis, clubbing, rash, lesions or edema.  SKIN: No new rashes or lesions.  MSK: Moves all extremities. +CMS X 4  VASCULAR: Radial and Dorsal pedal pulses palpable BL      12-05 @ 07:01  -  12-06 @ 07:00  --------------------------------------------------------  IN: 960 mL / OUT: 1525 mL / NET: -565 mL                              10.5   12.8  )-----------( 174      ( 06 Dec 2018 15:46 )             31.2     12-06    138  |  100  |  20  ----------------------------<  127<H>  4.4   |  23  |  1.13    Ca    9.0      06 Dec 2018 15:46  Phos  3.5     12-05  Mg     2.0     12-06    TPro  7.8  /  Alb  3.3  /  TBili  0.7  /  DBili  x   /  AST  23  /  ALT  10  /  AlkPhos  56  12-06         LIVER FUNCTIONS - ( 06 Dec 2018 15:46 )  Alb: 3.3 g/dL / Pro: 7.8 g/dL / ALK PHOS: 56 U/L / ALT: 10 U/L / AST: 23 U/L / GGT: x                  MEDICATIONS  (STANDING):  aspirin  chewable 81 milliGRAM(s) Oral daily  atorvastatin 40 milliGRAM(s) Oral at bedtime  carvedilol 3.125 milliGRAM(s) Oral every 12 hours  dextrose 5%. 1000 milliLiter(s) (50 mL/Hr) IV Continuous <Continuous>  dextrose 50% Injectable 12.5 Gram(s) IV Push once  docusate sodium 100 milliGRAM(s) Oral three times a day  enalapril 5 milliGRAM(s) Oral daily  ferrous    sulfate 325 milliGRAM(s) Oral daily  insulin lispro (HumaLOG) corrective regimen sliding scale   SubCutaneous Before meals and at bedtime  multivitamin 1 Tablet(s) Oral daily  senna 2 Tablet(s) Oral at bedtime  sodium chloride 0.9% Bolus 500 milliLiter(s) IV Bolus once    MEDICATIONS  (PRN):  acetaminophen   Tablet .. 650 milliGRAM(s) Oral every 6 hours PRN Mild Pain (1 - 3)  dextrose 40% Gel 15 Gram(s) Oral once PRN Blood Glucose LESS THAN 70 milliGRAM(s)/deciliter  glucagon  Injectable 1 milliGRAM(s) IntraMuscular once PRN Glucose LESS THAN 70 milligrams/deciliter  hydrALAZINE Injectable 10 milliGRAM(s) IV Push every 2 hours PRN SBP > 170      Assessment and Plan- Rapid Response called for 82 yo man, poor historian, with a PMH DM II, HLD, HTN, mitral regurgitation, cervical stenosis, prostate ca (s/p brachytherapy), CVA w R sided weakness (on coumadin-unclear if hx afib), carotid stenosis, CAD admitted for fall with new onset LLE weakness status post episode of syncope witnessed by PT.     On arrival patient awake, alert and oriented to baseline. No trauma. Per primary team patient acting appropriately to baseline. FSBG 136.   -EKG done and compared to baseline. NSR with PVC's Recommend trending EKG.  -Labs: CBC, CMP, Troponin. Recommend trending troponin.   -Patient febrile. Blood Cultures X 2 from two different sites drawn and sent. Lactate sent. Follow up CXR.    Plan discussed with primary team Neurosurgery PA and Dr. Rodriguez who reviewed EKG and labs.   Patient hemodynamically stable to stay on floor on telemetry. ***Rapid Response Clinical Impact Nurse Practitioner Note ***    Patient is an 84 yo man, poor historian, with a PMH DM II, HLD, HTN, mitral regurgitation, cervical stenosis, prostate ca (s/p brachytherapy), CVA w R sided weakness (on coumadin-unclear if hx afib), carotid stenosis, CAD admitted for fall with new onset LLE weakness.     Patient status post operative day 3 status post ADCF   Hospital course:   12/4, POD#0: s/p C3-C5 ACDF with C4 corpectomy  12/5, POD#1: No acute events overnight, HMV drain in place, pending post op CT cervical. No complaints of dysphagia, tolerating PO diet     Rapid response team called because patient had episode of unresponsiveness status post ambulation with physical therapy after feeling fatigued.     Patient was seen and examined at the bedside by the rapid response team.    Allergies    No Known Allergies    Intolerances      PAST MEDICAL & SURGICAL HISTORY:  Prostate cancer  Carotid artery stenosis  Cervical stenosis of spine  Aphasia  CVA (cerebral vascular accident)  DM (diabetes mellitus)  CAD (coronary artery disease)  HLD (hyperlipidemia)  No significant past surgical history      Vital Signs Last 24 Hrs  T(C): 38 (06 Dec 2018 17:39), Max: 38 (06 Dec 2018 03:59)  T(F): 100.4 (06 Dec 2018 17:39), Max: 100.4 (06 Dec 2018 03:59)  HR: 74 (06 Dec 2018 17:39) (74 - 103)  BP: 148/66 (06 Dec 2018 17:39) (144/65 - 180/81)  BP(mean): --  RR: 16 (06 Dec 2018 17:39) (15 - 17)  SpO2: 100% (06 Dec 2018 17:39) (96% - 100%)        GENERAL: The patient is awake and alert X 2 which is baseline per patient's family. No trauma.   HEENT: Head is normocephalic and atraumatic. Extraocular muscles are intact. Mucous membranes are moist. No throat erythema/exudates no lymphadenopathy, no JVD,   NECK: Supple. Incision site is CDI  LUNGS: Clear to auscultation bilaterally without wheezing, rales or rhonchi; respirations unlabored  HEART: Regular rate and rhythm ,+S1/+S2, no murmurs, rubs, gallops  ABDOMEN: Soft, nontender, and nondistended, no rebound, guarding rigidity, bowel sounds in all 4 quadrants  EXTREMITIES: Without any cyanosis, clubbing, rash, lesions or edema.  SKIN: No new rashes or lesions.  MSK: Moves all extremities. +CMS X 4  VASCULAR: Radial and Dorsal pedal pulses palpable BL      12-05 @ 07:01  -  12-06 @ 07:00  --------------------------------------------------------  IN: 960 mL / OUT: 1525 mL / NET: -565 mL                              10.5   12.8  )-----------( 174      ( 06 Dec 2018 15:46 )             31.2     12-06    138  |  100  |  20  ----------------------------<  127<H>  4.4   |  23  |  1.13    Ca    9.0      06 Dec 2018 15:46  Phos  3.5     12-05  Mg     2.0     12-06    TPro  7.8  /  Alb  3.3  /  TBili  0.7  /  DBili  x   /  AST  23  /  ALT  10  /  AlkPhos  56  12-06         LIVER FUNCTIONS - ( 06 Dec 2018 15:46 )  Alb: 3.3 g/dL / Pro: 7.8 g/dL / ALK PHOS: 56 U/L / ALT: 10 U/L / AST: 23 U/L / GGT: x                  MEDICATIONS  (STANDING):  aspirin  chewable 81 milliGRAM(s) Oral daily  atorvastatin 40 milliGRAM(s) Oral at bedtime  carvedilol 3.125 milliGRAM(s) Oral every 12 hours  dextrose 5%. 1000 milliLiter(s) (50 mL/Hr) IV Continuous <Continuous>  dextrose 50% Injectable 12.5 Gram(s) IV Push once  docusate sodium 100 milliGRAM(s) Oral three times a day  enalapril 5 milliGRAM(s) Oral daily  ferrous    sulfate 325 milliGRAM(s) Oral daily  insulin lispro (HumaLOG) corrective regimen sliding scale   SubCutaneous Before meals and at bedtime  multivitamin 1 Tablet(s) Oral daily  senna 2 Tablet(s) Oral at bedtime  sodium chloride 0.9% Bolus 500 milliLiter(s) IV Bolus once    MEDICATIONS  (PRN):  acetaminophen   Tablet .. 650 milliGRAM(s) Oral every 6 hours PRN Mild Pain (1 - 3)  dextrose 40% Gel 15 Gram(s) Oral once PRN Blood Glucose LESS THAN 70 milliGRAM(s)/deciliter  glucagon  Injectable 1 milliGRAM(s) IntraMuscular once PRN Glucose LESS THAN 70 milligrams/deciliter  hydrALAZINE Injectable 10 milliGRAM(s) IV Push every 2 hours PRN SBP > 170      Assessment and Plan- Rapid Response called for 84 yo man, poor historian, with a PMH DM II, HLD, HTN, mitral regurgitation, cervical stenosis, prostate ca (s/p brachytherapy), CVA w R sided weakness (on coumadin-unclear if hx afib), carotid stenosis, CAD admitted for fall with new onset LLE weakness status post episode of syncope witnessed by PT.     On arrival patient awake, alert and oriented to baseline. No trauma. Per primary team patient acting appropriately to baseline. FSBG 136.   -EKG done and compared to baseline. NSR with PVC's Recommend trending EKG.  -Labs: CBC, CMP, Troponin. Recommend trending troponin.   -Patient febrile. Blood Cultures X 2 from two different sites drawn and sent. Lactate sent. Follow up CXR.    Plan discussed with primary team Neurosurgery PA and Dr. Rodriguez who reviewed EKG and labs.   Patient hemodynamically stable to stay on floor on telemetry. Care handed off to primary team. ***Rapid Response Clinical Impact Nurse Practitioner Note ***    Patient is an 84 yo man, poor historian, with a PMH DM II, HLD, HTN, mitral regurgitation, cervical stenosis, prostate ca (s/p brachytherapy), CVA w R sided weakness (on coumadin-unclear if hx afib), carotid stenosis, CAD admitted for fall with new onset LLE weakness.     Patient status post operative day 3 status post ADCF   Hospital course:   12/4, POD#0: s/p C3-C5 ACDF with C4 corpectomy  12/5, POD#1: No acute events overnight, HMV drain in place, pending post op CT cervical. No complaints of dysphagia, tolerating PO diet     Rapid response team called because patient had episode of unresponsiveness status post ambulation with physical therapy after feeling fatigued.     Patient was seen and examined at the bedside by the rapid response team.    Allergies    No Known Allergies    Intolerances      PAST MEDICAL & SURGICAL HISTORY:  Prostate cancer  Carotid artery stenosis  Cervical stenosis of spine  Aphasia  CVA (cerebral vascular accident)  DM (diabetes mellitus)  CAD (coronary artery disease)  HLD (hyperlipidemia)  No significant past surgical history      Vital Signs Last 24 Hrs  T(C): 38 (06 Dec 2018 17:39), Max: 38 (06 Dec 2018 03:59)  T(F): 100.4 (06 Dec 2018 17:39), Max: 100.4 (06 Dec 2018 03:59)  HR: 74 (06 Dec 2018 17:39) (74 - 103)  BP: 148/66 (06 Dec 2018 17:39) (144/65 - 180/81)  BP(mean): --  RR: 16 (06 Dec 2018 17:39) (15 - 17)  SpO2: 100% (06 Dec 2018 17:39) (96% - 100%)        GENERAL: The patient is awake and alert X 2 which is baseline per patient's family. No trauma.   HEENT: Head is normocephalic and atraumatic. Extraocular muscles are intact. Mucous membranes are moist. No throat erythema/exudates no lymphadenopathy, no JVD,   NECK: Supple. Incision site is CDI  LUNGS: Clear to auscultation bilaterally without wheezing, rales or rhonchi; respirations unlabored  HEART: Regular rate and rhythm ,+S1/+S2, no murmurs, rubs, gallops  ABDOMEN: Soft, nontender, and nondistended, no rebound, guarding rigidity, bowel sounds in all 4 quadrants  EXTREMITIES: Without any cyanosis, clubbing, rash, lesions or edema.  SKIN: No new rashes or lesions.  MSK: Moves all extremities. +CMS X 4  VASCULAR: Radial and Dorsal pedal pulses palpable BL      12-05 @ 07:01  -  12-06 @ 07:00  --------------------------------------------------------  IN: 960 mL / OUT: 1525 mL / NET: -565 mL                              10.5   12.8  )-----------( 174      ( 06 Dec 2018 15:46 )             31.2     12-06    138  |  100  |  20  ----------------------------<  127<H>  4.4   |  23  |  1.13    Ca    9.0      06 Dec 2018 15:46  Phos  3.5     12-05  Mg     2.0     12-06    TPro  7.8  /  Alb  3.3  /  TBili  0.7  /  DBili  x   /  AST  23  /  ALT  10  /  AlkPhos  56  12-06         LIVER FUNCTIONS - ( 06 Dec 2018 15:46 )  Alb: 3.3 g/dL / Pro: 7.8 g/dL / ALK PHOS: 56 U/L / ALT: 10 U/L / AST: 23 U/L / GGT: x                  MEDICATIONS  (STANDING):  aspirin  chewable 81 milliGRAM(s) Oral daily  atorvastatin 40 milliGRAM(s) Oral at bedtime  carvedilol 3.125 milliGRAM(s) Oral every 12 hours  dextrose 5%. 1000 milliLiter(s) (50 mL/Hr) IV Continuous <Continuous>  dextrose 50% Injectable 12.5 Gram(s) IV Push once  docusate sodium 100 milliGRAM(s) Oral three times a day  enalapril 5 milliGRAM(s) Oral daily  ferrous    sulfate 325 milliGRAM(s) Oral daily  insulin lispro (HumaLOG) corrective regimen sliding scale   SubCutaneous Before meals and at bedtime  multivitamin 1 Tablet(s) Oral daily  senna 2 Tablet(s) Oral at bedtime  sodium chloride 0.9% Bolus 500 milliLiter(s) IV Bolus once    MEDICATIONS  (PRN):  acetaminophen   Tablet .. 650 milliGRAM(s) Oral every 6 hours PRN Mild Pain (1 - 3)  dextrose 40% Gel 15 Gram(s) Oral once PRN Blood Glucose LESS THAN 70 milliGRAM(s)/deciliter  glucagon  Injectable 1 milliGRAM(s) IntraMuscular once PRN Glucose LESS THAN 70 milligrams/deciliter  hydrALAZINE Injectable 10 milliGRAM(s) IV Push every 2 hours PRN SBP > 170      Assessment and Plan- Rapid Response called for 84 yo man, poor historian, with a PMH DM II, HLD, HTN, mitral regurgitation, cervical stenosis, prostate ca (s/p brachytherapy), CVA w R sided weakness (on coumadin-unclear if hx afib), carotid stenosis, CAD admitted for fall with new onset LLE weakness status post episode of syncope witnessed by PT.     On arrival patient awake, alert and oriented to baseline. No trauma. Per primary team patient acting appropriately to baseline. FSBG 136.   -EKG done and compared to baseline. NSR with PVC's Recommend trending EKG.  -Labs: CBC, CMP, Troponin. Recommend trending troponin.   -Patient febrile meeting 2/4 SIRS (Fever and Leukocytosis. Blood Cultures X 2 from two different sites drawn and sent. Lactate sent. Follow up CXR.    Plan discussed with primary team Neurosurgery PA and Dr. Rodriguez who reviewed EKG and labs.   Patient hemodynamically stable to stay on floor on telemetry. Care handed off to primary team.

## 2018-12-06 NOTE — PROGRESS NOTE ADULT - ASSESSMENT
84 yo man, poor historian, with a PMH DM II, HLD, HTN, mitral regurgitation, cervical stenosis, prostate ca (s/p brachytherapy), CVA w R sided weakness (on coumadin-unclear if hx afib), carotid stenosis, CAD admitted for fall with new onset LLE weakness.    Plan:  #Sepsis- meeting SIRS criteria with fever (101F rectally) and leukocytosis (WBC 12). On beta-blockade which may have blunted tachycardic response. Lactate elevated to 2.2. S/p 500cc bolus. No clear source of infection; patient c/o phlegm but no SOB, nasal congestion, throat pain, CP, abd pain, n/v/d/c, dysuria, freq, suprapubic pain. CXR unremarkable.   - will hold off on antibiotics for now given no clear source of infection  - trend lactate to clear s/p IVF rehydration  - pending UA, Ucxs, RVP  - f/u blood cultures, urine cultures     #Near Syncopal event- likely vasovagal given pt had near-syncope after standing and working with PT. Cardiology called; trop mildly elevated to 0.02, EKG unchanged from previous with no ischemic changes.   - pending orthostatics  - f/u cards recommendations 83M with PMH of DM II, HLD, HTN, cervical stenosis, prostate ca (s/p brachytherapy), CVA w R sided weakness (on coumadin), carotid stenosis, CAD admitted for fall with new onset LLE weakness, POD3 from C3-5 anterior cervical discectomy and fusion C4 corpectomy, now meeting SIRS criteria.     Plan:  #SIRS criteria- meeting SIRS criteria with fever (101F rectally) and leukocytosis (WBC 12). On beta-blockade which may have blunted tachycardic response. Lactate elevated to 2.2 s/p 500cc bolus. No clear source of infection; patient c/o phlegm but no SOB, nasal congestion, throat pain, CP, abd pain, n/v/d/c, dysuria, freq, suprapubic pain. CXR unremarkable.   - will hold off on antibiotics for now given no clear source of infection; leukocytosis may be reactive from surgery  - trend lactate to clear s/p IVF rehydration (500cc bolus)  - pending UA, Ucxs, RVP  - f/u blood cultures, urine cultures     #Near Syncopal event- likely vasovagal given pt had near-syncope after standing and working with PT. Cardiology called; trop mildly elevated to 0.02, EKG unchanged from previous with no ischemic changes.   - pending orthostatics  - f/u cards recommendations    ADDENDUM  UA negative. Lactate cleared s/p 500cc bolus. Patient non-toxic appearing, with no clear source of infection, cannot rule out viral illness. Hold off on antibiotics for now, and f/u RVP, blood cultures and Ucxs. Reconsult with questions or concerns.

## 2018-12-06 NOTE — PROGRESS NOTE ADULT - SUBJECTIVE AND OBJECTIVE BOX
HPI:  Patient is an 82 yo man, poor historian, with a PMH DM II, HLD, HTN, mitral regurgitation, cervical stenosis, prostate ca (s/p brachytherapy), CVA w R sided weakness (on coumadin-unclear if hx afib), carotid stenosis, CAD admitted for fall with new onset LLE weakness. At time of admission patient's family no longer at bedside, patient is a poor historian and cannot provide much history besides events of the day. Patient states he was walking with his walker earlier today and fell backwards. No CP, SOB, light headedness, LOC, shaking, incontinence of urine, changes in vision prior to fall. Once he fell, he could not get up and family had to help him up. Per signout from ED provider, patient's family reported worsening LLE weakness over the past month. No bowel/bladder incontinence.  Also with RLE erythema, swelling, unclear how long this has been going on for. Attempted to call patient's wife and daughter X3 each with no answer, unable to obtain collateral from family.      In ED VS: Tm 99.1, HR 71-89, -182/73-80, RR 18, O2% 98 RA  RLE doppler negative for DVT, MRI done showing "Possible cord edema versus myelomalacia between C3/4." CT head with frontoparietal swelling, no acute intracranial hemorrhage or infarct. 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    Trop elevated 0.05 -> 0.06, EKG unchanged from previous with nonspecific T wave abnormalities. CK mildly elevated 403. INR 1.73, patient reportedly takes coumadin.     Neuosurg consulted in ED, recommended surgical intervention. Per documentation patient and family were not agreeable to surgery at this time. Attempted to call family 3X daughter and 3X wife overnight to clarify this, unable to reach either person. (27 Nov 2018 03:21)    OVERNIGHT EVENTS:  Vital Signs Last 24 Hrs  T(C): 38 (06 Dec 2018 03:59), Max: 38 (06 Dec 2018 03:59)  T(F): 100.4 (06 Dec 2018 03:59), Max: 100.4 (06 Dec 2018 03:59)  HR: 83 (06 Dec 2018 03:59) (66 - 98)  BP: 175/87 (06 Dec 2018 03:59) (141/66 - 198/78)  BP(mean): --  RR: 17 (06 Dec 2018 03:59) (15 - 18)  SpO2: 96% (06 Dec 2018 03:59) (96% - 100%)    I&O's Summary    05 Dec 2018 07:01  -  06 Dec 2018 07:00  --------------------------------------------------------  IN: 960 mL / OUT: 1525 mL / NET: -565 mL        PHYSICAL EXAM:  Neurological:  Awake and alert, oriented x2 (baseline), NAD, coherent speech, following commands  CNII-XII grossly intact  MAEx4, RUE spastic (4+/5 , o/w 2-3/5), RLE (2-3/5 DF/PF, 1/5 proximal), LUE/LLE 5/5 throughout  SILT throughout b/l  Incision/wound: anterior cervical incision clean, dry and intact    Cardiovascular: RRR  Respiratory: Lungs CTAB  Gastrointestinal: +BS  Genitourinary: Voiding without difficulty  Extremities: warm and dry  Incision/Wound: CDI        DIET: Regular  LABS:                        9.9    12.9  )-----------( 183      ( 06 Dec 2018 06:37 )             30.0     12-05    141  |  104  |  23  ----------------------------<  131<H>  4.5   |  28  |  1.06    Ca    9.2      05 Dec 2018 16:43  Phos  3.5     12-05  Mg     1.9     12-05      PT/INR - ( 04 Dec 2018 10:22 )   PT: 13.4 sec;   INR: 1.18          PTT - ( 04 Dec 2018 10:22 )  PTT:31.6 sec        CAPILLARY BLOOD GLUCOSE      POCT Blood Glucose.: 114 mg/dL (06 Dec 2018 06:54)  POCT Blood Glucose.: 157 mg/dL (05 Dec 2018 21:38)  POCT Blood Glucose.: 137 mg/dL (05 Dec 2018 17:22)  POCT Blood Glucose.: 102 mg/dL (05 Dec 2018 11:38)      Drug Levels: [] N/A    CSF Analysis: [] N/A      Allergies    No Known Allergies    Intolerances      MEDICATIONS:  Antibiotics:    Neuro:  acetaminophen   Tablet .. 650 milliGRAM(s) Oral every 6 hours PRN    Anticoagulation:    OTHER:  atorvastatin 40 milliGRAM(s) Oral at bedtime  carvedilol 3.125 milliGRAM(s) Oral every 12 hours  dextrose 40% Gel 15 Gram(s) Oral once PRN  dextrose 50% Injectable 12.5 Gram(s) IV Push once  docusate sodium 100 milliGRAM(s) Oral three times a day  enalapril 5 milliGRAM(s) Oral daily  glucagon  Injectable 1 milliGRAM(s) IntraMuscular once PRN  hydrALAZINE Injectable 10 milliGRAM(s) IV Push every 2 hours PRN  insulin lispro (HumaLOG) corrective regimen sliding scale   SubCutaneous Before meals and at bedtime  senna 2 Tablet(s) Oral at bedtime    IVF:  dextrose 5%. 1000 milliLiter(s) IV Continuous <Continuous>  ferrous    sulfate 325 milliGRAM(s) Oral daily  multivitamin 1 Tablet(s) Oral daily    CULTURES:    RADIOLOGY & ADDITIONAL TESTS:      ASSESSMENT:  83y Male s/p  · Operative Findings	C3-5 ANTERIOR CERVICAL DISCECTOMY AND FUSION C4 CORPECTOMY	      PLAN:    NEURO:    Monitor neuro status  OT/PT  Pain Managment  Bowel regime  F/U Neurology in regard to restarting anticoaugulation  Continue current medcial regime    Dispo: Discussed with attending      CARDIOVASCULAR:    PULMONARY:    RENAL:    GI:    HEME:    ID:    ENDO:    DVT PROPHYLAXIS:  [] Venodynes                                [] Heparin/Lovenox    FALL RISK:  [] Low Risk                                    [] Impulsive

## 2018-12-06 NOTE — PROGRESS NOTE ADULT - SUBJECTIVE AND OBJECTIVE BOX
INTERVAL EVENTS:    PAST MEDICAL & SURGICAL HISTORY:  Prostate cancer  Carotid artery stenosis  Cervical stenosis of spine  Aphasia  CVA (cerebral vascular accident)  DM (diabetes mellitus)  CAD (coronary artery disease)  HLD (hyperlipidemia)  No significant past surgical history      MEDICATIONS  (STANDING):  aspirin  chewable 81 milliGRAM(s) Oral daily  atorvastatin 40 milliGRAM(s) Oral at bedtime  carvedilol 3.125 milliGRAM(s) Oral every 12 hours  dextrose 5%. 1000 milliLiter(s) (50 mL/Hr) IV Continuous <Continuous>  dextrose 50% Injectable 12.5 Gram(s) IV Push once  docusate sodium 100 milliGRAM(s) Oral three times a day  enalapril 5 milliGRAM(s) Oral daily  ferrous    sulfate 325 milliGRAM(s) Oral daily  insulin lispro (HumaLOG) corrective regimen sliding scale   SubCutaneous Before meals and at bedtime  multivitamin 1 Tablet(s) Oral daily  senna 2 Tablet(s) Oral at bedtime    MEDICATIONS  (PRN):  acetaminophen   Tablet .. 650 milliGRAM(s) Oral every 6 hours PRN Mild Pain (1 - 3)  dextrose 40% Gel 15 Gram(s) Oral once PRN Blood Glucose LESS THAN 70 milliGRAM(s)/deciliter  glucagon  Injectable 1 milliGRAM(s) IntraMuscular once PRN Glucose LESS THAN 70 milligrams/deciliter  hydrALAZINE Injectable 10 milliGRAM(s) IV Push every 2 hours PRN SBP > 170      Vital Signs Last 24 Hrs  T(C): 38 (06 Dec 2018 17:39), Max: 38 (06 Dec 2018 03:59)  T(F): 100.4 (06 Dec 2018 17:39), Max: 100.4 (06 Dec 2018 03:59)  HR: 74 (06 Dec 2018 17:39) (74 - 103)  BP: 148/66 (06 Dec 2018 17:39) (144/65 - 180/81)  BP(mean): --  RR: 16 (06 Dec 2018 17:39) (15 - 17)  SpO2: 100% (06 Dec 2018 17:39) (96% - 100%)     PHYSICAL EXAM:  GEN: Awake, alert. NAD.   HEENT: NCAT, PERRL, EOMI. Mucosa moist. No JVD.  RESP: CTA b/l  CV: RRR. Normal S1/S2. No m/r/g.  ABD: Soft. NT/ND. BS+  EXT: Warm. No edema, clubbing, or cyanosis.   NEURO: AAOx3. No focal deficits.     LABS:                        10.5   12.8  )-----------( 174      ( 06 Dec 2018 15:46 )             31.2     12-06    138  |  100  |  20  ----------------------------<  127<H>  4.4   |  23  |  1.13    Ca    9.0      06 Dec 2018 15:46  Phos  3.5     12-05  Mg     2.0     12-06    TPro  7.8  /  Alb  3.3  /  TBili  0.7  /  DBili  x   /  AST  23  /  ALT  10  /  AlkPhos  56  12-06    CARDIAC MARKERS ( 06 Dec 2018 15:46 )  x     / 0.02 ng/mL / 269 U/L / x     / 2.3 ng/mL          I&O's Summary    05 Dec 2018 07:01  -  06 Dec 2018 07:00  --------------------------------------------------------  IN: 960 mL / OUT: 1525 mL / NET: -565 mL    06 Dec 2018 07:01  -  06 Dec 2018 20:59  --------------------------------------------------------  IN: 0 mL / OUT: 550 mL / NET: -550 mL      BNP  RADIOLOGY & ADDITIONAL STUDIES:    TELEMETRY:    EKG: INTERVAL EVENTS: Patient ambulating with PT and felt fatigued. Sat down and became unresponsive. Patient found to be tachycardic with SBPs 140s. RRT called. Found to be febrile to 101.9 and sepsis work up was initiated. Since incident, patient has been feeling well. Denies any lightheadedness, dizziness, CP, SOB, palpitations Patient does not remember event today. Per daughter, patient has had episodes of syncope in past without known cause.     PAST MEDICAL & SURGICAL HISTORY:  Prostate cancer  Carotid artery stenosis  Cervical stenosis of spine  Aphasia  CVA (cerebral vascular accident)  DM (diabetes mellitus)  CAD (coronary artery disease)  HLD (hyperlipidemia)  No significant past surgical history      MEDICATIONS  (STANDING):  aspirin  chewable 81 milliGRAM(s) Oral daily  atorvastatin 40 milliGRAM(s) Oral at bedtime  carvedilol 3.125 milliGRAM(s) Oral every 12 hours  dextrose 5%. 1000 milliLiter(s) (50 mL/Hr) IV Continuous <Continuous>  dextrose 50% Injectable 12.5 Gram(s) IV Push once  docusate sodium 100 milliGRAM(s) Oral three times a day  enalapril 5 milliGRAM(s) Oral daily  ferrous    sulfate 325 milliGRAM(s) Oral daily  insulin lispro (HumaLOG) corrective regimen sliding scale   SubCutaneous Before meals and at bedtime  multivitamin 1 Tablet(s) Oral daily  senna 2 Tablet(s) Oral at bedtime    MEDICATIONS  (PRN):  acetaminophen   Tablet .. 650 milliGRAM(s) Oral every 6 hours PRN Mild Pain (1 - 3)  dextrose 40% Gel 15 Gram(s) Oral once PRN Blood Glucose LESS THAN 70 milliGRAM(s)/deciliter  glucagon  Injectable 1 milliGRAM(s) IntraMuscular once PRN Glucose LESS THAN 70 milligrams/deciliter  hydrALAZINE Injectable 10 milliGRAM(s) IV Push every 2 hours PRN SBP > 170      Vital Signs Last 24 Hrs  T(C): 38 (06 Dec 2018 17:39), Max: 38 (06 Dec 2018 03:59)  T(F): 100.4 (06 Dec 2018 17:39), Max: 100.4 (06 Dec 2018 03:59)  HR: 74 (06 Dec 2018 17:39) (74 - 103)  BP: 148/66 (06 Dec 2018 17:39) (144/65 - 180/81)  BP(mean): --  RR: 16 (06 Dec 2018 17:39) (15 - 17)  SpO2: 100% (06 Dec 2018 17:39) (96% - 100%)     PHYSICAL EXAM:  GEN: Awake, alert. NAD.   HEENT: NCAT, PERRL, EOMI. Mucosa moist. Patient in neck brace post surgery.   RESP: CTA b/l  CV: RRR. Normal S1/S2. No m/r/g.  ABD: Soft. NT/ND. BS+  EXT: Warm. No edema, clubbing, or cyanosis.   NEURO: AAOx3. No focal deficits.     LABS:                        10.5   12.8  )-----------( 174      ( 06 Dec 2018 15:46 )             31.2     12-06    138  |  100  |  20  ----------------------------<  127<H>  4.4   |  23  |  1.13    Ca    9.0      06 Dec 2018 15:46  Phos  3.5     12-05  Mg     2.0     12-06    TPro  7.8  /  Alb  3.3  /  TBili  0.7  /  DBili  x   /  AST  23  /  ALT  10  /  AlkPhos  56  12-06    CARDIAC MARKERS ( 06 Dec 2018 15:46 )  x     / 0.02 ng/mL / 269 U/L / x     / 2.3 ng/mL          I&O's Summary    05 Dec 2018 07:01  -  06 Dec 2018 07:00  --------------------------------------------------------  IN: 960 mL / OUT: 1525 mL / NET: -565 mL    06 Dec 2018 07:01  -  06 Dec 2018 20:59  --------------------------------------------------------  IN: 0 mL / OUT: 550 mL / NET: -550 mL      BNP  RADIOLOGY & ADDITIONAL STUDIES:  Echo-11/29Mild to moderate concentric left ventricular hypertrophy.The left   ventricular wall motion is normal.The left ventricular ejection fraction is   normal.The left ventricular ejection fraction is 65%.The right ventricle is normal   in size and function.The left atrial size is normal.The mitral inflow   pattern is consistent with impaired left ventricular relaxation with mildly   elevated left atrial pressure (8-14mmHg).  Right atrial size is normal.No evidence for   any hemodynamically significant valvular disease.The pulmonary artery   systolic pressure is estimated to be 29 mmHg.There is no pericardial effusion.Mild   aortic root dilatation.The aortic root measures 4.1 cm at sinuses   (normal less than 4 cm for men, less than 3.6 cm for women).    Cath: 2013-LM normal, pLAD and D1 stents patent, LCx 90%diffuse, mRCA 90% and dRCA 90% instent restenosis. PTCA of mid and dRCA and TWYLA to LCx.       TELEMETRY: Sinus tachycardia during episode.     EKG: NSR with PVCs. Some TWI in lateral precordial leads.

## 2018-12-06 NOTE — PROGRESS NOTE ADULT - SUBJECTIVE AND OBJECTIVE BOX
SARAH SÁNCHEZ  Reason for consult: sepsis, near syncopal event     HPI:  Patient is a 83y old  Male who presents with a chief complaint of LE weakness s/p fall (06 Dec 2018 20:58). Is POD#2 of       PAST MEDICAL & SURGICAL HISTORY:  Prostate cancer  Carotid artery stenosis  Cervical stenosis of spine  Aphasia  CVA (cerebral vascular accident)  DM (diabetes mellitus)  CAD (coronary artery disease)  HLD (hyperlipidemia)  No significant past surgical history      REVIEW OF SYSTEMS:  CONSTITUTIONAL: No fever, weight loss, or fatigue  EYES: No eye pain, visual disturbances, or discharge  ENMT:  No difficulty hearing, tinnitus, vertigo; No sinus or throat pain  NECK: No pain or stiffness  BREASTS: No pain, masses, or nipple discharge  RESPIRATORY: No cough, wheezing, chills or hemoptysis; No shortness of breath  CARDIOVASCULAR: No chest pain, palpitations, dizziness, or leg swelling  GASTROINTESTINAL: No abdominal or epigastric pain. No nausea, vomiting, or hematemesis; No diarrhea or constipation. No melena or hematochezia.  GENITOURINARY: No dysuria, frequency, hematuria, or incontinence  NEUROLOGICAL: No headaches, memory loss, loss of strength, numbness, or tremors  SKIN: No itching, burning, rashes, or lesions   LYMPH NODES: No enlarged glands  ENDOCRINE: No heat or cold intolerance; No hair loss  MUSCULOSKELETAL: No joint pain or swelling; No muscle, back, or extremity pain  PSYCHIATRIC: No depression, anxiety, mood swings, or difficulty sleeping  HEME/LYMPH: No easy bruising, or bleeding gums  ALLERY AND IMMUNOLOGIC: No hives or eczema    VITALS:  T(C): 38 (12-06-18 @ 17:39), Max: 38 (12-06-18 @ 03:59)  HR: 74 (12-06-18 @ 17:39) (74 - 103)  BP: 148/66 (12-06-18 @ 17:39) (144/65 - 180/81)  RR: 16 (12-06-18 @ 17:39) (15 - 17)  SpO2: 100% (12-06-18 @ 17:39) (96% - 100%)    PHYSICAL EXAM:  GENERAL: NAD, well-groomed, well-developed  HEAD:  Atraumatic, Normocephalic  EYES: EOMI, PERRLA, conjunctiva and sclera clear  ENMT: No tonsillar erythema, exudates, or enlargement; Moist mucous membranes, Good dentition, No lesions  NECK: Supple, No JVD, Normal thyroid  NERVOUS SYSTEM:  Alert & Oriented X3, Good concentration; Motor Strength 5/5 B/L upper and lower extremities; DTRs 2+ intact and symmetric  CHEST/LUNG: Clear to percussion bilaterally; No rales, rhonchi, wheezing, or rubs  HEART: Regular rate and rhythm; No murmurs, rubs, or gallops  ABDOMEN: Soft, Nontender, Nondistended; Bowel sounds present  EXTREMITIES:  2+ Peripheral Pulses, No clubbing, cyanosis, or edema  LYMPH: No lymphadenopathy noted  SKIN: No rashes or lesions    Consultant(s) Notes Reviewed:  [x] YES  [ ] NO  Care Discussed with Consultants/Other Providers [ x] YES  [ ] NO      LABS:                          10.5   12.8  )-----------( 174      ( 06 Dec 2018 15:46 )             31.2     12-06    138  |  100  |  20  ----------------------------<  127<H>  4.4   |  23  |  1.13    Ca    9.0      06 Dec 2018 15:46  Phos  3.5     12-05  Mg     2.0     12-06    TPro  7.8  /  Alb  3.3  /  TBili  0.7  /  DBili  x   /  AST  23  /  ALT  10  /  AlkPhos  56  12-06      CAPILLARY BLOOD GLUCOSE  POCT Blood Glucose.: 155 mg/dL (06 Dec 2018 17:20)  POCT Blood Glucose.: 134 mg/dL (06 Dec 2018 15:29)  POCT Blood Glucose.: 206 mg/dL (06 Dec 2018 11:37)  POCT Blood Glucose.: 114 mg/dL (06 Dec 2018 06:54)  POCT Blood Glucose.: 157 mg/dL (05 Dec 2018 21:38)    I&O's Summary    05 Dec 2018 07:01  -  06 Dec 2018 07:00  --------------------------------------------------------  IN: 960 mL / OUT: 1525 mL / NET: -565 mL    06 Dec 2018 07:01  -  06 Dec 2018 21:21  --------------------------------------------------------  IN: 0 mL / OUT: 550 mL / NET: -550 mL        RADIOLOGY & ADDITIONAL TESTS:    Imaging Personally Reviewed:  [x] YES  [ ] NO SARAH SÁNCHEZ  Reason for consult: sepsis, near syncopal event     HPI: Patient is a 83y old male who presents with a chief complaint of LE weakness s/p fall (06 Dec 2018 20:58). MRI significant for large disc protrusions/extrusions at C3-4 and C5-6 levels (chronic) contributing to marked spinal canal narrowing/stenosis cord compression in cervical region . Patient POD3 from C3-5 anterior cervical discectomy and fusion C4 corpectomy with neurosurgery service. Patient noted to have low grade fevers this morning with leukocytosis. Around 6pm, rapid response team called because patient had episode of near syncope s/p ambulation with physical therapy. On arrival patient awake, alert and oriented to baseline, no trauma. VSS. Full set of labs collected and EKG completed showing no change when compared to prior and no ischemic changes. Trop elevated to 0.02. Patient meeting 2/4 SIRS (Fever and Leukocytosis) therefore medicine team consulted. Blood Cultures X 2 from two different sites drawn and sent. Lactate sent and was 2.2. CXR unremarkable.    SUBJECTIVE: Patient seen and examined at bedside. Patient AOx3 with daughter accompanying patient. Patient with no complaints other than increased phlegm production since surgery without cough, SOB or CP. Denies pain at surgical site, fevers, chills, malaise, change in appetite, congestion, sore throat, abdominal pain, n/v/d/c, dysuria, frequency, urgency, LE pain, swelling or skin rashes.     PAST MEDICAL & SURGICAL HISTORY:  Prostate cancer  Carotid artery stenosis  Cervical stenosis of spine  Aphasia  CVA (cerebral vascular accident)  DM (diabetes mellitus)  CAD (coronary artery disease)  HLD (hyperlipidemia)  No significant past surgical history    REVIEW OF SYSTEMS:  CONSTITUTIONAL: No fever, weight loss, or fatigue  EYES: No eye pain, visual disturbances, or discharge  ENMT:  No difficulty hearing, tinnitus, vertigo; No sinus or throat pain  NECK: No pain or stiffness  BREASTS: No pain, masses, or nipple discharge  RESPIRATORY: No cough, wheezing, chills or hemoptysis; No shortness of breath  CARDIOVASCULAR: No chest pain, palpitations, dizziness, or leg swelling  GASTROINTESTINAL: No abdominal or epigastric pain. No nausea, vomiting, or hematemesis; No diarrhea or constipation. No melena or hematochezia.  GENITOURINARY: No dysuria, frequency, hematuria, or incontinence  NEUROLOGICAL: No headaches, memory loss, loss of strength, numbness, or tremors  SKIN: No itching, burning, rashes, or lesions   LYMPH NODES: No enlarged glands  ENDOCRINE: No heat or cold intolerance; No hair loss  MUSCULOSKELETAL: No joint pain or swelling; No muscle, back, or extremity pain  PSYCHIATRIC: No depression, anxiety, mood swings, or difficulty sleeping  HEME/LYMPH: No easy bruising, or bleeding gums  ALLERY AND IMMUNOLOGIC: No hives or eczema    VITALS:  T(C): 38 (12-06-18 @ 17:39), Max: 38 (12-06-18 @ 03:59)  HR: 74 (12-06-18 @ 17:39) (74 - 103)  BP: 148/66 (12-06-18 @ 17:39) (144/65 - 180/81)  RR: 16 (12-06-18 @ 17:39) (15 - 17)  SpO2: 100% (12-06-18 @ 17:39) (96% - 100%)    PHYSICAL EXAM:  GENERAL: elderly male, NAD, resting comfortable  HEAD:  Atraumatic, Normocephalic  EYES: EOMI, PERRLA, conjunctiva and sclera clear  ENMT: No tonsillar erythema, exudates, or enlargement; Moist mucous membranes, Good dentition, No lesions  NECK: soft collar in place, when removed- surgical site appears C/D/I w no drainage or fluctuance, Normal thyroid  NERVOUS SYSTEM:  AOx3,  strength 5/5 b/l upper and lower extremities; DTRs 2+ intact and symmetric  CHEST/LUNG: Clear to percussion bilaterally; No rales, rhonchi, wheezing, or rubs  HEART: Regular rate and rhythm; No murmurs, rubs, or gallops  ABDOMEN: Soft, Nontender, Nondistended; Bowel sounds present  EXTREMITIES:  2+ Peripheral Pulses, No clubbing, cyanosis, or edema  LYMPH: No lymphadenopathy noted  SKIN: No rashes or lesions    Consultant(s) Notes Reviewed:  [x] YES  [ ] NO  Care Discussed with Consultants/Other Providers [ x] YES  [ ] NO      LABS:                          10.5   12.8  )-----------( 174      ( 06 Dec 2018 15:46 )             31.2     12-06    138  |  100  |  20  ----------------------------<  127<H>  4.4   |  23  |  1.13    Ca    9.0      06 Dec 2018 15:46  Phos  3.5     12-05  Mg     2.0     12-06    TPro  7.8  /  Alb  3.3  /  TBili  0.7  /  DBili  x   /  AST  23  /  ALT  10  /  AlkPhos  56  12-06      CAPILLARY BLOOD GLUCOSE  POCT Blood Glucose.: 155 mg/dL (06 Dec 2018 17:20)  POCT Blood Glucose.: 134 mg/dL (06 Dec 2018 15:29)  POCT Blood Glucose.: 206 mg/dL (06 Dec 2018 11:37)  POCT Blood Glucose.: 114 mg/dL (06 Dec 2018 06:54)  POCT Blood Glucose.: 157 mg/dL (05 Dec 2018 21:38)    I&O's Summary    05 Dec 2018 07:01  -  06 Dec 2018 07:00  --------------------------------------------------------  IN: 960 mL / OUT: 1525 mL / NET: -565 mL    06 Dec 2018 07:01  -  06 Dec 2018 21:21  --------------------------------------------------------  IN: 0 mL / OUT: 550 mL / NET: -550 mL        RADIOLOGY & ADDITIONAL TESTS:    Imaging Personally Reviewed:  [x] YES  [ ] NO

## 2018-12-06 NOTE — CHART NOTE - NSCHARTNOTEFT_GEN_A_CORE
Admitting Diagnosis:   Patient is a 83y old  Male who presents with a chief complaint of LE weakness s/p fall (06 Dec 2018 07:11)      PAST MEDICAL & SURGICAL HISTORY:  Prostate cancer  Carotid artery stenosis  Cervical stenosis of spine  Aphasia  CVA (cerebral vascular accident)  DM (diabetes mellitus)  CAD (coronary artery disease)  HLD (hyperlipidemia)  No significant past surgical history      Current Nutrition Order: DASH/TLC, consistent carb w snack        PO Intake: Good (%) [   ]  Fair (50-75%) [ x  ] Poor (<25%) [   ]    GI Issues: no noted n/v/d/c, NT, round     Pain: appears comfortable     Skin Integrity: incisions noted     Labs:   12-06    139  |  105  |  19  ----------------------------<  123<H>  4.2   |  26  |  1.07    Ca    8.8      06 Dec 2018 06:37  Phos  3.5     12-05  Mg     1.9     12-05      CAPILLARY BLOOD GLUCOSE      POCT Blood Glucose.: 206 mg/dL (06 Dec 2018 11:37)  POCT Blood Glucose.: 114 mg/dL (06 Dec 2018 06:54)  POCT Blood Glucose.: 157 mg/dL (05 Dec 2018 21:38)  POCT Blood Glucose.: 137 mg/dL (05 Dec 2018 17:22)      Medications:  MEDICATIONS  (STANDING):  aspirin  chewable 81 milliGRAM(s) Oral daily  atorvastatin 40 milliGRAM(s) Oral at bedtime  carvedilol 3.125 milliGRAM(s) Oral every 12 hours  dextrose 5%. 1000 milliLiter(s) (50 mL/Hr) IV Continuous <Continuous>  dextrose 50% Injectable 12.5 Gram(s) IV Push once  docusate sodium 100 milliGRAM(s) Oral three times a day  enalapril 5 milliGRAM(s) Oral daily  ferrous    sulfate 325 milliGRAM(s) Oral daily  insulin lispro (HumaLOG) corrective regimen sliding scale   SubCutaneous Before meals and at bedtime  multivitamin 1 Tablet(s) Oral daily  senna 2 Tablet(s) Oral at bedtime    MEDICATIONS  (PRN):  acetaminophen   Tablet .. 650 milliGRAM(s) Oral every 6 hours PRN Mild Pain (1 - 3)  dextrose 40% Gel 15 Gram(s) Oral once PRN Blood Glucose LESS THAN 70 milliGRAM(s)/deciliter  glucagon  Injectable 1 milliGRAM(s) IntraMuscular once PRN Glucose LESS THAN 70 milligrams/deciliter  hydrALAZINE Injectable 10 milliGRAM(s) IV Push every 2 hours PRN SBP > 170      Weight: 80kg     Weight Change: no new wts     Nutrition Focused Physical Exam: Completed [   ]  Not Pertinent [ x  ]    Estimated energy needs:   Ht: 180.3cm, Wt: 80kg, IBW: 78.1kg, 102.4%IBW.   Needs calculated based on [facility] standards of care. Needs adjusted for age. Needs to be adjusted post surgical intervention.  ABW used for calculations as pt between % of IBW.     1600-2000kcal (20-25kcal/kg)   80-96g pro (1-1.2g/kg pro)   2400-2800ml (30-35ml/kg)     Subjective:   82M admitted w fall, new onset LLE weakness, found w/ mild RLE cellulitis, S/P MRI c-spine w/ spinal canal narrowing at C3-4 and C5-6 w/ cord edema. Note: at home use of coumadin confirmed by family 11/27, education provided 11/30 . PMH: poor historian, T2DM, HLD, HTN, MR, cervical stenosis, prostate cancer (S/P brachytherapy), CVA w/ R sided weakness (on coumadin), carotid stenosis, CAD, aphasia, dementia. Pt is on DASH/TLC + consistent carb diet with ~30% PO intake, which pt reports is similar to baseline intake. Reported UBW ~81.8kg which has been stable over the past year, current wt 80kg. TREVIN drain removal yesterday 12/5, pending NITZA when ready. RD to follow up per protocol.    Previous Nutrition Diagnosis: food and nutrition related knowledge deficit RT limited prior exposure to consistent carbohydrate and Vitamin K/Coumadin diet edu AEB limited understanding of consistent carbohydrate and Vitamin K/Coumadin diet edu     Active [  x ]  Resolved [   ]    If resolved, new PES:     Goal: verbalize diet recs     Recommendations:  1. Reinforce diet ed   2. Encourage intake   3. Manage pain prn   4. Monitor and replete lytes prn     Education: no further ed needed at this time     Risk Level: High [   ] Moderate [ x  ] Low [   ] Admitting Diagnosis:   Patient is a 83y old  Male who presents with a chief complaint of LE weakness s/p fall (06 Dec 2018 07:11)      PAST MEDICAL & SURGICAL HISTORY:  Prostate cancer  Carotid artery stenosis  Cervical stenosis of spine  Aphasia  CVA (cerebral vascular accident)  DM (diabetes mellitus)  CAD (coronary artery disease)  HLD (hyperlipidemia)  No significant past surgical history      Current Nutrition Order: DASH/TLC, consistent carb w snack        PO Intake: Good (%) [   ]  Fair (50-75%) [ x  ] Poor (<25%) [   ]    GI Issues: no noted n/v/d/c, NT, round     Pain: appears comfortable     Skin Integrity: incisions noted     Labs:   12-06    139  |  105  |  19  ----------------------------<  123<H>  4.2   |  26  |  1.07    Ca    8.8      06 Dec 2018 06:37  Phos  3.5     12-05  Mg     1.9     12-05      CAPILLARY BLOOD GLUCOSE      POCT Blood Glucose.: 206 mg/dL (06 Dec 2018 11:37)  POCT Blood Glucose.: 114 mg/dL (06 Dec 2018 06:54)  POCT Blood Glucose.: 157 mg/dL (05 Dec 2018 21:38)  POCT Blood Glucose.: 137 mg/dL (05 Dec 2018 17:22)      Medications:  MEDICATIONS  (STANDING):  aspirin  chewable 81 milliGRAM(s) Oral daily  atorvastatin 40 milliGRAM(s) Oral at bedtime  carvedilol 3.125 milliGRAM(s) Oral every 12 hours  dextrose 5%. 1000 milliLiter(s) (50 mL/Hr) IV Continuous <Continuous>  dextrose 50% Injectable 12.5 Gram(s) IV Push once  docusate sodium 100 milliGRAM(s) Oral three times a day  enalapril 5 milliGRAM(s) Oral daily  ferrous    sulfate 325 milliGRAM(s) Oral daily  insulin lispro (HumaLOG) corrective regimen sliding scale   SubCutaneous Before meals and at bedtime  multivitamin 1 Tablet(s) Oral daily  senna 2 Tablet(s) Oral at bedtime    MEDICATIONS  (PRN):  acetaminophen   Tablet .. 650 milliGRAM(s) Oral every 6 hours PRN Mild Pain (1 - 3)  dextrose 40% Gel 15 Gram(s) Oral once PRN Blood Glucose LESS THAN 70 milliGRAM(s)/deciliter  glucagon  Injectable 1 milliGRAM(s) IntraMuscular once PRN Glucose LESS THAN 70 milligrams/deciliter  hydrALAZINE Injectable 10 milliGRAM(s) IV Push every 2 hours PRN SBP > 170      Weight: 80kg     Weight Change: no new wts     Nutrition Focused Physical Exam: Completed [   ]  Not Pertinent [ x  ]    Estimated energy needs:   Ht: 180.3cm, Wt: 80kg, IBW: 78.1kg, 102.4%IBW.   Needs calculated based on [facility] standards of care. Needs adjusted for age. Needs to be adjusted post surgical intervention.  ABW used for calculations as pt between % of IBW.     1600-2000kcal (20-25kcal/kg)   80-96g pro (1-1.2g/kg pro)   2400-2800ml (30-35ml/kg)     Subjective:   82M admitted w fall, new onset LLE weakness, found w/ mild RLE cellulitis, S/P MRI c-spine w/ spinal canal narrowing at C3-4 and C5-6 w/ cord edema. Note: at home use of coumadin confirmed by family 11/27, education provided 11/30 . PMH: poor historian, T2DM, HLD, HTN, MR, cervical stenosis, prostate cancer (S/P brachytherapy), CVA w/ R sided weakness (on coumadin), carotid stenosis, CAD, aphasia, dementia. Pt is on DASH/TLC + consistent carb diet with ~30% PO intake, which pt reports is similar to baseline intake. Reported UBW ~81.8kg which has been stable over the past year, current wt 80kg. TREVIN drain removal yesterday 12/5, pending NITZA when ready. RD to follow up per protocol.    Previous Nutrition Diagnosis: food and nutrition related knowledge deficit RT limited prior exposure to consistent carbohydrate and Vitamin K/Coumadin diet edu AEB limited understanding of consistent carbohydrate and Vitamin K/Coumadin diet edu     Active [  x ]  Resolved [   ]    If resolved, new PES:     Goal: verbalize diet recs     Recommendations:  1. Reinforce diet ed   2. Encourage intake   3. Manage pain prn   4. Monitor and replete lytes prn     Education: encouraged intake     Risk Level: High [   ] Moderate [ x  ] Low [   ]

## 2018-12-06 NOTE — CHART NOTE - NSCHARTNOTEFT_GEN_A_CORE
Rapid response called on patient while ambulating with PT, per PT, pt was walking well in the hallway, when he complained of feeling fatigued and asked to sit down. A chair was brought in and upon sitting down, patient became unresponsive - not answering questions, not response to painful stimuli. The next set of vitals showed HR in 110s and BP 140s systolic. Pt denies feeling dizzy, weak, nauseous, confused, numbness/tinging, blurry vision. He states he feels generally tired.     EKG was performed, labs were drawn (CBC, BMP, Elias, lactate) and fever workup was ordered as patient was found to be febrile to 101.9 rectally - blood cultures, CXR, UA, LE dopplers. Pt was also given a 500cc bolus. Cardiology and medicine already following, thus were updated on the situation, recs pending. Pt stable from neurosurgical standpoint, surgical site c/d/i, no erythema, tenderness or signs of infection. Pt mental status and motor / sensory function at baseline (oriented x2 and RLE weakness). No pronator drift, dysmetria, facial droop, change of mental status, confusion, slurred speech or other focal findings.     A/P:  - F/u remainder of labs / cultures   - Trend fever   - Hold abx for now   - Appreciate cards / medicine recs   - Possible transfer back to medicine for further management   - D/w Dr. Phan

## 2018-12-07 ENCOUNTER — TRANSCRIPTION ENCOUNTER (OUTPATIENT)
Age: 83
End: 2018-12-07

## 2018-12-07 VITALS
SYSTOLIC BLOOD PRESSURE: 110 MMHG | DIASTOLIC BLOOD PRESSURE: 54 MMHG | OXYGEN SATURATION: 98 % | HEART RATE: 87 BPM | TEMPERATURE: 100 F | RESPIRATION RATE: 18 BRPM

## 2018-12-07 LAB
ANION GAP SERPL CALC-SCNC: 17 MMOL/L — SIGNIFICANT CHANGE UP (ref 5–17)
BUN SERPL-MCNC: 18 MG/DL — SIGNIFICANT CHANGE UP (ref 7–23)
CALCIUM SERPL-MCNC: 9.1 MG/DL — SIGNIFICANT CHANGE UP (ref 8.4–10.5)
CHLORIDE SERPL-SCNC: 102 MMOL/L — SIGNIFICANT CHANGE UP (ref 96–108)
CK MB CFR SERPL CALC: 3.1 NG/ML — SIGNIFICANT CHANGE UP (ref 0–6.7)
CK SERPL-CCNC: 202 U/L — HIGH (ref 30–200)
CO2 SERPL-SCNC: 22 MMOL/L — SIGNIFICANT CHANGE UP (ref 22–31)
CREAT SERPL-MCNC: 0.89 MG/DL — SIGNIFICANT CHANGE UP (ref 0.5–1.3)
GLUCOSE BLDC GLUCOMTR-MCNC: 114 MG/DL — HIGH (ref 70–99)
GLUCOSE BLDC GLUCOMTR-MCNC: 116 MG/DL — HIGH (ref 70–99)
GLUCOSE SERPL-MCNC: 112 MG/DL — HIGH (ref 70–99)
HCT VFR BLD CALC: 30.9 % — LOW (ref 39–50)
HGB BLD-MCNC: 10.3 G/DL — LOW (ref 13–17)
MCHC RBC-ENTMCNC: 30.8 PG — SIGNIFICANT CHANGE UP (ref 27–34)
MCHC RBC-ENTMCNC: 33.3 G/DL — SIGNIFICANT CHANGE UP (ref 32–36)
MCV RBC AUTO: 92.5 FL — SIGNIFICANT CHANGE UP (ref 80–100)
PLATELET # BLD AUTO: 163 K/UL — SIGNIFICANT CHANGE UP (ref 150–400)
POTASSIUM SERPL-MCNC: 4.1 MMOL/L — SIGNIFICANT CHANGE UP (ref 3.5–5.3)
POTASSIUM SERPL-SCNC: 4.1 MMOL/L — SIGNIFICANT CHANGE UP (ref 3.5–5.3)
RBC # BLD: 3.34 M/UL — LOW (ref 4.2–5.8)
RBC # FLD: 14.7 % — SIGNIFICANT CHANGE UP (ref 10.3–16.9)
SODIUM SERPL-SCNC: 141 MMOL/L — SIGNIFICANT CHANGE UP (ref 135–145)
TROPONIN T SERPL-MCNC: 0.02 NG/ML — HIGH (ref 0–0.01)
WBC # BLD: 11.7 K/UL — HIGH (ref 3.8–10.5)
WBC # FLD AUTO: 11.7 K/UL — HIGH (ref 3.8–10.5)

## 2018-12-07 PROCEDURE — 99233 SBSQ HOSP IP/OBS HIGH 50: CPT | Mod: GC

## 2018-12-07 RX ORDER — HYDROCHLOROTHIAZIDE 25 MG
12.5 TABLET ORAL DAILY
Qty: 0 | Refills: 0 | Status: DISCONTINUED | OUTPATIENT
Start: 2018-12-07 | End: 2018-12-07

## 2018-12-07 RX ORDER — BENZOYL PEROXIDE MICRONIZED 5.8 %
0 TOWELETTE (EA) TOPICAL
Qty: 0 | Refills: 0 | COMMUNITY

## 2018-12-07 RX ORDER — CARVEDILOL PHOSPHATE 80 MG/1
6.25 CAPSULE, EXTENDED RELEASE ORAL EVERY 12 HOURS
Qty: 0 | Refills: 0 | Status: DISCONTINUED | OUTPATIENT
Start: 2018-12-07 | End: 2018-12-07

## 2018-12-07 RX ORDER — CARVEDILOL PHOSPHATE 80 MG/1
3.12 CAPSULE, EXTENDED RELEASE ORAL ONCE
Qty: 0 | Refills: 0 | Status: COMPLETED | OUTPATIENT
Start: 2018-12-07 | End: 2018-12-07

## 2018-12-07 RX ORDER — INSULIN LISPRO 100/ML
0 VIAL (ML) SUBCUTANEOUS
Qty: 0 | Refills: 0 | COMMUNITY
Start: 2018-12-07

## 2018-12-07 RX ORDER — WARFARIN SODIUM 2.5 MG/1
5 TABLET ORAL DAILY
Qty: 0 | Refills: 0 | Status: DISCONTINUED | OUTPATIENT
Start: 2018-12-07 | End: 2018-12-07

## 2018-12-07 RX ORDER — DOCUSATE SODIUM 100 MG
1 CAPSULE ORAL
Qty: 0 | Refills: 0 | DISCHARGE
Start: 2018-12-07

## 2018-12-07 RX ORDER — SENNA PLUS 8.6 MG/1
2 TABLET ORAL
Qty: 0 | Refills: 0 | DISCHARGE
Start: 2018-12-07

## 2018-12-07 RX ORDER — ACETAMINOPHEN 500 MG
2 TABLET ORAL
Qty: 0 | Refills: 0 | DISCHARGE
Start: 2018-12-07

## 2018-12-07 RX ORDER — ATORVASTATIN CALCIUM 80 MG/1
40 TABLET, FILM COATED ORAL AT BEDTIME
Qty: 0 | Refills: 0 | Status: DISCONTINUED | OUTPATIENT
Start: 2018-12-07 | End: 2018-12-07

## 2018-12-07 RX ADMIN — Medication 1 TABLET(S): at 09:45

## 2018-12-07 RX ADMIN — Medication 10 MILLIGRAM(S): at 04:45

## 2018-12-07 RX ADMIN — Medication 325 MILLIGRAM(S): at 09:45

## 2018-12-07 RX ADMIN — Medication 5 MILLIGRAM(S): at 07:00

## 2018-12-07 RX ADMIN — Medication 81 MILLIGRAM(S): at 09:45

## 2018-12-07 RX ADMIN — Medication 100 MILLIGRAM(S): at 13:28

## 2018-12-07 RX ADMIN — CARVEDILOL PHOSPHATE 3.12 MILLIGRAM(S): 80 CAPSULE, EXTENDED RELEASE ORAL at 07:00

## 2018-12-07 RX ADMIN — Medication 100 MILLIGRAM(S): at 07:00

## 2018-12-07 RX ADMIN — CARVEDILOL PHOSPHATE 3.12 MILLIGRAM(S): 80 CAPSULE, EXTENDED RELEASE ORAL at 13:28

## 2018-12-07 NOTE — DISCHARGE NOTE ADULT - HOSPITAL COURSE
Patient is an 84 yo man, poor historian, with a PMH DM II, HLD, HTN, mitral regurgitation, cervical stenosis, prostate ca (s/p brachytherapy), CVA w R sided weakness (on coumadin-unclear if hx afib), carotid stenosis, CAD admitted for fall with new onset LLE weakness. At time of admission patient's family no longer at bedside, patient is a poor historian and cannot provide much history besides events of the day. Patient states he was walking with his walker earlier today and fell backwards. No CP, SOB, light headedness, LOC, shaking, incontinence of urine, changes in vision prior to fall. Once he fell, he could not get up and family had to help him up. Per signout from ED provider, patient's family reported worsening LLE weakness over the past month. No bowel/bladder incontinence.  Also with RLE erythema, swelling, unclear how long this has been going on for. Attempted to call patient's wife and daughter X3 each with no answer, unable to obtain collateral from family.    Pt admitted and underwent C3-5 ANTERIOR CERVICAL DISCECTOMY AND FUSION  C4 CORPECTOMY 12/4/2018 pt doing well  pt developed fevers  work up negative Medical team following pt and cleared pt to be transferred to rehab  on 12/9 pt cleared for anticoagulation as per Neurosurgery and Neurologist

## 2018-12-07 NOTE — PROGRESS NOTE ADULT - SUBJECTIVE AND OBJECTIVE BOX
HPI:  Patient is an 84 yo man, poor historian, with a PMH DM II, HLD, HTN, mitral regurgitation, cervical stenosis, prostate ca (s/p brachytherapy), CVA w R sided weakness (on coumadin-unclear if hx afib), carotid stenosis, CAD admitted for fall with new onset LLE weakness. At time of admission patient's family no longer at bedside, patient is a poor historian and cannot provide much history besides events of the day. Patient states he was walking with his walker earlier today and fell backwards. No CP, SOB, light headedness, LOC, shaking, incontinence of urine, changes in vision prior to fall. Once he fell, he could not get up and family had to help him up. Per signout from ED provider, patient's family reported worsening LLE weakness over the past month. No bowel/bladder incontinence.  Also with RLE erythema, swelling, unclear how long this has been going on for. Attempted to call patient's wife and daughter X3 each with no answer, unable to obtain collateral from family.      In ED VS: Tm 99.1, HR 71-89, -182/73-80, RR 18, O2% 98 RA  RLE doppler negative for DVT, MRI done showing "Possible cord edema versus myelomalacia between C3/4." CT head with frontoparietal swelling, no acute intracranial hemorrhage or infarct. 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    Trop elevated 0.05 -> 0.06, EKG unchanged from previous with nonspecific T wave abnormalities. CK mildly elevated 403. INR 1.73, patient reportedly takes coumadin.     Neuosurg consulted in ED, recommended surgical intervention. Per documentation patient and family were not agreeable to surgery at this time. Attempted to call family 3X daughter and 3X wife overnight to clarify this, unable to reach either person. (2018 03:21)    OVERNIGHT EVENTS:  Vital Signs Last 24 Hrs  T(C): 37.4 (07 Dec 2018 04:45), Max: 38 (06 Dec 2018 17:39)  T(F): 99.3 (07 Dec 2018 04:45), Max: 100.4 (06 Dec 2018 17:39)  HR: 77 (07 Dec 2018 04:45) (74 - 103)  BP: 186/85 (07 Dec 2018 04:45) (144/68 - 195/81)  BP(mean): --  RR: 14 (07 Dec 2018 04:45) (14 - 16)  SpO2: 99% (07 Dec 2018 04:45) (96% - 100%)    I&O's Summary    05 Dec 2018 07:01  -  06 Dec 2018 07:00  --------------------------------------------------------  IN: 960 mL / OUT: 1525 mL / NET: -565 mL    06 Dec 2018 07:01  -  07 Dec 2018 06:25  --------------------------------------------------------  IN: 0 mL / OUT: 550 mL / NET: -550 mL        PHYSICAL EXAM:  Neurological:  Awake and alert, oriented x2 (baseline), NAD, coherent speech, following commands  CNII-XII grossly intact  MAEx4, RUE spastic (4+/5 , o/w 2-3/5), RLE (2-3/5 DF/PF, 1/5 proximal), LUE/LLE 5/5 throughout  SILT throughout b/l  Incision/wound: anterior cervical incision clean, dry and intact    Cardiovascular: RRR  Respiratory: Lungs CTAB  Gastrointestinal: +BS  Genitourinary: Voiding without difficulty  Extremities: warm and dry  Incision/Wound: CDI         DIET: Regular    LABS:                        10.5   12.8  )-----------( 174      ( 06 Dec 2018 15:46 )             31.2     12-06    138  |  100  |  20  ----------------------------<  127<H>  4.4   |  23  |  1.13    Ca    9.0      06 Dec 2018 15:46  Mg     2.0     12-    TPro  7.8  /  Alb  3.3  /  TBili  0.7  /  DBili  x   /  AST  23  /  ALT  10  /  AlkPhos  56  12-      Urinalysis Basic - ( 06 Dec 2018 22:02 )    Color: Yellow / Appearance: Clear / S.020 / pH: x  Gluc: x / Ketone: NEGATIVE  / Bili: Negative / Urobili: 0.2 E.U./dL   Blood: x / Protein: Trace mg/dL / Nitrite: NEGATIVE   Leuk Esterase: NEGATIVE / RBC: 0-5 /HPF / WBC 2-5 /HPF   Sq Epi: x / Non Sq Epi: 0-5 /HPF / Bacteria: Present /HPF      CARDIAC MARKERS ( 06 Dec 2018 15:46 )  x     / 0.02 ng/mL / 269 U/L / x     / 2.3 ng/mL      CAPILLARY BLOOD GLUCOSE      POCT Blood Glucose.: 148 mg/dL (06 Dec 2018 21:28)  POCT Blood Glucose.: 155 mg/dL (06 Dec 2018 17:20)  POCT Blood Glucose.: 134 mg/dL (06 Dec 2018 15:29)  POCT Blood Glucose.: 206 mg/dL (06 Dec 2018 11:37)  POCT Blood Glucose.: 114 mg/dL (06 Dec 2018 06:54)      Drug Levels: [] N/A    CSF Analysis: [] N/A      Allergies    No Known Allergies    Intolerances      MEDICATIONS:  Antibiotics:    Neuro:  acetaminophen   Tablet .. 650 milliGRAM(s) Oral every 6 hours PRN    Anticoagulation:  aspirin  chewable 81 milliGRAM(s) Oral daily    OTHER:  atorvastatin 40 milliGRAM(s) Oral at bedtime  carvedilol 3.125 milliGRAM(s) Oral every 12 hours  dextrose 40% Gel 15 Gram(s) Oral once PRN  dextrose 50% Injectable 12.5 Gram(s) IV Push once  docusate sodium 100 milliGRAM(s) Oral three times a day  enalapril 5 milliGRAM(s) Oral daily  glucagon  Injectable 1 milliGRAM(s) IntraMuscular once PRN  hydrALAZINE Injectable 10 milliGRAM(s) IV Push every 2 hours PRN  insulin lispro (HumaLOG) corrective regimen sliding scale   SubCutaneous Before meals and at bedtime  senna 2 Tablet(s) Oral at bedtime    IVF:  dextrose 5%. 1000 milliLiter(s) IV Continuous <Continuous>  ferrous    sulfate 325 milliGRAM(s) Oral daily  multivitamin 1 Tablet(s) Oral daily    CULTURES:  Culture Results:   No growth at 12 hours ( @ 16:38)  Culture Results:   No growth at 12 hours (12- @ 16:38)    RADIOLOGY & ADDITIONAL TESTS:      ASSESSMENT:  83y Male s/p ACDF C3-5 C4 Corpectomy    PLAN:    NEURO:    Monitor neuro status  OT/PT  Pain Maanagment  Bowel regime  Continue ASA as pe rCardiology  BP managment  Continue current medical regime    Dispo: Discussed with attendinge

## 2018-12-07 NOTE — DISCHARGE NOTE ADULT - CARE PROVIDER_API CALL
José Antonio Phan), Neurological Surgery  [address], [place] [facility]  Phone: [phone]  Fax: [phone]

## 2018-12-07 NOTE — DISCHARGE NOTE ADULT - MEDICATION SUMMARY - MEDICATIONS TO TAKE
I will START or STAY ON the medications listed below when I get home from the hospital:    aspirin 81 mg oral tablet  -- 1 tab(s) by mouth once a day  -- Indication: For Anti platlet    acetaminophen 325 mg oral tablet  -- 2 tab(s) by mouth every 6 hours, As needed, Mild Pain (1 - 3)  -- Indication: For Pain    enalapril 5 mg oral tablet  -- 1 tab(s) by mouth once a day  -- Indication: For blood pressure    warfarin 5 mg oral tablet  -- 1 tab(s) by mouth once a day, 5 days a week; 7mg on Tues and Sat  -- Indication: For blood thinners    insulin lispro (concentrated) 200 units/mL subcutaneous solution  --  subcutaneous   -- Indication: For Diabetes    atorvastatin 40 mg oral tablet  -- 1 tab(s) by mouth every other day   -- Indication: For Cholesterol    carvedilol 3.125 mg oral tablet  -- 1 tab(s) by mouth 2 times a day  -- Indication: For Cv agent     hydroCHLOROthiazide 12.5 mg oral capsule  -- orally once a day  -- Indication: For Htn    senna oral tablet  -- 2 tab(s) by mouth once a day (at bedtime)  -- Indication: For Fiber pill    docusate sodium 100 mg oral capsule  -- 1 cap(s) by mouth 3 times a day  -- Indication: For Stool softner    Omega Essentials  -- 5 mg  by mouth once a day  -- Indication: For vitamin    multivitamin  -- 1 tab  once a day  -- Indication: For vitamin

## 2018-12-07 NOTE — PROGRESS NOTE ADULT - SUBJECTIVE AND OBJECTIVE BOX
INTERVAL HPI/OVERNIGHT EVENTS:    OVERNIGHT: No overnight events.  SUBJECTIVE: Patient seen and examined at bedside.     ROS:  CV: Denies chest pain  Resp: Denies SOB  GI: Denies abdominal pain, constipation, diarrhea, nausea, vomiting  : Denies dysuria  ID: Denies fevers, chills  MSK: Denies joint pain     OBJECTIVE:    VITAL SIGNS:  ICU Vital Signs Last 24 Hrs  T(C): 36.8 (07 Dec 2018 08:55), Max: 38 (06 Dec 2018 17:39)  T(F): 98.3 (07 Dec 2018 08:55), Max: 100.4 (06 Dec 2018 17:39)  HR: 83 (07 Dec 2018 08:55) (74 - 103)  BP: 169/77 (07 Dec 2018 08:55) (145/64 - 195/81)  BP(mean): --  ABP: --  ABP(mean): --  RR: 18 (07 Dec 2018 08:55) (14 - 22)  SpO2: 98% (07 Dec 2018 08:55) (96% - 100%)         @ 07:  -   @ 07:00  --------------------------------------------------------  IN: 0 mL / OUT: 625 mL / NET: -625 mL     @ 07:01  -   @ 11:07  --------------------------------------------------------  IN: 120 mL / OUT: 0 mL / NET: 120 mL      CAPILLARY BLOOD GLUCOSE      POCT Blood Glucose.: 114 mg/dL (07 Dec 2018 06:46)      PHYSICAL EXAM:    General: NAD, comfortable  HEENT: NCAT, PERRL, clear conjunctiva, no scleral icterus  Neck: supple, no JVD  Respiratory: CTA b/l, no wheezing, rhonchi, rales  Cardiovascular: RRR, normal S1S2, no M/R/G  Abdomen: soft, NT/ND, bowel sounds in all four quadrants, no palpable masses  Extremities: WWP, no clubbing, cyanosis, or edema  Neuro: AAO,3, answers questions appropriately    MEDICATIONS:  MEDICATIONS  (STANDING):  aspirin  chewable 81 milliGRAM(s) Oral daily  atorvastatin 40 milliGRAM(s) Oral at bedtime  carvedilol 3.125 milliGRAM(s) Oral every 12 hours  dextrose 5%. 1000 milliLiter(s) (50 mL/Hr) IV Continuous <Continuous>  dextrose 50% Injectable 12.5 Gram(s) IV Push once  docusate sodium 100 milliGRAM(s) Oral three times a day  enalapril 5 milliGRAM(s) Oral daily  ferrous    sulfate 325 milliGRAM(s) Oral daily  insulin lispro (HumaLOG) corrective regimen sliding scale   SubCutaneous Before meals and at bedtime  multivitamin 1 Tablet(s) Oral daily  senna 2 Tablet(s) Oral at bedtime    MEDICATIONS  (PRN):  acetaminophen   Tablet .. 650 milliGRAM(s) Oral every 6 hours PRN Mild Pain (1 - 3)  dextrose 40% Gel 15 Gram(s) Oral once PRN Blood Glucose LESS THAN 70 milliGRAM(s)/deciliter  glucagon  Injectable 1 milliGRAM(s) IntraMuscular once PRN Glucose LESS THAN 70 milligrams/deciliter  hydrALAZINE Injectable 10 milliGRAM(s) IV Push every 2 hours PRN SBP > 170      ALLERGIES:  Allergies    No Known Allergies    Intolerances        LABS:                        10.3   11.7  )-----------( 163      ( 07 Dec 2018 06:50 )             30.9     12-07    141  |  102  |  18  ----------------------------<  112<H>  4.1   |  22  |  0.89    Ca    9.1      07 Dec 2018 06:50  Mg     2.0     12-    TPro  7.8  /  Alb  3.3  /  TBili  0.7  /  DBili  x   /  AST  23  /  ALT  10  /  AlkPhos  56  12-      Urinalysis Basic - ( 06 Dec 2018 22:02 )    Color: Yellow / Appearance: Clear / S.020 / pH: x  Gluc: x / Ketone: NEGATIVE  / Bili: Negative / Urobili: 0.2 E.U./dL   Blood: x / Protein: Trace mg/dL / Nitrite: NEGATIVE   Leuk Esterase: NEGATIVE / RBC: 0-5 /HPF / WBC 2-5 /HPF   Sq Epi: x / Non Sq Epi: 0-5 /HPF / Bacteria: Present /HPF        RADIOLOGY & ADDITIONAL TESTS: Reviewed. INTERVAL HPI/OVERNIGHT EVENTS: No events overnight      SUBJECTIVE: Per wife at bedside, pt was hallucinating last night, thinking he was standing up while lying in bed. Pt's wife also mentioned that pt has had these "episodes" of near-syncope in the past since his CVA, each episode lasts about 1 minute.   Patient seen and examined at bedside. Pt has no complaints this morning    ROS:  CV: Denies chest pain  Resp: Denies SOB  GI: Denies abdominal pain, constipation, diarrhea, nausea, vomiting  : Denies dysuria  ID: Denies fevers, chills  MSK: Denies joint pain     OBJECTIVE:    VITAL SIGNS:  ICU Vital Signs Last 24 Hrs  T(C): 36.8 (07 Dec 2018 08:55), Max: 38 (06 Dec 2018 17:39)  T(F): 98.3 (07 Dec 2018 08:55), Max: 100.4 (06 Dec 2018 17:39)  HR: 83 (07 Dec 2018 08:55) (74 - 103)  BP: 169/77 (07 Dec 2018 08:55) (145/64 - 195/81)  BP(mean): --  ABP: --  ABP(mean): --  RR: 18 (07 Dec 2018 08:55) (14 - 22)  SpO2: 98% (07 Dec 2018 08:55) (96% - 100%)         @ :  -   @ 07:00  --------------------------------------------------------  IN: 0 mL / OUT: 625 mL / NET: -625 mL     @ 07:  -   @ 11:07  --------------------------------------------------------  IN: 120 mL / OUT: 0 mL / NET: 120 mL      CAPILLARY BLOOD GLUCOSE      POCT Blood Glucose.: 114 mg/dL (07 Dec 2018 06:46)      PHYSICAL EXAM:    Constitutional: WDWN resting comfortably in bed; NAD  Head: NC/AT  Eyes: PERRLA, EOMI, clear conjunctiva  ENT: no nasal discharge; no oropharyngeal erythema or exudates; dry oral mucosa  Neck: soft collar in place  Respiratory: CTA B/L; no W/R/R, no retractions  Cardiac: +S1/S2; RRR; no M/R/G; PMI non-displaced  Gastrointestinal: soft, NT/ND; no rebound or guarding; +BS, no hepatosplenomegaly  Extremities: WWP, no clubbing or cyanosis; no peripheral edema  Vascular: 2+ radial, DP/PT pulses B/L  Lymphatic: no submandibular or cervical LAD  Neurologic: AAOx3; right sided facial droop, otherwise rest of Cranial nerves intact, motor 5/5 LUE, 4/5 RUE, 5/5 LLE, 2/5 RLE , sensation intact to light touch throughout    MEDICATIONS:  MEDICATIONS  (STANDING):  aspirin  chewable 81 milliGRAM(s) Oral daily  atorvastatin 40 milliGRAM(s) Oral at bedtime  carvedilol 3.125 milliGRAM(s) Oral every 12 hours  dextrose 5%. 1000 milliLiter(s) (50 mL/Hr) IV Continuous <Continuous>  dextrose 50% Injectable 12.5 Gram(s) IV Push once  docusate sodium 100 milliGRAM(s) Oral three times a day  enalapril 5 milliGRAM(s) Oral daily  ferrous    sulfate 325 milliGRAM(s) Oral daily  insulin lispro (HumaLOG) corrective regimen sliding scale   SubCutaneous Before meals and at bedtime  multivitamin 1 Tablet(s) Oral daily  senna 2 Tablet(s) Oral at bedtime    MEDICATIONS  (PRN):  acetaminophen   Tablet .. 650 milliGRAM(s) Oral every 6 hours PRN Mild Pain (1 - 3)  dextrose 40% Gel 15 Gram(s) Oral once PRN Blood Glucose LESS THAN 70 milliGRAM(s)/deciliter  glucagon  Injectable 1 milliGRAM(s) IntraMuscular once PRN Glucose LESS THAN 70 milligrams/deciliter  hydrALAZINE Injectable 10 milliGRAM(s) IV Push every 2 hours PRN SBP > 170      ALLERGIES:  Allergies    No Known Allergies    Intolerances        LABS:                        10.3   11.7  )-----------( 163      ( 07 Dec 2018 06:50 )             30.9     12-07    141  |  102  |  18  ----------------------------<  112<H>  4.1   |  22  |  0.89    Ca    9.1      07 Dec 2018 06:50  Mg     2.0     12    TPro  7.8  /  Alb  3.3  /  TBili  0.7  /  DBili  x   /  AST  23  /  ALT  10  /  AlkPhos  56  12-      Urinalysis Basic - ( 06 Dec 2018 22:02 )    Color: Yellow / Appearance: Clear / S.020 / pH: x  Gluc: x / Ketone: NEGATIVE  / Bili: Negative / Urobili: 0.2 E.U./dL   Blood: x / Protein: Trace mg/dL / Nitrite: NEGATIVE   Leuk Esterase: NEGATIVE / RBC: 0-5 /HPF / WBC 2-5 /HPF   Sq Epi: x / Non Sq Epi: 0-5 /HPF / Bacteria: Present /HPF        RADIOLOGY & ADDITIONAL TESTS: Reviewed.

## 2018-12-07 NOTE — PROGRESS NOTE ADULT - ATTENDING COMMENTS
Assessment: Patient personally seen and examined myself during rounds with the House Staff/Fellow  ON DATE 12/6/18  House Staff/Fellow note read, including vitals, physical findings, laboratory data, and radiological reports.   Revisions included below.   Direct personal management at bed side and extensive interpretation of the data.    Plan was outlined and discussed in details with the House Staff/Fellow.    Decision making of high complexity   Risk high of complications, morbidity, and/or mortality  Assessment and Action taken for acute disease activity to reflect the level of care provided:  -Hemodynamic evaluation and support  -ACS assessment and treatment as applicable  -Heart failure assessment and treatment as applicable  -Cardiac Telemetry reviewed  -Medication reconciliation  -Review laboratory data  -EKG reviewed   -Echo reviewed  -Interdisciplinary discussion with IC / EP / HF / CTS teams as needed  My plan includes :  close hemodynamic monitoring and management   Monitor for arrhythmias and monitor parameters for organ perfusion  monitor neurologic status  Head of the bed should remain elevated to 45 deg .   chest PT and IS will be encouraged  monitor adequacy of oxygenation and ventilation and attempt to wean oxygen  Nutritional goals will be met using po eventually , ensure adequate caloric intake and montior the same  Stress ulcer and VTE prophylaxis will be achieved    Glycemic control is satisfactory  Electrolytes have been replete as necessary and wound care has been carried out. Pain control has been achieved.   aggressive physical therapy and early mobility and ambulation goals will be met   The family was updated about the course and plan  CRITICAL CARE TIME SPENT in evaluation and management, reassessments, review and interpretation of labs and x-rays, ventilator and hemodynamic management, formulating a plan and coordinating care: ___90____ MIN.  Time does not include procedural time.    Liseth Wells MD  Mobile: [phone]
Events and chart reviewed  No further fevers and patient feels better today, a bit tired as didn't sleep well last night as was having some "hallucinations"  Wife at bedside  All infectious work up is negative to date; leukocytosis likely reactive-post op improved  Encouraged IS use; avoid opiate use  SIRS possibly due to dehydration/vasovagal episode now resolved  As with borderline controlled HTN, rec to increase coreg to 6.25 bid and continue to hold HCTZ  Resume warfarin when ok from neurosurgery pespective  Medically stable to be discharged to [facility]  D/w wife, NSGY and SW at length  Rest as above
spoke with Dr Patel, Palliative medicine will sign off.  Please reconsult as needed.
Dispo: OR next week
Dispo: OR today
Dispo: OR tomorrow
Dispo: pending discussions between Neurosurgery and Pt.'s family re: possible surgery (on schedule for Friday)  Palliative Care following
Dispo: OR planning  Palliative Care following
pt seen and examined by me .case d/w housestaff. agree with VS, PE, assessment and plan as outlined above

## 2018-12-07 NOTE — DISCHARGE NOTE ADULT - NS AS ACTIVITY OBS
Bathing allowed/Showering allowed/Walking-Indoors allowed/Stairs allowed/No Heavy lifting/straining/Do not drive or operate machinery/Walking-Outdoors allowed

## 2018-12-07 NOTE — DISCHARGE NOTE ADULT - CARE PLAN
Principal Discharge DX:	Cervical stenosis of spine  Goal:	transfer to rehab prior to returning home  Assessment and plan of treatment:	Once home call the office to make an appt for a follow up visit  Able to shower and wash the incision , pat it dry with a clean towel  Wear Soft Cervical Collar while OOB and for comfort  Continue Aspirin  For anticoagulation able to start 12/9  No heavy lifting anything greater than 10 pounds, no bending or twisting

## 2018-12-07 NOTE — PROGRESS NOTE ADULT - ASSESSMENT
83M with PMH of DM II, HLD, HTN, cervical stenosis, prostate ca (s/p brachytherapy), CVA w R sided weakness (on coumadin), carotid stenosis, CAD admitted for fall with new onset LLE weakness, POD3 from C3-5 anterior cervical discectomy and fusion C4 corpectomy    #Near syncope: 83M with PMH of DM II, HLD, HTN, cervical stenosis, prostate ca (s/p brachytherapy), CVA w R sided weakness (on coumadin), carotid stenosis, CAD admitted for fall with new onset LLE weakness, POD3 from C3-5 anterior cervical discectomy and fusion C4 corpectomy    #SIRS criteria : pt remains afebrile since yesterday afternoon. WBC downtrending. No clear source of infection; patient c/o phlegm but no SOB, nasal congestion, throat pain, CP, abd pain, n/v/d/c, dysuria, freq, suprapubic pain. CXR negative for infiltrate, RVP negative  - No antibiotics for now given no clear source of infection; leukocytosis may be reactive from surgery    #Near syncope: likely vasovagal given pt had near-syncope after standing and working with PT. EKG unchanged from previous with no ischemic changes. Per wife, pt has had these episodes in the past  - No further workup at this time    #DM: HbA1C 5.8%, -150  - c/w mISS    #HTN: BP above goal (150-180 systolic), on enalapril 5mg QD and Coreg 3.125mg BID  - please increase Coreg to 6.25mg BID  - c/w enalapril 5mg QD  - would discontinue PRN IV hydralazine    #CAD: denies any CP, c/w aspirin 81mg qd and atorvastatin 40mg qd    Dispo: Pt can remain under Neurosurgery service, but medically ready for discharge today to rehab from Medicine perspective.    Medicine will continue to follow  Discussed with attending 83M with PMH of DM II, HLD, HTN, cervical stenosis, prostate ca (s/p brachytherapy), CVA w R sided weakness (on coumadin), carotid stenosis, CAD admitted for fall with new onset LLE weakness, POD3 from C3-5 anterior cervical discectomy and fusion C4 corpectomy    #SIRS criteria : pt remains afebrile since yesterday afternoon. WBC downtrending. No clear source of infection; patient c/o phlegm but no SOB, nasal congestion, throat pain, CP, abd pain, n/v/d/c, dysuria, freq, suprapubic pain. CXR negative for infiltrate, RVP negative  - No antibiotics for now given no clear source of infection; leukocytosis may be reactive from surgery    #Near syncope: likely vasovagal given pt had near-syncope after standing and working with PT. EKG unchanged from previous with no ischemic changes. Per wife, pt has had these episodes in the past  - No further workup at this time    #DM: HbA1C 5.8%, -150  - c/w mISS    #HTN: BP above goal (150-180 systolic), on enalapril 5mg QD and Coreg 3.125mg BID  - please increase Coreg to 6.25mg BID  - c/w enalapril 5mg QD  - would discontinue PRN IV hydralazine    #CVA: per wife, pt has family history of clotting disorder. Has been on coumadin since CVA  - resume Coumadin once cleared for anticoagulation per Neurosurgery    #CAD: denies any CP, c/w aspirin 81mg qd and atorvastatin 40mg qd    Dispo: Pt can remain under Neurosurgery service, but medically ready for discharge today to rehab from Medicine perspective.      Discussed with attending

## 2018-12-07 NOTE — DISCHARGE NOTE ADULT - MEDICATION SUMMARY - MEDICATIONS TO STOP TAKING
I will STOP taking the medications listed below when I get home from the hospital:    atorvastatin 40 mg oral tablet  -- 1 tab(s) by mouth every other day

## 2018-12-07 NOTE — DISCHARGE NOTE ADULT - PLAN OF CARE
transfer to rehab prior to returning home Once home call the office to make an appt for a follow up visit  Able to shower and wash the incision , pat it dry with a clean towel  Wear Soft Cervical Collar while OOB and for comfort  Continue Aspirin  For anticoagulation able to start 12/9  No heavy lifting anything greater than 10 pounds, no bending or twisting

## 2018-12-07 NOTE — PROGRESS NOTE ADULT - REASON FOR ADMISSION
LE weakness s/p fall

## 2018-12-07 NOTE — PROGRESS NOTE ADULT - PROVIDER SPECIALTY LIST ADULT
Cardiology
Hospitalist
Internal Medicine
Neurology
Neurosurgery
Palliative Care
Rehab Medicine
Cardiology
Rehab Medicine
Palliative Care

## 2018-12-07 NOTE — DISCHARGE NOTE ADULT - PATIENT PORTAL LINK FT
You can access the [facility] Patient Portal, offered by [facility], by registering with the following website: [email or web address]

## 2018-12-10 PROCEDURE — 85610 PROTHROMBIN TIME: CPT

## 2018-12-10 PROCEDURE — 93005 ELECTROCARDIOGRAM TRACING: CPT

## 2018-12-10 PROCEDURE — 99285 EMERGENCY DEPT VISIT HI MDM: CPT | Mod: 25

## 2018-12-10 PROCEDURE — 72158 MRI LUMBAR SPINE W/O & W/DYE: CPT

## 2018-12-10 PROCEDURE — 96366 THER/PROPH/DIAG IV INF ADDON: CPT

## 2018-12-10 PROCEDURE — 82553 CREATINE MB FRACTION: CPT

## 2018-12-10 PROCEDURE — 87581 M.PNEUMON DNA AMP PROBE: CPT

## 2018-12-10 PROCEDURE — 97116 GAIT TRAINING THERAPY: CPT

## 2018-12-10 PROCEDURE — 71045 X-RAY EXAM CHEST 1 VIEW: CPT

## 2018-12-10 PROCEDURE — 73610 X-RAY EXAM OF ANKLE: CPT

## 2018-12-10 PROCEDURE — 85027 COMPLETE CBC AUTOMATED: CPT

## 2018-12-10 PROCEDURE — 82962 GLUCOSE BLOOD TEST: CPT

## 2018-12-10 PROCEDURE — 83605 ASSAY OF LACTIC ACID: CPT

## 2018-12-10 PROCEDURE — 86900 BLOOD TYPING SEROLOGIC ABO: CPT

## 2018-12-10 PROCEDURE — 73630 X-RAY EXAM OF FOOT: CPT

## 2018-12-10 PROCEDURE — 80048 BASIC METABOLIC PNL TOTAL CA: CPT

## 2018-12-10 PROCEDURE — 76000 FLUOROSCOPY <1 HR PHYS/QHP: CPT

## 2018-12-10 PROCEDURE — 81001 URINALYSIS AUTO W/SCOPE: CPT

## 2018-12-10 PROCEDURE — C1713: CPT

## 2018-12-10 PROCEDURE — 83735 ASSAY OF MAGNESIUM: CPT

## 2018-12-10 PROCEDURE — 72156 MRI NECK SPINE W/O & W/DYE: CPT

## 2018-12-10 PROCEDURE — 97161 PT EVAL LOW COMPLEX 20 MIN: CPT

## 2018-12-10 PROCEDURE — 83036 HEMOGLOBIN GLYCOSYLATED A1C: CPT

## 2018-12-10 PROCEDURE — 84484 ASSAY OF TROPONIN QUANT: CPT

## 2018-12-10 PROCEDURE — 95940 IONM IN OPERATNG ROOM 15 MIN: CPT

## 2018-12-10 PROCEDURE — 93306 TTE W/DOPPLER COMPLETE: CPT

## 2018-12-10 PROCEDURE — 72125 CT NECK SPINE W/O DYE: CPT

## 2018-12-10 PROCEDURE — 93971 EXTREMITY STUDY: CPT

## 2018-12-10 PROCEDURE — 87633 RESP VIRUS 12-25 TARGETS: CPT

## 2018-12-10 PROCEDURE — C1889: CPT

## 2018-12-10 PROCEDURE — 82550 ASSAY OF CK (CPK): CPT

## 2018-12-10 PROCEDURE — 96365 THER/PROPH/DIAG IV INF INIT: CPT | Mod: XU

## 2018-12-10 PROCEDURE — A9585: CPT

## 2018-12-10 PROCEDURE — 86901 BLOOD TYPING SEROLOGIC RH(D): CPT

## 2018-12-10 PROCEDURE — 87798 DETECT AGENT NOS DNA AMP: CPT

## 2018-12-10 PROCEDURE — 85025 COMPLETE CBC W/AUTO DIFF WBC: CPT

## 2018-12-10 PROCEDURE — 87486 CHLMYD PNEUM DNA AMP PROBE: CPT

## 2018-12-10 PROCEDURE — 97530 THERAPEUTIC ACTIVITIES: CPT

## 2018-12-10 PROCEDURE — 84100 ASSAY OF PHOSPHORUS: CPT

## 2018-12-10 PROCEDURE — 70450 CT HEAD/BRAIN W/O DYE: CPT

## 2018-12-10 PROCEDURE — 86850 RBC ANTIBODY SCREEN: CPT

## 2018-12-10 PROCEDURE — 80053 COMPREHEN METABOLIC PANEL: CPT

## 2018-12-10 PROCEDURE — 36415 COLL VENOUS BLD VENIPUNCTURE: CPT

## 2018-12-10 PROCEDURE — 85730 THROMBOPLASTIN TIME PARTIAL: CPT

## 2018-12-10 PROCEDURE — 72157 MRI CHEST SPINE W/O & W/DYE: CPT

## 2018-12-10 PROCEDURE — 87040 BLOOD CULTURE FOR BACTERIA: CPT

## 2018-12-11 DIAGNOSIS — E78.5 HYPERLIPIDEMIA, UNSPECIFIED: ICD-10-CM

## 2018-12-11 DIAGNOSIS — I48.91 UNSPECIFIED ATRIAL FIBRILLATION: ICD-10-CM

## 2018-12-11 DIAGNOSIS — I69.351 HEMIPLEGIA AND HEMIPARESIS FOLLOWING CEREBRAL INFARCTION AFFECTING RIGHT DOMINANT SIDE: ICD-10-CM

## 2018-12-11 DIAGNOSIS — M50.21 OTHER CERVICAL DISC DISPLACEMENT, HIGH CERVICAL REGION: ICD-10-CM

## 2018-12-11 DIAGNOSIS — G95.19 OTHER VASCULAR MYELOPATHIES: ICD-10-CM

## 2018-12-11 DIAGNOSIS — I34.0 NONRHEUMATIC MITRAL (VALVE) INSUFFICIENCY: ICD-10-CM

## 2018-12-11 DIAGNOSIS — L03.115 CELLULITIS OF RIGHT LOWER LIMB: ICD-10-CM

## 2018-12-11 DIAGNOSIS — E11.9 TYPE 2 DIABETES MELLITUS WITHOUT COMPLICATIONS: ICD-10-CM

## 2018-12-11 DIAGNOSIS — I65.29 OCCLUSION AND STENOSIS OF UNSPECIFIED CAROTID ARTERY: ICD-10-CM

## 2018-12-11 DIAGNOSIS — M48.02 SPINAL STENOSIS, CERVICAL REGION: ICD-10-CM

## 2018-12-11 DIAGNOSIS — Z85.46 PERSONAL HISTORY OF MALIGNANT NEOPLASM OF PROSTATE: ICD-10-CM

## 2018-12-11 DIAGNOSIS — I10 ESSENTIAL (PRIMARY) HYPERTENSION: ICD-10-CM

## 2018-12-11 DIAGNOSIS — R65.10 SYSTEMIC INFLAMMATORY RESPONSE SYNDROME (SIRS) OF NON-INFECTIOUS ORIGIN WITHOUT ACUTE ORGAN DYSFUNCTION: ICD-10-CM

## 2018-12-11 DIAGNOSIS — I25.10 ATHEROSCLEROTIC HEART DISEASE OF NATIVE CORONARY ARTERY WITHOUT ANGINA PECTORIS: ICD-10-CM

## 2018-12-11 DIAGNOSIS — F03.90 UNSPECIFIED DEMENTIA WITHOUT BEHAVIORAL DISTURBANCE: ICD-10-CM

## 2018-12-11 LAB
CULTURE RESULTS: SIGNIFICANT CHANGE UP
CULTURE RESULTS: SIGNIFICANT CHANGE UP
SPECIMEN SOURCE: SIGNIFICANT CHANGE UP
SPECIMEN SOURCE: SIGNIFICANT CHANGE UP

## 2018-12-18 PROBLEM — I65.29 OCCLUSION AND STENOSIS OF UNSPECIFIED CAROTID ARTERY: Chronic | Status: ACTIVE | Noted: 2018-11-26

## 2018-12-18 PROBLEM — C61 MALIGNANT NEOPLASM OF PROSTATE: Chronic | Status: ACTIVE | Noted: 2018-11-26

## 2018-12-18 PROBLEM — M48.02 SPINAL STENOSIS, CERVICAL REGION: Chronic | Status: ACTIVE | Noted: 2018-11-26

## 2018-12-24 ENCOUNTER — APPOINTMENT (OUTPATIENT)
Dept: HEART AND VASCULAR | Facility: CLINIC | Age: 83
End: 2018-12-24
Payer: MEDICARE

## 2018-12-24 VITALS
TEMPERATURE: 97.7 F | SYSTOLIC BLOOD PRESSURE: 116 MMHG | OXYGEN SATURATION: 99 % | RESPIRATION RATE: 12 BRPM | HEIGHT: 74 IN | HEART RATE: 67 BPM | WEIGHT: 168 LBS | BODY MASS INDEX: 21.56 KG/M2 | DIASTOLIC BLOOD PRESSURE: 60 MMHG

## 2018-12-24 PROCEDURE — 93005 ELECTROCARDIOGRAM TRACING: CPT

## 2018-12-24 PROCEDURE — 93000 ELECTROCARDIOGRAM COMPLETE: CPT

## 2018-12-24 PROCEDURE — 93010 ELECTROCARDIOGRAM REPORT: CPT

## 2018-12-24 PROCEDURE — 99214 OFFICE O/P EST MOD 30 MIN: CPT

## 2018-12-24 NOTE — HISTORY OF PRESENT ILLNESS
[FreeTextEntry1] : 83 year male who had 4 falls in November at home, never witnessed. There was concern about low sugar and seizures. He was admitted to to [facility] November 27 and underwent surgery for a disk on December 4, 2018 with Dr Phan at [facility]. He was discharged to Rehab on December 7 and remains there. He comes for an outpatient followup visit. He continues on Warfarin with dose adjustment. He denies having chest pain or SOB. He is unable to walk with a walker at this time. As per his wife, while in SNF he has had episodes of sudden slumping forward. He is tearful, not remembering what happens

## 2018-12-24 NOTE — DISCUSSION/SUMMARY
[FreeTextEntry1] : CVA, s/p cervical disk surgery with periods of "slumping forward". 30 day event recorder. Dr Dalton to arrange for Echo and Carotid at [facility] facility. See Dr Phan of Neurosurgery and Dr Hines of Neurology

## 2018-12-28 ENCOUNTER — RX RENEWAL (OUTPATIENT)
Age: 83
End: 2018-12-28

## 2019-01-02 ENCOUNTER — FORM ENCOUNTER (OUTPATIENT)
Age: 84
End: 2019-01-02

## 2019-01-03 ENCOUNTER — OUTPATIENT (OUTPATIENT)
Dept: OUTPATIENT SERVICES | Facility: HOSPITAL | Age: 84
LOS: 1 days | End: 2019-01-03
Payer: MEDICARE

## 2019-01-03 ENCOUNTER — APPOINTMENT (OUTPATIENT)
Dept: NEUROSURGERY | Facility: CLINIC | Age: 84
End: 2019-01-03
Payer: MEDICARE

## 2019-01-03 VITALS
WEIGHT: 168 LBS | HEART RATE: 67 BPM | HEIGHT: 74 IN | OXYGEN SATURATION: 99 % | RESPIRATION RATE: 15 BRPM | BODY MASS INDEX: 21.56 KG/M2 | TEMPERATURE: 97.4 F | DIASTOLIC BLOOD PRESSURE: 70 MMHG | SYSTOLIC BLOOD PRESSURE: 113 MMHG

## 2019-01-03 DIAGNOSIS — Z09 ENCOUNTER FOR FOLLOW-UP EXAMINATION AFTER COMPLETED TREATMENT FOR CONDITIONS OTHER THAN MALIGNANT NEOPLASM: ICD-10-CM

## 2019-01-03 PROCEDURE — 99024 POSTOP FOLLOW-UP VISIT: CPT

## 2019-01-03 PROCEDURE — 72040 X-RAY EXAM NECK SPINE 2-3 VW: CPT | Mod: 26

## 2019-01-03 PROCEDURE — 72040 X-RAY EXAM NECK SPINE 2-3 VW: CPT

## 2019-01-08 ENCOUNTER — RX RENEWAL (OUTPATIENT)
Age: 84
End: 2019-01-08

## 2019-01-09 ENCOUNTER — FORM ENCOUNTER (OUTPATIENT)
Age: 84
End: 2019-01-09

## 2019-01-10 ENCOUNTER — OUTPATIENT (OUTPATIENT)
Dept: OUTPATIENT SERVICES | Facility: HOSPITAL | Age: 84
LOS: 1 days | End: 2019-01-10
Payer: MEDICARE

## 2019-01-10 ENCOUNTER — APPOINTMENT (OUTPATIENT)
Dept: CT IMAGING | Facility: HOSPITAL | Age: 84
End: 2019-01-10
Payer: MEDICARE

## 2019-01-10 PROBLEM — Z09 POSTOP CHECK: Status: ACTIVE | Noted: 2019-01-10

## 2019-01-10 PROCEDURE — 72125 CT NECK SPINE W/O DYE: CPT | Mod: 26

## 2019-01-10 PROCEDURE — 72125 CT NECK SPINE W/O DYE: CPT

## 2019-01-17 ENCOUNTER — APPOINTMENT (OUTPATIENT)
Dept: NEUROSURGERY | Facility: CLINIC | Age: 84
End: 2019-01-17

## 2019-01-17 ENCOUNTER — APPOINTMENT (OUTPATIENT)
Dept: VASCULAR SURGERY | Facility: CLINIC | Age: 84
End: 2019-01-17
Payer: COMMERCIAL

## 2019-01-17 DIAGNOSIS — L97.512 NON-PRESSURE CHRONIC ULCER OF OTHER PART OF RIGHT FOOT WITH FAT LAYER EXPOSED: ICD-10-CM

## 2019-01-17 PROCEDURE — 93923 UPR/LXTR ART STDY 3+ LVLS: CPT

## 2019-01-17 PROCEDURE — 99205 OFFICE O/P NEW HI 60 MIN: CPT

## 2019-01-17 NOTE — REASON FOR VISIT
[Consultation] : a consultation visit [Formal Caregiver] : formal caregiver [FreeTextEntry1] : R heel ulcer

## 2019-01-17 NOTE — HISTORY OF PRESENT ILLNESS
[FreeTextEntry1] : 83yoM presents in wheelchair, referred for evaluation of a R heel ulcer, slow to heal.  Pt sent for vascular evaluation to identify any obstacles to healing his wound.  Pt denies fever/chills/malaise at this time.

## 2019-01-17 NOTE — ASSESSMENT
[FreeTextEntry1] : Pt w/chronic R heel ulcer, no evidence of infection, ELEAZAR/PVR reveals no evidence of arterial occlusive disease.  Would recommend to continue w/daily aggressive wound care as pt has the vascular capacity to heal his wound.\par \par No vascular intervention required at this time.

## 2019-01-17 NOTE — PHYSICAL EXAM
[Normal Thyroid] : the thyroid was normal [Normal Breath Sounds] : Normal breath sounds [Normal Rate and Rhythm] : normal rate and rhythm [2+] : left 2+ [Skin Ulcer] : ulcer [Alert] : alert [Calm] : calm [JVD] : no jugular venous distention  [1+] : left 1+ [Ankle Swelling (On Exam)] : not present [Varicose Veins Of Lower Extremities] : not present [] : not present [Purpura] : no purpura  [Petechiae] : no petechiae [Skin Induration] : no induration [de-identified] : In wheelchair, NAD [de-identified] : NC/AT, anicteric [de-identified] : FROM throughout [de-identified] : R heel ulcer, granulating well, no evidence of active soft-tissue infection

## 2019-01-23 ENCOUNTER — APPOINTMENT (OUTPATIENT)
Dept: HEART AND VASCULAR | Facility: CLINIC | Age: 84
End: 2019-01-23
Payer: MEDICARE

## 2019-01-23 VITALS
HEART RATE: 67 BPM | WEIGHT: 168 LBS | DIASTOLIC BLOOD PRESSURE: 70 MMHG | BODY MASS INDEX: 21.56 KG/M2 | SYSTOLIC BLOOD PRESSURE: 106 MMHG | TEMPERATURE: 98 F | HEIGHT: 74 IN | OXYGEN SATURATION: 99 % | RESPIRATION RATE: 14 BRPM

## 2019-01-23 DIAGNOSIS — R26.81 UNSTEADINESS ON FEET: ICD-10-CM

## 2019-01-23 PROCEDURE — 85610 PROTHROMBIN TIME: CPT | Mod: QW

## 2019-01-23 PROCEDURE — 99214 OFFICE O/P EST MOD 30 MIN: CPT

## 2019-01-23 PROCEDURE — 93306 TTE W/DOPPLER COMPLETE: CPT

## 2019-01-23 NOTE — PHYSICAL EXAM
[General Appearance - Well Developed] : well developed [Normal Appearance] : normal appearance [Well Groomed] : well groomed [General Appearance - Well Nourished] : well nourished [No Deformities] : no deformities [General Appearance - In No Acute Distress] : no acute distress [Normal Conjunctiva] : the conjunctiva exhibited no abnormalities [] : no respiratory distress [Respiration, Rhythm And Depth] : normal respiratory rhythm and effort [Exaggerated Use Of Accessory Muscles For Inspiration] : no accessory muscle use [Auscultation Breath Sounds / Voice Sounds] : lungs were clear to auscultation bilaterally [Heart Sounds] : normal S1 and S2 [FreeTextEntry1] : lesion right foot that wife has applied garlic cloves to  [Affect] : the affect was normal

## 2019-01-23 NOTE — HISTORY OF PRESENT ILLNESS
[FreeTextEntry1] : 83 year male who had falls with cervical spinal cord compression. He was discharged to Rehab after surgery to decompress his cervical spine. He was noted to have NSVT, PSVT on event monitoring. His family is troubled by his inability to walk despite Rehab stay. He was discharged with a right foot skin ulceration. He appears more sleepy than his baseline. He is now on day 5 of Metoprololol Succinate 25 mg daily which replaced his Coreg 3.125 bid. His daughter is troubled by his keeping his right knee pointing outward and is concerned about an occult fracture. On review of HIE he had films of his right foot but not of his hip. FS INR 2.1 on Warfarin 5 mg 5x/week and 7.5 mg 2x/week

## 2019-01-23 NOTE — DISCUSSION/SUMMARY
[FreeTextEntry1] : At the time of the patient's visit an Echocardiogram was performed to evaluate his LV function. \par \par At the time of the visit the results were reviewed with patient \par \par CAD, CVA, prostate cancer, hx of spinal cord compression, unstable gait--Continue Metoprolol, continue event monitoring. stop HCTZ and Enalapril. X-Ray right hip. BAcitracin to right foot. VNS evaluation for wound care. See EP as planned. Possible PPM or ILR discussed

## 2019-01-27 ENCOUNTER — OUTPATIENT (OUTPATIENT)
Dept: OUTPATIENT SERVICES | Facility: HOSPITAL | Age: 84
LOS: 1 days | End: 2019-01-27
Payer: MEDICARE

## 2019-01-27 PROCEDURE — 73502 X-RAY EXAM HIP UNI 2-3 VIEWS: CPT | Mod: 26,RT

## 2019-01-27 PROCEDURE — 73502 X-RAY EXAM HIP UNI 2-3 VIEWS: CPT

## 2019-02-06 ENCOUNTER — NON-APPOINTMENT (OUTPATIENT)
Age: 84
End: 2019-02-06

## 2019-02-06 ENCOUNTER — APPOINTMENT (OUTPATIENT)
Dept: HEART AND VASCULAR | Facility: CLINIC | Age: 84
End: 2019-02-06
Payer: MEDICARE

## 2019-02-06 VITALS
HEIGHT: 74 IN | DIASTOLIC BLOOD PRESSURE: 63 MMHG | SYSTOLIC BLOOD PRESSURE: 140 MMHG | BODY MASS INDEX: 22.07 KG/M2 | WEIGHT: 172 LBS | HEART RATE: 72 BPM

## 2019-02-06 PROCEDURE — 93000 ELECTROCARDIOGRAM COMPLETE: CPT

## 2019-02-06 PROCEDURE — 99204 OFFICE O/P NEW MOD 45 MIN: CPT | Mod: 25

## 2019-02-14 ENCOUNTER — APPOINTMENT (OUTPATIENT)
Dept: NEUROLOGY | Facility: CLINIC | Age: 84
End: 2019-02-14
Payer: MEDICARE

## 2019-02-14 VITALS
HEIGHT: 74 IN | DIASTOLIC BLOOD PRESSURE: 76 MMHG | SYSTOLIC BLOOD PRESSURE: 181 MMHG | HEART RATE: 50 BPM | OXYGEN SATURATION: 100 %

## 2019-02-14 VITALS — SYSTOLIC BLOOD PRESSURE: 157 MMHG | DIASTOLIC BLOOD PRESSURE: 78 MMHG

## 2019-02-14 DIAGNOSIS — I63.9 CEREBRAL INFARCTION, UNSPECIFIED: ICD-10-CM

## 2019-02-14 DIAGNOSIS — M50.90 CERVICAL DISC DISORDER, UNSPECIFIED, UNSPECIFIED CERVICAL REGION: ICD-10-CM

## 2019-02-14 PROCEDURE — 99214 OFFICE O/P EST MOD 30 MIN: CPT

## 2019-02-14 NOTE — DISCUSSION/SUMMARY
[FreeTextEntry1] : Mr. Kauffman is a 83 year old male wheelchair bound with monitor with atrial fibrillation (on coumadin),  CAD s/p 6 stents (last 2013), history of prostate cancer, HTN, HLD and NSVT  on recent holter monitor, who presents for initial evaluation.  He was started on Toprol after review of his holter monitor and is having less frequent episodes of "zoning out".  He does not have any overt syncope.  He has a history of CAD and this could be contributing to the NSVT.  We discussed monitoring with a loop recorder; however given the fact that he had this "zoning out" during the hospitalization while on telemetry I do not believe this is arrhythmia related.  He will follow up with his general cardiologist and be referred back our clinic as needed.  He knows to call with any questions or conerns.

## 2019-02-14 NOTE — HISTORY OF PRESENT ILLNESS
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 Cervical spinal cord compression noted on MRI\par \par Prior to fall in November patient was able to ambulate with a walker, now he is unable to stand.\par \par Also episodes of passing out, about 5 over the past 10 years. Sometimes after eating. Episodes last 1 minute, patient at baseline after episodes. \par \par H/O Afib on Coumadin

## 2019-02-14 NOTE — REVIEW OF SYSTEMS
[Limb Weakness (Paresis)] : limb weakness [Negative] : Psychiatric [Fever] : no fever [Headache] : no headache [Chills] : no chills [Feeling Fatigued] : not feeling fatigued

## 2019-02-14 NOTE — PHYSICAL EXAM
[General Appearance - Alert] : alert [Mood] : the mood was normal [Person] : oriented to person [Cranial Nerves Optic (II)] : visual acuity intact bilaterally,  visual fields full to confrontation, pupils equal round and reactive to light [Cranial Nerves Oculomotor (III)] : extraocular motion intact [Cranial Nerves Trigeminal (V)] : facial sensation intact symmetrically [Cranial Nerves Facial (VII)] : face symmetrical [Cranial Nerves Vestibulocochlear (VIII)] : hearing was intact bilaterally [Cranial Nerves Glossopharyngeal (IX)] : tongue and palate midline [Cranial Nerves Accessory (XI - Cranial And Spinal)] : head turning and shoulder shrug symmetric [Cranial Nerves Hypoglossal (XII)] : there was no tongue deviation with protrusion [Motor Strength Upper Extremities Right] : there was weakness of the right upper extremity [Motor Strength Upper Extremities Left] : strength was normal in the left upper extremity [Motor Strength Lower Extremities Right] : there was weakness of the right lower extremity [Motor Strength Lower Extremities Left] : there was weakness of the left lower extremity [Non-ambulatory] : Non-ambulatory [FreeTextEntry5] : INCREASED TONE TO BILATERAL LOWER EXTREMITIES R>L. RUE SPASTICITY AND WEAKNESS. SLIGHT WEAKNESS TO LEFT UPPER EXTREMITY  [Sclera] : the sclera and conjunctiva were normal [PERRL With Normal Accommodation] : pupils were equal in size, round, reactive to light, with normal accommodation [Extraocular Movements] : extraocular movements were intact [Full Visual Field] : full visual field [] : no respiratory distress [FreeTextEntry1] : SPASTIC HEMIPARESIS RIGHT SIDE, SOME SPASTICITY / WEAKNESS TO LEFT UPPER AND LOWER AS WELL

## 2019-02-14 NOTE — HISTORY OF PRESENT ILLNESS
[FreeTextEntry1] : Mr. Kauffman is a 83 year old male wheelchair bound with monitor with atrial fibrillation (on coumadin),  CAD s/p 6 stents (last 2013), history of prostate cancer, HTN, HLD and NSVT  on recent holter monitor, who presents for initial evaluation.\par \par His wife states he has episodes of non responsiveness, where his eyes are open.  HE denies any loss of consciousness.  He was in the hospital this fall for a back disc surgery and was on telemetry with no bradycardic episodes or significant arrhythmias.  His wife states he had these episodes during that hospitalization; but no one else knew they were occurring.  He wore a recent event monitor with 12 beats of NSVT and NSR with no significant bradyarrhythmias.  He has a history of 6 stents with last stress test or catheterization since 2016.  He states he is feeling well and denies any chest pain, palpitations, SOB, syncope, lightheadedness.  \par \par

## 2019-02-14 NOTE — PHYSICAL EXAM
[General Appearance - Well Developed] : well developed [Normal Appearance] : normal appearance [Well Groomed] : well groomed [General Appearance - Well Nourished] : well nourished [No Deformities] : no deformities [General Appearance - In No Acute Distress] : no acute distress [Normal Conjunctiva] : the conjunctiva exhibited no abnormalities [Normal Oral Mucosa] : normal oral mucosa [Normal Jugular Venous V Waves Present] : normal jugular venous V waves present [5th Left ICS - MCL] : palpated at the 5th LICS in the midclavicular line [Normal] : normal [No Precordial Heave] : no precordial heave was noted [Normal Rate] : normal [Rhythm Regular] : regular [Normal S1] : normal S1 [Normal S2] : normal S2 [Respiration, Rhythm And Depth] : normal respiratory rhythm and effort [Exaggerated Use Of Accessory Muscles For Inspiration] : no accessory muscle use [Auscultation Breath Sounds / Voice Sounds] : lungs were clear to auscultation bilaterally [] : no ischemic changes [Skin Color & Pigmentation] : normal skin color and pigmentation [Oriented To Time, Place, And Person] : oriented to person, place, and time [Affect] : the affect was normal [Mood] : the mood was normal [No Anxiety] : not feeling anxious [Apical Thrill] : no thrill palpable at the apex [Click] : no click [Distant] : the heart sounds were ~L not distant [Pericardial Rub] : no pericardial rub [FreeTextEntry1] : wheelchair bound

## 2019-02-18 ENCOUNTER — RX RENEWAL (OUTPATIENT)
Age: 84
End: 2019-02-18

## 2019-02-27 ENCOUNTER — APPOINTMENT (OUTPATIENT)
Dept: HEART AND VASCULAR | Facility: CLINIC | Age: 84
End: 2019-02-27

## 2019-03-04 ENCOUNTER — RX RENEWAL (OUTPATIENT)
Age: 84
End: 2019-03-04

## 2019-03-05 ENCOUNTER — MEDICATION RENEWAL (OUTPATIENT)
Age: 84
End: 2019-03-05

## 2019-03-05 ENCOUNTER — APPOINTMENT (OUTPATIENT)
Dept: HEART AND VASCULAR | Facility: CLINIC | Age: 84
End: 2019-03-05
Payer: MEDICARE

## 2019-03-05 VITALS
SYSTOLIC BLOOD PRESSURE: 152 MMHG | TEMPERATURE: 98.4 F | OXYGEN SATURATION: 96 % | WEIGHT: 169 LBS | BODY MASS INDEX: 21.69 KG/M2 | RESPIRATION RATE: 12 BRPM | HEIGHT: 74 IN | DIASTOLIC BLOOD PRESSURE: 66 MMHG | HEART RATE: 40 BPM

## 2019-03-05 LAB
INR PPP: 2.1
PT BLD: 24.7

## 2019-03-05 PROCEDURE — 93000 ELECTROCARDIOGRAM COMPLETE: CPT

## 2019-03-05 PROCEDURE — 99214 OFFICE O/P EST MOD 30 MIN: CPT

## 2019-03-05 PROCEDURE — 85610 PROTHROMBIN TIME: CPT | Mod: QW

## 2019-03-05 PROCEDURE — 36415 COLL VENOUS BLD VENIPUNCTURE: CPT

## 2019-03-05 NOTE — DISCUSSION/SUMMARY
[FreeTextEntry1] : CAD, NSVT, CVA on Warfarin, possible A. Fib with aberrance--to continue Metoprolol at 25 mg but at bedtime. If symptoms of sleepiness continue then will change to Amio 200 mg bid for 2 weeks and stop the Metoprolol then Amio 200 mg daily. to continue monitoring for additional time. Restart Enalapril for BP. Labs drawn and sent. INR 2.8. to continue Warfarin.

## 2019-03-05 NOTE — HISTORY OF PRESENT ILLNESS
[FreeTextEntry1] : 83 year male with arrhythmia and sleepiness. He comes with his wife and daughter. He started his Metoprolol at night. Long discussion with family regarding need to treat arrhythmia and not just to halve his Metoprolol. We discussed option of continuing same dose at bedtime. We also discussed that if NSVT were associated with symptoms then EP study would be considered. Currently given any symptoms have not correlated with arrhythmia, then the option of Amio and stopping Metoprolol may be next step. At the time of his visit I contacted EP and discussed with Dr Kwok.

## 2019-03-05 NOTE — REASON FOR VISIT
[Follow-Up - Clinic] : a clinic follow-up of [FreeTextEntry2] : arrhythmia and sleepiness [Family Member] : family member

## 2019-03-05 NOTE — PHYSICAL EXAM
[General Appearance - Well Developed] : well developed [Normal Appearance] : normal appearance [Well Groomed] : well groomed [General Appearance - Well Nourished] : well nourished [No Deformities] : no deformities [General Appearance - In No Acute Distress] : no acute distress [Normal Conjunctiva] : the conjunctiva exhibited no abnormalities [] : no respiratory distress [Respiration, Rhythm And Depth] : normal respiratory rhythm and effort [Exaggerated Use Of Accessory Muscles For Inspiration] : no accessory muscle use [Auscultation Breath Sounds / Voice Sounds] : lungs were clear to auscultation bilaterally [Heart Sounds] : normal S1 and S2 [FreeTextEntry1] : no edema [Skin Turgor] : normal skin turgor [Oriented To Time, Place, And Person] : oriented to person, place, and time [Affect] : the affect was normal [Mood] : the mood was normal [No Anxiety] : not feeling anxious

## 2019-03-06 LAB
25(OH)D3 SERPL-MCNC: 43.9 NG/ML
ALBUMIN SERPL ELPH-MCNC: 3.4 G/DL
ALP BLD-CCNC: 75 U/L
ALT SERPL-CCNC: 16 U/L
ANION GAP SERPL CALC-SCNC: 14 MMOL/L
AST SERPL-CCNC: 33 U/L
BASOPHILS # BLD AUTO: 0.03 K/UL
BASOPHILS NFR BLD AUTO: 0.4 %
BILIRUB SERPL-MCNC: 0.5 MG/DL
BUN SERPL-MCNC: 15 MG/DL
CALCIUM SERPL-MCNC: 9.3 MG/DL
CHLORIDE SERPL-SCNC: 103 MMOL/L
CHOLEST SERPL-MCNC: 149 MG/DL
CHOLEST/HDLC SERPL: 2.9 RATIO
CO2 SERPL-SCNC: 25 MMOL/L
CREAT SERPL-MCNC: 0.9 MG/DL
EOSINOPHIL # BLD AUTO: 0.14 K/UL
EOSINOPHIL NFR BLD AUTO: 2 %
GLUCOSE SERPL-MCNC: 105 MG/DL
HBA1C MFR BLD HPLC: 5.9 %
HCT VFR BLD CALC: 32 %
HDLC SERPL-MCNC: 52 MG/DL
HGB BLD-MCNC: 9.9 G/DL
IMM GRANULOCYTES NFR BLD AUTO: 0.1 %
LDLC SERPL CALC-MCNC: 78 MG/DL
LYMPHOCYTES # BLD AUTO: 1.97 K/UL
LYMPHOCYTES NFR BLD AUTO: 28.5 %
MAN DIFF?: NORMAL
MCHC RBC-ENTMCNC: 29.4 PG
MCHC RBC-ENTMCNC: 30.9 GM/DL
MCV RBC AUTO: 95 FL
MONOCYTES # BLD AUTO: 0.53 K/UL
MONOCYTES NFR BLD AUTO: 7.7 %
NEUTROPHILS # BLD AUTO: 4.23 K/UL
NEUTROPHILS NFR BLD AUTO: 61.3 %
PLATELET # BLD AUTO: 191 K/UL
POTASSIUM SERPL-SCNC: 4.1 MMOL/L
PROT SERPL-MCNC: 7 G/DL
RBC # BLD: 3.37 M/UL
RBC # FLD: 15.5 %
SODIUM SERPL-SCNC: 142 MMOL/L
TRIGL SERPL-MCNC: 97 MG/DL
TSH SERPL-ACNC: 3.1 UIU/ML
WBC # FLD AUTO: 6.91 K/UL

## 2019-03-07 LAB
FOLATE SERPL-MCNC: 17.5 NG/ML
IRON SATN MFR SERPL: 20 %
IRON SERPL-MCNC: 38 UG/DL
TIBC SERPL-MCNC: 186 UG/DL
UIBC SERPL-MCNC: 148 UG/DL
VIT B12 SERPL-MCNC: 1339 PG/ML

## 2019-03-10 ENCOUNTER — EMERGENCY (EMERGENCY)
Facility: HOSPITAL | Age: 84
LOS: 1 days | Discharge: HOME | End: 2019-03-10
Attending: EMERGENCY MEDICINE | Admitting: EMERGENCY MEDICINE
Payer: MEDICARE

## 2019-03-10 VITALS
RESPIRATION RATE: 18 BRPM | SYSTOLIC BLOOD PRESSURE: 170 MMHG | HEART RATE: 76 BPM | DIASTOLIC BLOOD PRESSURE: 83 MMHG | TEMPERATURE: 98 F | OXYGEN SATURATION: 97 %

## 2019-03-10 PROCEDURE — 99284 EMERGENCY DEPT VISIT MOD MDM: CPT

## 2019-03-10 PROCEDURE — 99284 EMERGENCY DEPT VISIT MOD MDM: CPT | Mod: 25

## 2019-03-10 PROCEDURE — 70450 CT HEAD/BRAIN W/O DYE: CPT | Mod: 26

## 2019-03-10 PROCEDURE — 70450 CT HEAD/BRAIN W/O DYE: CPT

## 2019-03-10 NOTE — ED ADULT TRIAGE NOTE - ARRIVAL INFO ADDITIONAL COMMENTS
Pt in wheel chair aware of person, place and family states this is his baseline. He fell from his wheelchair forward to his left side and hit the left side of his head. Onset was at 1pm this afternoon. It was unwitnessed fall, daughter heard the noise and states he did not hace LOC. He has hx of stroke with right residual weakness, HTN, afib, CAD. He denies numbness, tingling, HA, visual changes, CP, new onset weakness. He takes warfarin.

## 2019-03-10 NOTE — ED PROVIDER NOTE - ATTENDING CONTRIBUTION TO CARE
pt with ho CVA w right sided weakness , wheel chair bound, pt fell out of wheelchair , on ground and awake, family came in immediately, pt has fallen out of chair in past, small hematoma, no other injury detected.    CT head wnl , no ICH,   stable for d/c

## 2019-03-10 NOTE — ED PROVIDER NOTE - NSFOLLOWUPINSTRUCTIONS_ED_ALL_ED_FT
Facial Contusion    WHAT YOU NEED TO KNOW:    A facial contusion is a bruise that appears on your face after an injury. A bruise happens when small blood vessels tear but skin does not. When blood vessels tear, blood leaks into nearby tissue, such as soft tissue or muscle. You may develop swelling and bruising around your eyes if your bruise is on your brow, forehead, or the bridge of your nose.          DISCHARGE INSTRUCTIONS:    Return to the emergency department if:     You have a fever.      You have watery, clear fluid draining from your nose.       You have changes in your vision or eye appearance.      You have changes or pain with eye movement.      You have tingling or numbness in or near the injured area.    Contact your healthcare provider if:     You find a new lump in the injured area.      Your symptoms do not improve with treatment.      You have questions or concerns about your condition or care.    Medicines:     Acetaminophen decreases pain. It is available without a doctor's order. Ask how much to take and how often to take it. Follow directions. Acetaminophen can cause liver damage if not taken correctly.      Take your medicine as directed. Contact your healthcare provider if you think your medicine is not helping or if you have side effects. Tell him of her if you are allergic to any medicine. Keep a list of the medicines, vitamins, and herbs you take. Include the amounts, and when and why you take them. Bring the list or the pill bottles to follow-up visits. Carry your medicine list with you in case of an emergency.    Ice: Apply ice on your bruise for 15 to 20 minutes every hour or as directed. Use an ice pack, or put crushed ice in a plastic bag. Cover it with a towel. Ice helps prevent tissue damage and decreases swelling and pain.    Elevation: Sleep with your head elevated to help decrease swelling.     Help your contusion heal: Do not massage the area or put heating pads or other warming devices on the bruise right after your injury. Heat and massage may slow the healing of the area.    Follow up with your healthcare provider as directed: You may need to return within a week to have your injury checked again. Write down any questions you have so you remember to ask them in your follow-up visits.    Prevent a facial contusion:     Use safety belts and child restraints.       Use safety helmets when you ride a bicycle or motorcycle.       Use a mouth and face guard during sports.

## 2019-03-10 NOTE — ED PROVIDER NOTE - CLINICAL SUMMARY MEDICAL DECISION MAKING FREE TEXT BOX
82 y/o m hx CVA with residual right sided weakness, afib on coumadin presents s/p fall out of wheelchair today; pt fell asleep, fell forward, hitting forehead on the ground, no LOC.  Neuro exam intact, CT head negative, d/c, recommend ice to forehead.

## 2019-03-10 NOTE — ED PROVIDER NOTE - OBJECTIVE STATEMENT
82 y/o m hx afib on coumadin, CVA with residual right side weakness presents s/p fall today.  Pt is wheelchair bound, family reports he fell forward out of wheelchair after he fell asleep and hit forehead on the floor.  Pt was awake during incident, didn't lose consciousness.  Denies headache, visual changes, all other ROS negative.

## 2019-03-11 NOTE — ED ADULT NURSE NOTE - FAMILY HISTORY
Father  Still living? Unknown  Family history of essential hypertension, Age at diagnosis: Age Unknown     Mother  Still living? Unknown  No family history of cardiovascular disease, Age at diagnosis: Age Unknown

## 2019-03-11 NOTE — ED ADULT NURSE NOTE - CHIEF COMPLAINT QUOTE
"I fell from my chair sideways and hit my head" pregnant & post-partum women during each pregancy irregardless of the patient's prior history of receiving Tdap to maximize the maternal antibody response and passive antibody transfer to the infant

## 2019-03-11 NOTE — ED ADULT NURSE NOTE - OBJECTIVE STATEMENT
Pt is a 84 y/o male who came in to ED for evaluation of fall. Pt is on coumadin. Pt noted to have hematoma on forehead. Pt denies any blurry vision, denies any vomiting.

## 2019-03-14 ENCOUNTER — EMERGENCY (EMERGENCY)
Facility: HOSPITAL | Age: 84
LOS: 1 days | Discharge: HOME | End: 2019-03-14
Attending: EMERGENCY MEDICINE | Admitting: EMERGENCY MEDICINE
Payer: MEDICARE

## 2019-03-14 VITALS
DIASTOLIC BLOOD PRESSURE: 70 MMHG | WEIGHT: 175.27 LBS | SYSTOLIC BLOOD PRESSURE: 170 MMHG | HEIGHT: 72 IN | RESPIRATION RATE: 16 BRPM | TEMPERATURE: 98 F | OXYGEN SATURATION: 98 % | HEART RATE: 62 BPM

## 2019-03-14 VITALS
RESPIRATION RATE: 16 BRPM | DIASTOLIC BLOOD PRESSURE: 87 MMHG | OXYGEN SATURATION: 99 % | TEMPERATURE: 97 F | HEART RATE: 63 BPM | SYSTOLIC BLOOD PRESSURE: 189 MMHG

## 2019-03-14 DIAGNOSIS — S00.81XA ABRASION OF OTHER PART OF HEAD, INITIAL ENCOUNTER: ICD-10-CM
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
## 2019-03-14 DIAGNOSIS — Z79.899 OTHER LONG TERM (CURRENT) DRUG THERAPY: ICD-10-CM

## 2019-03-14 DIAGNOSIS — S00.83XA CONTUSION OF OTHER PART OF HEAD, INITIAL ENCOUNTER: ICD-10-CM

## 2019-03-14 DIAGNOSIS — Z79.82 LONG TERM (CURRENT) USE OF ASPIRIN: ICD-10-CM
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
## 2019-03-14 DIAGNOSIS — Z79.4 LONG TERM (CURRENT) USE OF INSULIN: ICD-10-CM

## 2019-03-14 DIAGNOSIS — E78.5 HYPERLIPIDEMIA, UNSPECIFIED: ICD-10-CM

## 2019-03-14 DIAGNOSIS — Z79.02 LONG TERM (CURRENT) USE OF ANTITHROMBOTICS/ANTIPLATELETS: ICD-10-CM
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
## 2019-03-14 DIAGNOSIS — E11.9 TYPE 2 DIABETES MELLITUS WITHOUT COMPLICATIONS: ICD-10-CM
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
## 2019-03-14 DIAGNOSIS — I25.10 ATHEROSCLEROTIC HEART DISEASE OF NATIVE CORONARY ARTERY WITHOUT ANGINA PECTORIS: ICD-10-CM

## 2019-03-14 DIAGNOSIS — Z79.01 LONG TERM (CURRENT) USE OF ANTICOAGULANTS: ICD-10-CM

## 2019-03-14 PROCEDURE — 70450 CT HEAD/BRAIN W/O DYE: CPT

## 2019-03-14 PROCEDURE — 99284 EMERGENCY DEPT VISIT MOD MDM: CPT

## 2019-03-14 PROCEDURE — 70450 CT HEAD/BRAIN W/O DYE: CPT | Mod: 26

## 2019-03-14 PROCEDURE — 99284 EMERGENCY DEPT VISIT MOD MDM: CPT | Mod: 25

## 2019-03-14 NOTE — ED ADULT NURSE NOTE - INTERVENTIONS DEFINITIONS
Stretcher in lowest position, wheels locked, appropriate side rails in place/Physically safe environment: no spills, clutter or unnecessary equipment/Monitor for mental status changes and reorient to person, place, and time

## 2019-03-14 NOTE — ED PROVIDER NOTE - OBJECTIVE STATEMENT
82 y/o male hx of cva with right sided deficits is in the ed due to a fall x1 day. pt was sitting in his wheelchair when he was unable to hold himself up and feel forward and hit his head on the ground. As per wife, witnessed without loc. pt reports slight discomfort located on his forehead. He denies the following: numbness/tingling, nausea/vomiting, neck/back pain. Pt with abrasion to forehead. tdap is updated.

## 2019-03-14 NOTE — ED ADULT NURSE NOTE - CAS ELECT INFOMATION PROVIDED
DC instructions/Wife verbalized understanding to follow up outpatient. CT results given. Pt. will continue daily meds at home today for HTN.

## 2019-03-14 NOTE — ED PROVIDER NOTE - CLINICAL SUMMARY MEDICAL DECISION MAKING FREE TEXT BOX
82 y/o male well appearing in nad. pt baseline mental status as per wife. pt baseline neuro intact. no tdap required. ct head neg. advised cognitive rest. given strict return precautions and follow-up with pmd

## 2019-03-14 NOTE — ED ADULT NURSE NOTE - OBJECTIVE STATEMENT
Pt. s/p fall out of WC this AM, witnessed by wife, no LOC. Pt. denies any CP, palpitations, dizziness prior to fall. Per wife, pt. states she was pushing his WC "too rough" prior to fall. Wife states pt. had another fall out of chair on Sunday while he was with his daughter. Pt. noted w/ red/purple ecchymosis and edema of forehead, denies any pain at this time, area non-tender. Pt. is on coumadin, last INR 2.7 last week. Pt. is A&Ox2 with R sided weakness at baseline from prior stroke. Pt. w/ limited ROM of b/l LE, uses WC transfer at home. Pt. 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 Dressing changed. Sacral skin intact. Pt. s/p fall out of WC this AM, witnessed by wife, no LOC. Pt. denies any CP, palpitations, dizziness prior to fall. Per wife, pt. states she was pushing his wheelchair "too rough" prior to fall. Wife states pt. had another fall out of chair on Sunday while he was with his daughter. Pt. noted w/ red/purple ecchymosis and edema of forehead, no laceration, denies any pain at this time, area non-tender. PERRLA, denies vision changes. Pt. is on coumadin, last INR 2.7 last week. Pt. is A&Ox2 with R sided weakness at baseline from prior stroke. Pt. w/ limited ROM of b/l LE, uses wheelchair transfer at home. Pt. 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 Dressing changed. Sacral skin intact.

## 2019-03-14 NOTE — ED PROVIDER NOTE - CARE PLAN
Principal Discharge DX:	Facial hematoma, initial encounter  Secondary Diagnosis:	Fall, initial encounter

## 2019-03-14 NOTE — ED PROVIDER NOTE - NSFOLLOWUPINSTRUCTIONS_ED_ALL_ED_FT
Apply ice to your forehead, take tylenol as needed for pain. Follow up with your primary care physician for re-evaluation. Return to er for any new or worsening symptoms.     Contusion    A contusion is a deep bruise. Contusions are the result of a blunt injury to tissues and muscle fibers under the skin. The skin overlying the contusion may turn blue, purple, or yellow. Symptoms also include pain and swelling in the injured area.    SEEK IMMEDIATE MEDICAL CARE IF YOU HAVE ANY OF THE FOLLOWING SYMPTOMS: severe pain, numbness, tingling, pain, weakness, or skin color/temperature change in any part of your body distal to the injury.    HEAD INJURY - AfterCare(R) Instructions(ER/ED)     Head Injury    WHAT YOU NEED TO KNOW:    A head injury is most often caused by a blow to the head. This may occur from a fall, bicycle injury, sports injury, being struck in the head, or a motor vehicle accident.     DISCHARGE INSTRUCTIONS:    Call 911 or have someone else call for any of the following:     You cannot be woken.      You have a seizure.      You stop responding to others or you faint.      You have blurry or double vision.      Your speech becomes slurred or confused.      You have arm or leg weakness, loss of feeling, or new problems with coordination.      Your pupils are larger than usual or one pupil is a different size than the other.       You have blood or clear fluid coming out of your ears or nose.    Return to the emergency department if:     You have repeated or forceful vomiting.      You feel confused.      Your headache gets worse or becomes severe.      You or someone caring for you notices that you are harder to wake than usual.    Contact your healthcare provider if:     Your symptoms last longer than 6 weeks after the injury.      You have questions or concerns about your condition or care.    Medicines:     Acetaminophen decreases pain. Acetaminophen is available without a doctor's order. Ask how much to take and how often to take it. Follow directions. Acetaminophen can cause liver damage if not taken correctly.      Take your medicine as directed. Contact your healthcare provider if you think your medicine is not helping or if you have side effects. Tell him or her if you are allergic to any medicine. Keep a list of the medicines, vitamins, and herbs you take. Include the amounts, and when and why you take them. Bring the list or the pill bottles to follow-up visits. Carry your medicine list with you in case of an emergency.    Self-care:     Rest or do quiet activities for 24 to 48 hours. Limit your time watching TV, using the computer, or doing tasks that require a lot of thinking. Slowly return to your normal activities as directed. Do not play sports or do activities that may cause you to get hit in the head. Ask your healthcare provider when you can return to sports.       Apply ice on your head for 15 to 20 minutes every hour or as directed. Use an ice pack, or put crushed ice in a plastic bag. Cover it with a towel before you apply it to your skin. Ice helps prevent tissue damage and decreases swelling and pain.       Have someone stay with you for 24 hours or as directed. This person can monitor you for complications and call 911. When you are awake the person should ask you a few questions to see if you are thinking clearly. An example would be to ask your name or your address.     Prevent another head injury:     Wear a helmet that fits properly. Do this when you play sports, or ride a bike, scooter, or skateboard. Helmets help decrease your risk of a serious head injury. Talk to your healthcare provider about other ways you can protect yourself if you play sports.      Wear your seat belt every time you are in a car. This helps to decrease your risk for a head injury if you are in a car accident.     Follow up with your healthcare provider as directed: Write down your questions so you remember to ask them during your visits.

## 2019-03-14 NOTE — ED PROVIDER NOTE - CARE PROVIDER_API CALL
Bert Olmedo)  Cardiovascular Disease; Internal Medicine  [address] [place] [address], [place] [address]  Phone: [phone]  Fax: [phone]  Follow Up Time:

## 2019-03-19 RX ORDER — METOPROLOL SUCCINATE 25 MG/1
25 TABLET, EXTENDED RELEASE ORAL DAILY
Qty: 1 | Refills: 2 | Status: DISCONTINUED | COMMUNITY
Start: 2019-01-23 | End: 2019-03-19

## 2019-03-21 ENCOUNTER — APPOINTMENT (OUTPATIENT)
Dept: NEUROLOGY | Facility: CLINIC | Age: 84
End: 2019-03-21
Payer: MEDICARE

## 2019-03-21 VITALS
TEMPERATURE: 98.1 F | DIASTOLIC BLOOD PRESSURE: 70 MMHG | HEIGHT: 72 IN | OXYGEN SATURATION: 98 % | HEART RATE: 73 BPM | WEIGHT: 175 LBS | SYSTOLIC BLOOD PRESSURE: 131 MMHG | BODY MASS INDEX: 23.7 KG/M2

## 2019-03-21 PROCEDURE — 95874 GUIDE NERV DESTR NEEDLE EMG: CPT

## 2019-03-21 PROCEDURE — 64642 CHEMODENERV 1 EXTREMITY 1-4: CPT

## 2019-03-28 ENCOUNTER — INPATIENT (INPATIENT)
Facility: HOSPITAL | Age: 84
LOS: 0 days | Discharge: HOME HEALTH | DRG: 641 | End: 2019-03-29
Attending: INTERNAL MEDICINE | Admitting: INTERNAL MEDICINE
Payer: COMMERCIAL

## 2019-03-28 VITALS
DIASTOLIC BLOOD PRESSURE: 73 MMHG | HEART RATE: 81 BPM | TEMPERATURE: 98 F | OXYGEN SATURATION: 99 % | SYSTOLIC BLOOD PRESSURE: 159 MMHG | RESPIRATION RATE: 16 BRPM | WEIGHT: 179.9 LBS

## 2019-03-28 PROCEDURE — 99223 1ST HOSP IP/OBS HIGH 75: CPT | Mod: AI

## 2019-03-28 PROCEDURE — 99285 EMERGENCY DEPT VISIT HI MDM: CPT | Mod: 25

## 2019-03-28 PROCEDURE — 93010 ELECTROCARDIOGRAM REPORT: CPT

## 2019-03-28 RX ORDER — LABETALOL HCL 100 MG
10 TABLET ORAL ONCE
Qty: 0 | Refills: 0 | Status: COMPLETED | OUTPATIENT
Start: 2019-03-28 | End: 2019-03-28

## 2019-03-28 RX ORDER — SODIUM CHLORIDE 9 MG/ML
1000 INJECTION INTRAMUSCULAR; INTRAVENOUS; SUBCUTANEOUS
Qty: 0 | Refills: 0 | Status: DISCONTINUED | OUTPATIENT
Start: 2019-03-28 | End: 2019-03-29

## 2019-03-28 RX ADMIN — SODIUM CHLORIDE 100 MILLILITER(S): 9 INJECTION INTRAMUSCULAR; INTRAVENOUS; SUBCUTANEOUS at 18:23

## 2019-03-28 RX ADMIN — Medication 10 MILLIGRAM(S): at 22:49

## 2019-03-28 NOTE — H&P ADULT - PROBLEM SELECTOR PLAN 4
History of CVA in 2008, R. sided weakness, ?afib vs clotting disorder on coumadin (currently in NSR)  - follows with Dr. Snider, consult in AM

## 2019-03-28 NOTE — H&P ADULT - PROBLEM SELECTOR PLAN 3
BP systolic 220's in ED. Patient took home enalapril today per family members at bedside.   - c/w home enalapril 10 mg PO daily  - s/p labetalol 10 mg X1 IV in ED, will ctm BP and add new agent if necessary

## 2019-03-28 NOTE — H&P ADULT - PROBLEM SELECTOR PLAN 9
F: None  E: Replete PRN  N: DASH/TLC/cc/NPO after MN for now  VTE PPX: Therapeutically AC, has IVC filter, SCD  DISPO: [facility] F: None  E: Replete PRN  N: DASH/TLC/cc/NPO after MN for now  VTE PPX: Therapeutically AC, has IVC filter, SCD  DISPO: [facility]  FULL CODE

## 2019-03-28 NOTE — H&P ADULT - NSICDXPASTSURGICALHX_GEN_ALL_CORE_FT
PAST SURGICAL HISTORY:  S/P cervical discectomy     S/P insertion of penile implant     S/P IVC filter

## 2019-03-28 NOTE — H&P ADULT - PROBLEM SELECTOR PLAN 5
OP records with afib, not noted on EKG. Takes coumadin  - holding coumadin in the setting of supratherapeutic INR 3.4, will re-check in AM  - home coumadin schedule 5 mg daily except Tuesday and Saturday takes 7.5 mg

## 2019-03-28 NOTE — ED PROVIDER NOTE - CONSTITUTIONAL, MLM
normal... thin elderly male, awake, alert, oriented to person, place, time/situation and in no apparent distress.

## 2019-03-28 NOTE — ED ADULT NURSE NOTE - CHPI ED NUR SYMPTOMS NEG
no chest pain/no fever/no nausea/no dizziness/no back pain/no vomiting/no congestion/no diaphoresis/no shortness of breath/no chills

## 2019-03-28 NOTE — H&P ADULT - ASSESSMENT
Patient is an 84 yo M w/PMH DM II, HLD, HTN, mitral regurgitation, cervical stenosis, prostate ca (s/p brachytherapy), CVA w R sided weakness, mild aphasia (on coumadin-history of clotting disorder), IVC filter, carotid stenosis, CAD, recent admit for LE weakness s/p C3-C5 anterior cervical discectomy and fusion, wheelchair bound, presents to ED as prompted by OP cardiologist Dr. Olmedo for syncope w/vtach noted on holter monitor. Patient admitted to [facility] for further workup/monitoring.

## 2019-03-28 NOTE — ED PROVIDER NOTE - OBJECTIVE STATEMENT
83 M hx of CAD, CVA w syncopal event- 83 M hx of CAD, CVA, clotting disorder, afib on coumadin w syncopal event- had PT at home today- was doing well- sat down after and passed out- was out for a few minutes  no pre/post cp/sob/n/v/weakness  no exac/allev factors  stacey po normally  mod severity

## 2019-03-28 NOTE — H&P ADULT - PROBLEM SELECTOR PROBLEM 5
Type 2 diabetes mellitus without complication, without long-term current use of insulin Atrial fibrillation, unspecified type

## 2019-03-28 NOTE — H&P ADULT - NSHPPHYSICALEXAM_GEN_ALL_CORE
.  VITAL SIGNS:  T(F): 98.4 (03-28-19 @ 17:24), Max: 98.4 (03-28-19 @ 17:24)  HR: 69 (03-28-19 @ 20:35) (69 - 99)  BP: 190/84 (03-28-19 @ 20:35) (159/73 - 198/93)  BP(mean): --  RR: 16 (03-28-19 @ 20:35) (16 - 16)  SpO2: 98% (03-28-19 @ 20:35) (98% - 99%)    PHYSICAL EXAM:    Constitutional: Elderly gentleman resting comfortably in bed; NAD  HEENT: NC/AT, PERRL, EOMI, anicteric sclera, no nasal discharge; uvula midline, no oropharyngeal erythema or exudates; MMM  Neck: supple; no JVD or thyromegaly  Respiratory: CTA B/L; no W/R/R, no retractions  Cardiac: +S1/S2; RRR; no M/R/G; PMI non-displaced  Gastrointestinal: soft, NT/ND; no rebound or guarding; +BSx4  Back: spine midline, no bony tenderness or step-offs; no CVAT B/L  Extremities: WWP, no clubbing or cyanosis; b/l trace LE edema   Musculoskeletal: NROM x4; no joint swelling, tenderness or erythema  Vascular: 2+ radial, DP/PT pulses B/L  Lymphatic: no submandibular or cervical LAD  Neurologic: AAOx2 (person, place); CNII-XII grossly intact; Bilateral  strength 4/5. LUE strength 3-4/5, RUE strength 3/5, RLE strength 2/5, LLE strength 3/5 (per family at bedside strength is baseline), no sensory deficits. Reflexes and gait deferred. .  VITAL SIGNS:  T(F): 98.4 (03-28-19 @ 17:24), Max: 98.4 (03-28-19 @ 17:24)  HR: 69 (03-28-19 @ 20:35) (69 - 99)  BP: 190/84 (03-28-19 @ 20:35) (159/73 - 198/93)  BP(mean): --  RR: 16 (03-28-19 @ 20:35) (16 - 16)  SpO2: 98% (03-28-19 @ 20:35) (98% - 99%)    PHYSICAL EXAM:    Constitutional: Elderly gentleman resting comfortably in bed; NAD  HEENT: Old contusions around eyes from fall on 3/14, PERRL, EOMI, anicteric sclera, no nasal discharge; uvula midline, no oropharyngeal erythema or exudates; MMM  Neck: supple; no JVD or thyromegaly  Respiratory: CTA B/L; no W/R/R, no retractions  Cardiac: +S1/S2; RRR; no M/R/G; PMI non-displaced  Gastrointestinal: soft, NT/ND; no rebound or guarding; +BSx4  Back: spine midline, no bony tenderness or step-offs; no CVAT B/L  Extremities: WWP, no clubbing or cyanosis; b/l trace LE edema   Musculoskeletal: NROM x4; no joint swelling, tenderness or erythema  Vascular: 2+ radial, DP/PT pulses B/L  Lymphatic: no submandibular or cervical LAD  Neurologic: AAOx2 (person, place); CNII-XII grossly intact; Bilateral  strength 4/5. LUE strength 3-4/5, RUE strength 3/5, RLE strength 2/5, LLE strength 3/5 (per family at bedside strength is baseline), no sensory deficits. Reflexes and gait deferred.

## 2019-03-28 NOTE — H&P ADULT - NSICDXFAMILYHX_GEN_ALL_CORE_FT
FAMILY HISTORY:  Father  Still living? Unknown  Family history of essential hypertension, Age at diagnosis: Age Unknown    Mother  Still living? Unknown  No family history of cardiovascular disease, Age at diagnosis: Age Unknown

## 2019-03-28 NOTE — H&P ADULT - NSICDXPASTMEDICALHX_GEN_ALL_CORE_FT
PAST MEDICAL HISTORY:  Aphasia     CAD (coronary artery disease)     Carotid artery stenosis     Cervical stenosis of spine     CVA (cerebral vascular accident)     DM (diabetes mellitus)     HLD (hyperlipidemia)     Prostate cancer

## 2019-03-28 NOTE — H&P ADULT - PROBLEM SELECTOR PLAN 6
Echo w/diastolic dysfunction, EF 65%  - not currently in CHF exac  - c/w home enalapril  - holding b-blocker as OP, will not re-start

## 2019-03-28 NOTE — H&P ADULT - PROBLEM SELECTOR PLAN 2
Patient reportedly with episodes of vtach on outpatient monitor.  - EP in AM, known to Dr. Schreiber

## 2019-03-28 NOTE — H&P ADULT - HISTORY OF PRESENT ILLNESS
Patient is an 84 yo M w/PMH DM II, HLD, HTN, mitral regurgitation, cervical stenosis, prostate ca (s/p brachytherapy), CVA w R sided weakness, mild aphasia (on coumadin-history of clotting disorder), IVC filter, carotid stenosis, CAD, recent admit for LE weakness s/p C3-C5 anterior cervical discectomy and fusion, wheelchair bound, presents to ED as prompted by OP cardiologist Dr. Olmedo for syncope w/vtach noted on holter monitor. Per patient's family at bedside, patient was working with physical therapy today (not standing, sitting in chair) when he was noted to be unresponsive for 10 mins. PT checked vitals and stated they were within normal limits. Patient had no symptoms prior to passing out, denies CP, SOB, palpitations. Event was witnessed, no shaking or tongue biting, no urinary or bowl incontinence. Patient has been wearing a holter for per Dr. Olmedo as an outpatient. Family reports he had been on the monitor for over 2 months for fainting spells, then off the monitor and now back on again. The dose of metoprolol had been adjusted as well, currently discontinued. Otherwise denies cough, fevers/chills, dysuria, abd pain, n/v/d.     In ED VS: afebrile, HR 78-99, -220/, RR 16, O2% 99 RA.  EP consulted in ED, to see patient in AM.   In ED required 10 labetalol IVP for elevated BP, started on NS @ 100 cc/hour and admitted to [facility] for evaluation Patient is an 84 yo M w/PMH DM II, HLD, HTN, mitral regurgitation, cervical stenosis, prostate ca (s/p brachytherapy), CVA w R sided weakness, mild aphasia, afib on coumadin, ?history of clotting disorder on coumadin, IVC filter, carotid stenosis, CAD (6 stents- last 2013), recent admit for LE weakness s/p C3-C5 anterior cervical discectomy and fusion, wheelchair bound, presents to ED as prompted by OP cardiologist Dr. Olmedo for syncope w/vtach noted on holter monitor. Per patient's family at bedside, patient was working with physical therapy today (not standing, sitting in chair) when he was noted to be unresponsive for 10 mins. PT checked vitals and stated they were within normal limits. Patient had no symptoms prior to passing out, denies CP, SOB, palpitations. Event was witnessed, no shaking or tongue biting, no urinary or bowl incontinence. Patient has been wearing a holter for per Dr. Olmedo as an outpatient. Family reports he had been on the monitor for over 2 months for fainting spells, then off the monitor and now back on again. Patient's family also reports patient sees Dr. Schreiber as outpatient. The dose of metoprolol had been adjusted as well, currently discontinued by Dr. Olmedo. Otherwise denies cough, fevers/chills, dysuria, abd pain, n/v/d.     In ED VS: afebrile, HR 78-99, -220/, RR 16, O2% 99 RA.  EP consulted in ED, to see patient in AM.   In ED required 10 labetalol IVP for elevated BP, started on NS @ 100 cc/hour and admitted to [facility] for evaluation Patient is an 84 yo M w/PMH DM II, HLD, HTN, mitral regurgitation, cervical stenosis, prostate ca (s/p brachytherapy), CVA w R sided weakness, mild aphasia, afib on coumadin, ?history of clotting disorder on coumadin, IVC filter, carotid stenosis, CAD (6 stents- last 2013), recent admit for LE weakness s/p C3-C5 anterior cervical discectomy and fusion, wheelchair bound, presents to ED as prompted by OP cardiologist Dr. Olmedo for syncope w/vtach noted on holter monitor. Per patient's family at bedside, patient was working with physical therapy today (not standing, sitting in chair) when he was noted to be unresponsive for 10 mins. PT checked vitals and stated they were within normal limits. Patient had no symptoms prior to passing out, denies CP, SOB, palpitations. Event was witnessed, no shaking or tongue biting, no urinary or bowl incontinence. Patient has been wearing a holter for per Dr. Olmedo as an outpatient. Family reports he had been on the monitor for over 2 months for fainting spells, then off the monitor and now back on again. Patient's family also reports patient sees Dr. Schreiber as outpatient. The dose of metoprolol had been adjusted as well, currently discontinued by Dr. Olmedo. Otherwise denies cough, fevers/chills, dysuria, abd pain, n/v/d.     Of note, patient had recent fall 3/14 onto face, CT head negative at that time and patient discharged from ED.     In ED VS: afebrile, HR 78-99, -220/, RR 16, O2% 99 RA.  EP consulted in ED, to see patient in AM.   In ED required 10 labetalol IVP for elevated BP, started on NS @ 100 cc/hour and admitted to [facility] for evaluation

## 2019-03-28 NOTE — ED PROVIDER NOTE - CLINICAL SUMMARY MEDICAL DECISION MAKING FREE TEXT BOX
px w recurrent syncope- d/w Dr Kan- px has been having runs of VTACH- nonsustained- will admit- EP consulted- Dr Cyr will see/eval px

## 2019-03-28 NOTE — H&P ADULT - PROBLEM SELECTOR PLAN 1
Patient undergoing workup with holter monitor as OP for syncopal episodes with vtach reportedly on monitor. OP cardiologist is Dr. Olmedo. Patient reportedly unconscious for 10 mins with stable vitals, witnessed at physical therapy today. Blood glucose wnl, patient fully recovered at time of interview. EKG non-ischemic. Witnessed episode without head trauma or seizure-like activity. Echo from 11/2018 with EF 65%, diastolic dysfunction. Syncope possibly 2/2 arrythmia, unlikely new neuro event in the setting of neuro exam at baseline.   - collateral from Dr. Olmedo in AM  - repeat echo ordered  - EP consulted in ED, known to Dr. Schreiber.   - Will keep patient NPO in case of intervention in the morning  - Neuro consult (Dr. Snider since has seen patient as OP) in AM   - ctm on telemetry

## 2019-03-28 NOTE — ED ADULT NURSE NOTE - NSIMPLEMENTINTERV_GEN_ALL_ED
Implemented All Fall with Harm Risk Interventions:  [place] to call system. Call bell, personal items and telephone within reach. Instruct patient to call for assistance. Room bathroom lighting operational. Non-slip footwear when patient is off stretcher. Physically safe environment: no spills, clutter or unnecessary equipment. Stretcher in lowest position, wheels locked, appropriate side rails in place. Provide visual cue, wrist band, yellow gown, etc. Monitor gait and stability. Monitor for mental status changes and reorient to person, place, and time. Review medications for side effects contributing to fall risk. Reinforce activity limits and safety measures with patient and family. Provide visual clues: red socks.

## 2019-03-28 NOTE — ED PROVIDER NOTE - CARE PLAN
Principal Discharge DX:	Syncope, unspecified syncope type  Secondary Diagnosis:	Ventricular tachycardia

## 2019-03-28 NOTE — ED ADULT NURSE NOTE - OBJECTIVE STATEMENT
Pt BIBA for syncopal episode today after home PT, per wife pt was in chair and denies head trauma, no trauma found. PT also had mechanical trip and fall last week, has bruising around both eyes. Pt currently denies chest pain, SOB, abd pain, /GI symptoms, D/N/V, fever or chills. Pt has baseline right leg weakness from previous injury and leg is in bent position.  Pt baseline alert to person, place, situation, not time, per wife. Pt speaking in complete, clear sentences, cooperative with care, answering RN evaluations questions.

## 2019-03-29 ENCOUNTER — TRANSCRIPTION ENCOUNTER (OUTPATIENT)
Age: 84
End: 2019-03-29

## 2019-03-29 VITALS
RESPIRATION RATE: 18 BRPM | DIASTOLIC BLOOD PRESSURE: 85 MMHG | HEART RATE: 72 BPM | TEMPERATURE: 98 F | OXYGEN SATURATION: 100 % | SYSTOLIC BLOOD PRESSURE: 162 MMHG

## 2019-03-29 DIAGNOSIS — I63.9 CEREBRAL INFARCTION, UNSPECIFIED: ICD-10-CM

## 2019-03-29 DIAGNOSIS — Z96.0 PRESENCE OF UROGENITAL IMPLANTS: Chronic | ICD-10-CM

## 2019-03-29 DIAGNOSIS — Z29.9 ENCOUNTER FOR PROPHYLACTIC MEASURES, UNSPECIFIED: ICD-10-CM

## 2019-03-29 DIAGNOSIS — Z95.828 PRESENCE OF OTHER VASCULAR IMPLANTS AND GRAFTS: Chronic | ICD-10-CM

## 2019-03-29 DIAGNOSIS — I47.2 VENTRICULAR TACHYCARDIA: ICD-10-CM

## 2019-03-29 DIAGNOSIS — I10 ESSENTIAL (PRIMARY) HYPERTENSION: ICD-10-CM

## 2019-03-29 DIAGNOSIS — I48.91 UNSPECIFIED ATRIAL FIBRILLATION: ICD-10-CM

## 2019-03-29 DIAGNOSIS — E11.9 TYPE 2 DIABETES MELLITUS WITHOUT COMPLICATIONS: ICD-10-CM

## 2019-03-29 DIAGNOSIS — E78.2 MIXED HYPERLIPIDEMIA: ICD-10-CM

## 2019-03-29 DIAGNOSIS — I51.89 OTHER ILL-DEFINED HEART DISEASES: ICD-10-CM

## 2019-03-29 DIAGNOSIS — Z98.890 OTHER SPECIFIED POSTPROCEDURAL STATES: Chronic | ICD-10-CM

## 2019-03-29 DIAGNOSIS — R55 SYNCOPE AND COLLAPSE: ICD-10-CM

## 2019-03-29 LAB
ALBUMIN SERPL ELPH-MCNC: 3.2 G/DL — LOW (ref 3.3–5)
ALP SERPL-CCNC: 73 U/L — SIGNIFICANT CHANGE UP (ref 40–120)
ALT FLD-CCNC: 13 U/L — SIGNIFICANT CHANGE UP (ref 10–45)
ANION GAP SERPL CALC-SCNC: 12 MMOL/L — SIGNIFICANT CHANGE UP (ref 5–17)
APTT BLD: 42.4 SEC — HIGH (ref 27.5–36.3)
AST SERPL-CCNC: 31 U/L — SIGNIFICANT CHANGE UP (ref 10–40)
BILIRUB SERPL-MCNC: 0.5 MG/DL — SIGNIFICANT CHANGE UP (ref 0.2–1.2)
BUN SERPL-MCNC: 12 MG/DL — SIGNIFICANT CHANGE UP (ref 7–23)
CALCIUM SERPL-MCNC: 9.2 MG/DL — SIGNIFICANT CHANGE UP (ref 8.4–10.5)
CHLORIDE SERPL-SCNC: 103 MMOL/L — SIGNIFICANT CHANGE UP (ref 96–108)
CO2 SERPL-SCNC: 25 MMOL/L — SIGNIFICANT CHANGE UP (ref 22–31)
CREAT SERPL-MCNC: 0.85 MG/DL — SIGNIFICANT CHANGE UP (ref 0.5–1.3)
GLUCOSE BLDC GLUCOMTR-MCNC: 81 MG/DL — SIGNIFICANT CHANGE UP (ref 70–99)
GLUCOSE SERPL-MCNC: 96 MG/DL — SIGNIFICANT CHANGE UP (ref 70–99)
HCT VFR BLD CALC: 32.3 % — LOW (ref 39–50)
HGB BLD-MCNC: 10.4 G/DL — LOW (ref 13–17)
INR BLD: 3.38 — HIGH (ref 0.88–1.16)
MAGNESIUM SERPL-MCNC: 1.8 MG/DL — SIGNIFICANT CHANGE UP (ref 1.6–2.6)
MCHC RBC-ENTMCNC: 29.9 PG — SIGNIFICANT CHANGE UP (ref 27–34)
MCHC RBC-ENTMCNC: 32.2 GM/DL — SIGNIFICANT CHANGE UP (ref 32–36)
MCV RBC AUTO: 92.8 FL — SIGNIFICANT CHANGE UP (ref 80–100)
NRBC # BLD: 0 /100 WBCS — SIGNIFICANT CHANGE UP (ref 0–0)
PLATELET # BLD AUTO: 192 K/UL — SIGNIFICANT CHANGE UP (ref 150–400)
POTASSIUM SERPL-MCNC: 4.1 MMOL/L — SIGNIFICANT CHANGE UP (ref 3.5–5.3)
POTASSIUM SERPL-SCNC: 4.1 MMOL/L — SIGNIFICANT CHANGE UP (ref 3.5–5.3)
PROT SERPL-MCNC: 7.2 G/DL — SIGNIFICANT CHANGE UP (ref 6–8.3)
PROTHROM AB SERPL-ACNC: 39.7 SEC — HIGH (ref 10–12.9)
RBC # BLD: 3.48 M/UL — LOW (ref 4.2–5.8)
RBC # FLD: 14.9 % — HIGH (ref 10.3–14.5)
SODIUM SERPL-SCNC: 140 MMOL/L — SIGNIFICANT CHANGE UP (ref 135–145)
TSH SERPL-MCNC: 2.42 UIU/ML — SIGNIFICANT CHANGE UP (ref 0.35–4.94)
WBC # BLD: 7.75 K/UL — SIGNIFICANT CHANGE UP (ref 3.8–10.5)
WBC # FLD AUTO: 7.75 K/UL — SIGNIFICANT CHANGE UP (ref 3.8–10.5)

## 2019-03-29 PROCEDURE — 82962 GLUCOSE BLOOD TEST: CPT

## 2019-03-29 PROCEDURE — 99223 1ST HOSP IP/OBS HIGH 75: CPT

## 2019-03-29 PROCEDURE — 93005 ELECTROCARDIOGRAM TRACING: CPT

## 2019-03-29 PROCEDURE — 85027 COMPLETE CBC AUTOMATED: CPT

## 2019-03-29 PROCEDURE — 36415 COLL VENOUS BLD VENIPUNCTURE: CPT

## 2019-03-29 PROCEDURE — 84443 ASSAY THYROID STIM HORMONE: CPT

## 2019-03-29 PROCEDURE — 93306 TTE W/DOPPLER COMPLETE: CPT | Mod: 26

## 2019-03-29 PROCEDURE — 86900 BLOOD TYPING SEROLOGIC ABO: CPT

## 2019-03-29 PROCEDURE — 83735 ASSAY OF MAGNESIUM: CPT

## 2019-03-29 PROCEDURE — 85730 THROMBOPLASTIN TIME PARTIAL: CPT

## 2019-03-29 PROCEDURE — 86850 RBC ANTIBODY SCREEN: CPT

## 2019-03-29 PROCEDURE — 86901 BLOOD TYPING SEROLOGIC RH(D): CPT

## 2019-03-29 PROCEDURE — 93306 TTE W/DOPPLER COMPLETE: CPT

## 2019-03-29 PROCEDURE — 85610 PROTHROMBIN TIME: CPT

## 2019-03-29 PROCEDURE — 80053 COMPREHEN METABOLIC PANEL: CPT

## 2019-03-29 PROCEDURE — 85025 COMPLETE CBC W/AUTO DIFF WBC: CPT

## 2019-03-29 PROCEDURE — 99285 EMERGENCY DEPT VISIT HI MDM: CPT | Mod: 25

## 2019-03-29 PROCEDURE — 99238 HOSP IP/OBS DSCHRG MGMT 30/<: CPT

## 2019-03-29 RX ORDER — SODIUM CHLORIDE 9 MG/ML
1000 INJECTION, SOLUTION INTRAVENOUS
Qty: 0 | Refills: 0 | Status: DISCONTINUED | OUTPATIENT
Start: 2019-03-29 | End: 2019-03-29

## 2019-03-29 RX ORDER — ATORVASTATIN CALCIUM 80 MG/1
40 TABLET, FILM COATED ORAL AT BEDTIME
Qty: 0 | Refills: 0 | Status: DISCONTINUED | OUTPATIENT
Start: 2019-03-29 | End: 2019-03-29

## 2019-03-29 RX ORDER — SENNA PLUS 8.6 MG/1
2 TABLET ORAL AT BEDTIME
Qty: 0 | Refills: 0 | Status: DISCONTINUED | OUTPATIENT
Start: 2019-03-29 | End: 2019-03-29

## 2019-03-29 RX ORDER — DEXTROSE 50 % IN WATER 50 %
25 SYRINGE (ML) INTRAVENOUS ONCE
Qty: 0 | Refills: 0 | Status: DISCONTINUED | OUTPATIENT
Start: 2019-03-29 | End: 2019-03-29

## 2019-03-29 RX ORDER — DEXTROSE 50 % IN WATER 50 %
12.5 SYRINGE (ML) INTRAVENOUS ONCE
Qty: 0 | Refills: 0 | Status: DISCONTINUED | OUTPATIENT
Start: 2019-03-29 | End: 2019-03-29

## 2019-03-29 RX ORDER — METOPROLOL TARTRATE 50 MG
1 TABLET ORAL
Qty: 0 | Refills: 0 | COMMUNITY

## 2019-03-29 RX ORDER — INSULIN LISPRO 100/ML
VIAL (ML) SUBCUTANEOUS
Qty: 0 | Refills: 0 | Status: DISCONTINUED | OUTPATIENT
Start: 2019-03-29 | End: 2019-03-29

## 2019-03-29 RX ORDER — DEXTROSE 50 % IN WATER 50 %
15 SYRINGE (ML) INTRAVENOUS ONCE
Qty: 0 | Refills: 0 | Status: DISCONTINUED | OUTPATIENT
Start: 2019-03-29 | End: 2019-03-29

## 2019-03-29 RX ORDER — MAGNESIUM SULFATE 500 MG/ML
1 VIAL (ML) INJECTION ONCE
Qty: 0 | Refills: 0 | Status: COMPLETED | OUTPATIENT
Start: 2019-03-29 | End: 2019-03-29

## 2019-03-29 RX ORDER — ACETAMINOPHEN 500 MG
650 TABLET ORAL EVERY 6 HOURS
Qty: 0 | Refills: 0 | Status: DISCONTINUED | OUTPATIENT
Start: 2019-03-28 | End: 2019-03-29

## 2019-03-29 RX ORDER — ASPIRIN/CALCIUM CARB/MAGNESIUM 324 MG
81 TABLET ORAL DAILY
Qty: 0 | Refills: 0 | Status: DISCONTINUED | OUTPATIENT
Start: 2019-03-28 | End: 2019-03-29

## 2019-03-29 RX ORDER — CARVEDILOL PHOSPHATE 80 MG/1
6.25 CAPSULE, EXTENDED RELEASE ORAL EVERY 12 HOURS
Qty: 0 | Refills: 0 | Status: DISCONTINUED | OUTPATIENT
Start: 2019-03-29 | End: 2019-03-29

## 2019-03-29 RX ORDER — OMEGA-3 ACID ETHYL ESTERS 1 G
4 CAPSULE ORAL DAILY
Qty: 0 | Refills: 0 | Status: DISCONTINUED | OUTPATIENT
Start: 2019-03-29 | End: 2019-03-29

## 2019-03-29 RX ORDER — GLUCAGON INJECTION, SOLUTION 0.5 MG/.1ML
1 INJECTION, SOLUTION SUBCUTANEOUS ONCE
Qty: 0 | Refills: 0 | Status: DISCONTINUED | OUTPATIENT
Start: 2019-03-29 | End: 2019-03-29

## 2019-03-29 RX ORDER — DOCUSATE SODIUM 100 MG
100 CAPSULE ORAL THREE TIMES A DAY
Qty: 0 | Refills: 0 | Status: DISCONTINUED | OUTPATIENT
Start: 2019-03-29 | End: 2019-03-29

## 2019-03-29 RX ADMIN — CARVEDILOL PHOSPHATE 6.25 MILLIGRAM(S): 80 CAPSULE, EXTENDED RELEASE ORAL at 14:16

## 2019-03-29 RX ADMIN — Medication 100 MILLIGRAM(S): at 06:20

## 2019-03-29 RX ADMIN — Medication 100 MILLIGRAM(S): at 14:16

## 2019-03-29 RX ADMIN — Medication 100 GRAM(S): at 10:37

## 2019-03-29 RX ADMIN — Medication 81 MILLIGRAM(S): at 12:10

## 2019-03-29 RX ADMIN — Medication 1 TABLET(S): at 12:10

## 2019-03-29 RX ADMIN — Medication 20 MILLIGRAM(S): at 08:21

## 2019-03-29 RX ADMIN — Medication 10 MILLIGRAM(S): at 02:48

## 2019-03-29 NOTE — DISCHARGE NOTE PROVIDER - CARE PROVIDERS DIRECT ADDRESSES
[email or web address]

## 2019-03-29 NOTE — PROGRESS NOTE ADULT - PROBLEM SELECTOR PLAN 5
OP records with afib, not noted on EKG. Takes coumadin  - holding coumadin in the setting of supratherapeutic INR  - home coumadin schedule 5 mg daily except Tuesday and Saturday takes 7.5 mg

## 2019-03-29 NOTE — DISCHARGE NOTE PROVIDER - CARE PROVIDER_API CALL
Bert Olmedo)  Cardiovascular Disease; Internal Medicine  [address] [place] [address]  Phone: [phone]  Fax: [phone]  Follow Up Time: 1 week    Krista Snider)  Neurology  [address] [address]  Phone: [phone]  Fax: [phone]  Follow Up Time: 1 week

## 2019-03-29 NOTE — DISCHARGE NOTE PROVIDER - HOSPITAL COURSE
82 yo M w/PMH DM II, HLD, HTN, mitral regurgitation, cervical stenosis, prostate ca (s/p brachytherapy), CVA w R sided weakness, mild aphasia, afib on coumadin, ?history of clotting disorder on coumadin, IVC filter, carotid stenosis, CAD (6 stents- last 2013), recent admit for LE weakness s/p C3-C5 anterior cervical discectomy and fusion, wheelchair bound since a fall in 11/2018, presented to ED last night due to a syncopal event at home after Physical Therapy. Per wife, he passed out in the wheelchair for about 8-10 minutes shortly after finished working with PT. He has been wearing an Event Monitor given to him by Dr. Olmedo. He was wearing on during the event time (~2-3 PM). Patient was evaluated by EP who called the monitor company who stated that there were no events during this time. Most recent event was 17 beats of non sustained Vtach on 3/19/19. Patient likely had a vasovagal episode 2/2 to dehydration (only had 2 cups of water during the day) in combination with physical activity with PT. Patient is stable to be discharged with outpatient neurology and cardiology follow up.

## 2019-03-29 NOTE — DISCHARGE NOTE PROVIDER - NSDCFUADDAPPT_GEN_ALL_CORE_FT
Please follow up with Dr. Olmedo and Dr. Snider within 1-2 weeks for follow up. Their office information is above.

## 2019-03-29 NOTE — DISCHARGE NOTE NURSING/CASE MANAGEMENT/SOCIAL WORK - NSDCPEPTSTRK_GEN_ALL_CORE
Prescribed medications/Call 911 for stroke/Stroke support groups for patients, families, and friends/Need for follow up after discharge/Risk factors for stroke/Stroke education booklet/Stroke warning signs and symptoms/Signs and symptoms of stroke

## 2019-03-29 NOTE — PROGRESS NOTE ADULT - ASSESSMENT
Patient is an 82 yo M w/PMH DM II, HLD, HTN, mitral regurgitation, cervical stenosis, prostate ca (s/p brachytherapy), CVA w R sided weakness, mild aphasia (on coumadin-history of clotting disorder), IVC filter, carotid stenosis, CAD, recent admit for LE weakness s/p C3-C5 anterior cervical discectomy and fusion, wheelchair bound, presents to ED as prompted by OP cardiologist Dr. Olmedo for syncope w/vtach noted on holter monitor. Patient admitted to [facility] for further workup/monitoring.

## 2019-03-29 NOTE — DISCHARGE NOTE PROVIDER - NSDCCPCAREPLAN_GEN_ALL_CORE_FT
PRINCIPAL DISCHARGE DIAGNOSIS  Diagnosis: Syncope, unspecified syncope type  Assessment and Plan of Treatment: You came in due to a syncopal episode, which means you passed out. You were evaluated by electophysiology and it was determined that your event monitor did not record any cardiologic events yesterday during the episode. You likely had a vasovagal (fainting) episode due to dehydration and strenuous physical activity with physical therapy. You should follow up with Dr. Snider and Dr. Olmedo for further management.      SECONDARY DISCHARGE DIAGNOSES  Diagnosis: Atrial fibrillation, unspecified type  Assessment and Plan of Treatment: You have a history of atrial fibrillation. Your INR was 3.3 in the hospital. You should not take your coumadin today or tomorrow. You should restart your normal dosing on Sunday. Please follow up with Dr. Olmedo.    Diagnosis: Essential hypertension  Assessment and Plan of Treatment: Your blood pressure was very elevated when you came to the hospital. Your enalipril was increased from 10mg to 20mg, for which a prescription was sent to your pharmacy. Please follow up with Dr. Olmedo.    Diagnosis: Mixed hyperlipidemia  Assessment and Plan of Treatment: Please continue your home atorvastatin.    Diagnosis: Type 2 diabetes mellitus without complication, without long-term current use of insulin  Assessment and Plan of Treatment: Please continue to eat a low fat, low sugar diet and follow up with Dr. Olmedo.

## 2019-03-29 NOTE — PROGRESS NOTE ADULT - PROBLEM SELECTOR PLAN 4
History of CVA in 2008, R. sided weakness, ?afib vs clotting disorder on coumadin (currently in NSR)  - follows with Dr. Snider

## 2019-03-29 NOTE — PROGRESS NOTE ADULT - PROBLEM SELECTOR PLAN 9
F: None  E: Replete PRN  N: DASH/TLC/cc/NPO for now pending EP recs  VTE PPX: Therapeutically AC, has IVC filter, SCD. restart AC when INR decreases  DISPO: 5 [facility]  FULL CODE

## 2019-03-29 NOTE — DISCHARGE NOTE PROVIDER - PROVIDER TOKENS
PROVIDER:[TOKEN:[[id number]],FOLLOWUP:[1 week]],PROVIDER:[TOKEN:[[id number]],FOLLOWUP:[1 week]]

## 2019-03-29 NOTE — PROGRESS NOTE ADULT - SUBJECTIVE AND OBJECTIVE BOX
OVERNIGHT EVENTS: Admitted, still in ED, TONY    SUBJECTIVE: Patient feels at baseline, no acute complaints. No palpitations, CP, SOB, headache, dizziness, N/V.    Vital Signs Last 12 Hrs  T(F): 98.4 (03-29-19 @ 05:38), Max: 99.3 (03-29-19 @ 00:23)  HR: 74 (03-29-19 @ 08:27) (74 - 742)  BP: 155/86 (03-29-19 @ 08:27) (155/86 - 200/85)  BP(mean): --  RR: 18 (03-29-19 @ 08:27) (16 - 18)  SpO2: 98% (03-29-19 @ 08:27) (98% - 99%)  I&O's Summary      PHYSICAL EXAM:  Constitutional: NAD, comfortable in bed.  HEENT: NC/AT, PERRLA, EOMI, no conjunctival pallor or scleral icterus, MMM, mild resolving bruising around eyes  Neck: Supple, no JVD  Respiratory: Normal rate, rhythm, depth, effort. CTAB. No w/r/r.   Cardiovascular: RRR, frequent pvcs as seen on tele, normal S1 and S2, no m/r/g.   Gastrointestinal: +BS, soft NTND, no guarding or rebound tenderness, no palpable masses   Extremities: wwp; no cyanosis, clubbing or edema.   Vascular: Pulses equal and strong throughout.   Neurological: AAOx3, no CN deficits, RLE weakness 3/5 chronic. W foot wrapped in gauze for recent wound   Skin: No gross skin abnormalities or rashes        LABS:                        10.4   7.75  )-----------( 192      ( 29 Mar 2019 05:42 )             32.3     03-29    140  |  103  |  12  ----------------------------<  96  4.1   |  25  |  0.85    Ca    9.2      29 Mar 2019 05:41  Mg     1.8     03-29    TPro  7.2  /  Alb  3.2<L>  /  TBili  0.5  /  DBili  x   /  AST  31  /  ALT  13  /  AlkPhos  73  03-29    PT/INR - ( 29 Mar 2019 05:42 )   PT: 39.7 sec;   INR: 3.38          PTT - ( 29 Mar 2019 05:42 )  PTT:42.4 sec      RADIOLOGY & ADDITIONAL TESTS:    MEDICATIONS  (STANDING):  aspirin enteric coated 81 milliGRAM(s) Oral daily  atorvastatin 40 milliGRAM(s) Oral at bedtime  docusate sodium 100 milliGRAM(s) Oral three times a day  enalapril 20 milliGRAM(s) Oral daily  multivitamin 1 Tablet(s) Oral daily  omega-3-Acid Ethyl Esters 4 Gram(s) Oral daily  senna 2 Tablet(s) Oral at bedtime    MEDICATIONS  (PRN):  acetaminophen   Tablet .. 650 milliGRAM(s) Oral every 6 hours PRN Mild Pain (1 - 3)

## 2019-03-29 NOTE — CONSULT NOTE ADULT - SUBJECTIVE AND OBJECTIVE BOX
Electrophysiology Consult Note:     CHIEF COMPLAINT:  Patient is a 83y old  Male who presents with a chief complaint of syncope (29 Mar 2019 12:18)        HISTORY OF PRESENT ILLNESS:   84 yo M w/PMH DM II, HLD, HTN, mitral regurgitation, cervical stenosis, prostate ca (s/p brachytherapy), CVA w R sided weakness, mild aphasia, afib on coumadin, ?history of clotting disorder on coumadin, IVC filter, carotid stenosis, CAD (6 stents- last 2013), recent admit for LE weakness s/p C3-C5 anterior cervical discectomy and fusion, wheelchair bound since a fall in 11/2018,  presented to ED last night due to a syncopal event at home after Physical Therapy.     as prompted by OP cardiologist Dr. Olmedo for syncope w/vtach noted on holter monitor. Per patient's family at bedside, patient was working with physical therapy today (not standing, sitting in chair) when he was noted to be unresponsive for 10 mins. PT checked vitals and stated they were within normal limits. Patient had no symptoms prior to passing out, denies CP, SOB, palpitations. Event was witnessed, no shaking or tongue biting, no urinary or bowl incontinence. Patient has been wearing a holter for per Dr. Olmedo as an outpatient. Family reports he had been on the monitor for over 2 months for fainting spells, then off the monitor and now back on again. Patient's family also reports patient sees Dr. Schreiber as outpatient. The dose of metoprolol had been adjusted as well, currently discontinued by Dr. Olmedo. Otherwise denies cough, fevers/chills, dysuria, abd pain, n/v/d.     Of note, patient had recent fall 3/14 onto face, CT head negative at that time and patient discharged from ED.     In ED VS: afebrile, HR 78-99, -220/, RR 16, O2% 99 RA.  EP consulted in ED, to see patient in AM.   In ED required 10 labetalol IVP for elevated BP, started on NS @ 100 cc/hour and admitted to [facility] for evaluation (28 Mar 2019 23:42)        PAST MEDICAL & SURGICAL HISTORY:  Prostate cancer  Carotid artery stenosis  Cervical stenosis of spine  Aphasia  CVA (cerebral vascular accident)  DM (diabetes mellitus)  CAD (coronary artery disease)  HLD (hyperlipidemia)  S/P insertion of penile implant  S/P IVC filter  S/P cervical discectomy      FAMILY HISTORY:  No family history of cardiovascular disease (Mother)  Family history of essential hypertension (Father)      SOCIAL HISTORY:    [ ] Non-smoker  [ ] Smoker  [ ] Alcohol  [ ] illicit drugs    Allergies    No Known Allergies    Intolerances    	    MEDICATIONS:  MEDICATIONS  (STANDING):  aspirin enteric coated 81 milliGRAM(s) Oral daily  atorvastatin 40 milliGRAM(s) Oral at bedtime  dextrose 5%. 1000 milliLiter(s) (50 mL/Hr) IV Continuous <Continuous>  dextrose 50% Injectable 12.5 Gram(s) IV Push once  dextrose 50% Injectable 25 Gram(s) IV Push once  dextrose 50% Injectable 25 Gram(s) IV Push once  docusate sodium 100 milliGRAM(s) Oral three times a day  enalapril 20 milliGRAM(s) Oral daily  insulin lispro (HumaLOG) corrective regimen sliding scale   SubCutaneous Before meals and at bedtime  multivitamin 1 Tablet(s) Oral daily  omega-3-Acid Ethyl Esters 4 Gram(s) Oral daily  senna 2 Tablet(s) Oral at bedtime    MEDICATIONS  (PRN):  acetaminophen   Tablet .. 650 milliGRAM(s) Oral every 6 hours PRN Mild Pain (1 - 3)  dextrose 40% Gel 15 Gram(s) Oral once PRN Blood Glucose LESS THAN 70 milliGRAM(s)/deciliter  glucagon  Injectable 1 milliGRAM(s) IntraMuscular once PRN Glucose LESS THAN 70 milligrams/deciliter        REVIEW OF SYSTEMS:  CONSTITUTIONAL: No fever, weight loss, or fatigue  EYES: No eye pain, visual disturbances, or discharge  ENMT:  No difficulty hearing, tinnitus, vertigo; No sinus or throat pain  NECK: No pain or stiffness  BREASTS: No pain, masses, or nipple discharge  RESPIRATORY: No cough, wheezing, chills or hemoptysis; No Shortness of Breath  CARDIOVASCULAR: No chest pain, palpitations, dizziness, or leg swelling  GASTROINTESTINAL: No abdominal or epigastric pain. No nausea, vomiting, or hematemesis; No diarrhea or constipation. No melena or hematochezia.  GENITOURINARY: No dysuria, frequency, hematuria, or incontinence  NEUROLOGICAL: No headaches, memory loss, loss of strength, numbness, or tremors  SKIN: No itching, burning, rashes, or lesions   LYMPH Nodes: No enlarged glands  ENDOCRINE: No heat or cold intolerance; No hair loss  MUSCULOSKELETAL: No joint pain or swelling; No muscle, back, or extremity pain  PSYCHIATRIC: No depression, anxiety, mood swings, or difficulty sleeping  HEME/LYMPH: No easy bruising, or bleeding gums  ALLERY AND IMMUNOLOGIC: No hives or eczema	      PHYSICAL EXAM:  Vital Signs Last 24 Hrs  T(C): 36.9 (29 Mar 2019 12:26), Max: 37.4 (29 Mar 2019 00:23)  T(F): 98.5 (29 Mar 2019 12:26), Max: 99.3 (29 Mar 2019 00:23)  HR: 72 (29 Mar 2019 12:26) (69 - 742)  BP: 162/85 (29 Mar 2019 12:26) (155/86 - 200/85)  BP(mean): --  RR: 18 (29 Mar 2019 12:26) (16 - 18)  SpO2: 100% (29 Mar 2019 12:26) (98% - 100%)  Daily Height in cm: 182.88 (29 Mar 2019 08:27)    Daily     Constitutional: NAD	  HEENT:   Normal oral mucosa, PERRL, EOMI	  Neck: No JVD  CVS: Normal S1 / S2, RRR, No murmurs  Pulm: CTA. No wheeze or rale  GI:  + BS, soft, NT / ND   Ext: No LE edema  Vascular: Peripheral pulses palpable 2+ bilaterally  MS: full range of motion in all joints  Neurologic: A&O x 3, Non-focal  Psych: Pleasant, has good insight  Skin: No rash or lesion       	  LABS:	                         10.4   7.75  )-----------( 192      ( 29 Mar 2019 05:42 )             32.3     03-29    140  |  103  |  12  ----------------------------<  96  4.1   |  25  |  0.85    Ca    9.2      29 Mar 2019 05:41  Mg     1.8     03-29    TPro  7.2  /  Alb  3.2<L>  /  TBili  0.5  /  DBili  x   /  AST  31  /  ALT  13  /  AlkPhos  73  03-29    proBNP:   Lipid Profile:   HgA1c:   TSH: Thyroid Stimulating Hormone, Serum: 2.415 uIU/mL (03-29 @ 05:41)  	      EKG:   Telemetry:     Echo: Electrophysiology Consult Note:     CHIEF COMPLAINT:  Patient is a 83y old  Male who presents with a chief complaint of syncope (29 Mar 2019 12:18)        HISTORY OF PRESENT ILLNESS:   82 yo M w/PMH DM II, HLD, HTN, mitral regurgitation, cervical stenosis, prostate ca (s/p brachytherapy), CVA w R sided weakness, mild aphasia, afib on coumadin, ?history of clotting disorder on coumadin, IVC filter, carotid stenosis, CAD (6 stents- last 2013), recent admit for LE weakness s/p C3-C5 anterior cervical discectomy and fusion, wheelchair bound since a fall in 11/2018,  presented to ED last night due to a syncopal event at home after Physical Therapy.  Per wife, he passed out in the wheelchair for about 8-10 minutes shortly after finished working with PT.   He has been wearing an Event Monitor (MCOT) given to him by Dr. Olmedo. He was wearing on during the event time (~2-3 PM).  Per wife, he drinks only 3 small cups of water per day.   Pt is not aware of any palpitations.  In the past his MCOT showed NSVT (longest 17 beats on 3/19/19).  He was started on a Metoprolol ED 25 mg (about a month ago) and she had stopping giving it to him about 2 weeks ago due to drowsiness.  She had tried to give Metoprolol at night but it was still causing drowsiness.          PAST MEDICAL & SURGICAL HISTORY:  Prostate cancer  Carotid artery stenosis  Cervical stenosis of spine  Aphasia  CVA (cerebral vascular accident)  DM (diabetes mellitus)  CAD (coronary artery disease)  HLD (hyperlipidemia)  S/P insertion of penile implant  S/P IVC filter  S/P cervical discectomy      FAMILY HISTORY:  No family history of cardiovascular disease (Mother)  Family history of essential hypertension (Father)      SOCIAL HISTORY:    No etoh / tob / illicit drugs     Allergies:  No Known Allergies      MEDICATIONS  (STANDING):  aspirin enteric coated 81 milliGRAM(s) Oral daily  atorvastatin 40 milliGRAM(s) Oral at bedtime  docusate sodium 100 milliGRAM(s) Oral three times a day  enalapril 20 milliGRAM(s) Oral daily  insulin lispro (HumaLOG) corrective regimen sliding scale   SubCutaneous Before meals and at bedtime  multivitamin 1 Tablet(s) Oral daily  omega-3-Acid Ethyl Esters 4 Gram(s) Oral daily  senna 2 Tablet(s) Oral at bedtime    MEDICATIONS  (PRN):  acetaminophen   Tablet .. 650 milliGRAM(s) Oral every 6 hours PRN Mild Pain (1 - 3)  dextrose 40% Gel 15 Gram(s) Oral once PRN Blood Glucose LESS THAN 70 milliGRAM(s)/deciliter  glucagon  Injectable 1 milliGRAM(s) IntraMuscular once PRN Glucose LESS THAN 70 milligrams/deciliter        REVIEW OF SYSTEMS:  CONSTITUTIONAL: No fever, weight loss, or fatigue  EYES: No eye pain, visual disturbances, or discharge  ENMT:  No difficulty hearing, tinnitus, vertigo; No sinus or throat pain  NECK: No pain or stiffness  BREASTS: No pain, masses, or nipple discharge  RESPIRATORY: No cough, wheezing, chills or hemoptysis; No Shortness of Breath  CARDIOVASCULAR: No chest pain, palpitations, dizziness, or leg swelling. + syncope   GASTROINTESTINAL: No abdominal or epigastric pain. No nausea, vomiting, or hematemesis; No diarrhea or constipation. No melena or hematochezia.  GENITOURINARY: No dysuria, frequency, hematuria, or incontinence  NEUROLOGICAL: No headaches, memory loss, loss of strength, numbness, or tremors  SKIN: No itching, burning, rashes, or lesions   LYMPH Nodes: No enlarged glands  ENDOCRINE: No heat or cold intolerance; No hair loss  MUSCULOSKELETAL: No joint pain or swelling; No muscle, back, or extremity pain  PSYCHIATRIC: No depression, anxiety, mood swings, or difficulty sleeping  HEME/LYMPH: No easy bruising, or bleeding gums  ALLERY AND IMMUNOLOGIC: No hives or eczema	      PHYSICAL EXAM:  Vital Signs Last 24 Hrs  T(C): 36.9 (29 Mar 2019 12:26), Max: 37.4 (29 Mar 2019 00:23)  T(F): 98.5 (29 Mar 2019 12:26), Max: 99.3 (29 Mar 2019 00:23)  HR: 72 (29 Mar 2019 12:26) (69 - 742)  BP: 162/85 (29 Mar 2019 12:26) (155/86 - 200/85)  RR: 18 (29 Mar 2019 12:26) (16 - 18)  SpO2: 100% (29 Mar 2019 12:26) (98% - 100%)  Daily Height in cm: 182.88 (29 Mar 2019 08:27)        Constitutional: NAD	  HEENT:  NCAT . Dry mucosa   Neck: No JVD  CVS: Normal S1 / S2, RRR, No murmurs. +wearing MCOT   Pulm: Diminished BS bilaterally. No wheeze or rale   GI:  + BS, soft, NT / ND   Ext: No LE edema. Right LE muscle contraction / dystrophy   Vascular: Peripheral pulses palpable 2+ bilaterally  MS: no joint pain   Neurologic: A&O x 3.   Skin: No rash or lesion       	  LABS:	                         10.4   7.75  )-----------( 192      ( 29 Mar 2019 05:42 )             32.3     03-29    140  |  103  |  12  ----------------------------<  96  4.1   |  25  |  0.85    Ca    9.2      29 Mar 2019 05:41  Mg     1.8     03-29    TPro  7.2  /  Alb  3.2<L>  /  TBili  0.5  /  DBili  x   /  AST  31  /  ALT  13  /  AlkPhos  73  03-29    TSH: Thyroid Stimulating Hormone, Serum: 2.415 uIU/mL (03-29 @ 05:41)  	  EKG: 3/28/19: NSR 81 bpm. Normal intervals (, QRS 66, QTc 420)  Telemetry (in ED) -- NSR HR 70-80s. Occasional PVCs noted.     Echo: < from: Echocardiogram (11.29.18 @ 09:39) >  Mild to moderate concentric left ventricular hypertrophy.The left   ventricular wall motion is normal.The left ventricular ejection fraction is   normal.The left ventricular ejection fraction is 65%.The right ventricle is normal   in size and function.The left atrial size is normal.The mitral inflow   pattern is consistent with impaired left ventricular relaxation with mildly   elevated left atrial pressure (8-14mmHg).  Right atrial size is normal.No evidence for   any hemodynamically significant valvular disease.The pulmonary artery   systolic pressure is estimated to be 29 mmHg.There is no pericardial effusion.Mild   aortic root dilatation.The aortic root measures 4.1 cm at sinuses   (normal lessthan 4 cm for men, less than 3.6 cm for women).

## 2019-03-29 NOTE — CONSULT NOTE ADULT - ASSESSMENT
82 yo M w/PMH DM II, HLD, HTN, mitral regurgitation, cervical stenosis, prostate ca (s/p brachytherapy), CVA w R sided weakness, mild aphasia, afib on coumadin, ?history of clotting disorder on coumadin, IVC filter, carotid stenosis, CAD (6 stents- last 2013), recent admit for LE weakness s/p C3-C5 anterior cervical discectomy and fusion, wheelchair bound since a fall in 11/2018,  presented to ED last night due to a syncopal event at home after Physical Therapy.  Per wife, he passed out in the wheelchair for about 8-10 minutes shortly after finished working with PT.   He has been wearing an Event Monitor (MCOT) given to him by Dr. Olmedo. He was wearing on during the event time (~2-3 PM).   - Event monitor (MCOT) center was contacted by me ([phone]) -- no arrhythmic events noted yesterday and specifically around the time of the event.  Awaiting fax of the report from the MCOT center (expecting it to fax to our EP office).   - The past events from MCOT was only significant for NSVT (longest 17 beats on 3/19/19).  No bradycardia / pause.    - D/W Dr. Schreiber, given normal LVEF and no arrhythmic event during the time of the syncope, no EP intervention.  He can try another beta-blocker (coreg) as suggestive by primary team, hopefully without side effects.  We don't recommend amiodarone at this time.    - Encourage PO hydration.    - Please check orthostatic hypotension.   - Case d/w primary team resident.

## 2019-03-29 NOTE — PROGRESS NOTE ADULT - PROBLEM SELECTOR PLAN 1
Patient undergoing workup with holter monitor as OP for syncopal episodes with vtach reportedly on monitor. OP cardiologist is Dr. Olmedo. Patient reportedly unconscious for 10 mins with stable vitals, witnessed at physical therapy. Echo from 11/2018 with EF 65%, diastolic dysfunction. Syncope likely 2/2 arrythmia, unlikely new neuro event in the setting of neuro exam at baseline.   - f/u echo  - EP consulted, known to Dr. Schreiber.   - Will keep patient NPO in case of intervention  - Neuro consult (Dr. Snider since has seen patient as OP)  - ctm on telemetry

## 2019-03-29 NOTE — DISCHARGE NOTE NURSING/CASE MANAGEMENT/SOCIAL WORK - NSDCDPATPORTLINK_GEN_ALL_CORE
You can access the [facility] Portal, offered by [facility], by registering with the following website: [email or web address]

## 2019-03-29 NOTE — PROGRESS NOTE ADULT - PROBLEM SELECTOR PLAN 6
Echo w/diastolic dysfunction, EF 65%  - not currently in CHF exac  - c/w enalapril 20mg QD  - holding b-blocker as OP, will not re-start  - f/u repeat echo

## 2019-03-29 NOTE — PROGRESS NOTE ADULT - PROBLEM SELECTOR PLAN 3
BP systolic 220's in ED. decreased to 160s after IV labetalol x1  - increase home enalapril 10 mg PO daily to 20mg PO QD for htn  - ctm BP and add new agent if necessary

## 2019-04-01 ENCOUNTER — INBOUND DOCUMENT (OUTPATIENT)
Age: 84
End: 2019-04-01

## 2019-04-01 DIAGNOSIS — E86.0 DEHYDRATION: ICD-10-CM

## 2019-04-01 DIAGNOSIS — M48.02 SPINAL STENOSIS, CERVICAL REGION: ICD-10-CM

## 2019-04-01 DIAGNOSIS — I65.29 OCCLUSION AND STENOSIS OF UNSPECIFIED CAROTID ARTERY: ICD-10-CM

## 2019-04-01 DIAGNOSIS — Z86.73 PERSONAL HISTORY OF TRANSIENT ISCHEMIC ATTACK (TIA), AND CEREBRAL INFARCTION WITHOUT RESIDUAL DEFICITS: ICD-10-CM

## 2019-04-01 DIAGNOSIS — Z99.3 DEPENDENCE ON WHEELCHAIR: ICD-10-CM

## 2019-04-01 DIAGNOSIS — D68.9 COAGULATION DEFECT, UNSPECIFIED: ICD-10-CM

## 2019-04-01 DIAGNOSIS — I34.0 NONRHEUMATIC MITRAL (VALVE) INSUFFICIENCY: ICD-10-CM

## 2019-04-01 DIAGNOSIS — Z79.01 LONG TERM (CURRENT) USE OF ANTICOAGULANTS: ICD-10-CM

## 2019-04-01 DIAGNOSIS — I25.10 ATHEROSCLEROTIC HEART DISEASE OF NATIVE CORONARY ARTERY WITHOUT ANGINA PECTORIS: ICD-10-CM

## 2019-04-01 DIAGNOSIS — E78.5 HYPERLIPIDEMIA, UNSPECIFIED: ICD-10-CM

## 2019-04-01 DIAGNOSIS — R47.01 APHASIA: ICD-10-CM

## 2019-04-01 DIAGNOSIS — Z95.5 PRESENCE OF CORONARY ANGIOPLASTY IMPLANT AND GRAFT: ICD-10-CM

## 2019-04-01 DIAGNOSIS — R55 SYNCOPE AND COLLAPSE: ICD-10-CM

## 2019-04-01 DIAGNOSIS — I10 ESSENTIAL (PRIMARY) HYPERTENSION: ICD-10-CM

## 2019-04-01 DIAGNOSIS — I47.2 VENTRICULAR TACHYCARDIA: ICD-10-CM

## 2019-04-01 DIAGNOSIS — I48.91 UNSPECIFIED ATRIAL FIBRILLATION: ICD-10-CM

## 2019-04-01 DIAGNOSIS — Z85.46 PERSONAL HISTORY OF MALIGNANT NEOPLASM OF PROSTATE: ICD-10-CM

## 2019-04-01 DIAGNOSIS — Z79.82 LONG TERM (CURRENT) USE OF ASPIRIN: ICD-10-CM

## 2019-04-05 ENCOUNTER — CLINICAL ADVICE (OUTPATIENT)
Age: 84
End: 2019-04-05

## 2019-04-05 LAB
INR PPP: 1.7
PT BLD: 17.6

## 2019-04-09 ENCOUNTER — CLINICAL ADVICE (OUTPATIENT)
Age: 84
End: 2019-04-09

## 2019-04-11 NOTE — PROCEDURE
[FreeTextEntry1] : Chemodenervation for spasticity [FreeTextEntry2] : Right LE spasticity with gait difficulty [FreeTextEntry4] : Ethyl Chloride [FreeTextEntry3] : \par Patient was consented with explanation of risks and benefits including black box warnings and explanation of alternative treatments. \par \par Leg positioned to expose injection sites and prepped with alcohol and ethyl chloride. \par \par \par \par R Tibialis Posterior:  100 units\par R Soleus: 100 units\par R Hamstrin units\par \par \par  \par \par EMG used for LE muscles\par \par Total used 400 units Onabotulinum toxin\par Total wasted = 0\par Total billable = 400 units\par \par Patient tolerated procedure well\par

## 2019-04-16 ENCOUNTER — OUTPATIENT (OUTPATIENT)
Dept: OUTPATIENT SERVICES | Facility: HOSPITAL | Age: 84
LOS: 1 days | End: 2019-04-16
Payer: MEDICARE

## 2019-04-16 ENCOUNTER — APPOINTMENT (OUTPATIENT)
Dept: MRI IMAGING | Facility: HOSPITAL | Age: 84
End: 2019-04-16
Payer: MEDICARE

## 2019-04-16 DIAGNOSIS — Z95.828 PRESENCE OF OTHER VASCULAR IMPLANTS AND GRAFTS: Chronic | ICD-10-CM

## 2019-04-16 DIAGNOSIS — Z98.890 OTHER SPECIFIED POSTPROCEDURAL STATES: Chronic | ICD-10-CM

## 2019-04-16 DIAGNOSIS — Z96.0 PRESENCE OF UROGENITAL IMPLANTS: Chronic | ICD-10-CM

## 2019-04-16 PROCEDURE — 70551 MRI BRAIN STEM W/O DYE: CPT | Mod: 26

## 2019-04-16 PROCEDURE — 70551 MRI BRAIN STEM W/O DYE: CPT

## 2019-04-19 ENCOUNTER — RX RENEWAL (OUTPATIENT)
Age: 84
End: 2019-04-19

## 2019-04-22 ENCOUNTER — APPOINTMENT (OUTPATIENT)
Dept: NEUROLOGY | Facility: CLINIC | Age: 84
End: 2019-04-22

## 2019-04-22 ENCOUNTER — APPOINTMENT (OUTPATIENT)
Dept: NEUROLOGY | Facility: CLINIC | Age: 84
End: 2019-04-22
Payer: MEDICARE

## 2019-04-22 PROCEDURE — 95819 EEG AWAKE AND ASLEEP: CPT

## 2019-04-25 ENCOUNTER — APPOINTMENT (OUTPATIENT)
Dept: NEUROLOGY | Facility: CLINIC | Age: 84
End: 2019-04-25

## 2019-04-26 ENCOUNTER — CLINICAL ADVICE (OUTPATIENT)
Age: 84
End: 2019-04-26

## 2019-05-08 ENCOUNTER — NON-APPOINTMENT (OUTPATIENT)
Age: 84
End: 2019-05-08

## 2019-06-25 ENCOUNTER — APPOINTMENT (OUTPATIENT)
Dept: NEUROLOGY | Facility: CLINIC | Age: 84
End: 2019-06-25
Payer: MEDICARE

## 2019-06-25 VITALS
WEIGHT: 175 LBS | HEIGHT: 72 IN | HEART RATE: 56 BPM | DIASTOLIC BLOOD PRESSURE: 71 MMHG | OXYGEN SATURATION: 99 % | BODY MASS INDEX: 23.7 KG/M2 | SYSTOLIC BLOOD PRESSURE: 152 MMHG

## 2019-06-25 DIAGNOSIS — I69.398 OTHER SEQUELAE OF CEREBRAL INFARCTION: ICD-10-CM

## 2019-06-25 DIAGNOSIS — R25.9 OTHER SEQUELAE OF CEREBRAL INFARCTION: ICD-10-CM

## 2019-06-25 PROCEDURE — 64642 CHEMODENERV 1 EXTREMITY 1-4: CPT

## 2019-06-25 PROCEDURE — 95874 GUIDE NERV DESTR NEEDLE EMG: CPT

## 2019-06-25 NOTE — PROCEDURE
[FreeTextEntry1] : Chemodenervation for spasticity [FreeTextEntry4] : Ethyl Chloride [FreeTextEntry2] : Right LE spasticity with gait difficulty [FreeTextEntry3] : \par Patient was consented with explanation of risks and benefits including black box warnings and explanation of alternative treatments. \par \par Leg positioned to expose injection sites and prepped with alcohol and ethyl chloride. \par \par \par \par \par R Hamstrin units\par \par \par  \par \par EMG used for LE muscles\par \par Total used 400 units Onabotulinum toxin\par Total wasted = 0\par Total billable = 400 units\par \par Patient tolerated procedure well\par

## 2019-06-26 ENCOUNTER — RX RENEWAL (OUTPATIENT)
Age: 84
End: 2019-06-26

## 2019-07-03 ENCOUNTER — APPOINTMENT (OUTPATIENT)
Dept: HEART AND VASCULAR | Facility: CLINIC | Age: 84
End: 2019-07-03
Payer: MEDICARE

## 2019-07-03 VITALS
BODY MASS INDEX: 23.7 KG/M2 | RESPIRATION RATE: 12 BRPM | HEART RATE: 65 BPM | HEIGHT: 72 IN | WEIGHT: 175 LBS | OXYGEN SATURATION: 99 % | SYSTOLIC BLOOD PRESSURE: 146 MMHG | DIASTOLIC BLOOD PRESSURE: 66 MMHG | TEMPERATURE: 97.3 F

## 2019-07-03 PROCEDURE — 99214 OFFICE O/P EST MOD 30 MIN: CPT

## 2019-07-03 PROCEDURE — 36415 COLL VENOUS BLD VENIPUNCTURE: CPT

## 2019-07-03 NOTE — PHYSICAL EXAM
[General Appearance - Well Developed] : well developed [Normal Appearance] : normal appearance [Well Groomed] : well groomed [General Appearance - In No Acute Distress] : no acute distress [No Deformities] : no deformities [General Appearance - Well Nourished] : well nourished [Exaggerated Use Of Accessory Muscles For Inspiration] : no accessory muscle use [Respiration, Rhythm And Depth] : normal respiratory rhythm and effort [Normal Conjunctiva] : the conjunctiva exhibited no abnormalities [] : no respiratory distress [Auscultation Breath Sounds / Voice Sounds] : lungs were clear to auscultation bilaterally [FreeTextEntry1] : in essie booker [Heart Sounds] : normal S1 and S2 [Oriented To Time, Place, And Person] : oriented to person, place, and time [Skin Turgor] : normal skin turgor [No Anxiety] : not feeling anxious [Mood] : the mood was normal [Affect] : the affect was normal

## 2019-07-03 NOTE — HISTORY OF PRESENT ILLNESS
[FreeTextEntry1] : 83 year male who comes with wife and daughter. He continues to get up and walk more than he is able and then fall. He is notes to have a sense of imbalance when sitting in a stable chair. He has multiple skin abrasions which are healed

## 2019-07-04 LAB
25(OH)D3 SERPL-MCNC: 43.6 NG/ML
ALBUMIN SERPL ELPH-MCNC: 3.8 G/DL
ALP BLD-CCNC: 92 U/L
ALT SERPL-CCNC: 12 U/L
ANION GAP SERPL CALC-SCNC: 11 MMOL/L
AST SERPL-CCNC: 22 U/L
BASOPHILS # BLD AUTO: 0.02 K/UL
BASOPHILS NFR BLD AUTO: 0.4 %
BILIRUB SERPL-MCNC: 0.5 MG/DL
BUN SERPL-MCNC: 14 MG/DL
CALCIUM SERPL-MCNC: 9.4 MG/DL
CHLORIDE SERPL-SCNC: 103 MMOL/L
CHOLEST SERPL-MCNC: 143 MG/DL
CHOLEST/HDLC SERPL: 2.6 RATIO
CO2 SERPL-SCNC: 26 MMOL/L
CREAT SERPL-MCNC: 0.95 MG/DL
EOSINOPHIL # BLD AUTO: 0.1 K/UL
EOSINOPHIL NFR BLD AUTO: 1.8 %
ESTIMATED AVERAGE GLUCOSE: 105 MG/DL
FOLATE SERPL-MCNC: 18.5 NG/ML
GLUCOSE SERPL-MCNC: 88 MG/DL
HBA1C MFR BLD HPLC: 5.3 %
HCT VFR BLD CALC: 33.8 %
HDLC SERPL-MCNC: 55 MG/DL
HGB BLD-MCNC: 10.6 G/DL
IMM GRANULOCYTES NFR BLD AUTO: 0.2 %
INR PPP: 1.43 RATIO
IRON SATN MFR SERPL: 27 %
IRON SERPL-MCNC: 57 UG/DL
LDLC SERPL CALC-MCNC: 72 MG/DL
LYMPHOCYTES # BLD AUTO: 2.29 K/UL
LYMPHOCYTES NFR BLD AUTO: 41.9 %
MAN DIFF?: NORMAL
MCHC RBC-ENTMCNC: 29.8 PG
MCHC RBC-ENTMCNC: 31.4 GM/DL
MCV RBC AUTO: 94.9 FL
MONOCYTES # BLD AUTO: 0.44 K/UL
MONOCYTES NFR BLD AUTO: 8 %
NEUTROPHILS # BLD AUTO: 2.61 K/UL
NEUTROPHILS NFR BLD AUTO: 47.7 %
PLATELET # BLD AUTO: 159 K/UL
POTASSIUM SERPL-SCNC: 4.1 MMOL/L
PROT SERPL-MCNC: 7.2 G/DL
PT BLD: 16.5 SEC
RBC # BLD: 3.56 M/UL
RBC # FLD: 15.1 %
SODIUM SERPL-SCNC: 140 MMOL/L
TIBC SERPL-MCNC: 210 UG/DL
TRIGL SERPL-MCNC: 81 MG/DL
TSH SERPL-ACNC: 2.38 UIU/ML
UIBC SERPL-MCNC: 153 UG/DL
VIT B12 SERPL-MCNC: 1230 PG/ML
WBC # FLD AUTO: 5.47 K/UL

## 2019-07-25 LAB
INR PPP: 2.79
PT BLD: 31.9

## 2019-07-28 ENCOUNTER — EMERGENCY (EMERGENCY)
Facility: HOSPITAL | Age: 84
LOS: 1 days | Discharge: HOME | End: 2019-07-28
Attending: EMERGENCY MEDICINE | Admitting: EMERGENCY MEDICINE
Payer: MEDICARE

## 2019-07-28 VITALS
DIASTOLIC BLOOD PRESSURE: 63 MMHG | HEART RATE: 71 BPM | RESPIRATION RATE: 18 BRPM | TEMPERATURE: 98 F | SYSTOLIC BLOOD PRESSURE: 114 MMHG | OXYGEN SATURATION: 99 %

## 2019-07-28 VITALS
RESPIRATION RATE: 18 BRPM | OXYGEN SATURATION: 99 % | HEART RATE: 67 BPM | SYSTOLIC BLOOD PRESSURE: 199 MMHG | DIASTOLIC BLOOD PRESSURE: 77 MMHG

## 2019-07-28 DIAGNOSIS — Z96.0 PRESENCE OF UROGENITAL IMPLANTS: Chronic | ICD-10-CM

## 2019-07-28 DIAGNOSIS — Z98.890 OTHER SPECIFIED POSTPROCEDURAL STATES: Chronic | ICD-10-CM

## 2019-07-28 DIAGNOSIS — Z95.828 PRESENCE OF OTHER VASCULAR IMPLANTS AND GRAFTS: Chronic | ICD-10-CM

## 2019-07-28 LAB
ANION GAP SERPL CALC-SCNC: 9 MMOL/L — SIGNIFICANT CHANGE UP (ref 5–17)
APPEARANCE UR: ABNORMAL
APTT BLD: 42.1 SEC — HIGH (ref 27.5–36.3)
BASOPHILS # BLD AUTO: 0.02 K/UL — SIGNIFICANT CHANGE UP (ref 0–0.2)
BASOPHILS NFR BLD AUTO: 0.3 % — SIGNIFICANT CHANGE UP (ref 0–2)
BILIRUB UR-MCNC: NEGATIVE — SIGNIFICANT CHANGE UP
BUN SERPL-MCNC: 22 MG/DL — SIGNIFICANT CHANGE UP (ref 7–23)
CALCIUM SERPL-MCNC: 9.6 MG/DL — SIGNIFICANT CHANGE UP (ref 8.4–10.5)
CHLORIDE SERPL-SCNC: 106 MMOL/L — SIGNIFICANT CHANGE UP (ref 96–108)
CO2 SERPL-SCNC: 26 MMOL/L — SIGNIFICANT CHANGE UP (ref 22–31)
COLOR SPEC: ABNORMAL
CREAT SERPL-MCNC: 0.98 MG/DL — SIGNIFICANT CHANGE UP (ref 0.5–1.3)
DIFF PNL FLD: ABNORMAL
EOSINOPHIL # BLD AUTO: 0.13 K/UL — SIGNIFICANT CHANGE UP (ref 0–0.5)
EOSINOPHIL NFR BLD AUTO: 1.9 % — SIGNIFICANT CHANGE UP (ref 0–6)
GLUCOSE SERPL-MCNC: 86 MG/DL — SIGNIFICANT CHANGE UP (ref 70–99)
GLUCOSE UR QL: NEGATIVE — SIGNIFICANT CHANGE UP
HCT VFR BLD CALC: 33.4 % — LOW (ref 39–50)
HGB BLD-MCNC: 10.8 G/DL — LOW (ref 13–17)
IMM GRANULOCYTES NFR BLD AUTO: 0.1 % — SIGNIFICANT CHANGE UP (ref 0–1.5)
INR BLD: 2.77 — HIGH (ref 0.88–1.16)
KETONES UR-MCNC: NEGATIVE — SIGNIFICANT CHANGE UP
LEUKOCYTE ESTERASE UR-ACNC: ABNORMAL
LYMPHOCYTES # BLD AUTO: 2.5 K/UL — SIGNIFICANT CHANGE UP (ref 1–3.3)
LYMPHOCYTES # BLD AUTO: 36.7 % — SIGNIFICANT CHANGE UP (ref 13–44)
MCHC RBC-ENTMCNC: 30.3 PG — SIGNIFICANT CHANGE UP (ref 27–34)
MCHC RBC-ENTMCNC: 32.3 GM/DL — SIGNIFICANT CHANGE UP (ref 32–36)
MCV RBC AUTO: 93.8 FL — SIGNIFICANT CHANGE UP (ref 80–100)
MONOCYTES # BLD AUTO: 0.49 K/UL — SIGNIFICANT CHANGE UP (ref 0–0.9)
MONOCYTES NFR BLD AUTO: 7.2 % — SIGNIFICANT CHANGE UP (ref 2–14)
NEUTROPHILS # BLD AUTO: 3.67 K/UL — SIGNIFICANT CHANGE UP (ref 1.8–7.4)
NEUTROPHILS NFR BLD AUTO: 53.8 % — SIGNIFICANT CHANGE UP (ref 43–77)
NITRITE UR-MCNC: NEGATIVE — SIGNIFICANT CHANGE UP
NRBC # BLD: 0 /100 WBCS — SIGNIFICANT CHANGE UP (ref 0–0)
PH UR: 6.5 — SIGNIFICANT CHANGE UP (ref 5–8)
PLATELET # BLD AUTO: 186 K/UL — SIGNIFICANT CHANGE UP (ref 150–400)
POTASSIUM SERPL-MCNC: 4.5 MMOL/L — SIGNIFICANT CHANGE UP (ref 3.5–5.3)
POTASSIUM SERPL-SCNC: 4.5 MMOL/L — SIGNIFICANT CHANGE UP (ref 3.5–5.3)
PROT UR-MCNC: 30 MG/DL
PROTHROM AB SERPL-ACNC: 32.3 SEC — HIGH (ref 10–12.9)
RBC # BLD: 3.56 M/UL — LOW (ref 4.2–5.8)
RBC # FLD: 14.8 % — HIGH (ref 10.3–14.5)
SODIUM SERPL-SCNC: 141 MMOL/L — SIGNIFICANT CHANGE UP (ref 135–145)
SP GR SPEC: 1.01 — SIGNIFICANT CHANGE UP (ref 1–1.03)
UROBILINOGEN FLD QL: 1 E.U./DL — SIGNIFICANT CHANGE UP
WBC # BLD: 6.82 K/UL — SIGNIFICANT CHANGE UP (ref 3.8–10.5)
WBC # FLD AUTO: 6.82 K/UL — SIGNIFICANT CHANGE UP (ref 3.8–10.5)

## 2019-07-28 PROCEDURE — 81001 URINALYSIS AUTO W/SCOPE: CPT

## 2019-07-28 PROCEDURE — 85610 PROTHROMBIN TIME: CPT

## 2019-07-28 PROCEDURE — 99284 EMERGENCY DEPT VISIT MOD MDM: CPT

## 2019-07-28 PROCEDURE — 87186 SC STD MICRODIL/AGAR DIL: CPT

## 2019-07-28 PROCEDURE — 99283 EMERGENCY DEPT VISIT LOW MDM: CPT | Mod: 25

## 2019-07-28 PROCEDURE — 80048 BASIC METABOLIC PNL TOTAL CA: CPT

## 2019-07-28 PROCEDURE — 87086 URINE CULTURE/COLONY COUNT: CPT

## 2019-07-28 PROCEDURE — 85025 COMPLETE CBC W/AUTO DIFF WBC: CPT

## 2019-07-28 PROCEDURE — 36415 COLL VENOUS BLD VENIPUNCTURE: CPT

## 2019-07-28 PROCEDURE — 85730 THROMBOPLASTIN TIME PARTIAL: CPT

## 2019-07-28 RX ORDER — SODIUM CHLORIDE 9 MG/ML
500 INJECTION INTRAMUSCULAR; INTRAVENOUS; SUBCUTANEOUS ONCE
Refills: 0 | Status: COMPLETED | OUTPATIENT
Start: 2019-07-28 | End: 2019-07-28

## 2019-07-28 RX ORDER — CEPHALEXIN 500 MG
500 CAPSULE ORAL ONCE
Refills: 0 | Status: COMPLETED | OUTPATIENT
Start: 2019-07-28 | End: 2019-07-28

## 2019-07-28 RX ORDER — CEPHALEXIN 500 MG
1 CAPSULE ORAL
Qty: 20 | Refills: 0
Start: 2019-07-28 | End: 2019-08-06

## 2019-07-28 RX ADMIN — SODIUM CHLORIDE 750 MILLILITER(S): 9 INJECTION INTRAMUSCULAR; INTRAVENOUS; SUBCUTANEOUS at 15:08

## 2019-07-28 RX ADMIN — Medication 500 MILLIGRAM(S): at 17:48

## 2019-07-28 NOTE — ED ADULT NURSE NOTE - OBJECTIVE STATEMENT
pt alert, oriented to place and person, knows it's Sunday, but does not know year. Hx CVA with R side weakness residue. Hx prostate CA. c/c blood in urine , urinate 3 times today, blood q time. denies any pain or discomfort anywhere before, during , and after urination. Daughter reports , pt's wife said that pt "was saying strange things this morning, was talking to himself strangely."

## 2019-07-28 NOTE — ED PROVIDER NOTE - OBJECTIVE STATEMENT
84 y/o M with PMHx HLD, HTN, MI, UTI and CVA in 2008 treated with Coumadin daily presents to the ED with complaints of hematuria. Pt reports that he noticed bright red blood in his urine early this morning. Since, pt has urinated 3 times today with noted blood in each episode. Denies pain with urination, change in urinary frequency, black or bloody stool, abdominal pain, bruising, nosebleeds, headache, dizziness, or chest pain. Of note, pt is non-ambulatory and has a home-aid. 84 y/o M with PMHx HLD, HTN, MI, UTI and CVA in 2008 treated with Coumadin daily presents to the ED with complaints of hematuria. Pt reports that he noticed bright red blood in his urine early this morning. Since, pt has urinated 3 times today with noted blood in each episode. Denies pain with urination, change in urinary frequency, black or bloody stool, abdominal pain, bruising, nosebleeds, headache, dizziness, or chest pain. Of note, pt is non-ambulatory and has a home-aid.  Klepfish: 83M PMH DM II, HLD, HTN, MR, cervical stenosis w/ C3-C5 anterior cervical discectomy, and fusion prostate ca (s/p brachytherapy), CVA w R sided weakness, mild aphasia, afib on coumadin, IVC filter, carotid stenosis, CAD, wheelchair bound p/w painless hematuria today. Had 3 episodes of dark brown urine today. No similar symptoms prior. Denies any other symptoms. Denies lightheaded, f/c, abdominal pain, dysuria, bleeding from elsewhere, black/bloody stool, recent med changes.

## 2019-07-28 NOTE — ED PROVIDER NOTE - ATTENDING CONTRIBUTION TO CARE
83M PMH DM II, HLD, HTN, MR, cervical stenosis w/ C3-C5 anterior cervical discectomy, and fusion prostate ca (s/p brachytherapy), CVA w R sided weakness, mild aphasia, afib on coumadin, IVC filter, carotid stenosis, CAD, wheelchair bound p/w painless dark brown urine today, no other systemic symptoms. Vitals wnl, exam as above.  ddx: Hematuria. possible 2/2 INR vs. UTI vs. metabolic. Clinically not in retention and clinically not signficant to warrant CBI,  cbc, cmp, coags, UA, reassess.

## 2019-07-28 NOTE — ED PROVIDER NOTE - PROGRESS NOTE DETAILS
Klepfish: labs grossly wnl. UA w/ evidence of infection. will tx for UTI, remains asymptomatic, comfortable for dc, outpt pmd/gu f/u.

## 2019-07-28 NOTE — ED PROVIDER NOTE - CLINICAL SUMMARY MEDICAL DECISION MAKING FREE TEXT BOX
see attg note see attg note  Patient with positive cystitis on UA. Labs wnl, INR therapeutic and hemodynamically stable.

## 2019-07-28 NOTE — ED ADULT TRIAGE NOTE - CHIEF COMPLAINT QUOTE
Patient c/o blood in the urine noticed this am , on coumadin daily .  Denies any dysuria ,fever nor chills .

## 2019-07-28 NOTE — ED PROVIDER NOTE - MUSCULOSKELETAL, MLM
Spine appears normal, range of motion is not limited, no muscle or joint tenderness Spine appears normal, range of motion is not limited, no muscle or joint tenderness. No CVA tenderness

## 2019-07-30 LAB
-  AMPICILLIN/SULBACTAM: SIGNIFICANT CHANGE UP
-  AMPICILLIN: SIGNIFICANT CHANGE UP
-  CEFAZOLIN: SIGNIFICANT CHANGE UP
-  CEFTRIAXONE: SIGNIFICANT CHANGE UP
-  GENTAMICIN: SIGNIFICANT CHANGE UP
-  NITROFURANTOIN: SIGNIFICANT CHANGE UP
-  PIPERACILLIN/TAZOBACTAM: SIGNIFICANT CHANGE UP
-  TOBRAMYCIN: SIGNIFICANT CHANGE UP
-  TRIMETHOPRIM/SULFAMETHOXAZOLE: SIGNIFICANT CHANGE UP
CULTURE RESULTS: SIGNIFICANT CHANGE UP
METHOD TYPE: SIGNIFICANT CHANGE UP
ORGANISM # SPEC MICROSCOPIC CNT: SIGNIFICANT CHANGE UP
ORGANISM # SPEC MICROSCOPIC CNT: SIGNIFICANT CHANGE UP
SPECIMEN SOURCE: SIGNIFICANT CHANGE UP

## 2019-08-01 DIAGNOSIS — N30.91 CYSTITIS, UNSPECIFIED WITH HEMATURIA: ICD-10-CM

## 2019-08-01 DIAGNOSIS — R31.9 HEMATURIA, UNSPECIFIED: ICD-10-CM

## 2019-09-03 LAB
INR PPP: 2.48
PT BLD: 28.3

## 2019-09-26 LAB
INR PPP: 3.04
PT BLD: 34.7

## 2019-10-24 LAB
INR PPP: 2.46
PT BLD: 28

## 2019-12-02 LAB
INR PPP: 1.48
PT BLD: 16.8

## 2019-12-27 LAB
INR PPP: 2.23
PT BLD: 25.4

## 2020-01-13 ENCOUNTER — APPOINTMENT (OUTPATIENT)
Dept: OPHTHALMOLOGY | Facility: CLINIC | Age: 85
End: 2020-01-13

## 2020-01-23 LAB
INR PPP: 2.98
PT BLD: 34

## 2020-01-28 ENCOUNTER — APPOINTMENT (OUTPATIENT)
Dept: HEART AND VASCULAR | Facility: CLINIC | Age: 85
End: 2020-01-28
Payer: MEDICARE

## 2020-01-28 VITALS
BODY MASS INDEX: 23.7 KG/M2 | TEMPERATURE: 97.9 F | SYSTOLIC BLOOD PRESSURE: 120 MMHG | WEIGHT: 175 LBS | DIASTOLIC BLOOD PRESSURE: 64 MMHG | HEART RATE: 69 BPM | HEIGHT: 72 IN | OXYGEN SATURATION: 99 % | RESPIRATION RATE: 12 BRPM

## 2020-01-28 DIAGNOSIS — D64.9 ANEMIA, UNSPECIFIED: ICD-10-CM

## 2020-01-28 PROCEDURE — 93000 ELECTROCARDIOGRAM COMPLETE: CPT

## 2020-01-28 PROCEDURE — 36415 COLL VENOUS BLD VENIPUNCTURE: CPT

## 2020-01-28 PROCEDURE — 99214 OFFICE O/P EST MOD 30 MIN: CPT

## 2020-01-28 RX ORDER — ONABOTULINUMTOXINA 200 [USP'U]/1
200 INJECTION, POWDER, LYOPHILIZED, FOR SOLUTION INTRADERMAL; INTRAMUSCULAR
Qty: 3 | Refills: 4 | Status: DISCONTINUED | COMMUNITY
Start: 2019-02-14 | End: 2020-01-28

## 2020-01-28 NOTE — HISTORY OF PRESENT ILLNESS
[FreeTextEntry1] : 84 year male who has not had any falls in months. He is getting up less and having  at the gym to gain muscle strength. He is scheduled to see Didi

## 2020-01-28 NOTE — PHYSICAL EXAM
[General Appearance - Well Developed] : well developed [Normal Appearance] : normal appearance [Well Groomed] : well groomed [General Appearance - Well Nourished] : well nourished [General Appearance - In No Acute Distress] : no acute distress [Normal Conjunctiva] : the conjunctiva exhibited no abnormalities [No Deformities] : no deformities [Exaggerated Use Of Accessory Muscles For Inspiration] : no accessory muscle use [Respiration, Rhythm And Depth] : normal respiratory rhythm and effort [] : no respiratory distress [Heart Sounds] : normal S1 and S2 [Abdomen Soft] : soft [Auscultation Breath Sounds / Voice Sounds] : lungs were clear to auscultation bilaterally [FreeTextEntry1] : no edema [Skin Turgor] : normal skin turgor [Oriented To Time, Place, And Person] : oriented to person, place, and time [Affect] : the affect was normal [Mood] : the mood was normal [No Anxiety] : not feeling anxious

## 2020-01-29 LAB
25(OH)D3 SERPL-MCNC: 40.3 NG/ML
ALBUMIN SERPL ELPH-MCNC: 4.1 G/DL
ALP BLD-CCNC: 83 U/L
ALT SERPL-CCNC: 14 U/L
ANION GAP SERPL CALC-SCNC: 15 MMOL/L
AST SERPL-CCNC: 22 U/L
BASOPHILS # BLD AUTO: 0.04 K/UL
BASOPHILS NFR BLD AUTO: 0.6 %
BILIRUB SERPL-MCNC: 0.6 MG/DL
BUN SERPL-MCNC: 17 MG/DL
CALCIUM SERPL-MCNC: 9.7 MG/DL
CHLORIDE SERPL-SCNC: 104 MMOL/L
CHOLEST SERPL-MCNC: 145 MG/DL
CHOLEST/HDLC SERPL: 2.7 RATIO
CO2 SERPL-SCNC: 24 MMOL/L
CREAT SERPL-MCNC: 0.94 MG/DL
EOSINOPHIL # BLD AUTO: 0.39 K/UL
EOSINOPHIL NFR BLD AUTO: 6 %
ESTIMATED AVERAGE GLUCOSE: 114 MG/DL
GLUCOSE SERPL-MCNC: 82 MG/DL
HBA1C MFR BLD HPLC: 5.6 %
HCT VFR BLD CALC: 37.3 %
HDLC SERPL-MCNC: 54 MG/DL
HGB BLD-MCNC: 12.1 G/DL
IMM GRANULOCYTES NFR BLD AUTO: 0.3 %
IRON SATN MFR SERPL: 34 %
IRON SERPL-MCNC: 72 UG/DL
LDLC SERPL CALC-MCNC: 68 MG/DL
LYMPHOCYTES # BLD AUTO: 2 K/UL
LYMPHOCYTES NFR BLD AUTO: 30.6 %
MAN DIFF?: NORMAL
MCHC RBC-ENTMCNC: 31.3 PG
MCHC RBC-ENTMCNC: 32.4 GM/DL
MCV RBC AUTO: 96.4 FL
MONOCYTES # BLD AUTO: 0.44 K/UL
MONOCYTES NFR BLD AUTO: 6.7 %
NEUTROPHILS # BLD AUTO: 3.65 K/UL
NEUTROPHILS NFR BLD AUTO: 55.8 %
PLATELET # BLD AUTO: 152 K/UL
POTASSIUM SERPL-SCNC: 4.2 MMOL/L
PROT SERPL-MCNC: 7.8 G/DL
RBC # BLD: 3.87 M/UL
RBC # FLD: 14.3 %
SODIUM SERPL-SCNC: 142 MMOL/L
TIBC SERPL-MCNC: 210 UG/DL
TRIGL SERPL-MCNC: 115 MG/DL
TSH SERPL-ACNC: 2.67 UIU/ML
UIBC SERPL-MCNC: 138 UG/DL
WBC # FLD AUTO: 6.54 K/UL

## 2020-02-06 LAB
INR PPP: 2.5
PT BLD: 28.5

## 2020-02-20 LAB
INR PPP: 2.34
PT BLD: 26.7

## 2020-03-09 LAB
INR PPP: 3.34
PT BLD: 38.2

## 2020-03-19 ENCOUNTER — RX RENEWAL (OUTPATIENT)
Age: 85
End: 2020-03-19

## 2020-03-25 ENCOUNTER — RX RENEWAL (OUTPATIENT)
Age: 85
End: 2020-03-25

## 2020-03-26 LAB
INR PPP: 2.86
PT BLD: 32.7

## 2020-04-14 LAB
INR PPP: 2.25
PT BLD: 25.6

## 2020-04-23 LAB
INR PPP: 2.79
PT BLD: 31.8

## 2020-05-13 ENCOUNTER — TRANSCRIPTION ENCOUNTER (OUTPATIENT)
Age: 85
End: 2020-05-13

## 2020-05-20 LAB
INR PPP: 2.6
PT BLD: 29.7

## 2020-06-09 ENCOUNTER — RX RENEWAL (OUTPATIENT)
Age: 85
End: 2020-06-09

## 2020-06-11 ENCOUNTER — NON-APPOINTMENT (OUTPATIENT)
Age: 85
End: 2020-06-11

## 2020-06-11 LAB
INR PPP: 2.54
PT BLD: 29

## 2020-07-01 DIAGNOSIS — Z79.01 LONG TERM (CURRENT) USE OF ANTICOAGULANTS: ICD-10-CM

## 2020-07-02 PROBLEM — L97.512 SKIN ULCER OF RIGHT FOOT WITH FAT LAYER EXPOSED: Status: ACTIVE | Noted: 2019-01-17

## 2020-07-09 NOTE — DISCHARGE NOTE NURSING/CASE MANAGEMENT/SOCIAL WORK - NSDCPEPTCOWAFU_GEN_ALL_CORE
New medication added to your medication list    Hydralazine 50 mg 3 times a day  Nitroglycerin 0.4 mg 1 tablet x3, 5 min apart to relieve chest pain    Please follow-up with your primary care physician for blood pressure medication adjustment  Follow a strict low carb/low salt diet    Follow-up with Dr. Bey in 2 weeks post hospital discharge follow-up   Go for blood test as directed. Because your dose is based on the INR blood test it is very important that you get your blood tested on the date and time that you are scheduled and keep your healthcare providers appointments.   Please follow up with your doctor within 3 days of discharge to schedule your next blood test.

## 2020-07-15 LAB
INR PPP: 2.63
PT BLD: 30

## 2020-07-29 ENCOUNTER — EMERGENCY (EMERGENCY)
Facility: HOSPITAL | Age: 85
LOS: 1 days | Discharge: HOME | End: 2020-07-29
Attending: EMERGENCY MEDICINE | Admitting: EMERGENCY MEDICINE
Payer: MEDICARE

## 2020-07-29 VITALS
TEMPERATURE: 98 F | HEART RATE: 71 BPM | OXYGEN SATURATION: 98 % | DIASTOLIC BLOOD PRESSURE: 79 MMHG | SYSTOLIC BLOOD PRESSURE: 183 MMHG | RESPIRATION RATE: 18 BRPM

## 2020-07-29 DIAGNOSIS — Z96.0 PRESENCE OF UROGENITAL IMPLANTS: Chronic | ICD-10-CM

## 2020-07-29 DIAGNOSIS — R53.1 WEAKNESS: ICD-10-CM

## 2020-07-29 DIAGNOSIS — Z95.828 PRESENCE OF OTHER VASCULAR IMPLANTS AND GRAFTS: Chronic | ICD-10-CM

## 2020-07-29 DIAGNOSIS — R53.83 OTHER FATIGUE: ICD-10-CM

## 2020-07-29 DIAGNOSIS — Z20.828 CONTACT WITH AND (SUSPECTED) EXPOSURE TO OTHER VIRAL COMMUNICABLE DISEASES: ICD-10-CM

## 2020-07-29 DIAGNOSIS — Z98.890 OTHER SPECIFIED POSTPROCEDURAL STATES: Chronic | ICD-10-CM

## 2020-07-29 LAB
ALBUMIN SERPL ELPH-MCNC: 2.4 G/DL — LOW (ref 3.3–5)
ALBUMIN SERPL ELPH-MCNC: 3.6 G/DL — SIGNIFICANT CHANGE UP (ref 3.3–5)
ALP SERPL-CCNC: 54 U/L — SIGNIFICANT CHANGE UP (ref 40–120)
ALP SERPL-CCNC: 83 U/L — SIGNIFICANT CHANGE UP (ref 40–120)
ALT FLD-CCNC: 8 U/L — LOW (ref 10–45)
ALT FLD-CCNC: SIGNIFICANT CHANGE UP U/L (ref 10–45)
ANION GAP SERPL CALC-SCNC: 10 MMOL/L — SIGNIFICANT CHANGE UP (ref 5–17)
ANION GAP SERPL CALC-SCNC: 5 MMOL/L — SIGNIFICANT CHANGE UP (ref 5–17)
APPEARANCE UR: CLEAR — SIGNIFICANT CHANGE UP
AST SERPL-CCNC: 14 U/L — SIGNIFICANT CHANGE UP (ref 10–40)
AST SERPL-CCNC: SIGNIFICANT CHANGE UP U/L (ref 10–40)
BASOPHILS # BLD AUTO: 0.04 K/UL — SIGNIFICANT CHANGE UP (ref 0–0.2)
BASOPHILS NFR BLD AUTO: 0.5 % — SIGNIFICANT CHANGE UP (ref 0–2)
BILIRUB SERPL-MCNC: 0.3 MG/DL — SIGNIFICANT CHANGE UP (ref 0.2–1.2)
BILIRUB SERPL-MCNC: 0.4 MG/DL — SIGNIFICANT CHANGE UP (ref 0.2–1.2)
BILIRUB UR-MCNC: NEGATIVE — SIGNIFICANT CHANGE UP
BUN SERPL-MCNC: 15 MG/DL — SIGNIFICANT CHANGE UP (ref 7–23)
BUN SERPL-MCNC: 20 MG/DL — SIGNIFICANT CHANGE UP (ref 7–23)
CALCIUM SERPL-MCNC: 6.2 MG/DL — CRITICAL LOW (ref 8.4–10.5)
CALCIUM SERPL-MCNC: 9.4 MG/DL — SIGNIFICANT CHANGE UP (ref 8.4–10.5)
CHLORIDE SERPL-SCNC: 102 MMOL/L — SIGNIFICANT CHANGE UP (ref 96–108)
CHLORIDE SERPL-SCNC: 117 MMOL/L — HIGH (ref 96–108)
CO2 SERPL-SCNC: 19 MMOL/L — LOW (ref 22–31)
CO2 SERPL-SCNC: 23 MMOL/L — SIGNIFICANT CHANGE UP (ref 22–31)
COLOR SPEC: YELLOW — SIGNIFICANT CHANGE UP
CREAT SERPL-MCNC: 0.73 MG/DL — SIGNIFICANT CHANGE UP (ref 0.5–1.3)
CREAT SERPL-MCNC: 1.07 MG/DL — SIGNIFICANT CHANGE UP (ref 0.5–1.3)
DIFF PNL FLD: NEGATIVE — SIGNIFICANT CHANGE UP
EOSINOPHIL # BLD AUTO: 0.14 K/UL — SIGNIFICANT CHANGE UP (ref 0–0.5)
EOSINOPHIL NFR BLD AUTO: 1.8 % — SIGNIFICANT CHANGE UP (ref 0–6)
GLUCOSE SERPL-MCNC: 110 MG/DL — HIGH (ref 70–99)
GLUCOSE SERPL-MCNC: 83 MG/DL — SIGNIFICANT CHANGE UP (ref 70–99)
GLUCOSE UR QL: NEGATIVE — SIGNIFICANT CHANGE UP
HCT VFR BLD CALC: 35.7 % — LOW (ref 39–50)
HGB BLD-MCNC: 11.6 G/DL — LOW (ref 13–17)
IMM GRANULOCYTES NFR BLD AUTO: 0.3 % — SIGNIFICANT CHANGE UP (ref 0–1.5)
INR BLD: 2.52 — HIGH (ref 0.88–1.16)
KETONES UR-MCNC: NEGATIVE — SIGNIFICANT CHANGE UP
LEUKOCYTE ESTERASE UR-ACNC: NEGATIVE — SIGNIFICANT CHANGE UP
LYMPHOCYTES # BLD AUTO: 2.28 K/UL — SIGNIFICANT CHANGE UP (ref 1–3.3)
LYMPHOCYTES # BLD AUTO: 29 % — SIGNIFICANT CHANGE UP (ref 13–44)
MAGNESIUM SERPL-MCNC: 2 MG/DL — SIGNIFICANT CHANGE UP (ref 1.6–2.6)
MCHC RBC-ENTMCNC: 30.9 PG — SIGNIFICANT CHANGE UP (ref 27–34)
MCHC RBC-ENTMCNC: 32.5 GM/DL — SIGNIFICANT CHANGE UP (ref 32–36)
MCV RBC AUTO: 95.2 FL — SIGNIFICANT CHANGE UP (ref 80–100)
MONOCYTES # BLD AUTO: 0.55 K/UL — SIGNIFICANT CHANGE UP (ref 0–0.9)
MONOCYTES NFR BLD AUTO: 7 % — SIGNIFICANT CHANGE UP (ref 2–14)
NEUTROPHILS # BLD AUTO: 4.83 K/UL — SIGNIFICANT CHANGE UP (ref 1.8–7.4)
NEUTROPHILS NFR BLD AUTO: 61.4 % — SIGNIFICANT CHANGE UP (ref 43–77)
NITRITE UR-MCNC: NEGATIVE — SIGNIFICANT CHANGE UP
NRBC # BLD: 0 /100 WBCS — SIGNIFICANT CHANGE UP (ref 0–0)
PH UR: 6.5 — SIGNIFICANT CHANGE UP (ref 5–8)
PLATELET # BLD AUTO: 177 K/UL — SIGNIFICANT CHANGE UP (ref 150–400)
POTASSIUM SERPL-MCNC: 3.4 MMOL/L — LOW (ref 3.5–5.3)
POTASSIUM SERPL-MCNC: SIGNIFICANT CHANGE UP MMOL/L (ref 3.5–5.3)
POTASSIUM SERPL-SCNC: 3.4 MMOL/L — LOW (ref 3.5–5.3)
POTASSIUM SERPL-SCNC: SIGNIFICANT CHANGE UP MMOL/L (ref 3.5–5.3)
PROT SERPL-MCNC: 5.3 G/DL — LOW (ref 6–8.3)
PROT SERPL-MCNC: 7.6 G/DL — SIGNIFICANT CHANGE UP (ref 6–8.3)
PROT UR-MCNC: NEGATIVE MG/DL — SIGNIFICANT CHANGE UP
PROTHROM AB SERPL-ACNC: 28.9 SEC — HIGH (ref 10.6–13.6)
RBC # BLD: 3.75 M/UL — LOW (ref 4.2–5.8)
RBC # FLD: 13.9 % — SIGNIFICANT CHANGE UP (ref 10.3–14.5)
SODIUM SERPL-SCNC: 135 MMOL/L — SIGNIFICANT CHANGE UP (ref 135–145)
SODIUM SERPL-SCNC: 141 MMOL/L — SIGNIFICANT CHANGE UP (ref 135–145)
SP GR SPEC: 1.01 — SIGNIFICANT CHANGE UP (ref 1–1.03)
TROPONIN T SERPL-MCNC: 0.02 NG/ML — HIGH (ref 0–0.01)
UROBILINOGEN FLD QL: 0.2 E.U./DL — SIGNIFICANT CHANGE UP
WBC # BLD: 7.86 K/UL — SIGNIFICANT CHANGE UP (ref 3.8–10.5)
WBC # FLD AUTO: 7.86 K/UL — SIGNIFICANT CHANGE UP (ref 3.8–10.5)

## 2020-07-29 PROCEDURE — 99285 EMERGENCY DEPT VISIT HI MDM: CPT | Mod: CS

## 2020-07-29 PROCEDURE — 93010 ELECTROCARDIOGRAM REPORT: CPT

## 2020-07-29 PROCEDURE — 71045 X-RAY EXAM CHEST 1 VIEW: CPT | Mod: 26

## 2020-07-29 PROCEDURE — 70450 CT HEAD/BRAIN W/O DYE: CPT | Mod: 26

## 2020-07-29 NOTE — ED PROVIDER NOTE - CHIEF COMPLAINT
Mother calling on behalf of patient. Patient was seen last week for yeast infection. Symptoms are not going away. Are you able to prescribe something for her? Finished the diflucan prescribed on 10/25 and the additional pills mom had (thinks these may have been out dated). Still having itchiness and discharge.     Discussed with MB. Ok to send refill for diflucan 4 tablets. If no improvement, schedule appt.     TC to mother. Mother aware script sent and to schedule appt if symptoms don't resolve.   Korin Chen, GUERO-BSN     The patient is a 84y Male complaining of weakness.

## 2020-07-29 NOTE — ED PROVIDER NOTE - NS ED ROS FT
CONSTITUTIONAL: No fever, chills, or weakness  NEURO: No headache, no dizziness, no syncope; No tingling/numbness  EYES: No visual changes  ENT: No rhinorrhea or sore throat  PULM: No cough or dyspnea  CV: No chest pain or palpitations  GI: No abdominal pain, vomiting, or diarrhea  : No dysuria, hematuria, frequency  MSK: No neck pain or back pain, no joint pain  SKIN: no rash or unusual bruising CONSTITUTIONAL: No fever/chills  NEURO: No headache, no dizziness, no syncope  EYES: No visual changes  ENT: No rhinorrhea or sore throat  PULM: No cough or dyspnea  CV: No chest pain or palpitations  GI: No abdominal pain, vomiting, or diarrhea  : No dysuria, hematuria, frequency  MSK: No neck pain or back pain, no joint pain  SKIN: no rash or unusual bruising

## 2020-07-29 NOTE — ED PROVIDER NOTE - PATIENT PORTAL LINK FT
You can access the FollowMyHealth Patient Portal offered by [facility] by registering at the following website: [email or web address]. By joining [employer]’s FollowMyHealth portal, you will also be able to view your health information using other applications (apps) compatible with our system.

## 2020-07-29 NOTE — ED PROVIDER NOTE - PMH
Aphasia    CAD (coronary artery disease)    Carotid artery stenosis    Cervical stenosis of spine    CVA (cerebral vascular accident)    DM (diabetes mellitus)    DVT (deep venous thrombosis)    HLD (hyperlipidemia)    HTN (hypertension)    Prostate cancer

## 2020-07-29 NOTE — ED PROVIDER NOTE - PHYSICAL EXAMINATION
CONSTITUTIONAL: Elderly male in NAD  SKIN: Normal color and turgor. No rash.    HEAD: NC/AT.  EYES: Conjunctiva clear. EOMI. PERRL.    ENT: Airway patent, OP without erythema, tonsillar swelling or exudate; uvula midline without swelling. Nasal mucosa clear, no rhinorrhea. Oral mucosa moist.    RESPIRATORY:  Breathing non-labored. No retractions or accessory muscle use.  Lungs CTA bilat.  CARDIOVASCULAR:  RRR, S1S2. No M/R/G.      GI:  Abdomen soft, nontender. No rebound, no guarding.   MSK: Mild contractor of R elbow. No LE edema or calf tenderness.   SKIN: Normal color, possibly decreased turgor. No rash.   NEURO: Alert and oriented to name. Knows name of the president. Does not know the date, month or year. Wife states this is normal for him. Cranial nerves w/ faint NL flattening. No facial asymmetry. PEERLA. Motor exam 5/5 strength bilateral UE/LE (contractors of RUE). SILT in all extremities. CONSTITUTIONAL: Elderly male in NAD  SKIN: Normal color and turgor. No rash.    HEAD: NC/AT.  EYES: Conjunctiva clear. EOMI. PERRL.    ENT: Airway patent, OP without erythema, tonsillar swelling or exudate; uvula midline without swelling. Nasal mucosa clear, no rhinorrhea. Oral mucosa moist.    RESPIRATORY:  Breathing non-labored. No retractions or accessory muscle use.  Lungs CTA bilat.  CARDIOVASCULAR:  RRR, S1S2. No M/R/G.      GI:  Abdomen soft, nontender. No rebound, no guarding.   MSK: Mild contractor of R elbow. No LE edema or calf tenderness.   SKIN: Normal color, possibly decreased turgor. No rash.   NEURO: Alert and oriented to name. Knows name of the president. Does not know the date, month or year. Wife states this is normal for him. Cranial nerves:  SEB, EOMI, no facial asymmetry appreciated, tongue protrudes midline, normal palate rise. Motor exam 5/5 strength bilateral UE/LE (contractures noted RUE). SILT in all extremities.

## 2020-07-29 NOTE — ED PROVIDER NOTE - NSFOLLOWUPINSTRUCTIONS_ED_ALL_ED_FT
Please speak with Dr Olmedo in the morning.  Return to the Emergency Department if you have any new or worsening symptoms, or if you have any concerns.  ====================================================================  WEAKNESS - AfterCare(R) Instructions(ER/ED)     Weakness    WHAT YOU NEED TO KNOW:    Weakness is a loss of muscle strength. It may be caused by brain, nerve, or muscle problems. Physical and mental conditions such as heart problems, pregnancy, dehydration, or depression may also cause weakness. Reactions to certain drugs can cause weakness. Parts of your body may become weak if you need to wear a cast or splint or have been on bed rest for a long time.    DISCHARGE INSTRUCTIONS:    Call 911 for any of the following:     You have any of the following signs of a stroke:   Numbness or drooping on one side of your face       Weakness in an arm or leg      Confusion or difficulty speaking      Dizziness, a severe headache, or vision loss      You lose feeling in your weakened body area.      You have electric shock-like feelings down your arms and legs when you flex or move your neck.      You have sudden or increased trouble speaking, swallowing, or breathing.    Return to the emergency department if:     You have severe pain in your back, arms, or legs that worsens.      You have sudden or worsened muscle weakness or loss of movement.      You are not able to control when you urinate or have a bowel movement.    Contact your healthcare provider if:     You feel depressed or anxious.       You have questions or concerns about your condition or care.     Manage weakness:     Use assistive devices as directed. These help protect you from injury. Examples include a walker or cane. Have someone install handrails in your home. These will help you get out of a bathtub or stand up from a toilet. Use a shower chair so you can sit while you shower. Sit down on the toilet or another chair to dry off and put on your clothes. Get help going up and down stairs if your legs are weak.       Go to physical or occupational therapy if directed. A physical therapist can teach you exercises to help strengthen weak muscles. An occupational therapist can show you ways to do your daily activities more easily. For example, light forks and spoons can be easier to use if you have hand weakness. You may also learn ways to organize your household items so you are not moving heavy items.      Balance rest with exercise. Exercise can help increase your muscle strength and energy. Do not exercise for long periods at a time. Take breaks often to rest. Too much exercise can cause muscle strain or make you more tired. Ask your healthcare provider how much exercise is right for you.      Eat a variety of healthy foods. Too much or too little food may cause weakness or tiredness. Ask your healthcare provider what a healthy amount of food is for you. Healthy foods include fruits, vegetables, whole-grain breads, low-fat dairy products, lean meats and fish, nuts, and cooked beans.      Do not smoke. Nicotine and other chemicals in cigarettes and cigars can make your symptoms worse, and can cause lung damage. Ask your healthcare provider for information if you currently smoke and need help to quit. E-cigarettes or smokeless tobacco still contain nicotine. Talk to your healthcare provider before you use these products.       Do not use caffeine, alcohol, or illegal drugs. These may cause muscle twitching, which could lead to worsened weakness.     Follow up with your healthcare provider as directed: Write down your questions so you remember to ask them during your visits.

## 2020-07-29 NOTE — ED ADULT NURSE NOTE - OBJECTIVE STATEMENT
85 y/o M PMHx CVA w/ R sided weakness and deficits, CAD w/ 6 stents, pre-DM, HTN, HLD, DVT w/ IVC filter brought to ED today. Wife states  has been lethargic all day. Pt says that he usually sleeps everyday after breakfast but today he was sleepier for a longer time than normal and seemed lethargic all day. He refused to go to the hospital until this evening. On arrival, wife states he is back to his normal baseline and no longer seems lethargic. There are no focal weaknesses. Denies fevers, chills, headache, CP or any other pain, recent cough, difficulty breathing, changes in bowel/bladder habits. Appetite has been good.

## 2020-07-29 NOTE — ED PROVIDER NOTE - CLINICAL SUMMARY MEDICAL DECISION MAKING FREE TEXT BOX
85 yo m with Hx of CVA, CAD/MI, DVT on AC, HTN, HLD presented after apx 7 hrs of lethargy, which has resolved.  Evidence of prior stroke with right sided residual contractures, otherwise nonfocal exam.  CT head without acute findings, CXR and UA negative.  EKG sinus annamaria, troponin mildly elevated, consistent with prior results.  Hypocalcemia noted, mild when corrected for hypoalbuminemia.

## 2020-07-29 NOTE — ED PROVIDER NOTE - CARE PROVIDER_API CALL
Bert Olmedo  CARDIOVASCULAR DISEASE  [address] [place] [address]  Phone: [phone]  Fax: [phone]  Follow Up Time: 1-3 Days

## 2020-07-29 NOTE — ED PROVIDER NOTE - OBJECTIVE STATEMENT
83 y/o M PMHx CVA w/ R sided weakness and deficits, CAD w/ 6 stents, pre-DM, HTN, HLD, DVT w/ IVC filter brought to ED today. Wife states  has been lethargic all day. Pt says that he usually sleeps everyday after breakfast but today he was sleepier for a longer time than normal and seemed lethargic all day. He refused to go to the hospital until this evening. On arrival, wife states he is back to his normal baseline and no longer seems lethargic. There are no focal weaknesses. Denies fevers, chills, headache, CP or any other pain, recent cough, difficulty breathing, changes in bowel/bladder habits. Appetite has been good. 83 y/o M PMHx CVA w right sided residual weakness , CAD w/ 6 stents, pre-DM, HTN, HLD, DVT w/ IVC filter brought to ED today. Wife states  has been lethargic all day. He usually sleeps every day after breakfast but today he slept longer time than normal and seemed lethargic all day. He refused to go to the hospital until this evening. On arrival, wife states he is back to his normal baseline and no longer seems lethargic. There is no new focal weaknesses from baseline. Denies fevers, chills, headache, CP or any other pain, recent cough, difficulty breathing, changes in bowel/bladder habits. Appetite has been good.

## 2020-07-29 NOTE — ED PROVIDER NOTE - PROGRESS NOTE DETAILS
Corrected calcium for hypoalbuminemia is 7.5  Troponin 0.02 consistent/lower than previous troponin levels.  Will admit to [facility]. Corrected calcium for hypoalbuminemia is 7.5  Troponin 0.02 consistent/lower than previous troponin levels. patient has remained awake and alert in the ED without recurrence of lethargy, no signs of distress.  ate a meal, and now daughter is here with him.  suspect the repeat labs are significantly different from the first due to sample being drawn from IV line.  will repeat. no events/complaints, doing well.  repeat labs reassuring.  suspect prior set of labs was spurious.

## 2020-07-30 VITALS
RESPIRATION RATE: 17 BRPM | HEART RATE: 70 BPM | DIASTOLIC BLOOD PRESSURE: 85 MMHG | SYSTOLIC BLOOD PRESSURE: 191 MMHG | OXYGEN SATURATION: 98 %

## 2020-07-30 LAB
ALBUMIN SERPL ELPH-MCNC: 3.5 G/DL — SIGNIFICANT CHANGE UP (ref 3.3–5)
ALP SERPL-CCNC: 84 U/L — SIGNIFICANT CHANGE UP (ref 40–120)
ALT FLD-CCNC: 12 U/L — SIGNIFICANT CHANGE UP (ref 10–45)
ANION GAP SERPL CALC-SCNC: 6 MMOL/L — SIGNIFICANT CHANGE UP (ref 5–17)
AST SERPL-CCNC: 22 U/L — SIGNIFICANT CHANGE UP (ref 10–40)
BILIRUB SERPL-MCNC: 0.5 MG/DL — SIGNIFICANT CHANGE UP (ref 0.2–1.2)
BUN SERPL-MCNC: 21 MG/DL — SIGNIFICANT CHANGE UP (ref 7–23)
CALCIUM SERPL-MCNC: 9.6 MG/DL — SIGNIFICANT CHANGE UP (ref 8.4–10.5)
CHLORIDE SERPL-SCNC: 105 MMOL/L — SIGNIFICANT CHANGE UP (ref 96–108)
CO2 SERPL-SCNC: 28 MMOL/L — SIGNIFICANT CHANGE UP (ref 22–31)
CREAT SERPL-MCNC: 1.14 MG/DL — SIGNIFICANT CHANGE UP (ref 0.5–1.3)
GLUCOSE SERPL-MCNC: 151 MG/DL — HIGH (ref 70–99)
POTASSIUM SERPL-MCNC: 5 MMOL/L — SIGNIFICANT CHANGE UP (ref 3.5–5.3)
POTASSIUM SERPL-SCNC: 5 MMOL/L — SIGNIFICANT CHANGE UP (ref 3.5–5.3)
PROT SERPL-MCNC: 7.9 G/DL — SIGNIFICANT CHANGE UP (ref 6–8.3)
SARS-COV-2 RNA SPEC QL NAA+PROBE: SIGNIFICANT CHANGE UP
SODIUM SERPL-SCNC: 139 MMOL/L — SIGNIFICANT CHANGE UP (ref 135–145)
TROPONIN T SERPL-MCNC: 0.02 NG/ML — HIGH (ref 0–0.01)

## 2020-07-30 PROCEDURE — 80053 COMPREHEN METABOLIC PANEL: CPT

## 2020-07-30 PROCEDURE — 85025 COMPLETE CBC W/AUTO DIFF WBC: CPT

## 2020-07-30 PROCEDURE — 81003 URINALYSIS AUTO W/O SCOPE: CPT

## 2020-07-30 PROCEDURE — 82962 GLUCOSE BLOOD TEST: CPT

## 2020-07-30 PROCEDURE — 84484 ASSAY OF TROPONIN QUANT: CPT

## 2020-07-30 PROCEDURE — 83735 ASSAY OF MAGNESIUM: CPT

## 2020-07-30 PROCEDURE — 93005 ELECTROCARDIOGRAM TRACING: CPT

## 2020-07-30 PROCEDURE — 70450 CT HEAD/BRAIN W/O DYE: CPT

## 2020-07-30 PROCEDURE — 85610 PROTHROMBIN TIME: CPT

## 2020-07-30 PROCEDURE — 36415 COLL VENOUS BLD VENIPUNCTURE: CPT

## 2020-07-30 PROCEDURE — 99284 EMERGENCY DEPT VISIT MOD MDM: CPT | Mod: 25

## 2020-07-30 PROCEDURE — 87635 SARS-COV-2 COVID-19 AMP PRB: CPT

## 2020-07-30 PROCEDURE — 71045 X-RAY EXAM CHEST 1 VIEW: CPT

## 2020-07-30 PROCEDURE — 87086 URINE CULTURE/COLONY COUNT: CPT

## 2020-07-30 RX ADMIN — Medication 10 MILLIGRAM(S): at 02:03

## 2020-07-31 LAB
CULTURE RESULTS: SIGNIFICANT CHANGE UP
SPECIMEN SOURCE: SIGNIFICANT CHANGE UP

## 2020-08-25 ENCOUNTER — RX RENEWAL (OUTPATIENT)
Age: 85
End: 2020-08-25

## 2020-09-08 NOTE — ED PROVIDER NOTE - ATTENDING CONTRIBUTION TO CARE
This prescription had to be printed since the pharmacy at [facility] does not except faxed prescriptions.   Please contact this patient to  .  Future refills to go back to primary provider.    Kristen Kehr PA-C     Pt is an 82yo m, h/o cva w/ r sided deficits, who presents s/p fall from wheelchair, hitting forehead on the ground. No loc. No neck/ back pain, n/t/w, cp, sob, palp, dizziness... Seen in ed 3 d ago for same sxs and had neg ct head.     VITAL SIGNS: I have reviewed nursing notes and confirm.  CONSTITUTIONAL: Well-developed; well-nourished; in no acute distress.   SKIN:  warm and dry, no acute rash. Healing ecchymosis to mid forehead.   HEAD:  normocephalic, atraumatic.  EYES: PERRL, EOM intact; conjunctiva and sclera clear. No raccoon eyes.   ENT: No nasal discharge; airway clear. No reyez sign, hemotymp.   NECK: Supple; non tender.  CARD: S1, S2 normal; no murmurs, gallops, or rubs. Regular rate and rhythm.   RESP:  Clear to auscultation b/l, no wheezes, rales or rhonchi.  ABD: Normal bowel sounds; soft; non-distended; non-tender; no guarding/ rebound.  EXT: Normal ROM. No clubbing, cyanosis or edema. 2+ pulses to b/l ue/le.  NEURO: Alert, oriented at baseline mental status, CN grossly intact. + r sided weakness.   PSYCH: Cooperative, mood and affect appropriate.    Plan to obtain ct head to r/o acute injury. If neg, pt to be dc'd home. Pt is an 84yo m, h/o cva w/ r sided deficits, who presents s/p fall from wheelchair, hitting forehead on the ground. No loc. No neck/ back pain, n/t/w, cp, sob, palp, dizziness, nausea, vomiting, ha, vision changes... Seen in ed 3 d ago for same sxs and had neg ct head.     VITAL SIGNS: I have reviewed nursing notes and confirm.  CONSTITUTIONAL: Well-developed; well-nourished; in no acute distress.   SKIN:  warm and dry, no acute rash.   HEAD:  normocephalic, + hematoma to mid forehead w/ ecchymosis. Non-tender. No bony depressions.  EYES: PERRL, EOM intact; conjunctiva and sclera clear. No raccoon eyes.   ENT: No nasal discharge; airway clear. No reyez sign, hemotymp.   NECK: Supple; non tender.  CARD: S1, S2 normal; no murmurs, gallops, or rubs. Regular rate and rhythm.   RESP:  Clear to auscultation b/l, no wheezes, rales or rhonchi.  ABD: Normal bowel sounds; soft; non-distended; non-tender; no guarding/ rebound.  EXT: Normal ROM. No clubbing, cyanosis or edema. 2+ pulses to b/l ue/le.  NEURO: Alert, oriented at baseline mental status, CN grossly intact. + r sided weakness, chronic 2/2 cva. No sensory deficits.   PSYCH: Cooperative, mood and affect appropriate.    Plan to obtain ct head to r/o acute injury. If neg, pt to be dc'd home.  CT head neg for acute injury. Test results d/w pt and wife. Pt also seen by cm to facilitate home care arrangements. Close PMD follow up encouraged.  Strict ED return instructions discussed in detail and patient given the opportunity to ask any questions about their discharge diagnosis and instructions. Patient verbalized understanding.

## 2020-09-11 ENCOUNTER — NON-APPOINTMENT (OUTPATIENT)
Age: 85
End: 2020-09-11

## 2020-09-11 LAB
INR PPP: 3.49
PT BLD: 39.9

## 2020-10-02 LAB
INR PPP: 3.3
PT BLD: 37.8

## 2020-10-27 ENCOUNTER — NON-APPOINTMENT (OUTPATIENT)
Age: 85
End: 2020-10-27

## 2020-10-28 LAB
INR PPP: 1.93
PT BLD: 22

## 2020-11-12 ENCOUNTER — NON-APPOINTMENT (OUTPATIENT)
Age: 85
End: 2020-11-12

## 2020-11-12 LAB
INR PPP: 2.25
PT BLD: 25

## 2020-12-03 ENCOUNTER — NON-APPOINTMENT (OUTPATIENT)
Age: 85
End: 2020-12-03

## 2020-12-28 ENCOUNTER — NON-APPOINTMENT (OUTPATIENT)
Age: 85
End: 2020-12-28

## 2020-12-30 ENCOUNTER — RX RENEWAL (OUTPATIENT)
Age: 85
End: 2020-12-30

## 2021-01-11 LAB
INR PPP: 3.07
INR PPP: 3.07
PT BLD: 33.9
PT BLD: 33.9

## 2021-01-25 PROBLEM — I10 ESSENTIAL (PRIMARY) HYPERTENSION: Chronic | Status: ACTIVE | Noted: 2020-07-30

## 2021-01-25 PROBLEM — I82.409 ACUTE EMBOLISM AND THROMBOSIS OF UNSPECIFIED DEEP VEINS OF UNSPECIFIED LOWER EXTREMITY: Chronic | Status: ACTIVE | Noted: 2020-07-30

## 2021-01-26 ENCOUNTER — NON-APPOINTMENT (OUTPATIENT)
Age: 86
End: 2021-01-26

## 2021-01-26 LAB
INR PPP: 2.87
PT BLD: 32.8

## 2021-02-08 ENCOUNTER — NON-APPOINTMENT (OUTPATIENT)
Age: 86
End: 2021-02-08

## 2021-02-09 ENCOUNTER — APPOINTMENT (OUTPATIENT)
Dept: HEART AND VASCULAR | Facility: CLINIC | Age: 86
End: 2021-02-09

## 2021-02-26 ENCOUNTER — NON-APPOINTMENT (OUTPATIENT)
Age: 86
End: 2021-02-26

## 2021-02-26 LAB
INR PPP: 3.07
PT BLD: 35.1

## 2021-03-16 ENCOUNTER — APPOINTMENT (OUTPATIENT)
Dept: HEART AND VASCULAR | Facility: CLINIC | Age: 86
End: 2021-03-16
Payer: MEDICARE

## 2021-03-16 VITALS
TEMPERATURE: 97.2 F | SYSTOLIC BLOOD PRESSURE: 154 MMHG | HEIGHT: 72 IN | RESPIRATION RATE: 12 BRPM | DIASTOLIC BLOOD PRESSURE: 80 MMHG | WEIGHT: 175 LBS | HEART RATE: 68 BPM | BODY MASS INDEX: 23.7 KG/M2 | OXYGEN SATURATION: 99 %

## 2021-03-16 PROCEDURE — 93000 ELECTROCARDIOGRAM COMPLETE: CPT

## 2021-03-16 PROCEDURE — 36415 COLL VENOUS BLD VENIPUNCTURE: CPT

## 2021-03-16 PROCEDURE — 99214 OFFICE O/P EST MOD 30 MIN: CPT

## 2021-03-16 NOTE — ASSESSMENT
[FreeTextEntry1] : hx of CVA--labs drawn and sent. To take medication regularly. I suggested getting home BP

## 2021-03-16 NOTE — HISTORY OF PRESENT ILLNESS
[FreeTextEntry1] : 85 year male who comes in a wheelchair with his wife. He has LUE soreness following PT. He missed his medication for BP yesterday

## 2021-03-16 NOTE — PHYSICAL EXAM
[General Appearance - Well Developed] : well developed [Normal Appearance] : normal appearance [Well Groomed] : well groomed [General Appearance - Well Nourished] : well nourished [No Deformities] : no deformities [General Appearance - In No Acute Distress] : no acute distress [Normal Conjunctiva] : the conjunctiva exhibited no abnormalities [] : no respiratory distress [Respiration, Rhythm And Depth] : normal respiratory rhythm and effort [Exaggerated Use Of Accessory Muscles For Inspiration] : no accessory muscle use [Auscultation Breath Sounds / Voice Sounds] : lungs were clear to auscultation bilaterally [Heart Sounds] : normal S1 and S2 [Skin Turgor] : normal skin turgor [Oriented To Time, Place, And Person] : oriented to person, place, and time [Affect] : the affect was normal [Mood] : the mood was normal [No Anxiety] : not feeling anxious [FreeTextEntry1] : examined in wheelchair

## 2021-03-23 ENCOUNTER — NON-APPOINTMENT (OUTPATIENT)
Age: 86
End: 2021-03-23

## 2021-03-23 LAB
25(OH)D3 SERPL-MCNC: 41 NG/ML
ALBUMIN SERPL ELPH-MCNC: 3.8 G/DL
ALP BLD-CCNC: 88 U/L
ALT SERPL-CCNC: 9 U/L
ANION GAP SERPL CALC-SCNC: 7 MMOL/L
AST SERPL-CCNC: 18 U/L
BASOPHILS # BLD AUTO: 0.04 K/UL
BASOPHILS NFR BLD AUTO: 0.5 %
BILIRUB SERPL-MCNC: 0.6 MG/DL
BUN SERPL-MCNC: 15 MG/DL
CALCIUM SERPL-MCNC: 9.8 MG/DL
CHLORIDE SERPL-SCNC: 102 MMOL/L
CHOLEST SERPL-MCNC: 159 MG/DL
CO2 SERPL-SCNC: 27 MMOL/L
CREAT SERPL-MCNC: 1 MG/DL
EOSINOPHIL # BLD AUTO: 0.27 K/UL
EOSINOPHIL NFR BLD AUTO: 3.5 %
ESTIMATED AVERAGE GLUCOSE: 120 MG/DL
GLUCOSE SERPL-MCNC: 104 MG/DL
HBA1C MFR BLD HPLC: 5.8 %
HCT VFR BLD CALC: 35.8 %
HDLC SERPL-MCNC: 43 MG/DL
HGB BLD-MCNC: 11.5 G/DL
IMM GRANULOCYTES NFR BLD AUTO: 0.3 %
INR PPP: 2.23
LDLC SERPL CALC-MCNC: 86 MG/DL
LYMPHOCYTES # BLD AUTO: 2.47 K/UL
LYMPHOCYTES NFR BLD AUTO: 32 %
MAN DIFF?: NORMAL
MCHC RBC-ENTMCNC: 30.8 PG
MCHC RBC-ENTMCNC: 32.1 GM/DL
MCV RBC AUTO: 96 FL
MONOCYTES # BLD AUTO: 0.54 K/UL
MONOCYTES NFR BLD AUTO: 7 %
NEUTROPHILS # BLD AUTO: 4.37 K/UL
NEUTROPHILS NFR BLD AUTO: 56.7 %
NONHDLC SERPL-MCNC: 116 MG/DL
NT-PROBNP SERPL-MCNC: 1017 PG/ML
PLATELET # BLD AUTO: 166 K/UL
POTASSIUM SERPL-SCNC: 4.9 MMOL/L
PROT SERPL-MCNC: 7.7 G/DL
PT BLD: 25.4
RBC # BLD: 3.73 M/UL
RBC # FLD: 14.6 %
SODIUM SERPL-SCNC: 137 MMOL/L
TRIGL SERPL-MCNC: 149 MG/DL
TSH SERPL-ACNC: 3 UIU/ML
WBC # FLD AUTO: 7.71 K/UL

## 2021-03-24 ENCOUNTER — NON-APPOINTMENT (OUTPATIENT)
Age: 86
End: 2021-03-24

## 2021-04-06 ENCOUNTER — EMERGENCY (EMERGENCY)
Facility: HOSPITAL | Age: 86
LOS: 1 days | Discharge: HOME | End: 2021-04-06
Attending: EMERGENCY MEDICINE | Admitting: EMERGENCY MEDICINE
Payer: MEDICARE

## 2021-04-06 VITALS
RESPIRATION RATE: 18 BRPM | WEIGHT: 214.95 LBS | HEIGHT: 72 IN | OXYGEN SATURATION: 99 % | DIASTOLIC BLOOD PRESSURE: 94 MMHG | HEART RATE: 74 BPM | TEMPERATURE: 98 F | SYSTOLIC BLOOD PRESSURE: 189 MMHG

## 2021-04-06 VITALS
OXYGEN SATURATION: 97 % | RESPIRATION RATE: 18 BRPM | DIASTOLIC BLOOD PRESSURE: 71 MMHG | HEART RATE: 71 BPM | SYSTOLIC BLOOD PRESSURE: 205 MMHG

## 2021-04-06 DIAGNOSIS — Z98.890 OTHER SPECIFIED POSTPROCEDURAL STATES: Chronic | ICD-10-CM

## 2021-04-06 DIAGNOSIS — Z96.0 PRESENCE OF UROGENITAL IMPLANTS: Chronic | ICD-10-CM

## 2021-04-06 DIAGNOSIS — Z95.828 PRESENCE OF OTHER VASCULAR IMPLANTS AND GRAFTS: Chronic | ICD-10-CM

## 2021-04-06 LAB
APTT BLD: 38.3 SEC — HIGH (ref 27.5–35.5)
INR BLD: 2.26 — HIGH (ref 0.88–1.16)
PROTHROM AB SERPL-ACNC: 26.1 SEC — HIGH (ref 10.6–13.6)

## 2021-04-06 PROCEDURE — 85730 THROMBOPLASTIN TIME PARTIAL: CPT

## 2021-04-06 PROCEDURE — 99284 EMERGENCY DEPT VISIT MOD MDM: CPT | Mod: 25

## 2021-04-06 PROCEDURE — 99284 EMERGENCY DEPT VISIT MOD MDM: CPT | Mod: CS

## 2021-04-06 PROCEDURE — 85025 COMPLETE CBC W/AUTO DIFF WBC: CPT

## 2021-04-06 PROCEDURE — U0005: CPT

## 2021-04-06 PROCEDURE — 85610 PROTHROMBIN TIME: CPT

## 2021-04-06 PROCEDURE — 93005 ELECTROCARDIOGRAM TRACING: CPT

## 2021-04-06 PROCEDURE — 84100 ASSAY OF PHOSPHORUS: CPT

## 2021-04-06 PROCEDURE — 80053 COMPREHEN METABOLIC PANEL: CPT

## 2021-04-06 PROCEDURE — 70450 CT HEAD/BRAIN W/O DYE: CPT

## 2021-04-06 PROCEDURE — 83735 ASSAY OF MAGNESIUM: CPT

## 2021-04-06 PROCEDURE — U0003: CPT

## 2021-04-06 PROCEDURE — 70450 CT HEAD/BRAIN W/O DYE: CPT | Mod: 26,MA

## 2021-04-06 PROCEDURE — 36415 COLL VENOUS BLD VENIPUNCTURE: CPT

## 2021-04-06 RX ORDER — SODIUM CHLORIDE 9 MG/ML
1000 INJECTION INTRAMUSCULAR; INTRAVENOUS; SUBCUTANEOUS
Refills: 0 | Status: DISCONTINUED | OUTPATIENT
Start: 2021-04-06 | End: 2021-04-10

## 2021-04-06 RX ADMIN — Medication 10 MILLIGRAM(S): at 20:13

## 2021-04-06 RX ADMIN — SODIUM CHLORIDE 100 MILLILITER(S): 9 INJECTION INTRAMUSCULAR; INTRAVENOUS; SUBCUTANEOUS at 19:47

## 2021-04-06 NOTE — ED PROVIDER NOTE - PATIENT PORTAL LINK FT
You can access the FollowMyHealth Patient Portal offered by [facility] by registering at the following website: [email or web address]. By joining [employer]’s FollowMyHealth portal, you will also be able to view your health information using other applications (apps) compatible with our system.

## 2021-04-06 NOTE — ED PROVIDER NOTE - PROGRESS NOTE DETAILS
W/u negative for acute pathology. Twitching stopped. Asymptomatic HTN, just received PM meds. Monitor BP at home and f/u PCP tomorrow

## 2021-04-06 NOTE — ED PROVIDER NOTE - NSFOLLOWUPINSTRUCTIONS_ED_ALL_ED_FT
You were evaluated in the ED for intermittent twitching in both legs. Your blood work and CT of the brain did not reveal ay acute abnormalities. Your twitching resolved. Please call your primary medical doctor tomorrow for follow up visit.       Muscle Cramps and Spasms      Muscle cramps and spasms are when muscles tighten by themselves. They usually get better within minutes. Muscle cramps are painful. They are usually stronger and last longer than muscle spasms. Muscle spasms may or may not be painful. They can last a few seconds or much longer.  Cramps and spasms can affect any muscle, but they occur most often in the calf muscles of the leg. They are usually not caused by a serious problem. In many cases, the cause is not known. Some common causes include:  •Doing more physical work or exercise than your body is ready for.      •Using the muscles too much (overuse) by repeating certain movements too many times.      •Staying in a certain position for a long time.      •Playing a sport or doing an activity without preparing properly.      •Using bad form or technique while playing a sport or doing an activity.      •Not having enough water in your body (dehydration).      •Injury.       •Side effects of some medicines.       •Low levels of the salts and minerals in your blood (electrolytes), such as low potassium or calcium.        Follow these instructions at home:      Managing pain and stiffness                   •Massage, stretch, and relax the muscle. Do this for many minutes at a time.    •If told, put heat on tight or tense muscles as often as told by your doctor. Use the heat source that your doctor recommends, such as a moist heat pack or a heating pad.  •Place a towel between your skin and the heat source.      •Leave the heat on for 20–30 minutes.      •Remove the heat if your skin turns bright red. This is very important if you are not able to feel pain, heat, or cold. You may have a greater risk of getting burned.      •If told, put ice on the affected area. This may help if you are sore or have pain after a cramp or spasm.  •Put ice in a plastic bag.      •Place a towel between your skin and the bag.      •Leave the ice on for 20 minutes, 2–3 times a day.        •Try taking hot showers or baths to help relax tight muscles.      Eating and drinking     •Drink enough fluid to keep your pee (urine) pale yellow.    •Eat a healthy diet to help ensure that your muscles work well. This should include:  •Fruits and vegetables.      •Lean protein.      •Whole grains.      •Low-fat or nonfat dairy products.        General instructions     •If you are having cramps often, avoid intense exercise for several days.      •Take over-the-counter and prescription medicines only as told by your doctor.      •Watch for any changes in your symptoms.      •Keep all follow-up visits as told by your doctor. This is important.        Contact a doctor if:    •Your cramps or spasms get worse or happen more often.      •Your cramps or spasms do not get better with time.        Summary    •Muscle cramps and spasms are when muscles tighten by themselves. They usually get better within minutes.      •Cramps and spasms occur most often in the calf muscles of the leg.      •Massage, stretch, and relax the muscle. This may help the cramp or spasm go away.      •Drink enough fluid to keep your pee (urine) pale yellow.      This information is not intended to replace advice given to you by your health care provider. Make sure you discuss any questions you have with your health care provider.

## 2021-04-06 NOTE — ED PROVIDER NOTE - PHYSICAL EXAMINATION
Limited PE performed in the setting of the COVID10 pandemic, in efforts to limit exposure and cross-contamination  Constitutional: Well appearing, well nourished, awake, alert, oriented to person, place, time/situation and in no apparent distress.  ENMT: Airway patent.   Eyes: Clear bilaterally  Cardiac: Normal rate, regular rhythm.   Respiratory: No increased WOB, tachypnea, hypoxia, or accessory mm use. Pt speaks in full sentences.   Gastrointestinal: Abd soft, NT, ND. No guarding, rebound, or rigidity.   Musculoskeletal: Range of motion is not limited  Neuro: Alert and oriented x 3, baseline R hemiparesis. int twitch of b/l LE. 2+ patellar DTRs b/l. stiff in all extremities  Skin: Skin normal color for race, warm, dry and intact. No evidence of rash.  Psych: Alert and oriented to person, place, time/situation. normal mood and affect. no apparent risk to self or others.

## 2021-04-06 NOTE — ED PROVIDER NOTE - OBJECTIVE STATEMENT
baseline r hemiparesis Pt w/ PMHx CVA w/ baseline R hemiparesis, AF on coumadin, HTN, HLD, DM, DVT s/p IVC filter p/w intermittent twitching of b/l LE, onset this morning. No pain. No twitching of other body parts. No facial twitching. No changes in MS. No bowel or bladder incontinence. No recent falls. Unknown last INR. No f/c, or infectious sx

## 2021-04-06 NOTE — ED ADULT NURSE NOTE - OBJECTIVE STATEMENT
86 yo male c/o "twitching". Per pt. and and wife, pt. started having involuntary movements of BLE and LUE this AM at irregular frequencies. Pt. is wheelchair bound. H/O HTN, residual right sided hemiparesis following CVA. Denies chest pain, fever/chills, SOB, cough, dizziness, n/v/d.

## 2021-04-06 NOTE — ED PROVIDER NOTE - CLINICAL SUMMARY MEDICAL DECISION MAKING FREE TEXT BOX
Pt p/w muscle twitching, not c/w seizure pattern, invol, isolated, and infrequent. DDx includes but not limited to dehydration, electrolyte / metabolic disturbances, less likely restless legs, other pathology. b/l, not unilateral, and no new focal deficits. Given on coumadin, will get CTH, check labs, gentle hydration. Dispo pending w/u and clinical status

## 2021-04-10 DIAGNOSIS — I25.10 ATHEROSCLEROTIC HEART DISEASE OF NATIVE CORONARY ARTERY WITHOUT ANGINA PECTORIS: ICD-10-CM

## 2021-04-10 DIAGNOSIS — Z20.822 CONTACT WITH AND (SUSPECTED) EXPOSURE TO COVID-19: ICD-10-CM

## 2021-04-10 DIAGNOSIS — M48.02 SPINAL STENOSIS, CERVICAL REGION: ICD-10-CM

## 2021-04-10 DIAGNOSIS — Z79.52 LONG TERM (CURRENT) USE OF SYSTEMIC STEROIDS: ICD-10-CM

## 2021-04-10 DIAGNOSIS — I69.351 HEMIPLEGIA AND HEMIPARESIS FOLLOWING CEREBRAL INFARCTION AFFECTING RIGHT DOMINANT SIDE: ICD-10-CM

## 2021-04-10 DIAGNOSIS — I82.409 ACUTE EMBOLISM AND THROMBOSIS OF UNSPECIFIED DEEP VEINS OF UNSPECIFIED LOWER EXTREMITY: ICD-10-CM

## 2021-04-10 DIAGNOSIS — Z79.899 OTHER LONG TERM (CURRENT) DRUG THERAPY: ICD-10-CM

## 2021-04-10 DIAGNOSIS — I10 ESSENTIAL (PRIMARY) HYPERTENSION: ICD-10-CM

## 2021-04-10 DIAGNOSIS — R25.3 FASCICULATION: ICD-10-CM

## 2021-04-10 DIAGNOSIS — I48.91 UNSPECIFIED ATRIAL FIBRILLATION: ICD-10-CM

## 2021-04-10 DIAGNOSIS — Z85.46 PERSONAL HISTORY OF MALIGNANT NEOPLASM OF PROSTATE: ICD-10-CM

## 2021-04-10 DIAGNOSIS — Z79.82 LONG TERM (CURRENT) USE OF ASPIRIN: ICD-10-CM

## 2021-04-10 DIAGNOSIS — R47.01 APHASIA: ICD-10-CM

## 2021-04-10 DIAGNOSIS — Z79.01 LONG TERM (CURRENT) USE OF ANTICOAGULANTS: ICD-10-CM

## 2021-04-10 DIAGNOSIS — E11.9 TYPE 2 DIABETES MELLITUS WITHOUT COMPLICATIONS: ICD-10-CM

## 2021-04-10 DIAGNOSIS — Z98.890 OTHER SPECIFIED POSTPROCEDURAL STATES: ICD-10-CM

## 2021-04-10 DIAGNOSIS — E78.5 HYPERLIPIDEMIA, UNSPECIFIED: ICD-10-CM

## 2021-04-10 DIAGNOSIS — Z79.2 LONG TERM (CURRENT) USE OF ANTIBIOTICS: ICD-10-CM

## 2021-04-10 DIAGNOSIS — Z79.83 LONG TERM (CURRENT) USE OF BISPHOSPHONATES: ICD-10-CM

## 2021-04-10 DIAGNOSIS — Z96.0 PRESENCE OF UROGENITAL IMPLANTS: ICD-10-CM

## 2021-04-10 DIAGNOSIS — Z86.79 PERSONAL HISTORY OF OTHER DISEASES OF THE CIRCULATORY SYSTEM: ICD-10-CM

## 2021-04-10 DIAGNOSIS — Z79.811 LONG TERM (CURRENT) USE OF AROMATASE INHIBITORS: ICD-10-CM

## 2021-04-14 ENCOUNTER — NON-APPOINTMENT (OUTPATIENT)
Age: 86
End: 2021-04-14

## 2021-04-14 LAB
INR PPP: 2.37
PT BLD: 27.3

## 2021-05-03 ENCOUNTER — NON-APPOINTMENT (OUTPATIENT)
Age: 86
End: 2021-05-03

## 2021-05-03 LAB
INR PPP: 2.41
PT BLD: 27.7

## 2021-05-06 NOTE — PROGRESS NOTE ADULT - PROBLEM/PLAN-6
DISPLAY PLAN FREE TEXT
06-May-2021 06:18

## 2021-05-20 ENCOUNTER — RX RENEWAL (OUTPATIENT)
Age: 86
End: 2021-05-20

## 2021-05-20 ENCOUNTER — NON-APPOINTMENT (OUTPATIENT)
Age: 86
End: 2021-05-20

## 2021-05-20 LAB
INR PPP: 3.28
PT BLD: 37.7

## 2021-06-10 ENCOUNTER — NON-APPOINTMENT (OUTPATIENT)
Age: 86
End: 2021-06-10

## 2021-06-10 LAB
INR PPP: 2.24
PT BLD: 25.8

## 2021-08-11 ENCOUNTER — RX RENEWAL (OUTPATIENT)
Age: 86
End: 2021-08-11

## 2021-08-15 ENCOUNTER — NON-APPOINTMENT (OUTPATIENT)
Age: 86
End: 2021-08-15

## 2021-08-17 ENCOUNTER — NON-APPOINTMENT (OUTPATIENT)
Age: 86
End: 2021-08-17

## 2021-08-18 LAB
INR PPP: 2.05
INR PPP: 2.05
PT BLD: 23.6
PT BLD: 23.6

## 2021-08-20 ENCOUNTER — NON-APPOINTMENT (OUTPATIENT)
Age: 86
End: 2021-08-20

## 2021-08-30 ENCOUNTER — NON-APPOINTMENT (OUTPATIENT)
Age: 86
End: 2021-08-30

## 2021-08-30 ENCOUNTER — APPOINTMENT (OUTPATIENT)
Dept: OPHTHALMOLOGY | Facility: CLINIC | Age: 86
End: 2021-08-30
Payer: MEDICARE

## 2021-08-30 PROCEDURE — 92004 COMPRE OPH EXAM NEW PT 1/>: CPT

## 2021-09-03 ENCOUNTER — NON-APPOINTMENT (OUTPATIENT)
Age: 86
End: 2021-09-03

## 2021-09-17 ENCOUNTER — NON-APPOINTMENT (OUTPATIENT)
Age: 86
End: 2021-09-17

## 2021-09-17 LAB
INR PPP: 2.08
PT BLD: 23.7

## 2021-10-01 ENCOUNTER — APPOINTMENT (OUTPATIENT)
Dept: HEART AND VASCULAR | Facility: CLINIC | Age: 86
End: 2021-10-01
Payer: MEDICARE

## 2021-10-01 ENCOUNTER — NON-APPOINTMENT (OUTPATIENT)
Age: 86
End: 2021-10-01

## 2021-10-01 VITALS
BODY MASS INDEX: 24.24 KG/M2 | RESPIRATION RATE: 12 BRPM | OXYGEN SATURATION: 96 % | HEART RATE: 72 BPM | TEMPERATURE: 97.6 F | SYSTOLIC BLOOD PRESSURE: 148 MMHG | WEIGHT: 179 LBS | DIASTOLIC BLOOD PRESSURE: 64 MMHG | HEIGHT: 72 IN

## 2021-10-01 LAB
INR PPP: 3.18
PT BLD: 35.8

## 2021-10-01 PROCEDURE — 36415 COLL VENOUS BLD VENIPUNCTURE: CPT

## 2021-10-01 PROCEDURE — 93000 ELECTROCARDIOGRAM COMPLETE: CPT

## 2021-10-01 PROCEDURE — 99214 OFFICE O/P EST MOD 30 MIN: CPT

## 2021-10-01 NOTE — HISTORY OF PRESENT ILLNESS
[FreeTextEntry1] : 85 year male who comes for preop evaluation prior to cataract surgery with Dr Hu at [facility] on 10/12/2021. He denies having any chest pain, SOB, MULLER, dizziness, palpitations, orthopnea, PND or syncope. No bleeding. Patient comes in a wheelchair

## 2021-10-01 NOTE — PHYSICAL EXAM
[Well Developed] : well developed [Normal Conjunctiva] : normal conjunctiva [Normal S1, S2] : normal S1, S2 [Clear Lung Fields] : clear lung fields [No Edema] : no edema [Moves all extremities] : moves all extremities [Normal] : alert and oriented, normal memory [de-identified] : seen in wheelchair

## 2021-10-01 NOTE — REVIEW OF SYSTEMS
[Blurry Vision] : blurred vision [Negative] : Heme/Lymph [Earache] : no earache [Hearing Loss] : no hearing loss [Sore Throat] : no sore throat [Urinary Frequency] : no change in urinary frequency [Hematuria] : no hematuria [Joint Pain] : no joint pain [Dizziness] : no dizziness

## 2021-10-01 NOTE — ASSESSMENT
[FreeTextEntry1] : I informed the patient that pending results of his preop labs I did not find a Medical or Cardiovascular contraindication to the proposed cataract surgery at this time

## 2021-10-11 ENCOUNTER — TRANSCRIPTION ENCOUNTER (OUTPATIENT)
Age: 86
End: 2021-10-11

## 2021-10-12 ENCOUNTER — APPOINTMENT (OUTPATIENT)
Dept: OPHTHALMOLOGY | Facility: AMBULATORY SURGERY CENTER | Age: 86
End: 2021-10-12
Payer: MEDICARE

## 2021-10-12 ENCOUNTER — NON-APPOINTMENT (OUTPATIENT)
Age: 86
End: 2021-10-12

## 2021-10-12 ENCOUNTER — OUTPATIENT (OUTPATIENT)
Dept: OUTPATIENT SERVICES | Facility: HOSPITAL | Age: 86
LOS: 1 days | Discharge: HOME | End: 2021-10-12

## 2021-10-12 DIAGNOSIS — Z98.890 OTHER SPECIFIED POSTPROCEDURAL STATES: Chronic | ICD-10-CM

## 2021-10-12 DIAGNOSIS — Z95.828 PRESENCE OF OTHER VASCULAR IMPLANTS AND GRAFTS: Chronic | ICD-10-CM

## 2021-10-12 DIAGNOSIS — Z96.0 PRESENCE OF UROGENITAL IMPLANTS: Chronic | ICD-10-CM

## 2021-10-12 LAB
GLUCOSE BLDC GLUCOMTR-MCNC: 80 MG/DL — SIGNIFICANT CHANGE UP (ref 70–99)
GLUCOSE BLDC GLUCOMTR-MCNC: 80 MG/DL — SIGNIFICANT CHANGE UP (ref 70–99)

## 2021-10-12 PROCEDURE — 66982 XCAPSL CTRC RMVL CPLX WO ECP: CPT | Mod: RT

## 2021-10-13 ENCOUNTER — APPOINTMENT (OUTPATIENT)
Dept: OPHTHALMOLOGY | Facility: CLINIC | Age: 86
End: 2021-10-13
Payer: MEDICARE

## 2021-10-13 ENCOUNTER — NON-APPOINTMENT (OUTPATIENT)
Age: 86
End: 2021-10-13

## 2021-10-13 PROCEDURE — 99024 POSTOP FOLLOW-UP VISIT: CPT

## 2021-10-15 ENCOUNTER — NON-APPOINTMENT (OUTPATIENT)
Age: 86
End: 2021-10-15

## 2021-10-15 LAB
INR PPP: 2.6
PT BLD: 29.3

## 2021-10-20 ENCOUNTER — NON-APPOINTMENT (OUTPATIENT)
Age: 86
End: 2021-10-20

## 2021-10-20 ENCOUNTER — APPOINTMENT (OUTPATIENT)
Dept: OPHTHALMOLOGY | Facility: CLINIC | Age: 86
End: 2021-10-20
Payer: MEDICARE

## 2021-10-20 PROCEDURE — 99024 POSTOP FOLLOW-UP VISIT: CPT

## 2021-11-01 ENCOUNTER — RX RENEWAL (OUTPATIENT)
Age: 86
End: 2021-11-01

## 2021-11-10 ENCOUNTER — NON-APPOINTMENT (OUTPATIENT)
Age: 86
End: 2021-11-10

## 2021-11-10 ENCOUNTER — APPOINTMENT (OUTPATIENT)
Dept: OPHTHALMOLOGY | Facility: CLINIC | Age: 86
End: 2021-11-10
Payer: MEDICARE

## 2021-11-10 PROCEDURE — 99024 POSTOP FOLLOW-UP VISIT: CPT

## 2021-11-12 ENCOUNTER — NON-APPOINTMENT (OUTPATIENT)
Age: 86
End: 2021-11-12

## 2021-11-12 LAB
INR PPP: 2.85
PT BLD: 32

## 2021-11-29 ENCOUNTER — LABORATORY RESULT (OUTPATIENT)
Age: 86
End: 2021-11-29

## 2021-12-02 ENCOUNTER — APPOINTMENT (OUTPATIENT)
Dept: HEART AND VASCULAR | Facility: CLINIC | Age: 86
End: 2021-12-02
Payer: MEDICARE

## 2021-12-02 VITALS
RESPIRATION RATE: 12 BRPM | HEART RATE: 67 BPM | SYSTOLIC BLOOD PRESSURE: 164 MMHG | DIASTOLIC BLOOD PRESSURE: 80 MMHG | WEIGHT: 179 LBS | HEIGHT: 72 IN | BODY MASS INDEX: 24.24 KG/M2 | TEMPERATURE: 97.7 F | OXYGEN SATURATION: 97 %

## 2021-12-02 DIAGNOSIS — Z86.79 PERSONAL HISTORY OF OTHER DISEASES OF THE CIRCULATORY SYSTEM: ICD-10-CM

## 2021-12-02 DIAGNOSIS — C61 MALIGNANT NEOPLASM OF PROSTATE: ICD-10-CM

## 2021-12-02 DIAGNOSIS — Z87.898 PERSONAL HISTORY OF OTHER SPECIFIED CONDITIONS: ICD-10-CM

## 2021-12-02 DIAGNOSIS — Z01.818 ENCOUNTER FOR OTHER PREPROCEDURAL EXAMINATION: ICD-10-CM

## 2021-12-02 PROCEDURE — 93000 ELECTROCARDIOGRAM COMPLETE: CPT

## 2021-12-02 PROCEDURE — 99214 OFFICE O/P EST MOD 30 MIN: CPT

## 2021-12-02 PROCEDURE — 93306 TTE W/DOPPLER COMPLETE: CPT

## 2021-12-02 PROCEDURE — 36415 COLL VENOUS BLD VENIPUNCTURE: CPT

## 2021-12-02 NOTE — PHYSICAL EXAM
[Well Developed] : well developed [Normal Conjunctiva] : normal conjunctiva [Normal S1, S2] : normal S1, S2 [Clear Lung Fields] : clear lung fields [No Edema] : no edema [Moves all extremities] : moves all extremities [Normal] : alert and oriented, normal memory [de-identified] : seen in wheelchair

## 2021-12-02 NOTE — HISTORY OF PRESENT ILLNESS
[FreeTextEntry1] : 86 year male who had an issue of choking this more when eating food (grits). He attributes it to eating too fast. His wife reports that he complained that it was stuck. He comes prior to cataract surgery on 12/14 at [facility]. He had his first cataract surgery on 10/20 while remaining on Coumadin without complication. INR 2.72 at [place]

## 2021-12-02 NOTE — ASSESSMENT
[FreeTextEntry1] : An EKG was performed to evaluate for arrhythmia and ischemia. \par \par At the time of the patient's visit an Echocardiogram was performed to evaluate LV function. At the time of the visit the results were reviewed with patient \par \par CVA, swallowing problem--GI evaluation and then consider speech and swallowing. I informed the patient that pending results of his preop labs  I did not find a Medical or Cardiovascular contraindication to the proposed surgery at this time

## 2021-12-03 LAB
ALBUMIN SERPL ELPH-MCNC: 3.8 G/DL
ALP BLD-CCNC: 86 U/L
ALT SERPL-CCNC: 12 U/L
ANION GAP SERPL CALC-SCNC: 13 MMOL/L
AST SERPL-CCNC: 17 U/L
BASOPHILS # BLD AUTO: 0.05 K/UL
BASOPHILS NFR BLD AUTO: 0.7 %
BILIRUB SERPL-MCNC: 0.5 MG/DL
BUN SERPL-MCNC: 12 MG/DL
CALCIUM SERPL-MCNC: 9.4 MG/DL
CHLORIDE SERPL-SCNC: 103 MMOL/L
CHOLEST SERPL-MCNC: 166 MG/DL
CO2 SERPL-SCNC: 23 MMOL/L
CREAT SERPL-MCNC: 1.04 MG/DL
EOSINOPHIL # BLD AUTO: 0.19 K/UL
EOSINOPHIL NFR BLD AUTO: 2.8 %
GLUCOSE SERPL-MCNC: 103 MG/DL
HCT VFR BLD CALC: 38.2 %
HDLC SERPL-MCNC: 44 MG/DL
HGB BLD-MCNC: 12 G/DL
IMM GRANULOCYTES NFR BLD AUTO: 0.1 %
LDLC SERPL CALC-MCNC: 98 MG/DL
LYMPHOCYTES # BLD AUTO: 2.22 K/UL
LYMPHOCYTES NFR BLD AUTO: 32.3 %
MAN DIFF?: NORMAL
MCHC RBC-ENTMCNC: 30.4 PG
MCHC RBC-ENTMCNC: 31.4 GM/DL
MCV RBC AUTO: 96.7 FL
MONOCYTES # BLD AUTO: 0.47 K/UL
MONOCYTES NFR BLD AUTO: 6.8 %
NEUTROPHILS # BLD AUTO: 3.93 K/UL
NEUTROPHILS NFR BLD AUTO: 57.3 %
NONHDLC SERPL-MCNC: 122 MG/DL
PLATELET # BLD AUTO: 191 K/UL
POTASSIUM SERPL-SCNC: 4.2 MMOL/L
PROT SERPL-MCNC: 7.6 G/DL
RBC # BLD: 3.95 M/UL
RBC # FLD: 15.1 %
SODIUM SERPL-SCNC: 139 MMOL/L
TRIGL SERPL-MCNC: 117 MG/DL
TSH SERPL-ACNC: 3.19 UIU/ML
WBC # FLD AUTO: 6.87 K/UL

## 2021-12-06 LAB
ESTIMATED AVERAGE GLUCOSE: 126 MG/DL
HBA1C MFR BLD HPLC: 6 %

## 2021-12-09 ENCOUNTER — LABORATORY RESULT (OUTPATIENT)
Age: 86
End: 2021-12-09

## 2021-12-13 ENCOUNTER — NON-APPOINTMENT (OUTPATIENT)
Age: 86
End: 2021-12-13

## 2021-12-13 ENCOUNTER — TRANSCRIPTION ENCOUNTER (OUTPATIENT)
Age: 86
End: 2021-12-13

## 2021-12-14 ENCOUNTER — APPOINTMENT (OUTPATIENT)
Dept: OPHTHALMOLOGY | Facility: AMBULATORY SURGERY CENTER | Age: 86
End: 2021-12-14
Payer: MEDICARE

## 2021-12-14 ENCOUNTER — OUTPATIENT (OUTPATIENT)
Dept: OUTPATIENT SERVICES | Facility: HOSPITAL | Age: 86
LOS: 1 days | Discharge: HOME | End: 2021-12-14

## 2021-12-14 ENCOUNTER — NON-APPOINTMENT (OUTPATIENT)
Age: 86
End: 2021-12-14

## 2021-12-14 DIAGNOSIS — Z95.828 PRESENCE OF OTHER VASCULAR IMPLANTS AND GRAFTS: Chronic | ICD-10-CM

## 2021-12-14 DIAGNOSIS — Z96.0 PRESENCE OF UROGENITAL IMPLANTS: Chronic | ICD-10-CM

## 2021-12-14 DIAGNOSIS — Z98.890 OTHER SPECIFIED POSTPROCEDURAL STATES: Chronic | ICD-10-CM

## 2021-12-14 PROCEDURE — 66984 XCAPSL CTRC RMVL W/O ECP: CPT | Mod: LT,79

## 2021-12-15 ENCOUNTER — NON-APPOINTMENT (OUTPATIENT)
Age: 86
End: 2021-12-15

## 2021-12-15 ENCOUNTER — APPOINTMENT (OUTPATIENT)
Dept: OPHTHALMOLOGY | Facility: CLINIC | Age: 86
End: 2021-12-15
Payer: MEDICARE

## 2021-12-15 PROCEDURE — 99024 POSTOP FOLLOW-UP VISIT: CPT

## 2021-12-23 ENCOUNTER — LABORATORY RESULT (OUTPATIENT)
Age: 86
End: 2021-12-23

## 2022-01-03 ENCOUNTER — NON-APPOINTMENT (OUTPATIENT)
Age: 87
End: 2022-01-03

## 2022-01-05 ENCOUNTER — APPOINTMENT (OUTPATIENT)
Dept: OPHTHALMOLOGY | Facility: CLINIC | Age: 87
End: 2022-01-05

## 2022-01-08 ENCOUNTER — LABORATORY RESULT (OUTPATIENT)
Age: 87
End: 2022-01-08

## 2022-01-10 ENCOUNTER — NON-APPOINTMENT (OUTPATIENT)
Age: 87
End: 2022-01-10

## 2022-01-11 ENCOUNTER — NON-APPOINTMENT (OUTPATIENT)
Age: 87
End: 2022-01-11

## 2022-01-11 LAB
INR PPP: 3.72
PT BLD: 41.6

## 2022-01-20 ENCOUNTER — LABORATORY RESULT (OUTPATIENT)
Age: 87
End: 2022-01-20

## 2022-01-21 ENCOUNTER — NON-APPOINTMENT (OUTPATIENT)
Age: 87
End: 2022-01-21

## 2022-01-21 LAB
INR PPP: 4.36
PT BLD: 48.4

## 2022-01-22 ENCOUNTER — NON-APPOINTMENT (OUTPATIENT)
Age: 87
End: 2022-01-22

## 2022-01-26 ENCOUNTER — APPOINTMENT (OUTPATIENT)
Dept: OPHTHALMOLOGY | Facility: CLINIC | Age: 87
End: 2022-01-26
Payer: MEDICARE

## 2022-01-26 ENCOUNTER — NON-APPOINTMENT (OUTPATIENT)
Age: 87
End: 2022-01-26

## 2022-01-26 PROCEDURE — 99024 POSTOP FOLLOW-UP VISIT: CPT

## 2022-01-31 ENCOUNTER — NON-APPOINTMENT (OUTPATIENT)
Age: 87
End: 2022-01-31

## 2022-01-31 LAB
INR PPP: 1.29 RATIO
PT BLD: 15 SEC

## 2022-02-03 ENCOUNTER — LABORATORY RESULT (OUTPATIENT)
Age: 87
End: 2022-02-03

## 2022-02-04 ENCOUNTER — NON-APPOINTMENT (OUTPATIENT)
Age: 87
End: 2022-02-04

## 2022-02-04 LAB
INR PPP: 1.62
PT BLD: 18.8

## 2022-02-23 NOTE — H&P ADULT - NSHPLABSRESULTS_GEN_ALL_CORE
.  LABS:                         11.5   7.46  )-----------( 223      ( 28 Mar 2019 16:10 )             35.9     03-28    138  |  102  |  13  ----------------------------<  91  4.0   |  26  |  0.98    Ca    9.5      28 Mar 2019 16:10    TPro  8.1  /  Alb  3.6  /  TBili  0.5  /  DBili  x   /  AST  26  /  ALT  14  /  AlkPhos  84  03-28    PT/INR - ( 28 Mar 2019 16:10 )   PT: 40.4 sec;   INR: 3.44          PTT - ( 28 Mar 2019 16:10 )  PTT:45.0 sec              RADIOLOGY, EKG & ADDITIONAL TESTS:   EKG: Non-specific T wave abnormalities w/PVC, no ischemic changes.     < from: Echocardiogram (11.29.18 @ 09:39) >    Interpretation Summary  Mild to moderate concentric left ventricular hypertrophy.The left   ventricular   wall motion is normal.The left ventricular ejection fraction is   normal.The   left ventricular ejection fraction is 65%.The right ventricle is normal   in   size and function.The left atrial size is normal.The mitral inflow   pattern is   consistent with impaired left ventricular relaxation with mildly   elevated left   atrial pressure (8-14mmHg).  Right atrial size is normal.No evidence for   any   hemodynamically significant valvular disease.The pulmonary artery   systolic   pressure is estimated to be 29 mmHg.There is no pericardial effusion.Mild   aortic root dilatation.The aortic root measures 4.1 cm at sinuses   (normal less  than 4 cm for men, less than 3.6 cm for women).    < end of copied text >
[facility]

## 2022-03-14 ENCOUNTER — LABORATORY RESULT (OUTPATIENT)
Age: 87
End: 2022-03-14

## 2022-03-15 ENCOUNTER — NON-APPOINTMENT (OUTPATIENT)
Age: 87
End: 2022-03-15

## 2022-03-15 LAB
INR PPP: 2.67
PT BLD: 31.6

## 2022-03-23 ENCOUNTER — LABORATORY RESULT (OUTPATIENT)
Age: 87
End: 2022-03-23

## 2022-03-24 ENCOUNTER — NON-APPOINTMENT (OUTPATIENT)
Age: 87
End: 2022-03-24

## 2022-04-05 ENCOUNTER — LABORATORY RESULT (OUTPATIENT)
Age: 87
End: 2022-04-05

## 2022-04-06 ENCOUNTER — NON-APPOINTMENT (OUTPATIENT)
Age: 87
End: 2022-04-06

## 2022-04-07 ENCOUNTER — NON-APPOINTMENT (OUTPATIENT)
Age: 87
End: 2022-04-07

## 2022-04-20 ENCOUNTER — LABORATORY RESULT (OUTPATIENT)
Age: 87
End: 2022-04-20

## 2022-04-22 ENCOUNTER — NON-APPOINTMENT (OUTPATIENT)
Age: 87
End: 2022-04-22

## 2022-04-22 LAB
INR PPP: 3.06
PT BLD: 36.5

## 2022-05-10 ENCOUNTER — LABORATORY RESULT (OUTPATIENT)
Age: 87
End: 2022-05-10

## 2022-05-11 ENCOUNTER — NON-APPOINTMENT (OUTPATIENT)
Age: 87
End: 2022-05-11

## 2022-05-11 LAB
INR PPP: 3.09
PT BLD: 36.9

## 2022-05-18 DIAGNOSIS — Z00.00 ENCOUNTER FOR GENERAL ADULT MEDICAL EXAMINATION W/OUT ABNORMAL FINDINGS: ICD-10-CM

## 2022-05-31 ENCOUNTER — LABORATORY RESULT (OUTPATIENT)
Age: 87
End: 2022-05-31

## 2022-06-01 ENCOUNTER — NON-APPOINTMENT (OUTPATIENT)
Age: 87
End: 2022-06-01

## 2022-06-01 LAB
INR PPP: 2.43
PT BLD: 28.4

## 2022-06-07 NOTE — PROGRESS NOTE ADULT - SUBJECTIVE AND OBJECTIVE BOX
Hematology Malignancy Consult    Date of Service 06/07/2022  Admit Date 6/6/2022   Initial Consult 06/08/22   Hospital Day: 2     Reason for consult: Follicular lymphoma  Consult requested by: Dr. Reese    History of Present Illness  Lilly Babcock is a 73 year old woman with a history of follicular lymphoma who was admitted 6/6/2022 for stroke. She was initally diagnosed in 2008 with grade 1/2 follicular lymphoma after biopsy of a breast mass. She was observed until 2016 when she had left femur neck involvement, and was treated with radiation and later hip replacement. Over the next few years she has had stable skeletal lesions until February 2022 which showed progressive diffuse skeletal lesions. She was started on rituximab and lenalidomide on 3/8/22 and follows with Dr. Gutierrez. She is now on cycle 4 of 4 week cycles of rituximab and lenalidomide with plan for 6 cycles then decrease in dose of the lenalidomide and decrease in frequency of the rituximab. PET CT after 3 cycles showed complete response, with 2 indeterminate sites. She last received rituximab on 6/1. Has not started lenalidomide.    She now presents with severe headache, was admitted on 6/4/22 and diagnosed with subdural hematoma leading to transient left hand numbness, some loss of balance and clumsinoess, and was discharged 6/5 after PT and OT assessment that she was back to functional baseline. Repeat head CT showed stable SDH. She presented again on 6/6 with worsening symptoms as well as worsening headache and a non-traumatic fall. She was admitted and NSG and neurology have evaluated her. An MRI showed R occiptal lobe IPH with surrounding subarachnoid blood, present on review of CT. She also has a punctate acute ischemic stroke in the right pericaval gyrus.     Notes say that hematology was consulted, but we were not notified and I do not see a consult order.    PMH: as above  PSH: as above  FamHx: reviewed and noncontributory  SocHx: non  "smoker  Meds reviewed in Epic.  Allergies reviewed in Epic  Comprehensive review of systems performed and otherwise negative unless mentioned above.    Physical Exam  /68 (BP Location: Right arm)   Pulse 86   Temp 98.1  F (36.7  C) (Oral)   Resp 16   Ht 1.702 m (5' 7\")   Wt 56.7 kg (125 lb)   SpO2 97%   BMI 19.58 kg/m         Constitutional: Awake, alert, cooperative, in NAD.  Eyes: sclera clear, conjunctiva normal.  ENT: Normocephalic,   Respiratory: No tachypnea.  Cardiovascular: RRR, no murmur noted.  GI: Soft and nontender.  Skin: Extensive bruising on the skin.    Musculoskeletal: No edema dinah LEs.  Neurologic: Awake, alert & oriented x3. Strength and sensations grossly normal.   Psych: appropriate affect          Recent Labs   Lab 06/07/22  0541   POTASSIUM 4.0   CR 0.77   FLORENCIA 9.2   MAG 1.9   PHOS 3.1     Recent Labs   Lab 06/07/22  0541 06/06/22  1230 06/04/22  2352 06/01/22  0844   AST 17 18 31 25   ALT 29 32 44 34   ALKPHOS 66 64 79 70   BILITOTAL 0.5 0.4 0.3 0.3       Recent Labs   Lab 06/07/22  0541 06/06/22  1230 06/05/22  0601   WBC 5.5 6.6 5.6   HGB 11.0* 10.8* 10.2*   HCT 34.1* 34.0* 31.5*   MCV 95 97 96    198 194     Recent Labs   Lab 06/06/22  1230   B12 781         Impression:  # Follicular lymphoma, Stage IV, FLIPI 2, now in CR after 3 cycles of rituximab/lenalidomide  # Subarachnoid hemorrhage  # Subdural hemorrhage  # Small acute ischemic stroke    Lenalidomide has been associated with both venous thromboembolism and arterial ischemic stroke. Thus patients are usually put on aspirin, which she was on. She now presents with subarachnoid hemorrhage, subdural hemorrhage, and associated small ischemic stroke. She has a history of prior hemorrhage in the right basal ganglia and right temporal lobe consistent with sequelae of moderately advanced chronic small vessel ischemic. Work up shows no significant stenosis in the cervical arteries and normal echo.    Recommendations:  I " discussed with the patient that lenalidomide usually is associated with thromboembolic events such as acute ischemic stroke, myocardial infarction, or venous thromboembolism.  To prevent these, we treat patients with aspirin 81 mg daily.  It is unlikely that lenalidomide is associated with intracranial hemorrhage.  Small acute infarct could be secondary to increased hypercoagulability related to lenalidomide.  We can for now discontinue lenalidomide as she is in complete response.  We can continue rituximab 375 mg meter squared every month for 3 more cycles.  I do believe patient should be assessed for underlying bleeding diathesis.  I have ordered lab studies for basic evaluation including PT, thrombin time, von Willebrand factor antigen and activity, platelet function assay.  Thank you for allowing us to participate in this patient's care. Please do not hesitate to contact us if there are any further questions or concerns.     We will schedule the patient in our clinic at the end of the month when her rituximab is due.    Total time spent coordinating care of this patient was 60 minutes.    Rafa Gutierrez MD  Attending Physician  Pager [phone]       SARAH SÁNCHEZ   MRN-[id number]     (1935):     HPI:  Patient is an 84 yo man, poor historian, with a PMH DM II, HLD, HTN, mitral regurgitation, cervical stenosis, prostate ca (s/p brachytherapy), CVA w R sided weakness (on coumadin-unclear if hx afib), carotid stenosis, CAD admitted for fall with new onset LLE weakness. At time of admission patient's family no longer at bedside, patient is a poor historian and cannot provide much history besides events of the day. Patient states he was walking with his walker earlier today and fell backwards. No CP, SOB, light headedness, LOC, shaking, incontinence of urine, changes in vision prior to fall. Once he fell, he could not get up and family had to help him up. Per signout from ED provider, patient's family reported worsening LLE weakness over the past month. No bowel/bladder incontinence.  Also with RLE erythema, swelling, unclear how long this has been going on for. Attempted to call patient's wife and daughter X3 each with no answer, unable to obtain collateral from family.      In ED VS: Tm 99.1, HR 71-89, -182/73-80, RR 18, O2% 98 RA  RLE doppler negative for DVT, MRI done showing "Possible cord edema versus myelomalacia between C3/4." CT head with frontoparietal swelling, no acute intracranial hemorrhage or infarct. 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    Trop elevated 0.05 -> 0.06, EKG unchanged from previous with nonspecific T wave abnormalities. CK mildly elevated 403. INR 1.73, patient reportedly takes coumadin.     Neuosurg consulted in ED, recommended surgical intervention. Per documentation patient and family were not agreeable to surgery at this time. Attempted to call family 3X daughter and 3X wife overnight to clarify this, unable to reach either person. (2018 03:21)    Palliative Medicine asked to consult for this pt who has a possible cord edema due to stenosis and had refused surgical intervention last year.  At this time, NS is recommending surgery and family has declined same.  Pt, family to discuss with NS again tomorrow.     Pts family reports that pts functional status has recently been declining, ambulating less, using WC more in the past few weeks.  He is  having urinary accidents -- unclear if the mechanics of toileting take longer and pt cant hold it vs ye incontinence. Pt recognizers the urge to void.  PT had declined spinal  surgery last year bc "nothing hurt" and he didn't want to take on pain when nothing felt wrong.  Additionally. pt family was very concerned about pts willingness to comply with the required post-op care (including physical therapy) as he is not willing to accept instruction from his family re: same.  Pts wife reports that pt has fallen 4 times in the last month and she has been able to get pt up off the ground, but this time was different due to the weakness of pts LLE.         ROS:    Unable to attain due to:                      Dyspnea (Joe 0-10):   0/10  N/V (Y/N):   n     Secretions (Y/N):  no               Agitation(Y/N): no  Pain (Y/N):     yes  -Provocation/Palliation: unable to report  -Quality/Quantity:  unable to characterize  -Radiating:  no  -Severity:  moderate  -Timing/Frequency:  ongoing x a few days  -Impact on ADLs:  none as pt is currently bedbound      Allergies:  No Known Allergies    Intolerances    Opiate Naive (Y/N):  yes  -iStop reviewed (Y/N):m yes ref #[id number]    Medications:      MEDICATIONS  (STANDING):  atorvastatin 40 milliGRAM(s) Oral at bedtime  carvedilol 3.125 milliGRAM(s) Oral every 12 hours  cephalexin 500 milliGRAM(s) Oral every 6 hours  dextrose 5%. 1000 milliLiter(s) (50 mL/Hr) IV Continuous <Continuous>  dextrose 50% Injectable 12.5 Gram(s) IV Push once  enalapril 5 milliGRAM(s) Oral daily  ferrous    sulfate 325 milliGRAM(s) Oral daily  heparin  Infusion 1400 Unit(s)/Hr (14 mL/Hr) IV Continuous <Continuous>  insulin lispro (HumaLOG) corrective regimen sliding scale   SubCutaneous Before meals and at bedtime  multivitamin 1 Tablet(s) Oral daily  sodium chloride 0.9%. 1000 milliLiter(s) (60 mL/Hr) IV Continuous <Continuous>    MEDICATIONS  (PRN):  dextrose 40% Gel 15 Gram(s) Oral once PRN Blood Glucose LESS THAN 70 milliGRAM(s)/deciliter  glucagon  Injectable 1 milliGRAM(s) IntraMuscular once PRN Glucose LESS THAN 70 milligrams/deciliter      Labs:      CBC:                                   10.6   16.2  )-----------( 146      ( 2018 07:05 )             31.4         138  |  104  |  26<H>  ----------------------------<  164<H>  4.1   |  26  |  1.05    Ca    8.8      2018 07:05  Mg     2.0         TPro  8.5<H>  /  Alb  3.8  /  TBili  0.9  /  DBili  x   /  AST  27  /  ALT  14  /  AlkPhos  64      CAPILLARY BLOOD GLUCOSE  POCT Blood Glucose.: 180 mg/dL (2018 10:56)    PT/INR - ( 2018 07:05 )   PT: 18.4 sec;   INR: 1.60          PTT - ( 2018 07:05 )  PTT:72.4 sec  Urinalysis Basic - ( 2018 18:48 )    Color: Yellow / Appearance: Clear / S.020 / pH: x  Gluc: x / Ketone: NEGATIVE  / Bili: Negative / Urobili: 2.0 E.U./dL   Blood: x / Protein: NEGATIVE mg/dL / Nitrite: NEGATIVE   Leuk Esterase: NEGATIVE / RBC: > 10 /HPF / WBC < 5 /HPF   Sq Epi: x / Non Sq Epi: 0-5 /HPF / Bacteria: Present /HPF      Imaging:  Reviewed        < from: Xray Ankle Complete 3 Views, Right (18 @ 18:56) >  EXAM:  XR FOOT-RIGHT MIN 3 VIEWS                          EXAM:  XR ANKLE-RIGHT MIN 3 VIEWS                          PROCEDURE DATE:  2018      INTERPRETATION:  X-ray of the right ankle and right foot    Indication: Pain    3 views of theright ankle are submitted along with 3 views of the right   foot. There is marked soft tissue swelling about the ankle and overlying   the dorsum of the foot. No acute fracture or dislocation is visible.   Ankle mortise is congruent.    Impression: Large soft tissue swelling. No fracture identified.  < end of copied text >        < from: US Duplex Venous Lower Ext Ltd, Right (18 @ 20:06) >  EXAM:  US DPLX LWR EXT VEINS LTD RT                          PROCEDURE DATE:  2018      INTERPRETATION:  VENOUS DUPLEX DOPPLER OF THE RIGHT LOWER EXTREMITY dated   2018 8:06 PM    INDICATION: Right lower extremity pain and edema.    TECHNIQUE: Duplex Doppler evaluation including gray-scale ultrasound   imaging, color flow Doppler imaging, and Doppler spectral analysis of the   veins of the right lower extremity was performed.     COMPARISON: Venous duplex of the lower extremities from 2008.    FINDINGS:    Thigh veins: The right common femoral, femoral, popliteal, proximal   greater saphenous, and proximal deep femoral veins are patent and free of   thrombus. The veins are normally compressible and have normal phasic flow   and augmentation response.    Calf veins: Right lower extremity edema. Limited evaluation of the calf   veins. Within that limitation, the paired peroneal and posterior tibial   calf veins appear patent.    Contralateral CFV: The contralateral (left) common femoral vein is patent   and free of thrombus.    IMPRESSION:  No right-sided deep vein thrombosis seen.    < end of copied text >          < from: MR Lumbar Spine w/wo IV Cont (18 @ 22:42) >  EXAM:  MR SPINE LUMBAR WAW IC                          PROCEDURE DATE:  2018      INTERPRETATION:  Nick DREW MD, have reviewed the images and   the report and agree with the findings, with the following modifications:     This is MRI lumbar spine without and with IV contrast. 10 cc of Gadavist   administered.    IMPRESSION:    No abnormal enhancement involves the bony lumbar spine or spinal canal.   No compression deformity or other acute osseous process.    Degenerativefindings:  At L4-5, there is slight anterolisthesis large disc bulge showing   superimposed Central disc herniation. Along with ligamentum flavum   thickening, findings causing moderate central spinal canal stenosis.  At L2-3, there is disc bulge andepidural lipomatosis with mild spinal   canal stenosis. There are bilateral disc protrusions in the   subarticular/foraminal zones as well.  At L3-4 and L5-S1, there small disc bulges which may contact the   descending nerve roots in the subarticularzones.    There is advanced facet arthropathy at L5-S1, right greater than left,   and at L4-5 left greater than right, contributing to moderate narrowing   of their respective neural foramina. Disc protrusion at L2-3 results in   left greater than right neural foraminal narrowing. Mild neural foraminal   narrowing is shown at L3-4 and L4-5.    In the abdomen, there are several bilateral renal cysts. In addition,   there is cholelithiasis as well as intra and extrahepatic dilated   dilatation. The distal common bile duct measures 7 mm diameter. Correlate   with LFTs and consider MRCP for further evaluation.    Please refer to cervical spine MRI report for findings regarding cord   compression.    < end of copied text >        PEx:  Vital Signs Last 24 Hrs  T(C): 36.7 (2018 08:30), Max: 36.9 (2018 12:46)  T(F): 98 (2018 08:30), Max: 98.5 (2018 12:46)  HR: 65 (2018 08:30) (47 - 68)  BP: 157/70 (2018 08:30) (137/67 - 185/94)  BP(mean): --  RR: 18 (2018 08:30) (16 - 18)  SpO2: 97% (2018 08:30) (95% - 97%)  Wt(kg): 80 kgs          General: not ill appearing, not distressed  HEENT:  nc/at, moist oral cavity  Neck: supple  CVS: regular  Resp:   clear, nonlabored  GI:  large, soft, non-tender, +BS  :  voids  Musc:   R UE weakness,   Neuro: awake, alert, confused to person, place, events  Psych:   calm, not distressed, although pt cries intermittently s/p CVA  Skin:  warm, dry  Lymph:  neg  Preadmit Karnofsky:  40  %           Current Karnofsky:   30    %  Cachexia (Y/N):   no  BMI:      Advanced Directives:     Full Code        Decision maker:  Pt does not appears to have full medical decision making  capacity.  Legal surrogate:  Pt's wife is his surrogate decision maker in the absence of a documented HCP.    Social History: , 4 adults dgts, worked for [employer], retired, not spiritual, pt was in the [employer] x 3 years and has medical benefits form the [employer]  Pt attends adult day care 2 days a week at the [facility] in [facility] paid for by the [facility].    GOALS OF CARE DISCUSSION:       Palliative care info/counseling provided	           Family meeting-   with Dr Phan       Advanced Directives addressed please see Advance Care Planning Note	           See previous Palliative Medicine Note       Documentation of GOC:  unclear at this time          REFERRALS:	            Unit SW/Case Mgmt       - declined          Patient/Family Support       Massage Therapy       Music Therapy                    PT/OT SARAH SÁNCHEZ   MRN-[id number]     (1935):     HPI:  Patient is an 82 yo man, poor historian, with a PMH DM II, HLD, HTN, mitral regurgitation, cervical stenosis, prostate ca (s/p brachytherapy), CVA w R sided weakness (on coumadin-unclear if hx afib), carotid stenosis, CAD admitted for fall with new onset LLE weakness. At time of admission patient's family no longer at bedside, patient is a poor historian and cannot provide much history besides events of the day. Patient states he was walking with his walker earlier today and fell backwards. No CP, SOB, light headedness, LOC, shaking, incontinence of urine, changes in vision prior to fall. Once he fell, he could not get up and family had to help him up. Per signout from ED provider, patient's family reported worsening LLE weakness over the past month. No bowel/bladder incontinence.  Also with RLE erythema, swelling, unclear how long this has been going on for. Attempted to call patient's wife and daughter X3 each with no answer, unable to obtain collateral from family.      In ED VS: Tm 99.1, HR 71-89, -182/73-80, RR 18, O2% 98 RA  RLE doppler negative for DVT, MRI done showing "Possible cord edema versus myelomalacia between C3/4." CT head with frontoparietal swelling, no acute intracranial hemorrhage or infarct. 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    Trop elevated 0.05 -> 0.06, EKG unchanged from previous with nonspecific T wave abnormalities. CK mildly elevated 403. INR 1.73, patient reportedly takes coumadin.     Neuosurg consulted in ED, recommended surgical intervention. Per documentation patient and family were not agreeable to surgery at this time. Attempted to call family 3X daughter and 3X wife overnight to clarify this, unable to reach either person. (2018 03:21)    Palliative Medicine asked to consult for this pt who has a possible cord edema due to stenosis and had refused surgical intervention last year.  At this time, NS is recommending surgery and family has declined same.  Pt, family to discuss with NS again tomorrow.     Pts family reports that pts functional status has recently been declining, ambulating less, using WC more in the past few weeks.  He is  having urinary accidents -- unclear if the mechanics of toileting take longer and pt cant hold it vs ye incontinence. Pt recognizers the urge to void.  PT had declined spinal  surgery last year bc "nothing hurt" and he didn't want to take on pain when nothing felt wrong.  Additionally. pt family was very concerned about pts willingness to comply with the required post-op care (including physical therapy) as he is not willing to accept instruction from his family re: same.  Pts wife reports that pt has fallen 4 times in the last month and she has been able to get pt up off the ground, but this time was different due to the weakness of pts LLE.      Interval History: no changes  Pt asks me repeatedly about whether I had breakfast and the quality of same.  No spontaneous complaints.     ROS:  less pain in R foot  Unable to attain due to:                      Dyspnea (Joe 0-10):   0/10  N/V (Y/N):   n     Secretions (Y/N):  no               Agitation(Y/N): no  Pain (Y/N):     yes  -Provocation/Palliation: unable to report  -Quality/Quantity:  unable to characterize  -Radiating:  no  -Severity:  moderate  -Timing/Frequency:  ongoing x a few days  -Impact on ADLs:  none as pt is currently bedbound      Allergies:  No Known Allergies    Intolerances    Opiate Naive (Y/N):  yes  -iStop reviewed (Y/N): yes ref #[id number]    Medications:      MEDICATIONS  (STANDING):  atorvastatin 40 milliGRAM(s) Oral at bedtime  carvedilol 3.125 milliGRAM(s) Oral every 12 hours  cephalexin 500 milliGRAM(s) Oral every 6 hours  dextrose 5%. 1000 milliLiter(s) (50 mL/Hr) IV Continuous <Continuous>  dextrose 50% Injectable 12.5 Gram(s) IV Push once  enalapril 5 milliGRAM(s) Oral daily  ferrous    sulfate 325 milliGRAM(s) Oral daily  heparin  Infusion 1400 Unit(s)/Hr (14 mL/Hr) IV Continuous <Continuous>  insulin lispro (HumaLOG) corrective regimen sliding scale   SubCutaneous Before meals and at bedtime  multivitamin 1 Tablet(s) Oral daily  sodium chloride 0.9%. 1000 milliLiter(s) (60 mL/Hr) IV Continuous <Continuous>    MEDICATIONS  (PRN):  dextrose 40% Gel 15 Gram(s) Oral once PRN Blood Glucose LESS THAN 70 milliGRAM(s)/deciliter  glucagon  Injectable 1 milliGRAM(s) IntraMuscular once PRN Glucose LESS THAN 70 milligrams/deciliter      Labs:      CBC:                                   10.6   16.2  )-----------( 146      ( 2018 07:05 )             31.4         138  |  104  |  26<H>  ----------------------------<  164<H>  4.1   |  26  |  1.05    Ca    8.8      2018 07:05  Mg     2.0         TPro  8.5<H>  /  Alb  3.8  /  TBili  0.9  /  DBili  x   /  AST  27  /  ALT  14  /  AlkPhos  64      CAPILLARY BLOOD GLUCOSE  POCT Blood Glucose.: 180 mg/dL (2018 10:56)    PT/INR - ( 2018 07:05 )   PT: 18.4 sec;   INR: 1.60     PTT - ( 2018 07:05 )  PTT:72.4 sec  Urinalysis Basic - ( 2018 18:48 )    Color: Yellow / Appearance: Clear / S.020 / pH: x  Gluc: x / Ketone: NEGATIVE  / Bili: Negative / Urobili: 2.0 E.U./dL   Blood: x / Protein: NEGATIVE mg/dL / Nitrite: NEGATIVE   Leuk Esterase: NEGATIVE / RBC: > 10 /HPF / WBC < 5 /HPF   Sq Epi: x / Non Sq Epi: 0-5 /HPF / Bacteria: Present /HPF      Imaging:  Reviewed        < from: Xray Ankle Complete 3 Views, Right (18 @ 18:56) >  EXAM:  XR FOOT-RIGHT MIN 3 VIEWS                          EXAM:  XR ANKLE-RIGHT MIN 3 VIEWS                          PROCEDURE DATE:  2018      INTERPRETATION:  X-ray of the right ankle and right foot    Indication: Pain    3 views of the right ankle are submitted along with 3 views of the right   foot. There is marked soft tissue swelling about the ankle and overlying   the dorsum of the foot. No acute fracture or dislocation is visible.   Ankle mortise is congruent.    Impression: Large soft tissue swelling. No fracture identified.  < end of copied text >        < from: US Duplex Venous Lower Ext Ltd, Right (18 @ 20:06) >  EXAM:  US DPLX LWR EXT VEINS LTD RT                          PROCEDURE DATE:  2018      INTERPRETATION:  VENOUS DUPLEX DOPPLER OF THE RIGHT LOWER EXTREMITY dated   2018 8:06 PM    INDICATION: Right lower extremity pain and edema.    TECHNIQUE: Duplex Doppler evaluation including gray-scale ultrasound   imaging, color flow Doppler imaging, and Doppler spectral analysis of the   veins of the right lower extremity was performed.     COMPARISON: Venous duplex of the lower extremities from 2008.    FINDINGS:    Thigh veins: The right common femoral, femoral, popliteal, proximal   greater saphenous, and proximal deep femoral veins are patent and free of   thrombus. The veins are normally compressible and have normal phasic flow   and augmentation response.    Calf veins: Right lower extremity edema. Limited evaluation of the calf   veins. Within that limitation, the paired peroneal and posterior tibial   calf veins appear patent.    Contralateral CFV: The contralateral (left) common femoral vein is patent   and free of thrombus.    IMPRESSION:  No right-sided deep vein thrombosis seen.    < end of copied text >          < from: MR Lumbar Spine w/wo IV Cont (18 @ 22:42) >  EXAM:  MR SPINE LUMBAR WAW IC                          PROCEDURE DATE:  2018      INTERPRETATION:  Nick DREW MD, have reviewed the images and   the report and agree with the findings, with the following modifications:     This is MRI lumbar spine without and with IV contrast. 10 cc of Gadavist   administered.    IMPRESSION:    No abnormal enhancement involves the bony lumbar spine or spinal canal.   No compression deformity or other acute osseous process.    Degenerative findings:  At L4-5, there is slight anterolisthesis large disc bulge showing   superimposed Central disc herniation. Along with ligamentum flavum   thickening, findings causing moderate central spinal canal stenosis.  At L2-3, there is disc bulge andepidural lipomatosis with mild spinal   canal stenosis. There are bilateral disc protrusions in the   subarticular/foraminal zones as well.  At L3-4 and L5-S1, there small disc bulges which may contact the   descending nerve roots in the subarticularzones.    There is advanced facet arthropathy at L5-S1, right greater than left,   and at L4-5 left greater than right, contributing to moderate narrowing   of their respective neural foramina. Disc protrusion at L2-3 results in   left greater than right neural foraminal narrowing. Mild neural foraminal   narrowing is shown at L3-4 and L4-5.    In the abdomen, there are several bilateral renal cysts. In addition,   there is cholelithiasis as well as intra and extrahepatic dilated   dilatation. The distal common bile duct measures 7 mm diameter. Correlate   with LFTs and consider MRCP for further evaluation.    Please refer to cervical spine MRI report for findings regarding cord   compression.    < end of copied text >        PEx:  Vital Signs Last 24 Hrs  T(C): 36.7 (2018 12:47), Max: 36.9 (2018 20:42)  T(F): 98 (2018 12:47), Max: 98.5 (2018 20:42)  HR: 80 (2018 12:47) (47 - 80)  BP: 154/80 (2018 12:47) (153/73 - 185/94)  BP(mean): --  RR: 17 (2018 12:47) (16 - 18)  SpO2: 98% (2018 12:47) (95% - 98%)  Wt(kg): 80 kgs      General: not ill appearing, not distressed  HEENT:  nc/at, moist oral cavity  Neck: supple  CVS:  regular  Resp:   clear, nonlabored  GI:  large, soft, non-tender, +BS  :  voids  Musc:   R UE weakness, RLE weakness, mild weakness of LLE  Neuro: awake, alert, confused to person, place, events  Psych:   calm, not distressed, although pt cries intermittently s/p CVA  Skin:  warm, dry  Lymph:  neg  Preadmit Karnofsky:  40  %           Current Karnofsky:   30    %  Cachexia (Y/N):   no  BMI:      Advanced Directives:     Full Code        Decision maker:  Pt does not appears to have full medical decision making  capacity.  Legal surrogate:  Pt's wife is his surrogate decision maker in the absence of a documented HCP.    Social History: , 4 adults dgts, worked for [employer], retired, not spiritual, pt was in the [employer] x 3 years and has medical benefits form the [employer]  Pt attends adult day care 2 days a week at the [facility] in [facility] paid for by the [facility].    GOALS OF CARE DISCUSSION:       Palliative care info/counseling provided	           Family meeting-   with Dr Phan       Advanced Directives addressed please see Advance Care Planning Note	           See previous Palliative Medicine Note       Documentation of GOC:  unclear at this time          REFERRALS:	        Unit SW/Case Mgmt       - declined       Patient/Family Support       Massage Therapy       Music Therapy       PT/OT

## 2022-06-13 ENCOUNTER — APPOINTMENT (OUTPATIENT)
Dept: HEART AND VASCULAR | Facility: CLINIC | Age: 87
End: 2022-06-13

## 2022-06-15 ENCOUNTER — APPOINTMENT (OUTPATIENT)
Dept: HEART AND VASCULAR | Facility: CLINIC | Age: 87
End: 2022-06-15

## 2022-06-15 VITALS
WEIGHT: 179 LBS | BODY MASS INDEX: 24.24 KG/M2 | HEART RATE: 64 BPM | DIASTOLIC BLOOD PRESSURE: 64 MMHG | RESPIRATION RATE: 12 BRPM | HEIGHT: 72 IN | TEMPERATURE: 98.2 F | OXYGEN SATURATION: 95 % | SYSTOLIC BLOOD PRESSURE: 134 MMHG

## 2022-06-15 DIAGNOSIS — R27.0 ATAXIA, UNSPECIFIED: ICD-10-CM

## 2022-06-15 PROCEDURE — 93000 ELECTROCARDIOGRAM COMPLETE: CPT

## 2022-06-15 PROCEDURE — 99214 OFFICE O/P EST MOD 30 MIN: CPT | Mod: 25

## 2022-06-15 PROCEDURE — 36415 COLL VENOUS BLD VENIPUNCTURE: CPT

## 2022-06-15 NOTE — HISTORY OF PRESENT ILLNESS
[FreeTextEntry1] : 86 year male who comes with his wife. She describes intermittent episodes of feeling like he needs to vomit. It is followed by a state of immobility with his eyes open. No loss of continence. It lasts 1-2 minutes. He awakens and appears more alert or like nothing ever happened. He was seen in ER [facility] in April 2021 after a fall from his bed

## 2022-06-15 NOTE — ASSESSMENT
[FreeTextEntry1] : An EKG was performed in wheelchair to evaluate for arrhythmia and ischemia. \par \par Labs were drawn in the office and sent \par \par Meet Neurology (former patient of Dr Hines prior to pandemic)

## 2022-06-15 NOTE — PHYSICAL EXAM
[Well Developed] : well developed [Normal Conjunctiva] : normal conjunctiva [Normal S1, S2] : normal S1, S2 [Clear Lung Fields] : clear lung fields [No Edema] : no edema [Moves all extremities] : moves all extremities [Normal] : alert and oriented, normal memory [de-identified] : seen in wheelchair

## 2022-06-16 LAB
ALBUMIN SERPL ELPH-MCNC: 3.8 G/DL
ALP BLD-CCNC: 118 U/L
ALT SERPL-CCNC: 23 U/L
ANION GAP SERPL CALC-SCNC: 12 MMOL/L
AST SERPL-CCNC: 25 U/L
BASOPHILS # BLD AUTO: 0.03 K/UL
BASOPHILS NFR BLD AUTO: 0.5 %
BILIRUB SERPL-MCNC: 0.4 MG/DL
BUN SERPL-MCNC: 20 MG/DL
CALCIUM SERPL-MCNC: 9.2 MG/DL
CHLORIDE SERPL-SCNC: 106 MMOL/L
CHOLEST SERPL-MCNC: 145 MG/DL
CO2 SERPL-SCNC: 24 MMOL/L
CREAT SERPL-MCNC: 1.04 MG/DL
EGFR: 70 ML/MIN/1.73M2
EOSINOPHIL # BLD AUTO: 0.15 K/UL
EOSINOPHIL NFR BLD AUTO: 2.5 %
ESTIMATED AVERAGE GLUCOSE: 123 MG/DL
GLUCOSE SERPL-MCNC: 117 MG/DL
HBA1C MFR BLD HPLC: 5.9 %
HCT VFR BLD CALC: 35.4 %
HDLC SERPL-MCNC: 42 MG/DL
HGB BLD-MCNC: 11 G/DL
IMM GRANULOCYTES NFR BLD AUTO: 0.2 %
LDLC SERPL CALC-MCNC: 85 MG/DL
LYMPHOCYTES # BLD AUTO: 2.11 K/UL
LYMPHOCYTES NFR BLD AUTO: 35.4 %
MAN DIFF?: NORMAL
MCHC RBC-ENTMCNC: 30.8 PG
MCHC RBC-ENTMCNC: 31.1 GM/DL
MCV RBC AUTO: 99.2 FL
MONOCYTES # BLD AUTO: 0.45 K/UL
MONOCYTES NFR BLD AUTO: 7.6 %
NEUTROPHILS # BLD AUTO: 3.21 K/UL
NEUTROPHILS NFR BLD AUTO: 53.8 %
NONHDLC SERPL-MCNC: 103 MG/DL
PLATELET # BLD AUTO: 160 K/UL
POTASSIUM SERPL-SCNC: 4.2 MMOL/L
PROT SERPL-MCNC: 7.3 G/DL
RBC # BLD: 3.57 M/UL
RBC # FLD: 15.5 %
SODIUM SERPL-SCNC: 143 MMOL/L
TRIGL SERPL-MCNC: 87 MG/DL
TSH SERPL-ACNC: 2.02 UIU/ML
WBC # FLD AUTO: 5.96 K/UL

## 2022-06-20 ENCOUNTER — LABORATORY RESULT (OUTPATIENT)
Age: 87
End: 2022-06-20

## 2022-06-24 ENCOUNTER — NON-APPOINTMENT (OUTPATIENT)
Age: 87
End: 2022-06-24

## 2022-07-05 ENCOUNTER — LABORATORY RESULT (OUTPATIENT)
Age: 87
End: 2022-07-05

## 2022-07-06 ENCOUNTER — NON-APPOINTMENT (OUTPATIENT)
Age: 87
End: 2022-07-06

## 2022-07-06 LAB
INR PPP: 1.98
PT BLD: 23.1

## 2022-07-12 ENCOUNTER — NON-APPOINTMENT (OUTPATIENT)
Age: 87
End: 2022-07-12

## 2022-07-25 ENCOUNTER — LABORATORY RESULT (OUTPATIENT)
Age: 87
End: 2022-07-25

## 2022-07-26 ENCOUNTER — NON-APPOINTMENT (OUTPATIENT)
Age: 87
End: 2022-07-26

## 2022-07-26 LAB
INR PPP: 3.06
PT BLD: 35.9

## 2022-08-08 ENCOUNTER — LABORATORY RESULT (OUTPATIENT)
Age: 87
End: 2022-08-08

## 2022-08-09 ENCOUNTER — NON-APPOINTMENT (OUTPATIENT)
Age: 87
End: 2022-08-09

## 2022-08-09 LAB
INR PPP: 1.94
PT BLD: 22.6

## 2022-08-19 ENCOUNTER — APPOINTMENT (OUTPATIENT)
Dept: HEART AND VASCULAR | Facility: CLINIC | Age: 87
End: 2022-08-19

## 2022-08-19 VITALS
DIASTOLIC BLOOD PRESSURE: 94 MMHG | SYSTOLIC BLOOD PRESSURE: 136 MMHG | HEART RATE: 69 BPM | TEMPERATURE: 98.3 F | HEIGHT: 72 IN | OXYGEN SATURATION: 97 %

## 2022-08-19 DIAGNOSIS — M62.81 MUSCLE WEAKNESS (GENERALIZED): ICD-10-CM

## 2022-08-19 PROCEDURE — 99214 OFFICE O/P EST MOD 30 MIN: CPT

## 2022-08-19 RX ORDER — WARFARIN 5 MG/1
5 TABLET ORAL DAILY
Qty: 13 | Refills: 0 | Status: COMPLETED | COMMUNITY
Start: 2020-10-02 | End: 2022-08-19

## 2022-08-21 NOTE — REVIEW OF SYSTEMS
[Fever] : no fever [Chills] : no chills [SOB] : no shortness of breath [Dyspnea on exertion] : not dyspnea during exertion [Chest Discomfort] : no chest discomfort [Lower Ext Edema] : no extremity edema [Palpitations] : no palpitations [FreeTextEntry9] : muscle weakness

## 2022-08-21 NOTE — HISTORY OF PRESENT ILLNESS
[FreeTextEntry1] : Mr. Kauffman is an 85 yo M with CAD, CVA, HTN, HL, carotid stenosis, Afib who is referred by Dr. Olmedo for concerns of statin related side effects. \par \par His daughter is present today during today's appointment and expresses concern over pt.'s decreased mobility and deconditioning due to progressive muscle weakness over the last several years. She is wondering if the deconditioning and muscle weakness is due to being on statin therapy long-term. Pt. has been on Atorvastatin 40 mg daily since 2016.  Patient denies any pain in bilateral arms or legs or recent increase in neurofocal deficits. He was previously able to stand on his own but currently cannot stand without support. 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 was in rehab after a spinal surgery and has been in a wheelchair ever since\par - Prior to January, pt. was working with a  at the gym twice/week\par - Pt. is now working with OT\par \par June 2022 Labs:\par Chol 145\par HDL 42\par TG 87\par LDL 85\par Na 143\par K 4.2\par Cr 1.04\par AST/ALT wnl\par TSH 2\par A1c 5.9%

## 2022-08-21 NOTE — PHYSICAL EXAM
[Normal Conjunctiva] : normal conjunctiva [No Carotid Bruit] : no carotid bruit [Normal S1, S2] : normal S1, S2 [No Murmur] : no murmur [Normal] : clear lung fields, good air entry, no respiratory distress [No Edema] : no edema [Normal Speech] : normal speech [de-identified] : Wheelchair dependent  [de-identified] : R-sided muscle weakness in upper and lower extremity. Muscle rigidity noted in bilateral upper and lower extremities

## 2022-08-21 NOTE — ASSESSMENT
[FreeTextEntry1] : Mr. Kauffman is an 85 yo M with CAD, CVA, HTN, HL, carotid stenosis, Afib who is referred by Dr. Olmedo for concerns of statin related side effects. \par \par Muscle weakness: \par - Etiology of weakness is likely multifactorial given duration and neurologic history, but reasonable to consider statin as a possibility if has never had a trial off it, though not typical for THOM\par - Pt. advised to stop Atorvastatin 40 mg QD to assess if symptoms resolve off medication. If muscle weakness improves off medication, consider stopping statin indefinitely. If symptoms fail to resolve several weeks after statin discontinuation, will discuss further work-up, consideration of other possibilities \par - Low suspicion for statin -associated inflammatory myopathy, as pt. is not experiencing symmetric proximal muscle weakness. However, will assess pt.'s response to discontinued statin therapy and reassess. \par - Creatine kinase sent today to assess for muscle damage  \par - Pt. to start Zetia 10 mg QD for continued lipid management - last LDL 85 (06/2022) above goal < 70 given hx of CAD s/p PCI\par - Consider other neuromuscular disorder (?rigid on exam) as cause of weakness - pt. advised to follow-up with his Neurologist\par - Discussed above with pt's other daughter (physician) over phone as well\par \par Will follow-up with a telehealth visit in one month to assess status of symptoms\par \par Addendum: Patient seen and examined. Case discussed with NP.  Agree with plan as detailed above.

## 2022-08-22 ENCOUNTER — LABORATORY RESULT (OUTPATIENT)
Age: 87
End: 2022-08-22

## 2022-08-22 LAB — CK SERPL-CCNC: 211 U/L

## 2022-08-23 LAB — INR PPP: 3.35

## 2022-08-30 ENCOUNTER — NON-APPOINTMENT (OUTPATIENT)
Age: 87
End: 2022-08-30

## 2022-09-06 ENCOUNTER — LABORATORY RESULT (OUTPATIENT)
Age: 87
End: 2022-09-06

## 2022-09-07 ENCOUNTER — RX RENEWAL (OUTPATIENT)
Age: 87
End: 2022-09-07

## 2022-09-07 ENCOUNTER — NON-APPOINTMENT (OUTPATIENT)
Age: 87
End: 2022-09-07

## 2022-10-04 ENCOUNTER — NON-APPOINTMENT (OUTPATIENT)
Age: 87
End: 2022-10-04

## 2022-10-10 ENCOUNTER — LABORATORY RESULT (OUTPATIENT)
Age: 87
End: 2022-10-10

## 2022-10-11 ENCOUNTER — NON-APPOINTMENT (OUTPATIENT)
Age: 87
End: 2022-10-11

## 2022-10-17 ENCOUNTER — LABORATORY RESULT (OUTPATIENT)
Age: 87
End: 2022-10-17

## 2022-10-24 ENCOUNTER — LABORATORY RESULT (OUTPATIENT)
Age: 87
End: 2022-10-24

## 2022-10-25 ENCOUNTER — NON-APPOINTMENT (OUTPATIENT)
Age: 87
End: 2022-10-25

## 2022-10-31 ENCOUNTER — LABORATORY RESULT (OUTPATIENT)
Age: 87
End: 2022-10-31

## 2022-11-07 ENCOUNTER — LABORATORY RESULT (OUTPATIENT)
Age: 87
End: 2022-11-07

## 2022-11-21 ENCOUNTER — LABORATORY RESULT (OUTPATIENT)
Age: 87
End: 2022-11-21

## 2022-11-22 ENCOUNTER — LABORATORY RESULT (OUTPATIENT)
Age: 87
End: 2022-11-22

## 2022-11-23 ENCOUNTER — NON-APPOINTMENT (OUTPATIENT)
Age: 87
End: 2022-11-23

## 2022-12-05 ENCOUNTER — LABORATORY RESULT (OUTPATIENT)
Age: 87
End: 2022-12-05

## 2022-12-06 ENCOUNTER — LABORATORY RESULT (OUTPATIENT)
Age: 87
End: 2022-12-06

## 2022-12-07 ENCOUNTER — NON-APPOINTMENT (OUTPATIENT)
Age: 87
End: 2022-12-07

## 2022-12-19 ENCOUNTER — LABORATORY RESULT (OUTPATIENT)
Age: 87
End: 2022-12-19

## 2022-12-20 ENCOUNTER — NON-APPOINTMENT (OUTPATIENT)
Age: 87
End: 2022-12-20

## 2022-12-27 ENCOUNTER — LABORATORY RESULT (OUTPATIENT)
Age: 87
End: 2022-12-27

## 2023-01-01 ENCOUNTER — NON-APPOINTMENT (OUTPATIENT)
Age: 88
End: 2023-01-01

## 2023-01-01 ENCOUNTER — RX RENEWAL (OUTPATIENT)
Age: 88
End: 2023-01-01

## 2023-01-01 ENCOUNTER — LABORATORY RESULT (OUTPATIENT)
Age: 88
End: 2023-01-01

## 2023-01-01 ENCOUNTER — APPOINTMENT (OUTPATIENT)
Dept: HEART AND VASCULAR | Facility: CLINIC | Age: 88
End: 2023-01-01
Payer: MEDICARE

## 2023-01-01 ENCOUNTER — TRANSCRIPTION ENCOUNTER (OUTPATIENT)
Age: 88
End: 2023-01-01

## 2023-01-01 ENCOUNTER — INPATIENT (INPATIENT)
Facility: HOSPITAL | Age: 88
LOS: 4 days | Discharge: HOME HEALTH | DRG: 871 | End: 2023-11-08
Attending: INTERNAL MEDICINE | Admitting: INTERNAL MEDICINE
Payer: MEDICARE

## 2023-01-01 ENCOUNTER — INPATIENT (INPATIENT)
Facility: HOSPITAL | Age: 88
LOS: 2 days | Discharge: NURSING, REHAB OR LONG-TERM CARE | DRG: 281 | End: 2023-10-12
Attending: INTERNAL MEDICINE | Admitting: HOSPITALIST
Payer: MEDICARE

## 2023-01-01 VITALS
OXYGEN SATURATION: 99 % | TEMPERATURE: 98.6 F | DIASTOLIC BLOOD PRESSURE: 68 MMHG | SYSTOLIC BLOOD PRESSURE: 140 MMHG | HEART RATE: 75 BPM | WEIGHT: 179 LBS | HEIGHT: 72 IN | BODY MASS INDEX: 24.24 KG/M2

## 2023-01-01 VITALS
RESPIRATION RATE: 17 BRPM | TEMPERATURE: 98 F | DIASTOLIC BLOOD PRESSURE: 75 MMHG | OXYGEN SATURATION: 98 % | HEART RATE: 67 BPM | SYSTOLIC BLOOD PRESSURE: 163 MMHG

## 2023-01-01 VITALS
SYSTOLIC BLOOD PRESSURE: 157 MMHG | HEART RATE: 70 BPM | OXYGEN SATURATION: 98 % | RESPIRATION RATE: 16 BRPM | DIASTOLIC BLOOD PRESSURE: 73 MMHG

## 2023-01-01 VITALS
WEIGHT: 171.96 LBS | HEIGHT: 72 IN | TEMPERATURE: 102 F | OXYGEN SATURATION: 95 % | DIASTOLIC BLOOD PRESSURE: 76 MMHG | SYSTOLIC BLOOD PRESSURE: 181 MMHG | HEART RATE: 93 BPM | RESPIRATION RATE: 18 BRPM

## 2023-01-01 VITALS
HEIGHT: 72 IN | BODY MASS INDEX: 22.21 KG/M2 | WEIGHT: 164 LBS | HEART RATE: 62 BPM | OXYGEN SATURATION: 98 % | TEMPERATURE: 96.1 F

## 2023-01-01 VITALS
SYSTOLIC BLOOD PRESSURE: 180 MMHG | OXYGEN SATURATION: 96 % | HEART RATE: 80 BPM | RESPIRATION RATE: 17 BRPM | DIASTOLIC BLOOD PRESSURE: 90 MMHG

## 2023-01-01 DIAGNOSIS — I50.22 CHRONIC SYSTOLIC (CONGESTIVE) HEART FAILURE: ICD-10-CM

## 2023-01-01 DIAGNOSIS — Z86.718 PERSONAL HISTORY OF OTHER VENOUS THROMBOSIS AND EMBOLISM: ICD-10-CM

## 2023-01-01 DIAGNOSIS — I69.353 HEMIPLEGIA AND HEMIPARESIS FOLLOWING CEREBRAL INFARCTION AFFECTING RIGHT NON-DOMINANT SIDE: ICD-10-CM

## 2023-01-01 DIAGNOSIS — R79.89 OTHER SPECIFIED ABNORMAL FINDINGS OF BLOOD CHEMISTRY: ICD-10-CM

## 2023-01-01 DIAGNOSIS — I25.10 ATHEROSCLEROTIC HEART DISEASE OF NATIVE CORONARY ARTERY WITHOUT ANGINA PECTORIS: ICD-10-CM

## 2023-01-01 DIAGNOSIS — Z95.5 PRESENCE OF CORONARY ANGIOPLASTY IMPLANT AND GRAFT: ICD-10-CM

## 2023-01-01 DIAGNOSIS — I69.319 UNSPECIFIED SYMPTOMS AND SIGNS INVOLVING COGNITIVE FUNCTIONS FOLLOWING CEREBRAL INFARCTION: ICD-10-CM

## 2023-01-01 DIAGNOSIS — Z51.5 ENCOUNTER FOR PALLIATIVE CARE: ICD-10-CM

## 2023-01-01 DIAGNOSIS — R94.31 ABNORMAL ELECTROCARDIOGRAM [ECG] [EKG]: ICD-10-CM

## 2023-01-01 DIAGNOSIS — Z79.01 LONG TERM (CURRENT) USE OF ANTICOAGULANTS: ICD-10-CM

## 2023-01-01 DIAGNOSIS — I25.2 OLD MYOCARDIAL INFARCTION: ICD-10-CM

## 2023-01-01 DIAGNOSIS — Z79.82 LONG TERM (CURRENT) USE OF ASPIRIN: ICD-10-CM

## 2023-01-01 DIAGNOSIS — R82.90 UNSPECIFIED ABNORMAL FINDINGS IN URINE: ICD-10-CM

## 2023-01-01 DIAGNOSIS — I11.0 HYPERTENSIVE HEART DISEASE WITH HEART FAILURE: ICD-10-CM

## 2023-01-01 DIAGNOSIS — Z86.73 PERSONAL HISTORY OF TRANSIENT ISCHEMIC ATTACK (TIA), AND CEREBRAL INFARCTION WITHOUT RESIDUAL DEFICITS: ICD-10-CM

## 2023-01-01 DIAGNOSIS — I10 ESSENTIAL (PRIMARY) HYPERTENSION: ICD-10-CM

## 2023-01-01 DIAGNOSIS — Z79.01 ENCOUNTER FOR THERAPEUTIC DRUG LVL MONITORING: ICD-10-CM

## 2023-01-01 DIAGNOSIS — Z98.890 OTHER SPECIFIED POSTPROCEDURAL STATES: Chronic | ICD-10-CM

## 2023-01-01 DIAGNOSIS — E78.5 HYPERLIPIDEMIA, UNSPECIFIED: ICD-10-CM

## 2023-01-01 DIAGNOSIS — N17.9 ACUTE KIDNEY FAILURE, UNSPECIFIED: ICD-10-CM

## 2023-01-01 DIAGNOSIS — Z71.89 OTHER SPECIFIED COUNSELING: ICD-10-CM

## 2023-01-01 DIAGNOSIS — I69.951 HEMIPLEGIA AND HEMIPARESIS FOLLOWING UNSPECIFIED CEREBROVASCULAR DISEASE AFFECTING RIGHT DOMINANT SIDE: ICD-10-CM

## 2023-01-01 DIAGNOSIS — A41.89 OTHER SPECIFIED SEPSIS: ICD-10-CM

## 2023-01-01 DIAGNOSIS — Z96.0 PRESENCE OF UROGENITAL IMPLANTS: Chronic | ICD-10-CM

## 2023-01-01 DIAGNOSIS — I21.19 ST ELEVATION (STEMI) MYOCARDIAL INFARCTION INVOLVING OTHER CORONARY ARTERY OF INFERIOR WALL: ICD-10-CM

## 2023-01-01 DIAGNOSIS — I65.29 OCCLUSION AND STENOSIS OF UNSPECIFIED CAROTID ARTERY: ICD-10-CM

## 2023-01-01 DIAGNOSIS — Z66 DO NOT RESUSCITATE: ICD-10-CM

## 2023-01-01 DIAGNOSIS — I48.91 UNSPECIFIED ATRIAL FIBRILLATION: ICD-10-CM

## 2023-01-01 DIAGNOSIS — I69.320 APHASIA FOLLOWING CEREBRAL INFARCTION: ICD-10-CM

## 2023-01-01 DIAGNOSIS — U07.1 COVID-19: ICD-10-CM

## 2023-01-01 DIAGNOSIS — Z78.9 OTHER SPECIFIED HEALTH STATUS: ICD-10-CM

## 2023-01-01 DIAGNOSIS — R55 SYNCOPE AND COLLAPSE: ICD-10-CM

## 2023-01-01 DIAGNOSIS — I16.0 HYPERTENSIVE URGENCY: ICD-10-CM

## 2023-01-01 DIAGNOSIS — Z95.828 PRESENCE OF OTHER VASCULAR IMPLANTS AND GRAFTS: ICD-10-CM

## 2023-01-01 DIAGNOSIS — Z95.828 PRESENCE OF OTHER VASCULAR IMPLANTS AND GRAFTS: Chronic | ICD-10-CM

## 2023-01-01 DIAGNOSIS — E11.9 TYPE 2 DIABETES MELLITUS WITHOUT COMPLICATIONS: ICD-10-CM

## 2023-01-01 DIAGNOSIS — I34.0 NONRHEUMATIC MITRAL (VALVE) INSUFFICIENCY: ICD-10-CM

## 2023-01-01 DIAGNOSIS — I50.32 CHRONIC DIASTOLIC (CONGESTIVE) HEART FAILURE: ICD-10-CM

## 2023-01-01 DIAGNOSIS — I48.0 PAROXYSMAL ATRIAL FIBRILLATION: ICD-10-CM

## 2023-01-01 DIAGNOSIS — I47.29 OTHER VENTRICULAR TACHYCARDIA: ICD-10-CM

## 2023-01-01 DIAGNOSIS — Z99.3 DEPENDENCE ON WHEELCHAIR: ICD-10-CM

## 2023-01-01 DIAGNOSIS — R11.2 NAUSEA WITH VOMITING, UNSPECIFIED: ICD-10-CM

## 2023-01-01 DIAGNOSIS — R53.81 OTHER MALAISE: ICD-10-CM

## 2023-01-01 DIAGNOSIS — R65.20 SEVERE SEPSIS WITHOUT SEPTIC SHOCK: ICD-10-CM

## 2023-01-01 DIAGNOSIS — Z51.81 ENCOUNTER FOR THERAPEUTIC DRUG LVL MONITORING: ICD-10-CM

## 2023-01-01 LAB
-  AMPICILLIN: SIGNIFICANT CHANGE UP
-  AMPICILLIN: SIGNIFICANT CHANGE UP
-  CIPROFLOXACIN: SIGNIFICANT CHANGE UP
-  CIPROFLOXACIN: SIGNIFICANT CHANGE UP
-  NITROFURANTOIN: SIGNIFICANT CHANGE UP
-  NITROFURANTOIN: SIGNIFICANT CHANGE UP
-  TETRACYCLINE: SIGNIFICANT CHANGE UP
-  TETRACYCLINE: SIGNIFICANT CHANGE UP
-  VANCOMYCIN: SIGNIFICANT CHANGE UP
-  VANCOMYCIN: SIGNIFICANT CHANGE UP
A1C WITH ESTIMATED AVERAGE GLUCOSE RESULT: 6.1 % — HIGH (ref 4–5.6)
ALBUMIN SERPL ELPH-MCNC: 2.7 G/DL — LOW (ref 3.3–5)
ALBUMIN SERPL ELPH-MCNC: 2.9 G/DL — LOW (ref 3.3–5)
ALBUMIN SERPL ELPH-MCNC: 2.9 G/DL — LOW (ref 3.3–5)
ALBUMIN SERPL ELPH-MCNC: 3 G/DL — LOW (ref 3.3–5)
ALBUMIN SERPL ELPH-MCNC: 3.2 G/DL — LOW (ref 3.3–5)
ALBUMIN SERPL ELPH-MCNC: 3.2 G/DL — LOW (ref 3.3–5)
ALBUMIN SERPL ELPH-MCNC: 3.3 G/DL — SIGNIFICANT CHANGE UP (ref 3.3–5)
ALBUMIN SERPL ELPH-MCNC: 3.4 G/DL — SIGNIFICANT CHANGE UP (ref 3.3–5)
ALP SERPL-CCNC: 100 U/L — SIGNIFICANT CHANGE UP (ref 40–120)
ALP SERPL-CCNC: 105 U/L — SIGNIFICANT CHANGE UP (ref 40–120)
ALP SERPL-CCNC: 105 U/L — SIGNIFICANT CHANGE UP (ref 40–120)
ALP SERPL-CCNC: 107 U/L — SIGNIFICANT CHANGE UP (ref 40–120)
ALP SERPL-CCNC: 116 U/L — SIGNIFICANT CHANGE UP (ref 40–120)
ALP SERPL-CCNC: 89 U/L — SIGNIFICANT CHANGE UP (ref 40–120)
ALP SERPL-CCNC: 89 U/L — SIGNIFICANT CHANGE UP (ref 40–120)
ALP SERPL-CCNC: 93 U/L — SIGNIFICANT CHANGE UP (ref 40–120)
ALP SERPL-CCNC: 93 U/L — SIGNIFICANT CHANGE UP (ref 40–120)
ALP SERPL-CCNC: 96 U/L — SIGNIFICANT CHANGE UP (ref 40–120)
ALP SERPL-CCNC: 96 U/L — SIGNIFICANT CHANGE UP (ref 40–120)
ALP SERPL-CCNC: 98 U/L — SIGNIFICANT CHANGE UP (ref 40–120)
ALP SERPL-CCNC: 98 U/L — SIGNIFICANT CHANGE UP (ref 40–120)
ALT FLD-CCNC: 10 U/L — SIGNIFICANT CHANGE UP (ref 10–45)
ALT FLD-CCNC: 11 U/L — SIGNIFICANT CHANGE UP (ref 10–45)
ALT FLD-CCNC: 14 U/L — SIGNIFICANT CHANGE UP (ref 10–45)
ALT FLD-CCNC: 15 U/L — SIGNIFICANT CHANGE UP (ref 10–45)
ALT FLD-CCNC: 15 U/L — SIGNIFICANT CHANGE UP (ref 10–45)
ALT FLD-CCNC: 16 U/L — SIGNIFICANT CHANGE UP (ref 10–45)
ALT FLD-CCNC: 17 U/L — SIGNIFICANT CHANGE UP (ref 10–45)
ALT FLD-CCNC: 17 U/L — SIGNIFICANT CHANGE UP (ref 10–45)
ALT FLD-CCNC: 18 U/L — SIGNIFICANT CHANGE UP (ref 10–45)
ALT FLD-CCNC: 18 U/L — SIGNIFICANT CHANGE UP (ref 10–45)
ANION GAP SERPL CALC-SCNC: 10 MMOL/L — SIGNIFICANT CHANGE UP (ref 5–17)
ANION GAP SERPL CALC-SCNC: 12 MMOL/L — SIGNIFICANT CHANGE UP (ref 5–17)
ANION GAP SERPL CALC-SCNC: 4 MMOL/L — LOW (ref 5–17)
ANION GAP SERPL CALC-SCNC: 4 MMOL/L — LOW (ref 5–17)
ANION GAP SERPL CALC-SCNC: 6 MMOL/L — SIGNIFICANT CHANGE UP (ref 5–17)
ANION GAP SERPL CALC-SCNC: 6 MMOL/L — SIGNIFICANT CHANGE UP (ref 5–17)
ANION GAP SERPL CALC-SCNC: 7 MMOL/L — SIGNIFICANT CHANGE UP (ref 5–17)
ANION GAP SERPL CALC-SCNC: 9 MMOL/L — SIGNIFICANT CHANGE UP (ref 5–17)
APPEARANCE UR: CLEAR — SIGNIFICANT CHANGE UP
APPEARANCE UR: CLEAR — SIGNIFICANT CHANGE UP
APTT BLD: 120 SEC — CRITICAL HIGH (ref 24.5–35.6)
APTT BLD: 29 SEC — SIGNIFICANT CHANGE UP (ref 24.5–35.6)
APTT BLD: 29 SEC — SIGNIFICANT CHANGE UP (ref 24.5–35.6)
APTT BLD: 31.4 SEC — SIGNIFICANT CHANGE UP (ref 24.5–35.6)
APTT BLD: 33.1 SEC — SIGNIFICANT CHANGE UP (ref 24.5–35.6)
APTT BLD: 34.9 SEC — SIGNIFICANT CHANGE UP (ref 24.5–35.6)
APTT BLD: 37.3 SEC — HIGH (ref 24.5–35.6)
APTT BLD: 37.6 SEC — HIGH (ref 24.5–35.6)
APTT BLD: 76.9 SEC — HIGH (ref 24.5–35.6)
AST SERPL-CCNC: 28 U/L — SIGNIFICANT CHANGE UP (ref 10–40)
AST SERPL-CCNC: 29 U/L — SIGNIFICANT CHANGE UP (ref 10–40)
AST SERPL-CCNC: 32 U/L — SIGNIFICANT CHANGE UP (ref 10–40)
AST SERPL-CCNC: 35 U/L — SIGNIFICANT CHANGE UP (ref 10–40)
AST SERPL-CCNC: 35 U/L — SIGNIFICANT CHANGE UP (ref 10–40)
AST SERPL-CCNC: 37 U/L — SIGNIFICANT CHANGE UP (ref 10–40)
AST SERPL-CCNC: 37 U/L — SIGNIFICANT CHANGE UP (ref 10–40)
AST SERPL-CCNC: 38 U/L — SIGNIFICANT CHANGE UP (ref 10–40)
AST SERPL-CCNC: 38 U/L — SIGNIFICANT CHANGE UP (ref 10–40)
AST SERPL-CCNC: 46 U/L — HIGH (ref 10–40)
AST SERPL-CCNC: 46 U/L — HIGH (ref 10–40)
AST SERPL-CCNC: 54 U/L — HIGH (ref 10–40)
AST SERPL-CCNC: 54 U/L — HIGH (ref 10–40)
BACTERIA # UR AUTO: NEGATIVE /HPF — SIGNIFICANT CHANGE UP
BACTERIA # UR AUTO: NEGATIVE /HPF — SIGNIFICANT CHANGE UP
BASOPHILS # BLD AUTO: 0.02 K/UL — SIGNIFICANT CHANGE UP (ref 0–0.2)
BASOPHILS # BLD AUTO: 0.03 K/UL — SIGNIFICANT CHANGE UP (ref 0–0.2)
BASOPHILS # BLD AUTO: 0.04 K/UL — SIGNIFICANT CHANGE UP (ref 0–0.2)
BASOPHILS NFR BLD AUTO: 0.2 % — SIGNIFICANT CHANGE UP (ref 0–2)
BASOPHILS NFR BLD AUTO: 0.3 % — SIGNIFICANT CHANGE UP (ref 0–2)
BASOPHILS NFR BLD AUTO: 0.3 % — SIGNIFICANT CHANGE UP (ref 0–2)
BASOPHILS NFR BLD AUTO: 0.4 % — SIGNIFICANT CHANGE UP (ref 0–2)
BASOPHILS NFR BLD AUTO: 0.4 % — SIGNIFICANT CHANGE UP (ref 0–2)
BASOPHILS NFR BLD AUTO: 0.5 % — SIGNIFICANT CHANGE UP (ref 0–2)
BASOPHILS NFR BLD AUTO: 0.6 % — SIGNIFICANT CHANGE UP (ref 0–2)
BASOPHILS NFR BLD AUTO: 0.6 % — SIGNIFICANT CHANGE UP (ref 0–2)
BILIRUB DIRECT SERPL-MCNC: 0.2 MG/DL — SIGNIFICANT CHANGE UP (ref 0–0.3)
BILIRUB DIRECT SERPL-MCNC: 0.2 MG/DL — SIGNIFICANT CHANGE UP (ref 0–0.3)
BILIRUB INDIRECT FLD-MCNC: 0.5 MG/DL — SIGNIFICANT CHANGE UP (ref 0.2–1)
BILIRUB INDIRECT FLD-MCNC: 0.5 MG/DL — SIGNIFICANT CHANGE UP (ref 0.2–1)
BILIRUB SERPL-MCNC: 0.4 MG/DL — SIGNIFICANT CHANGE UP (ref 0.2–1.2)
BILIRUB SERPL-MCNC: 0.5 MG/DL — SIGNIFICANT CHANGE UP (ref 0.2–1.2)
BILIRUB SERPL-MCNC: 0.7 MG/DL — SIGNIFICANT CHANGE UP (ref 0.2–1.2)
BILIRUB UR-MCNC: NEGATIVE — SIGNIFICANT CHANGE UP
BILIRUB UR-MCNC: NEGATIVE — SIGNIFICANT CHANGE UP
BLD GP AB SCN SERPL QL: NEGATIVE — SIGNIFICANT CHANGE UP
BUN SERPL-MCNC: 11 MG/DL — SIGNIFICANT CHANGE UP (ref 7–23)
BUN SERPL-MCNC: 11 MG/DL — SIGNIFICANT CHANGE UP (ref 7–23)
BUN SERPL-MCNC: 14 MG/DL — SIGNIFICANT CHANGE UP (ref 7–23)
BUN SERPL-MCNC: 14 MG/DL — SIGNIFICANT CHANGE UP (ref 7–23)
BUN SERPL-MCNC: 16 MG/DL — SIGNIFICANT CHANGE UP (ref 7–23)
BUN SERPL-MCNC: 19 MG/DL — SIGNIFICANT CHANGE UP (ref 7–23)
BUN SERPL-MCNC: 19 MG/DL — SIGNIFICANT CHANGE UP (ref 7–23)
BUN SERPL-MCNC: 25 MG/DL — HIGH (ref 7–23)
BUN SERPL-MCNC: 26 MG/DL — HIGH (ref 7–23)
BUN SERPL-MCNC: 27 MG/DL — HIGH (ref 7–23)
CALCIUM SERPL-MCNC: 8.7 MG/DL — SIGNIFICANT CHANGE UP (ref 8.4–10.5)
CALCIUM SERPL-MCNC: 8.7 MG/DL — SIGNIFICANT CHANGE UP (ref 8.4–10.5)
CALCIUM SERPL-MCNC: 8.8 MG/DL — SIGNIFICANT CHANGE UP (ref 8.4–10.5)
CALCIUM SERPL-MCNC: 8.8 MG/DL — SIGNIFICANT CHANGE UP (ref 8.4–10.5)
CALCIUM SERPL-MCNC: 8.9 MG/DL — SIGNIFICANT CHANGE UP (ref 8.4–10.5)
CALCIUM SERPL-MCNC: 9.1 MG/DL — SIGNIFICANT CHANGE UP (ref 8.4–10.5)
CALCIUM SERPL-MCNC: 9.1 MG/DL — SIGNIFICANT CHANGE UP (ref 8.4–10.5)
CALCIUM SERPL-MCNC: 9.2 MG/DL — SIGNIFICANT CHANGE UP (ref 8.4–10.5)
CALCIUM SERPL-MCNC: 9.3 MG/DL — SIGNIFICANT CHANGE UP (ref 8.4–10.5)
CALCIUM SERPL-MCNC: 9.3 MG/DL — SIGNIFICANT CHANGE UP (ref 8.4–10.5)
CALCIUM SERPL-MCNC: 9.4 MG/DL — SIGNIFICANT CHANGE UP (ref 8.4–10.5)
CALCIUM SERPL-MCNC: 9.4 MG/DL — SIGNIFICANT CHANGE UP (ref 8.4–10.5)
CAST: 1 /LPF — SIGNIFICANT CHANGE UP (ref 0–4)
CAST: 1 /LPF — SIGNIFICANT CHANGE UP (ref 0–4)
CHLORIDE SERPL-SCNC: 103 MMOL/L — SIGNIFICANT CHANGE UP (ref 96–108)
CHLORIDE SERPL-SCNC: 103 MMOL/L — SIGNIFICANT CHANGE UP (ref 96–108)
CHLORIDE SERPL-SCNC: 104 MMOL/L — SIGNIFICANT CHANGE UP (ref 96–108)
CHLORIDE SERPL-SCNC: 105 MMOL/L — SIGNIFICANT CHANGE UP (ref 96–108)
CHLORIDE SERPL-SCNC: 106 MMOL/L — SIGNIFICANT CHANGE UP (ref 96–108)
CHLORIDE SERPL-SCNC: 107 MMOL/L — SIGNIFICANT CHANGE UP (ref 96–108)
CHOLEST SERPL-MCNC: 107 MG/DL — SIGNIFICANT CHANGE UP
CHOLEST SERPL-MCNC: 107 MG/DL — SIGNIFICANT CHANGE UP
CHOLEST SERPL-MCNC: 213 MG/DL — HIGH
CK MB CFR SERPL CALC: 11.3 NG/ML — HIGH (ref 0–6.7)
CK MB CFR SERPL CALC: 11.3 NG/ML — HIGH (ref 0–6.7)
CK MB CFR SERPL CALC: 17.7 NG/ML — HIGH (ref 0–6.7)
CK MB CFR SERPL CALC: 19 NG/ML — HIGH (ref 0–6.7)
CK MB CFR SERPL CALC: 19 NG/ML — HIGH (ref 0–6.7)
CK MB CFR SERPL CALC: 27.7 NG/ML — HIGH (ref 0–6.7)
CK MB CFR SERPL CALC: 27.7 NG/ML — HIGH (ref 0–6.7)
CK MB CFR SERPL CALC: 3.6 NG/ML — SIGNIFICANT CHANGE UP (ref 0–6.7)
CK MB CFR SERPL CALC: 3.6 NG/ML — SIGNIFICANT CHANGE UP (ref 0–6.7)
CK MB CFR SERPL CALC: 34.6 NG/ML — HIGH (ref 0–6.7)
CK MB CFR SERPL CALC: 34.6 NG/ML — HIGH (ref 0–6.7)
CK MB CFR SERPL CALC: 6.7 NG/ML — SIGNIFICANT CHANGE UP (ref 0–6.7)
CK MB CFR SERPL CALC: 6.7 NG/ML — SIGNIFICANT CHANGE UP (ref 0–6.7)
CK MB CFR SERPL CALC: 6.8 NG/ML — HIGH (ref 0–6.7)
CK MB CFR SERPL CALC: 8.4 NG/ML — HIGH (ref 0–6.7)
CK MB CFR SERPL CALC: 8.4 NG/ML — HIGH (ref 0–6.7)
CK SERPL-CCNC: 258 U/L — HIGH (ref 30–200)
CK SERPL-CCNC: 297 U/L — HIGH (ref 30–200)
CK SERPL-CCNC: 297 U/L — HIGH (ref 30–200)
CK SERPL-CCNC: 305 U/L — HIGH (ref 30–200)
CK SERPL-CCNC: 313 U/L — HIGH (ref 30–200)
CK SERPL-CCNC: 313 U/L — HIGH (ref 30–200)
CK SERPL-CCNC: 333 U/L — HIGH (ref 30–200)
CK SERPL-CCNC: 333 U/L — HIGH (ref 30–200)
CK SERPL-CCNC: 409 U/L — HIGH (ref 30–200)
CK SERPL-CCNC: 409 U/L — HIGH (ref 30–200)
CK SERPL-CCNC: 514 U/L — HIGH (ref 30–200)
CK SERPL-CCNC: 514 U/L — HIGH (ref 30–200)
CK SERPL-CCNC: 547 U/L — HIGH (ref 30–200)
CK SERPL-CCNC: 547 U/L — HIGH (ref 30–200)
CK SERPL-CCNC: 616 U/L — HIGH (ref 30–200)
CK SERPL-CCNC: 616 U/L — HIGH (ref 30–200)
CO2 SERPL-SCNC: 20 MMOL/L — LOW (ref 22–31)
CO2 SERPL-SCNC: 22 MMOL/L — SIGNIFICANT CHANGE UP (ref 22–31)
CO2 SERPL-SCNC: 23 MMOL/L — SIGNIFICANT CHANGE UP (ref 22–31)
CO2 SERPL-SCNC: 24 MMOL/L — SIGNIFICANT CHANGE UP (ref 22–31)
CO2 SERPL-SCNC: 25 MMOL/L — SIGNIFICANT CHANGE UP (ref 22–31)
CO2 SERPL-SCNC: 26 MMOL/L — SIGNIFICANT CHANGE UP (ref 22–31)
CO2 SERPL-SCNC: 26 MMOL/L — SIGNIFICANT CHANGE UP (ref 22–31)
CO2 SERPL-SCNC: 27 MMOL/L — SIGNIFICANT CHANGE UP (ref 22–31)
CO2 SERPL-SCNC: 27 MMOL/L — SIGNIFICANT CHANGE UP (ref 22–31)
COLOR SPEC: YELLOW — SIGNIFICANT CHANGE UP
COLOR SPEC: YELLOW — SIGNIFICANT CHANGE UP
CREAT SERPL-MCNC: 0.89 MG/DL — SIGNIFICANT CHANGE UP (ref 0.5–1.3)
CREAT SERPL-MCNC: 0.89 MG/DL — SIGNIFICANT CHANGE UP (ref 0.5–1.3)
CREAT SERPL-MCNC: 0.92 MG/DL — SIGNIFICANT CHANGE UP (ref 0.5–1.3)
CREAT SERPL-MCNC: 0.92 MG/DL — SIGNIFICANT CHANGE UP (ref 0.5–1.3)
CREAT SERPL-MCNC: 0.95 MG/DL — SIGNIFICANT CHANGE UP (ref 0.5–1.3)
CREAT SERPL-MCNC: 0.95 MG/DL — SIGNIFICANT CHANGE UP (ref 0.5–1.3)
CREAT SERPL-MCNC: 1 MG/DL — SIGNIFICANT CHANGE UP (ref 0.5–1.3)
CREAT SERPL-MCNC: 1 MG/DL — SIGNIFICANT CHANGE UP (ref 0.5–1.3)
CREAT SERPL-MCNC: 1.14 MG/DL — SIGNIFICANT CHANGE UP (ref 0.5–1.3)
CREAT SERPL-MCNC: 1.14 MG/DL — SIGNIFICANT CHANGE UP (ref 0.5–1.3)
CREAT SERPL-MCNC: 1.16 MG/DL — SIGNIFICANT CHANGE UP (ref 0.5–1.3)
CREAT SERPL-MCNC: 1.16 MG/DL — SIGNIFICANT CHANGE UP (ref 0.5–1.3)
CREAT SERPL-MCNC: 1.31 MG/DL — HIGH (ref 0.5–1.3)
CREAT SERPL-MCNC: 1.34 MG/DL — HIGH (ref 0.5–1.3)
CREAT SERPL-MCNC: 1.39 MG/DL — HIGH (ref 0.5–1.3)
CREAT SERPL-MCNC: 1.45 MG/DL — HIGH (ref 0.5–1.3)
CREAT SERPL-MCNC: 1.46 MG/DL — HIGH (ref 0.5–1.3)
CREAT SERPL-MCNC: 1.49 MG/DL — HIGH (ref 0.5–1.3)
CRP SERPL-MCNC: 90.8 MG/L — HIGH (ref 0–4)
CRP SERPL-MCNC: 90.8 MG/L — HIGH (ref 0–4)
CULTURE RESULTS: ABNORMAL
CULTURE RESULTS: ABNORMAL
CULTURE RESULTS: SIGNIFICANT CHANGE UP
D DIMER BLD IA.RAPID-MCNC: 535 NG/ML DDU — HIGH
D DIMER BLD IA.RAPID-MCNC: 535 NG/ML DDU — HIGH
DIFF PNL FLD: ABNORMAL
DIFF PNL FLD: ABNORMAL
EGFR: 45 ML/MIN/1.73M2 — LOW
EGFR: 46 ML/MIN/1.73M2 — LOW
EGFR: 47 ML/MIN/1.73M2 — LOW
EGFR: 49 ML/MIN/1.73M2 — LOW
EGFR: 51 ML/MIN/1.73M2 — LOW
EGFR: 53 ML/MIN/1.73M2 — LOW
EGFR: 61 ML/MIN/1.73M2 — SIGNIFICANT CHANGE UP
EGFR: 61 ML/MIN/1.73M2 — SIGNIFICANT CHANGE UP
EGFR: 62 ML/MIN/1.73M2 — SIGNIFICANT CHANGE UP
EGFR: 62 ML/MIN/1.73M2 — SIGNIFICANT CHANGE UP
EGFR: 72 ML/MIN/1.73M2 — SIGNIFICANT CHANGE UP
EGFR: 72 ML/MIN/1.73M2 — SIGNIFICANT CHANGE UP
EGFR: 77 ML/MIN/1.73M2 — SIGNIFICANT CHANGE UP
EGFR: 77 ML/MIN/1.73M2 — SIGNIFICANT CHANGE UP
EGFR: 80 ML/MIN/1.73M2 — SIGNIFICANT CHANGE UP
EGFR: 80 ML/MIN/1.73M2 — SIGNIFICANT CHANGE UP
EGFR: 82 ML/MIN/1.73M2 — SIGNIFICANT CHANGE UP
EGFR: 82 ML/MIN/1.73M2 — SIGNIFICANT CHANGE UP
EOSINOPHIL # BLD AUTO: 0.04 K/UL — SIGNIFICANT CHANGE UP (ref 0–0.5)
EOSINOPHIL # BLD AUTO: 0.08 K/UL — SIGNIFICANT CHANGE UP (ref 0–0.5)
EOSINOPHIL # BLD AUTO: 0.08 K/UL — SIGNIFICANT CHANGE UP (ref 0–0.5)
EOSINOPHIL # BLD AUTO: 0.12 K/UL — SIGNIFICANT CHANGE UP (ref 0–0.5)
EOSINOPHIL # BLD AUTO: 0.13 K/UL — SIGNIFICANT CHANGE UP (ref 0–0.5)
EOSINOPHIL # BLD AUTO: 0.16 K/UL — SIGNIFICANT CHANGE UP (ref 0–0.5)
EOSINOPHIL # BLD AUTO: 0.16 K/UL — SIGNIFICANT CHANGE UP (ref 0–0.5)
EOSINOPHIL # BLD AUTO: 0.18 K/UL — SIGNIFICANT CHANGE UP (ref 0–0.5)
EOSINOPHIL # BLD AUTO: 0.18 K/UL — SIGNIFICANT CHANGE UP (ref 0–0.5)
EOSINOPHIL NFR BLD AUTO: 0.5 % — SIGNIFICANT CHANGE UP (ref 0–6)
EOSINOPHIL NFR BLD AUTO: 1.2 % — SIGNIFICANT CHANGE UP (ref 0–6)
EOSINOPHIL NFR BLD AUTO: 1.2 % — SIGNIFICANT CHANGE UP (ref 0–6)
EOSINOPHIL NFR BLD AUTO: 1.4 % — SIGNIFICANT CHANGE UP (ref 0–6)
EOSINOPHIL NFR BLD AUTO: 1.5 % — SIGNIFICANT CHANGE UP (ref 0–6)
EOSINOPHIL NFR BLD AUTO: 2.7 % — SIGNIFICANT CHANGE UP (ref 0–6)
EOSINOPHIL NFR BLD AUTO: 2.7 % — SIGNIFICANT CHANGE UP (ref 0–6)
EOSINOPHIL NFR BLD AUTO: 2.8 % — SIGNIFICANT CHANGE UP (ref 0–6)
EOSINOPHIL NFR BLD AUTO: 2.8 % — SIGNIFICANT CHANGE UP (ref 0–6)
ESTIMATED AVERAGE GLUCOSE: 128 MG/DL — HIGH (ref 68–114)
FERRITIN SERPL-MCNC: 217 NG/ML — SIGNIFICANT CHANGE UP (ref 30–400)
FERRITIN SERPL-MCNC: 217 NG/ML — SIGNIFICANT CHANGE UP (ref 30–400)
FLUAV AG NPH QL: SIGNIFICANT CHANGE UP
FLUAV AG NPH QL: SIGNIFICANT CHANGE UP
FLUBV AG NPH QL: SIGNIFICANT CHANGE UP
FLUBV AG NPH QL: SIGNIFICANT CHANGE UP
FOLATE SERPL-MCNC: 12.2 NG/ML — SIGNIFICANT CHANGE UP
FOLATE SERPL-MCNC: 12.2 NG/ML — SIGNIFICANT CHANGE UP
GLUCOSE SERPL-MCNC: 101 MG/DL — HIGH (ref 70–99)
GLUCOSE SERPL-MCNC: 101 MG/DL — HIGH (ref 70–99)
GLUCOSE SERPL-MCNC: 102 MG/DL — HIGH (ref 70–99)
GLUCOSE SERPL-MCNC: 102 MG/DL — HIGH (ref 70–99)
GLUCOSE SERPL-MCNC: 104 MG/DL — HIGH (ref 70–99)
GLUCOSE SERPL-MCNC: 104 MG/DL — HIGH (ref 70–99)
GLUCOSE SERPL-MCNC: 108 MG/DL — HIGH (ref 70–99)
GLUCOSE SERPL-MCNC: 116 MG/DL — HIGH (ref 70–99)
GLUCOSE SERPL-MCNC: 123 MG/DL — HIGH (ref 70–99)
GLUCOSE SERPL-MCNC: 127 MG/DL — HIGH (ref 70–99)
GLUCOSE SERPL-MCNC: 127 MG/DL — HIGH (ref 70–99)
GLUCOSE SERPL-MCNC: 170 MG/DL — HIGH (ref 70–99)
GLUCOSE SERPL-MCNC: 91 MG/DL — SIGNIFICANT CHANGE UP (ref 70–99)
GLUCOSE SERPL-MCNC: 91 MG/DL — SIGNIFICANT CHANGE UP (ref 70–99)
GLUCOSE SERPL-MCNC: 92 MG/DL — SIGNIFICANT CHANGE UP (ref 70–99)
GLUCOSE SERPL-MCNC: 92 MG/DL — SIGNIFICANT CHANGE UP (ref 70–99)
GLUCOSE SERPL-MCNC: 98 MG/DL — SIGNIFICANT CHANGE UP (ref 70–99)
GLUCOSE SERPL-MCNC: 98 MG/DL — SIGNIFICANT CHANGE UP (ref 70–99)
GLUCOSE UR QL: NEGATIVE MG/DL — SIGNIFICANT CHANGE UP
GLUCOSE UR QL: NEGATIVE MG/DL — SIGNIFICANT CHANGE UP
HCT VFR BLD CALC: 28.7 % — LOW (ref 39–50)
HCT VFR BLD CALC: 29.9 % — LOW (ref 39–50)
HCT VFR BLD CALC: 29.9 % — LOW (ref 39–50)
HCT VFR BLD CALC: 30.3 % — LOW (ref 39–50)
HCT VFR BLD CALC: 30.3 % — LOW (ref 39–50)
HCT VFR BLD CALC: 31.1 % — LOW (ref 39–50)
HCT VFR BLD CALC: 31.4 % — LOW (ref 39–50)
HCT VFR BLD CALC: 31.4 % — LOW (ref 39–50)
HCT VFR BLD CALC: 31.8 % — LOW (ref 39–50)
HCT VFR BLD CALC: 32.7 % — LOW (ref 39–50)
HCT VFR BLD CALC: 32.8 % — LOW (ref 39–50)
HCT VFR BLD CALC: 32.8 % — LOW (ref 39–50)
HCT VFR BLD CALC: 34.6 % — LOW (ref 39–50)
HCT VFR BLD CALC: 35.7 % — LOW (ref 39–50)
HDLC SERPL-MCNC: 43 MG/DL — SIGNIFICANT CHANGE UP
HDLC SERPL-MCNC: 43 MG/DL — SIGNIFICANT CHANGE UP
HDLC SERPL-MCNC: 45 MG/DL — SIGNIFICANT CHANGE UP
HGB BLD-MCNC: 10 G/DL — LOW (ref 13–17)
HGB BLD-MCNC: 10.2 G/DL — LOW (ref 13–17)
HGB BLD-MCNC: 10.2 G/DL — LOW (ref 13–17)
HGB BLD-MCNC: 10.5 G/DL — LOW (ref 13–17)
HGB BLD-MCNC: 10.8 G/DL — LOW (ref 13–17)
HGB BLD-MCNC: 10.8 G/DL — LOW (ref 13–17)
HGB BLD-MCNC: 10.9 G/DL — LOW (ref 13–17)
HGB BLD-MCNC: 10.9 G/DL — LOW (ref 13–17)
HGB BLD-MCNC: 11.6 G/DL — LOW (ref 13–17)
HGB BLD-MCNC: 11.9 G/DL — LOW (ref 13–17)
HGB BLD-MCNC: 9.4 G/DL — LOW (ref 13–17)
HGB BLD-MCNC: 9.4 G/DL — LOW (ref 13–17)
HGB BLD-MCNC: 9.5 G/DL — LOW (ref 13–17)
HGB BLD-MCNC: 9.5 G/DL — LOW (ref 13–17)
IMM GRANULOCYTES NFR BLD AUTO: 0 % — SIGNIFICANT CHANGE UP (ref 0–0.9)
IMM GRANULOCYTES NFR BLD AUTO: 0 % — SIGNIFICANT CHANGE UP (ref 0–0.9)
IMM GRANULOCYTES NFR BLD AUTO: 0.2 % — SIGNIFICANT CHANGE UP (ref 0–0.9)
IMM GRANULOCYTES NFR BLD AUTO: 0.3 % — SIGNIFICANT CHANGE UP (ref 0–0.9)
IMM GRANULOCYTES NFR BLD AUTO: 0.4 % — SIGNIFICANT CHANGE UP (ref 0–0.9)
IMM GRANULOCYTES NFR BLD AUTO: 0.4 % — SIGNIFICANT CHANGE UP (ref 0–0.9)
INR BLD: 1.08 — SIGNIFICANT CHANGE UP (ref 0.85–1.18)
INR BLD: 1.08 — SIGNIFICANT CHANGE UP (ref 0.85–1.18)
INR BLD: 1.79 — HIGH (ref 0.85–1.18)
INR BLD: 1.91 — HIGH (ref 0.85–1.18)
INR BLD: 2.3 — HIGH (ref 0.85–1.18)
INR BLD: 2.37 — HIGH (ref 0.85–1.18)
INR BLD: 2.42 — HIGH (ref 0.85–1.18)
INR BLD: 2.53 — HIGH (ref 0.85–1.18)
KETONES UR-MCNC: NEGATIVE MG/DL — SIGNIFICANT CHANGE UP
KETONES UR-MCNC: NEGATIVE MG/DL — SIGNIFICANT CHANGE UP
LACTATE SERPL-SCNC: 0.5 MMOL/L — SIGNIFICANT CHANGE UP (ref 0.5–2)
LACTATE SERPL-SCNC: 0.5 MMOL/L — SIGNIFICANT CHANGE UP (ref 0.5–2)
LACTATE SERPL-SCNC: 1.6 MMOL/L — SIGNIFICANT CHANGE UP (ref 0.5–2)
LACTATE SERPL-SCNC: 1.6 MMOL/L — SIGNIFICANT CHANGE UP (ref 0.5–2)
LACTATE SERPL-SCNC: 2 MMOL/L — SIGNIFICANT CHANGE UP (ref 0.5–2)
LDH SERPL L TO P-CCNC: 210 U/L — SIGNIFICANT CHANGE UP (ref 50–242)
LDH SERPL L TO P-CCNC: 210 U/L — SIGNIFICANT CHANGE UP (ref 50–242)
LEUKOCYTE ESTERASE UR-ACNC: ABNORMAL
LEUKOCYTE ESTERASE UR-ACNC: ABNORMAL
LIDOCAIN IGE QN: 51 U/L — SIGNIFICANT CHANGE UP (ref 7–60)
LIDOCAIN IGE QN: 51 U/L — SIGNIFICANT CHANGE UP (ref 7–60)
LIPID PNL WITH DIRECT LDL SERPL: 156 MG/DL — HIGH
LIPID PNL WITH DIRECT LDL SERPL: 54 MG/DL — SIGNIFICANT CHANGE UP
LIPID PNL WITH DIRECT LDL SERPL: 54 MG/DL — SIGNIFICANT CHANGE UP
LYMPHOCYTES # BLD AUTO: 0.89 K/UL — LOW (ref 1–3.3)
LYMPHOCYTES # BLD AUTO: 0.89 K/UL — LOW (ref 1–3.3)
LYMPHOCYTES # BLD AUTO: 1.55 K/UL — SIGNIFICANT CHANGE UP (ref 1–3.3)
LYMPHOCYTES # BLD AUTO: 1.55 K/UL — SIGNIFICANT CHANGE UP (ref 1–3.3)
LYMPHOCYTES # BLD AUTO: 1.71 K/UL — SIGNIFICANT CHANGE UP (ref 1–3.3)
LYMPHOCYTES # BLD AUTO: 1.9 K/UL — SIGNIFICANT CHANGE UP (ref 1–3.3)
LYMPHOCYTES # BLD AUTO: 1.9 K/UL — SIGNIFICANT CHANGE UP (ref 1–3.3)
LYMPHOCYTES # BLD AUTO: 1.94 K/UL — SIGNIFICANT CHANGE UP (ref 1–3.3)
LYMPHOCYTES # BLD AUTO: 11.7 % — LOW (ref 13–44)
LYMPHOCYTES # BLD AUTO: 11.7 % — LOW (ref 13–44)
LYMPHOCYTES # BLD AUTO: 2.27 K/UL — SIGNIFICANT CHANGE UP (ref 1–3.3)
LYMPHOCYTES # BLD AUTO: 2.27 K/UL — SIGNIFICANT CHANGE UP (ref 1–3.3)
LYMPHOCYTES # BLD AUTO: 2.43 K/UL — SIGNIFICANT CHANGE UP (ref 1–3.3)
LYMPHOCYTES # BLD AUTO: 20.6 % — SIGNIFICANT CHANGE UP (ref 13–44)
LYMPHOCYTES # BLD AUTO: 24 % — SIGNIFICANT CHANGE UP (ref 13–44)
LYMPHOCYTES # BLD AUTO: 24 % — SIGNIFICANT CHANGE UP (ref 13–44)
LYMPHOCYTES # BLD AUTO: 24.4 % — SIGNIFICANT CHANGE UP (ref 13–44)
LYMPHOCYTES # BLD AUTO: 26.7 % — SIGNIFICANT CHANGE UP (ref 13–44)
LYMPHOCYTES # BLD AUTO: 33.2 % — SIGNIFICANT CHANGE UP (ref 13–44)
LYMPHOCYTES # BLD AUTO: 33.2 % — SIGNIFICANT CHANGE UP (ref 13–44)
LYMPHOCYTES # BLD AUTO: 34.2 % — SIGNIFICANT CHANGE UP (ref 13–44)
LYMPHOCYTES # BLD AUTO: 34.2 % — SIGNIFICANT CHANGE UP (ref 13–44)
MAGNESIUM SERPL-MCNC: 1.7 MG/DL — SIGNIFICANT CHANGE UP (ref 1.6–2.6)
MAGNESIUM SERPL-MCNC: 1.8 MG/DL — SIGNIFICANT CHANGE UP (ref 1.6–2.6)
MAGNESIUM SERPL-MCNC: 1.9 MG/DL — SIGNIFICANT CHANGE UP (ref 1.6–2.6)
MAGNESIUM SERPL-MCNC: 1.9 MG/DL — SIGNIFICANT CHANGE UP (ref 1.6–2.6)
MAGNESIUM SERPL-MCNC: 2 MG/DL — SIGNIFICANT CHANGE UP (ref 1.6–2.6)
MCHC RBC-ENTMCNC: 30.1 PG — SIGNIFICANT CHANGE UP (ref 27–34)
MCHC RBC-ENTMCNC: 30.4 PG — SIGNIFICANT CHANGE UP (ref 27–34)
MCHC RBC-ENTMCNC: 30.4 PG — SIGNIFICANT CHANGE UP (ref 27–34)
MCHC RBC-ENTMCNC: 30.5 PG — SIGNIFICANT CHANGE UP (ref 27–34)
MCHC RBC-ENTMCNC: 30.6 PG — SIGNIFICANT CHANGE UP (ref 27–34)
MCHC RBC-ENTMCNC: 30.8 PG — SIGNIFICANT CHANGE UP (ref 27–34)
MCHC RBC-ENTMCNC: 30.8 PG — SIGNIFICANT CHANGE UP (ref 27–34)
MCHC RBC-ENTMCNC: 30.9 PG — SIGNIFICANT CHANGE UP (ref 27–34)
MCHC RBC-ENTMCNC: 31.2 PG — SIGNIFICANT CHANGE UP (ref 27–34)
MCHC RBC-ENTMCNC: 31.2 PG — SIGNIFICANT CHANGE UP (ref 27–34)
MCHC RBC-ENTMCNC: 32.5 GM/DL — SIGNIFICANT CHANGE UP (ref 32–36)
MCHC RBC-ENTMCNC: 32.5 GM/DL — SIGNIFICANT CHANGE UP (ref 32–36)
MCHC RBC-ENTMCNC: 32.8 GM/DL — SIGNIFICANT CHANGE UP (ref 32–36)
MCHC RBC-ENTMCNC: 32.8 GM/DL — SIGNIFICANT CHANGE UP (ref 32–36)
MCHC RBC-ENTMCNC: 33 GM/DL — SIGNIFICANT CHANGE UP (ref 32–36)
MCHC RBC-ENTMCNC: 33.1 GM/DL — SIGNIFICANT CHANGE UP (ref 32–36)
MCHC RBC-ENTMCNC: 33.1 GM/DL — SIGNIFICANT CHANGE UP (ref 32–36)
MCHC RBC-ENTMCNC: 33.2 GM/DL — SIGNIFICANT CHANGE UP (ref 32–36)
MCHC RBC-ENTMCNC: 33.2 GM/DL — SIGNIFICANT CHANGE UP (ref 32–36)
MCHC RBC-ENTMCNC: 33.3 GM/DL — SIGNIFICANT CHANGE UP (ref 32–36)
MCHC RBC-ENTMCNC: 33.4 GM/DL — SIGNIFICANT CHANGE UP (ref 32–36)
MCHC RBC-ENTMCNC: 33.4 GM/DL — SIGNIFICANT CHANGE UP (ref 32–36)
MCHC RBC-ENTMCNC: 33.5 GM/DL — SIGNIFICANT CHANGE UP (ref 32–36)
MCHC RBC-ENTMCNC: 33.8 GM/DL — SIGNIFICANT CHANGE UP (ref 32–36)
MCHC RBC-ENTMCNC: 34 GM/DL — SIGNIFICANT CHANGE UP (ref 32–36)
MCV RBC AUTO: 89.6 FL — SIGNIFICANT CHANGE UP (ref 80–100)
MCV RBC AUTO: 90.7 FL — SIGNIFICANT CHANGE UP (ref 80–100)
MCV RBC AUTO: 91.1 FL — SIGNIFICANT CHANGE UP (ref 80–100)
MCV RBC AUTO: 92.7 FL — SIGNIFICANT CHANGE UP (ref 80–100)
MCV RBC AUTO: 92.7 FL — SIGNIFICANT CHANGE UP (ref 80–100)
MCV RBC AUTO: 93.1 FL — SIGNIFICANT CHANGE UP (ref 80–100)
MCV RBC AUTO: 93.1 FL — SIGNIFICANT CHANGE UP (ref 80–100)
MCV RBC AUTO: 93.5 FL — SIGNIFICANT CHANGE UP (ref 80–100)
MCV RBC AUTO: 94.3 FL — SIGNIFICANT CHANGE UP (ref 80–100)
MCV RBC AUTO: 94.3 FL — SIGNIFICANT CHANGE UP (ref 80–100)
METHOD TYPE: SIGNIFICANT CHANGE UP
METHOD TYPE: SIGNIFICANT CHANGE UP
MONOCYTES # BLD AUTO: 0.39 K/UL — SIGNIFICANT CHANGE UP (ref 0–0.9)
MONOCYTES # BLD AUTO: 0.39 K/UL — SIGNIFICANT CHANGE UP (ref 0–0.9)
MONOCYTES # BLD AUTO: 0.43 K/UL — SIGNIFICANT CHANGE UP (ref 0–0.9)
MONOCYTES # BLD AUTO: 0.43 K/UL — SIGNIFICANT CHANGE UP (ref 0–0.9)
MONOCYTES # BLD AUTO: 0.45 K/UL — SIGNIFICANT CHANGE UP (ref 0–0.9)
MONOCYTES # BLD AUTO: 0.49 K/UL — SIGNIFICANT CHANGE UP (ref 0–0.9)
MONOCYTES # BLD AUTO: 0.54 K/UL — SIGNIFICANT CHANGE UP (ref 0–0.9)
MONOCYTES # BLD AUTO: 0.6 K/UL — SIGNIFICANT CHANGE UP (ref 0–0.9)
MONOCYTES # BLD AUTO: 0.6 K/UL — SIGNIFICANT CHANGE UP (ref 0–0.9)
MONOCYTES NFR BLD AUTO: 10.5 % — SIGNIFICANT CHANGE UP (ref 2–14)
MONOCYTES NFR BLD AUTO: 10.5 % — SIGNIFICANT CHANGE UP (ref 2–14)
MONOCYTES NFR BLD AUTO: 5.1 % — SIGNIFICANT CHANGE UP (ref 2–14)
MONOCYTES NFR BLD AUTO: 5.1 % — SIGNIFICANT CHANGE UP (ref 2–14)
MONOCYTES NFR BLD AUTO: 5.4 % — SIGNIFICANT CHANGE UP (ref 2–14)
MONOCYTES NFR BLD AUTO: 5.4 % — SIGNIFICANT CHANGE UP (ref 2–14)
MONOCYTES NFR BLD AUTO: 6.7 % — SIGNIFICANT CHANGE UP (ref 2–14)
MONOCYTES NFR BLD AUTO: 6.7 % — SIGNIFICANT CHANGE UP (ref 2–14)
MONOCYTES NFR BLD AUTO: 6.8 % — SIGNIFICANT CHANGE UP (ref 2–14)
MONOCYTES NFR BLD AUTO: 7.4 % — SIGNIFICANT CHANGE UP (ref 2–14)
MONOCYTES NFR BLD AUTO: 7.4 % — SIGNIFICANT CHANGE UP (ref 2–14)
NEUTROPHILS # BLD AUTO: 3.04 K/UL — SIGNIFICANT CHANGE UP (ref 1.8–7.4)
NEUTROPHILS # BLD AUTO: 3.04 K/UL — SIGNIFICANT CHANGE UP (ref 1.8–7.4)
NEUTROPHILS # BLD AUTO: 3.63 K/UL — SIGNIFICANT CHANGE UP (ref 1.8–7.4)
NEUTROPHILS # BLD AUTO: 3.63 K/UL — SIGNIFICANT CHANGE UP (ref 1.8–7.4)
NEUTROPHILS # BLD AUTO: 4.36 K/UL — SIGNIFICANT CHANGE UP (ref 1.8–7.4)
NEUTROPHILS # BLD AUTO: 4.36 K/UL — SIGNIFICANT CHANGE UP (ref 1.8–7.4)
NEUTROPHILS # BLD AUTO: 5.28 K/UL — SIGNIFICANT CHANGE UP (ref 1.8–7.4)
NEUTROPHILS # BLD AUTO: 6.02 K/UL — SIGNIFICANT CHANGE UP (ref 1.8–7.4)
NEUTROPHILS # BLD AUTO: 6.03 K/UL — SIGNIFICANT CHANGE UP (ref 1.8–7.4)
NEUTROPHILS # BLD AUTO: 6.22 K/UL — SIGNIFICANT CHANGE UP (ref 1.8–7.4)
NEUTROPHILS # BLD AUTO: 6.22 K/UL — SIGNIFICANT CHANGE UP (ref 1.8–7.4)
NEUTROPHILS NFR BLD AUTO: 53.2 % — SIGNIFICANT CHANGE UP (ref 43–77)
NEUTROPHILS NFR BLD AUTO: 53.2 % — SIGNIFICANT CHANGE UP (ref 43–77)
NEUTROPHILS NFR BLD AUTO: 54.8 % — SIGNIFICANT CHANGE UP (ref 43–77)
NEUTROPHILS NFR BLD AUTO: 54.8 % — SIGNIFICANT CHANGE UP (ref 43–77)
NEUTROPHILS NFR BLD AUTO: 66.1 % — SIGNIFICANT CHANGE UP (ref 43–77)
NEUTROPHILS NFR BLD AUTO: 66.5 % — SIGNIFICANT CHANGE UP (ref 43–77)
NEUTROPHILS NFR BLD AUTO: 67.4 % — SIGNIFICANT CHANGE UP (ref 43–77)
NEUTROPHILS NFR BLD AUTO: 67.4 % — SIGNIFICANT CHANGE UP (ref 43–77)
NEUTROPHILS NFR BLD AUTO: 72.7 % — SIGNIFICANT CHANGE UP (ref 43–77)
NEUTROPHILS NFR BLD AUTO: 81.9 % — HIGH (ref 43–77)
NEUTROPHILS NFR BLD AUTO: 81.9 % — HIGH (ref 43–77)
NITRITE UR-MCNC: NEGATIVE — SIGNIFICANT CHANGE UP
NITRITE UR-MCNC: NEGATIVE — SIGNIFICANT CHANGE UP
NON HDL CHOLESTEROL: 168 MG/DL — HIGH
NON HDL CHOLESTEROL: 64 MG/DL — SIGNIFICANT CHANGE UP
NON HDL CHOLESTEROL: 64 MG/DL — SIGNIFICANT CHANGE UP
NRBC # BLD: 0 /100 WBCS — SIGNIFICANT CHANGE UP (ref 0–0)
NT-PROBNP SERPL-SCNC: 3061 PG/ML — HIGH (ref 0–300)
NT-PROBNP SERPL-SCNC: 3061 PG/ML — HIGH (ref 0–300)
NT-PROBNP SERPL-SCNC: 788 PG/ML — HIGH (ref 0–300)
ORGANISM # SPEC MICROSCOPIC CNT: ABNORMAL
ORGANISM # SPEC MICROSCOPIC CNT: ABNORMAL
ORGANISM # SPEC MICROSCOPIC CNT: SIGNIFICANT CHANGE UP
ORGANISM # SPEC MICROSCOPIC CNT: SIGNIFICANT CHANGE UP
PH UR: 5.5 — SIGNIFICANT CHANGE UP (ref 5–8)
PH UR: 5.5 — SIGNIFICANT CHANGE UP (ref 5–8)
PHOSPHATE SERPL-MCNC: 2.3 MG/DL — LOW (ref 2.5–4.5)
PHOSPHATE SERPL-MCNC: 2.8 MG/DL — SIGNIFICANT CHANGE UP (ref 2.5–4.5)
PLATELET # BLD AUTO: 150 K/UL — SIGNIFICANT CHANGE UP (ref 150–400)
PLATELET # BLD AUTO: 150 K/UL — SIGNIFICANT CHANGE UP (ref 150–400)
PLATELET # BLD AUTO: 153 K/UL — SIGNIFICANT CHANGE UP (ref 150–400)
PLATELET # BLD AUTO: 153 K/UL — SIGNIFICANT CHANGE UP (ref 150–400)
PLATELET # BLD AUTO: 160 K/UL — SIGNIFICANT CHANGE UP (ref 150–400)
PLATELET # BLD AUTO: 160 K/UL — SIGNIFICANT CHANGE UP (ref 150–400)
PLATELET # BLD AUTO: 164 K/UL — SIGNIFICANT CHANGE UP (ref 150–400)
PLATELET # BLD AUTO: 164 K/UL — SIGNIFICANT CHANGE UP (ref 150–400)
PLATELET # BLD AUTO: 173 K/UL — SIGNIFICANT CHANGE UP (ref 150–400)
PLATELET # BLD AUTO: 173 K/UL — SIGNIFICANT CHANGE UP (ref 150–400)
PLATELET # BLD AUTO: 174 K/UL — SIGNIFICANT CHANGE UP (ref 150–400)
PLATELET # BLD AUTO: 174 K/UL — SIGNIFICANT CHANGE UP (ref 150–400)
PLATELET # BLD AUTO: 211 K/UL — SIGNIFICANT CHANGE UP (ref 150–400)
PLATELET # BLD AUTO: 225 K/UL — SIGNIFICANT CHANGE UP (ref 150–400)
PLATELET # BLD AUTO: 226 K/UL — SIGNIFICANT CHANGE UP (ref 150–400)
PLATELET # BLD AUTO: 238 K/UL — SIGNIFICANT CHANGE UP (ref 150–400)
PLATELET # BLD AUTO: 239 K/UL — SIGNIFICANT CHANGE UP (ref 150–400)
POTASSIUM SERPL-MCNC: 3.7 MMOL/L — SIGNIFICANT CHANGE UP (ref 3.5–5.3)
POTASSIUM SERPL-MCNC: 3.7 MMOL/L — SIGNIFICANT CHANGE UP (ref 3.5–5.3)
POTASSIUM SERPL-MCNC: 4 MMOL/L — SIGNIFICANT CHANGE UP (ref 3.5–5.3)
POTASSIUM SERPL-MCNC: 4 MMOL/L — SIGNIFICANT CHANGE UP (ref 3.5–5.3)
POTASSIUM SERPL-MCNC: 4.1 MMOL/L — SIGNIFICANT CHANGE UP (ref 3.5–5.3)
POTASSIUM SERPL-MCNC: 4.1 MMOL/L — SIGNIFICANT CHANGE UP (ref 3.5–5.3)
POTASSIUM SERPL-MCNC: 4.2 MMOL/L — SIGNIFICANT CHANGE UP (ref 3.5–5.3)
POTASSIUM SERPL-MCNC: 4.3 MMOL/L — SIGNIFICANT CHANGE UP (ref 3.5–5.3)
POTASSIUM SERPL-MCNC: 4.4 MMOL/L — SIGNIFICANT CHANGE UP (ref 3.5–5.3)
POTASSIUM SERPL-MCNC: 4.4 MMOL/L — SIGNIFICANT CHANGE UP (ref 3.5–5.3)
POTASSIUM SERPL-MCNC: 4.8 MMOL/L — SIGNIFICANT CHANGE UP (ref 3.5–5.3)
POTASSIUM SERPL-SCNC: 3.7 MMOL/L — SIGNIFICANT CHANGE UP (ref 3.5–5.3)
POTASSIUM SERPL-SCNC: 3.7 MMOL/L — SIGNIFICANT CHANGE UP (ref 3.5–5.3)
POTASSIUM SERPL-SCNC: 4 MMOL/L — SIGNIFICANT CHANGE UP (ref 3.5–5.3)
POTASSIUM SERPL-SCNC: 4 MMOL/L — SIGNIFICANT CHANGE UP (ref 3.5–5.3)
POTASSIUM SERPL-SCNC: 4.1 MMOL/L — SIGNIFICANT CHANGE UP (ref 3.5–5.3)
POTASSIUM SERPL-SCNC: 4.1 MMOL/L — SIGNIFICANT CHANGE UP (ref 3.5–5.3)
POTASSIUM SERPL-SCNC: 4.2 MMOL/L — SIGNIFICANT CHANGE UP (ref 3.5–5.3)
POTASSIUM SERPL-SCNC: 4.3 MMOL/L — SIGNIFICANT CHANGE UP (ref 3.5–5.3)
POTASSIUM SERPL-SCNC: 4.4 MMOL/L — SIGNIFICANT CHANGE UP (ref 3.5–5.3)
POTASSIUM SERPL-SCNC: 4.4 MMOL/L — SIGNIFICANT CHANGE UP (ref 3.5–5.3)
POTASSIUM SERPL-SCNC: 4.8 MMOL/L — SIGNIFICANT CHANGE UP (ref 3.5–5.3)
PROCALCITONIN SERPL-MCNC: 0.09 NG/ML — SIGNIFICANT CHANGE UP (ref 0.02–0.1)
PROCALCITONIN SERPL-MCNC: 0.09 NG/ML — SIGNIFICANT CHANGE UP (ref 0.02–0.1)
PROT SERPL-MCNC: 6.2 G/DL — SIGNIFICANT CHANGE UP (ref 6–8.3)
PROT SERPL-MCNC: 6.2 G/DL — SIGNIFICANT CHANGE UP (ref 6–8.3)
PROT SERPL-MCNC: 6.4 G/DL — SIGNIFICANT CHANGE UP (ref 6–8.3)
PROT SERPL-MCNC: 6.4 G/DL — SIGNIFICANT CHANGE UP (ref 6–8.3)
PROT SERPL-MCNC: 6.7 G/DL — SIGNIFICANT CHANGE UP (ref 6–8.3)
PROT SERPL-MCNC: 6.7 G/DL — SIGNIFICANT CHANGE UP (ref 6–8.3)
PROT SERPL-MCNC: 6.8 G/DL — SIGNIFICANT CHANGE UP (ref 6–8.3)
PROT SERPL-MCNC: 6.8 G/DL — SIGNIFICANT CHANGE UP (ref 6–8.3)
PROT SERPL-MCNC: 6.9 G/DL — SIGNIFICANT CHANGE UP (ref 6–8.3)
PROT SERPL-MCNC: 7.5 G/DL — SIGNIFICANT CHANGE UP (ref 6–8.3)
PROT SERPL-MCNC: 7.5 G/DL — SIGNIFICANT CHANGE UP (ref 6–8.3)
PROT UR-MCNC: 30 MG/DL
PROT UR-MCNC: 30 MG/DL
PROTHROM AB SERPL-ACNC: 12.3 SEC — SIGNIFICANT CHANGE UP (ref 9.5–13)
PROTHROM AB SERPL-ACNC: 12.3 SEC — SIGNIFICANT CHANGE UP (ref 9.5–13)
PROTHROM AB SERPL-ACNC: 20.1 SEC — HIGH (ref 9.5–13)
PROTHROM AB SERPL-ACNC: 21.4 SEC — HIGH (ref 9.5–13)
PROTHROM AB SERPL-ACNC: 25.6 SEC — HIGH (ref 9.5–13)
PROTHROM AB SERPL-ACNC: 26.3 SEC — HIGH (ref 9.5–13)
PROTHROM AB SERPL-ACNC: 26.9 SEC — HIGH (ref 9.5–13)
PROTHROM AB SERPL-ACNC: 28.1 SEC — HIGH (ref 9.5–13)
RBC # BLD: 3.07 M/UL — LOW (ref 4.2–5.8)
RBC # BLD: 3.21 M/UL — LOW (ref 4.2–5.8)
RBC # BLD: 3.21 M/UL — LOW (ref 4.2–5.8)
RBC # BLD: 3.24 M/UL — LOW (ref 4.2–5.8)
RBC # BLD: 3.24 M/UL — LOW (ref 4.2–5.8)
RBC # BLD: 3.33 M/UL — LOW (ref 4.2–5.8)
RBC # BLD: 3.33 M/UL — LOW (ref 4.2–5.8)
RBC # BLD: 3.43 M/UL — LOW (ref 4.2–5.8)
RBC # BLD: 3.54 M/UL — LOW (ref 4.2–5.8)
RBC # BLD: 3.54 M/UL — LOW (ref 4.2–5.8)
RBC # BLD: 3.55 M/UL — LOW (ref 4.2–5.8)
RBC # BLD: 3.59 M/UL — LOW (ref 4.2–5.8)
RBC # BLD: 3.8 M/UL — LOW (ref 4.2–5.8)
RBC # BLD: 3.92 M/UL — LOW (ref 4.2–5.8)
RBC # FLD: 14.1 % — SIGNIFICANT CHANGE UP (ref 10.3–14.5)
RBC # FLD: 14.1 % — SIGNIFICANT CHANGE UP (ref 10.3–14.5)
RBC # FLD: 14.2 % — SIGNIFICANT CHANGE UP (ref 10.3–14.5)
RBC # FLD: 15.1 % — HIGH (ref 10.3–14.5)
RBC # FLD: 15.1 % — HIGH (ref 10.3–14.5)
RBC # FLD: 15.2 % — HIGH (ref 10.3–14.5)
RBC # FLD: 15.2 % — HIGH (ref 10.3–14.5)
RBC # FLD: 15.4 % — HIGH (ref 10.3–14.5)
RBC # FLD: 15.4 % — HIGH (ref 10.3–14.5)
RBC # FLD: 15.6 % — HIGH (ref 10.3–14.5)
RBC # FLD: 15.6 % — HIGH (ref 10.3–14.5)
RBC # FLD: 15.7 % — HIGH (ref 10.3–14.5)
RBC CASTS # UR COMP ASSIST: 2 /HPF — SIGNIFICANT CHANGE UP (ref 0–4)
RBC CASTS # UR COMP ASSIST: 2 /HPF — SIGNIFICANT CHANGE UP (ref 0–4)
RH IG SCN BLD-IMP: POSITIVE — SIGNIFICANT CHANGE UP
RSV RNA NPH QL NAA+NON-PROBE: SIGNIFICANT CHANGE UP
RSV RNA NPH QL NAA+NON-PROBE: SIGNIFICANT CHANGE UP
SARS-COV-2 RNA SPEC QL NAA+PROBE: DETECTED
SARS-COV-2 RNA SPEC QL NAA+PROBE: DETECTED
SARS-COV-2 RNA SPEC QL NAA+PROBE: NEGATIVE — SIGNIFICANT CHANGE UP
SODIUM SERPL-SCNC: 135 MMOL/L — SIGNIFICANT CHANGE UP (ref 135–145)
SODIUM SERPL-SCNC: 137 MMOL/L — SIGNIFICANT CHANGE UP (ref 135–145)
SODIUM SERPL-SCNC: 138 MMOL/L — SIGNIFICANT CHANGE UP (ref 135–145)
SODIUM SERPL-SCNC: 138 MMOL/L — SIGNIFICANT CHANGE UP (ref 135–145)
SODIUM SERPL-SCNC: 139 MMOL/L — SIGNIFICANT CHANGE UP (ref 135–145)
SODIUM SERPL-SCNC: 140 MMOL/L — SIGNIFICANT CHANGE UP (ref 135–145)
SP GR SPEC: 1.02 — SIGNIFICANT CHANGE UP (ref 1–1.03)
SP GR SPEC: 1.02 — SIGNIFICANT CHANGE UP (ref 1–1.03)
SPECIMEN SOURCE: SIGNIFICANT CHANGE UP
SQUAMOUS # UR AUTO: 2 /HPF — SIGNIFICANT CHANGE UP (ref 0–5)
SQUAMOUS # UR AUTO: 2 /HPF — SIGNIFICANT CHANGE UP (ref 0–5)
TRIGL SERPL-MCNC: 50 MG/DL — SIGNIFICANT CHANGE UP
TRIGL SERPL-MCNC: 50 MG/DL — SIGNIFICANT CHANGE UP
TRIGL SERPL-MCNC: 62 MG/DL — SIGNIFICANT CHANGE UP
TROPONIN T, HIGH SENSITIVITY RESULT: 119 NG/L — CRITICAL HIGH (ref 0–51)
TROPONIN T, HIGH SENSITIVITY RESULT: 119 NG/L — CRITICAL HIGH (ref 0–51)
TROPONIN T, HIGH SENSITIVITY RESULT: 164 NG/L — CRITICAL HIGH (ref 0–51)
TROPONIN T, HIGH SENSITIVITY RESULT: 164 NG/L — CRITICAL HIGH (ref 0–51)
TROPONIN T, HIGH SENSITIVITY RESULT: 249 NG/L — CRITICAL HIGH (ref 0–51)
TROPONIN T, HIGH SENSITIVITY RESULT: 266 NG/L — CRITICAL HIGH (ref 0–51)
TROPONIN T, HIGH SENSITIVITY RESULT: 304 NG/L — CRITICAL HIGH (ref 0–51)
TROPONIN T, HIGH SENSITIVITY RESULT: 388 NG/L — CRITICAL HIGH (ref 0–51)
TROPONIN T, HIGH SENSITIVITY RESULT: 388 NG/L — CRITICAL HIGH (ref 0–51)
TROPONIN T, HIGH SENSITIVITY RESULT: 508 NG/L — CRITICAL HIGH (ref 0–51)
TROPONIN T, HIGH SENSITIVITY RESULT: 508 NG/L — CRITICAL HIGH (ref 0–51)
TROPONIN T, HIGH SENSITIVITY RESULT: 562 NG/L — CRITICAL HIGH (ref 0–51)
TROPONIN T, HIGH SENSITIVITY RESULT: 562 NG/L — CRITICAL HIGH (ref 0–51)
TROPONIN T, HIGH SENSITIVITY RESULT: 608 NG/L — CRITICAL HIGH (ref 0–51)
TROPONIN T, HIGH SENSITIVITY RESULT: 608 NG/L — CRITICAL HIGH (ref 0–51)
TROPONIN T, HIGH SENSITIVITY RESULT: 72 NG/L — CRITICAL HIGH (ref 0–51)
TROPONIN T, HIGH SENSITIVITY RESULT: 734 NG/L — CRITICAL HIGH (ref 0–51)
TROPONIN T, HIGH SENSITIVITY RESULT: 734 NG/L — CRITICAL HIGH (ref 0–51)
TROPONIN T, HIGH SENSITIVITY RESULT: 745 NG/L — CRITICAL HIGH (ref 0–51)
TROPONIN T, HIGH SENSITIVITY RESULT: 745 NG/L — CRITICAL HIGH (ref 0–51)
TSH SERPL-MCNC: 3.33 UIU/ML — SIGNIFICANT CHANGE UP (ref 0.27–4.2)
UROBILINOGEN FLD QL: 1 MG/DL — SIGNIFICANT CHANGE UP (ref 0.2–1)
UROBILINOGEN FLD QL: 1 MG/DL — SIGNIFICANT CHANGE UP (ref 0.2–1)
VIT B12 SERPL-MCNC: 971 PG/ML — SIGNIFICANT CHANGE UP (ref 232–1245)
VIT B12 SERPL-MCNC: 971 PG/ML — SIGNIFICANT CHANGE UP (ref 232–1245)
WBC # BLD: 5.38 K/UL — SIGNIFICANT CHANGE UP (ref 3.8–10.5)
WBC # BLD: 5.38 K/UL — SIGNIFICANT CHANGE UP (ref 3.8–10.5)
WBC # BLD: 5.72 K/UL — SIGNIFICANT CHANGE UP (ref 3.8–10.5)
WBC # BLD: 5.72 K/UL — SIGNIFICANT CHANGE UP (ref 3.8–10.5)
WBC # BLD: 6.46 K/UL — SIGNIFICANT CHANGE UP (ref 3.8–10.5)
WBC # BLD: 6.46 K/UL — SIGNIFICANT CHANGE UP (ref 3.8–10.5)
WBC # BLD: 6.6 K/UL — SIGNIFICANT CHANGE UP (ref 3.8–10.5)
WBC # BLD: 6.6 K/UL — SIGNIFICANT CHANGE UP (ref 3.8–10.5)
WBC # BLD: 6.63 K/UL — SIGNIFICANT CHANGE UP (ref 3.8–10.5)
WBC # BLD: 6.63 K/UL — SIGNIFICANT CHANGE UP (ref 3.8–10.5)
WBC # BLD: 7.6 K/UL — SIGNIFICANT CHANGE UP (ref 3.8–10.5)
WBC # BLD: 7.6 K/UL — SIGNIFICANT CHANGE UP (ref 3.8–10.5)
WBC # BLD: 7.94 K/UL — SIGNIFICANT CHANGE UP (ref 3.8–10.5)
WBC # BLD: 8.29 K/UL — SIGNIFICANT CHANGE UP (ref 3.8–10.5)
WBC # BLD: 8.75 K/UL — SIGNIFICANT CHANGE UP (ref 3.8–10.5)
WBC # BLD: 8.82 K/UL — SIGNIFICANT CHANGE UP (ref 3.8–10.5)
WBC # BLD: 9.11 K/UL — SIGNIFICANT CHANGE UP (ref 3.8–10.5)
WBC # FLD AUTO: 5.38 K/UL — SIGNIFICANT CHANGE UP (ref 3.8–10.5)
WBC # FLD AUTO: 5.38 K/UL — SIGNIFICANT CHANGE UP (ref 3.8–10.5)
WBC # FLD AUTO: 5.72 K/UL — SIGNIFICANT CHANGE UP (ref 3.8–10.5)
WBC # FLD AUTO: 5.72 K/UL — SIGNIFICANT CHANGE UP (ref 3.8–10.5)
WBC # FLD AUTO: 6.46 K/UL — SIGNIFICANT CHANGE UP (ref 3.8–10.5)
WBC # FLD AUTO: 6.46 K/UL — SIGNIFICANT CHANGE UP (ref 3.8–10.5)
WBC # FLD AUTO: 6.6 K/UL — SIGNIFICANT CHANGE UP (ref 3.8–10.5)
WBC # FLD AUTO: 6.6 K/UL — SIGNIFICANT CHANGE UP (ref 3.8–10.5)
WBC # FLD AUTO: 6.63 K/UL — SIGNIFICANT CHANGE UP (ref 3.8–10.5)
WBC # FLD AUTO: 6.63 K/UL — SIGNIFICANT CHANGE UP (ref 3.8–10.5)
WBC # FLD AUTO: 7.6 K/UL — SIGNIFICANT CHANGE UP (ref 3.8–10.5)
WBC # FLD AUTO: 7.6 K/UL — SIGNIFICANT CHANGE UP (ref 3.8–10.5)
WBC # FLD AUTO: 7.94 K/UL — SIGNIFICANT CHANGE UP (ref 3.8–10.5)
WBC # FLD AUTO: 8.29 K/UL — SIGNIFICANT CHANGE UP (ref 3.8–10.5)
WBC # FLD AUTO: 8.75 K/UL — SIGNIFICANT CHANGE UP (ref 3.8–10.5)
WBC # FLD AUTO: 8.82 K/UL — SIGNIFICANT CHANGE UP (ref 3.8–10.5)
WBC # FLD AUTO: 9.11 K/UL — SIGNIFICANT CHANGE UP (ref 3.8–10.5)
WBC UR QL: 174 /HPF — HIGH (ref 0–5)
WBC UR QL: 174 /HPF — HIGH (ref 0–5)

## 2023-01-01 PROCEDURE — 99285 EMERGENCY DEPT VISIT HI MDM: CPT

## 2023-01-01 PROCEDURE — 83880 ASSAY OF NATRIURETIC PEPTIDE: CPT

## 2023-01-01 PROCEDURE — 97530 THERAPEUTIC ACTIVITIES: CPT

## 2023-01-01 PROCEDURE — 93005 ELECTROCARDIOGRAM TRACING: CPT

## 2023-01-01 PROCEDURE — 99285 EMERGENCY DEPT VISIT HI MDM: CPT | Mod: 25

## 2023-01-01 PROCEDURE — 93306 TTE W/DOPPLER COMPLETE: CPT

## 2023-01-01 PROCEDURE — 85025 COMPLETE CBC W/AUTO DIFF WBC: CPT

## 2023-01-01 PROCEDURE — 85379 FIBRIN DEGRADATION QUANT: CPT

## 2023-01-01 PROCEDURE — 86901 BLOOD TYPING SEROLOGIC RH(D): CPT

## 2023-01-01 PROCEDURE — 82550 ASSAY OF CK (CPK): CPT

## 2023-01-01 PROCEDURE — 87637 SARSCOV2&INF A&B&RSV AMP PRB: CPT

## 2023-01-01 PROCEDURE — 80076 HEPATIC FUNCTION PANEL: CPT

## 2023-01-01 PROCEDURE — 96374 THER/PROPH/DIAG INJ IV PUSH: CPT

## 2023-01-01 PROCEDURE — 93010 ELECTROCARDIOGRAM REPORT: CPT

## 2023-01-01 PROCEDURE — 93000 ELECTROCARDIOGRAM COMPLETE: CPT

## 2023-01-01 PROCEDURE — 97161 PT EVAL LOW COMPLEX 20 MIN: CPT

## 2023-01-01 PROCEDURE — 99232 SBSQ HOSP IP/OBS MODERATE 35: CPT | Mod: GC

## 2023-01-01 PROCEDURE — 83605 ASSAY OF LACTIC ACID: CPT

## 2023-01-01 PROCEDURE — 99215 OFFICE O/P EST HI 40 MIN: CPT

## 2023-01-01 PROCEDURE — 82728 ASSAY OF FERRITIN: CPT

## 2023-01-01 PROCEDURE — 84443 ASSAY THYROID STIM HORMONE: CPT

## 2023-01-01 PROCEDURE — 93321 DOPPLER ECHO F-UP/LMTD STD: CPT

## 2023-01-01 PROCEDURE — 99222 1ST HOSP IP/OBS MODERATE 55: CPT

## 2023-01-01 PROCEDURE — 82553 CREATINE MB FRACTION: CPT

## 2023-01-01 PROCEDURE — 80061 LIPID PANEL: CPT

## 2023-01-01 PROCEDURE — 97165 OT EVAL LOW COMPLEX 30 MIN: CPT

## 2023-01-01 PROCEDURE — 85027 COMPLETE CBC AUTOMATED: CPT

## 2023-01-01 PROCEDURE — 86850 RBC ANTIBODY SCREEN: CPT

## 2023-01-01 PROCEDURE — 99233 SBSQ HOSP IP/OBS HIGH 50: CPT

## 2023-01-01 PROCEDURE — 86900 BLOOD TYPING SEROLOGIC ABO: CPT

## 2023-01-01 PROCEDURE — 99232 SBSQ HOSP IP/OBS MODERATE 35: CPT

## 2023-01-01 PROCEDURE — 80053 COMPREHEN METABOLIC PANEL: CPT

## 2023-01-01 PROCEDURE — 93308 TTE F-UP OR LMTD: CPT | Mod: 26

## 2023-01-01 PROCEDURE — 99239 HOSP IP/OBS DSCHRG MGMT >30: CPT

## 2023-01-01 PROCEDURE — 99223 1ST HOSP IP/OBS HIGH 75: CPT

## 2023-01-01 PROCEDURE — 70450 CT HEAD/BRAIN W/O DYE: CPT

## 2023-01-01 PROCEDURE — 82607 VITAMIN B-12: CPT

## 2023-01-01 PROCEDURE — 81001 URINALYSIS AUTO W/SCOPE: CPT

## 2023-01-01 PROCEDURE — 84100 ASSAY OF PHOSPHORUS: CPT

## 2023-01-01 PROCEDURE — 82962 GLUCOSE BLOOD TEST: CPT

## 2023-01-01 PROCEDURE — 93306 TTE W/DOPPLER COMPLETE: CPT | Mod: 26

## 2023-01-01 PROCEDURE — 87086 URINE CULTURE/COLONY COUNT: CPT

## 2023-01-01 PROCEDURE — 71045 X-RAY EXAM CHEST 1 VIEW: CPT

## 2023-01-01 PROCEDURE — 80048 BASIC METABOLIC PNL TOTAL CA: CPT

## 2023-01-01 PROCEDURE — 36415 COLL VENOUS BLD VENIPUNCTURE: CPT

## 2023-01-01 PROCEDURE — 85610 PROTHROMBIN TIME: CPT

## 2023-01-01 PROCEDURE — 83735 ASSAY OF MAGNESIUM: CPT

## 2023-01-01 PROCEDURE — 85730 THROMBOPLASTIN TIME PARTIAL: CPT

## 2023-01-01 PROCEDURE — 99497 ADVNCD CARE PLAN 30 MIN: CPT

## 2023-01-01 PROCEDURE — 84146 ASSAY OF PROLACTIN: CPT

## 2023-01-01 PROCEDURE — 71045 X-RAY EXAM CHEST 1 VIEW: CPT | Mod: 26

## 2023-01-01 PROCEDURE — 70450 CT HEAD/BRAIN W/O DYE: CPT | Mod: 26

## 2023-01-01 PROCEDURE — 97162 PT EVAL MOD COMPLEX 30 MIN: CPT

## 2023-01-01 PROCEDURE — 82746 ASSAY OF FOLIC ACID SERUM: CPT

## 2023-01-01 PROCEDURE — 83615 LACTATE (LD) (LDH) ENZYME: CPT

## 2023-01-01 PROCEDURE — 87040 BLOOD CULTURE FOR BACTERIA: CPT

## 2023-01-01 PROCEDURE — 99496 TRANSJ CARE MGMT HIGH F2F 7D: CPT

## 2023-01-01 PROCEDURE — 84484 ASSAY OF TROPONIN QUANT: CPT

## 2023-01-01 PROCEDURE — 99233 SBSQ HOSP IP/OBS HIGH 50: CPT | Mod: GC

## 2023-01-01 PROCEDURE — 83036 HEMOGLOBIN GLYCOSYLATED A1C: CPT

## 2023-01-01 PROCEDURE — 83690 ASSAY OF LIPASE: CPT

## 2023-01-01 PROCEDURE — 87635 SARS-COV-2 COVID-19 AMP PRB: CPT

## 2023-01-01 PROCEDURE — 99231 SBSQ HOSP IP/OBS SF/LOW 25: CPT

## 2023-01-01 PROCEDURE — 84145 PROCALCITONIN (PCT): CPT

## 2023-01-01 PROCEDURE — 87186 SC STD MICRODIL/AGAR DIL: CPT

## 2023-01-01 PROCEDURE — 86140 C-REACTIVE PROTEIN: CPT

## 2023-01-01 RX ORDER — ATORVASTATIN CALCIUM 80 MG/1
80 TABLET, FILM COATED ORAL ONCE
Refills: 0 | Status: DISCONTINUED | OUTPATIENT
Start: 2023-01-01 | End: 2023-01-01

## 2023-01-01 RX ORDER — METOPROLOL TARTRATE 50 MG
0.5 TABLET ORAL
Qty: 30 | Refills: 0
Start: 2023-01-01 | End: 2023-01-01

## 2023-01-01 RX ORDER — METOPROLOL TARTRATE 50 MG
12.5 TABLET ORAL
Refills: 0 | Status: DISCONTINUED | OUTPATIENT
Start: 2023-01-01 | End: 2023-01-01

## 2023-01-01 RX ORDER — APIXABAN 2.5 MG/1
5 TABLET, FILM COATED ORAL ONCE
Refills: 0 | Status: COMPLETED | OUTPATIENT
Start: 2023-01-01 | End: 2023-01-01

## 2023-01-01 RX ORDER — MAGNESIUM OXIDE 400 MG ORAL TABLET 241.3 MG
800 TABLET ORAL ONCE
Refills: 0 | Status: COMPLETED | OUTPATIENT
Start: 2023-01-01 | End: 2023-01-01

## 2023-01-01 RX ORDER — LABETALOL HCL 100 MG
10 TABLET ORAL ONCE
Refills: 0 | Status: DISCONTINUED | OUTPATIENT
Start: 2023-01-01 | End: 2023-01-01

## 2023-01-01 RX ORDER — CLOPIDOGREL BISULFATE 75 MG/1
75 TABLET, FILM COATED ORAL DAILY
Refills: 0 | Status: DISCONTINUED | OUTPATIENT
Start: 2023-01-01 | End: 2023-01-01

## 2023-01-01 RX ORDER — ISOSORBIDE DINITRATE 5 MG/1
1 TABLET ORAL
Qty: 90 | Refills: 1
Start: 2023-01-01 | End: 2024-01-01

## 2023-01-01 RX ORDER — HYDRALAZINE HCL 50 MG
25 TABLET ORAL THREE TIMES A DAY
Refills: 0 | Status: DISCONTINUED | OUTPATIENT
Start: 2023-01-01 | End: 2023-01-01

## 2023-01-01 RX ORDER — APIXABAN 2.5 MG/1
1 TABLET, FILM COATED ORAL
Qty: 60 | Refills: 0
Start: 2023-01-01 | End: 2023-01-01

## 2023-01-01 RX ORDER — APIXABAN 2.5 MG/1
5 TABLET, FILM COATED ORAL EVERY 12 HOURS
Refills: 0 | Status: DISCONTINUED | OUTPATIENT
Start: 2023-01-01 | End: 2023-01-01

## 2023-01-01 RX ORDER — REMDESIVIR 5 MG/ML
200 INJECTION INTRAVENOUS EVERY 24 HOURS
Refills: 0 | Status: COMPLETED | OUTPATIENT
Start: 2023-01-01 | End: 2023-01-01

## 2023-01-01 RX ORDER — ACETAMINOPHEN 325 MG/1
325 TABLET ORAL EVERY 6 HOURS
Refills: 0 | Status: ACTIVE | COMMUNITY

## 2023-01-01 RX ORDER — APIXABAN 2.5 MG/1
1 TABLET, FILM COATED ORAL
Qty: 60 | Refills: 1
Start: 2023-01-01 | End: 2024-01-01

## 2023-01-01 RX ORDER — ATORVASTATIN CALCIUM 80 MG/1
80 TABLET, FILM COATED ORAL AT BEDTIME
Refills: 0 | Status: DISCONTINUED | OUTPATIENT
Start: 2023-01-01 | End: 2023-01-01

## 2023-01-01 RX ORDER — ASPIRIN/CALCIUM CARB/MAGNESIUM 324 MG
324 TABLET ORAL ONCE
Refills: 0 | Status: COMPLETED | OUTPATIENT
Start: 2023-01-01 | End: 2023-01-01

## 2023-01-01 RX ORDER — ASPIRIN/CALCIUM CARB/MAGNESIUM 324 MG
81 TABLET ORAL EVERY 24 HOURS
Refills: 0 | Status: DISCONTINUED | OUTPATIENT
Start: 2023-01-01 | End: 2023-01-01

## 2023-01-01 RX ORDER — ISOSORBIDE DINITRATE 5 MG/1
10 TABLET ORAL THREE TIMES A DAY
Refills: 0 | Status: DISCONTINUED | OUTPATIENT
Start: 2023-01-01 | End: 2023-01-01

## 2023-01-01 RX ORDER — HYDRALAZINE HCL 50 MG
50 TABLET ORAL ONCE
Refills: 0 | Status: COMPLETED | OUTPATIENT
Start: 2023-01-01 | End: 2023-01-01

## 2023-01-01 RX ORDER — ATORVASTATIN CALCIUM 80 MG/1
80 TABLET, FILM COATED ORAL
Qty: 30 | Refills: 0 | Status: ACTIVE | COMMUNITY

## 2023-01-01 RX ORDER — CLOPIDOGREL BISULFATE 75 MG/1
1 TABLET, FILM COATED ORAL
Qty: 0 | Refills: 0 | DISCHARGE
Start: 2023-01-01

## 2023-01-01 RX ORDER — SODIUM CHLORIDE 9 MG/ML
500 INJECTION INTRAMUSCULAR; INTRAVENOUS; SUBCUTANEOUS ONCE
Refills: 0 | Status: COMPLETED | OUTPATIENT
Start: 2023-01-01 | End: 2023-01-01

## 2023-01-01 RX ORDER — HEPARIN SODIUM 5000 [USP'U]/ML
1000 INJECTION INTRAVENOUS; SUBCUTANEOUS
Qty: 25000 | Refills: 0 | Status: DISCONTINUED | OUTPATIENT
Start: 2023-01-01 | End: 2023-01-01

## 2023-01-01 RX ORDER — ATORVASTATIN CALCIUM 80 MG/1
1 TABLET, FILM COATED ORAL
Qty: 0 | Refills: 0 | DISCHARGE
Start: 2023-01-01

## 2023-01-01 RX ORDER — CEFTRIAXONE 500 MG/1
1000 INJECTION, POWDER, FOR SOLUTION INTRAMUSCULAR; INTRAVENOUS EVERY 24 HOURS
Refills: 0 | Status: DISCONTINUED | OUTPATIENT
Start: 2023-01-01 | End: 2023-01-01

## 2023-01-01 RX ORDER — ACETAMINOPHEN 500 MG
650 TABLET ORAL EVERY 6 HOURS
Refills: 0 | Status: DISCONTINUED | OUTPATIENT
Start: 2023-01-01 | End: 2023-01-01

## 2023-01-01 RX ORDER — METOCLOPRAMIDE HCL 10 MG
10 TABLET ORAL ONCE
Refills: 0 | Status: COMPLETED | OUTPATIENT
Start: 2023-01-01 | End: 2023-01-01

## 2023-01-01 RX ORDER — CHLORHEXIDINE GLUCONATE 213 G/1000ML
1 SOLUTION TOPICAL
Refills: 0 | Status: DISCONTINUED | OUTPATIENT
Start: 2023-01-01 | End: 2023-01-01

## 2023-01-01 RX ORDER — HYDRALAZINE HCL 50 MG
35 TABLET ORAL THREE TIMES A DAY
Refills: 0 | Status: DISCONTINUED | OUTPATIENT
Start: 2023-01-01 | End: 2023-01-01

## 2023-01-01 RX ORDER — REMDESIVIR 5 MG/ML
INJECTION INTRAVENOUS
Refills: 0 | Status: DISCONTINUED | OUTPATIENT
Start: 2023-01-01 | End: 2023-01-01

## 2023-01-01 RX ORDER — HYDRALAZINE HCL 50 MG
10 TABLET ORAL ONCE
Refills: 0 | Status: COMPLETED | OUTPATIENT
Start: 2023-01-01 | End: 2023-01-01

## 2023-01-01 RX ORDER — POTASSIUM PHOSPHATE, MONOBASIC POTASSIUM PHOSPHATE, DIBASIC 236; 224 MG/ML; MG/ML
15 INJECTION, SOLUTION INTRAVENOUS ONCE
Refills: 0 | Status: DISCONTINUED | OUTPATIENT
Start: 2023-01-01 | End: 2023-01-01

## 2023-01-01 RX ORDER — APIXABAN 2.5 MG/1
5 TABLET, FILM COATED ORAL
Refills: 0 | Status: DISCONTINUED | OUTPATIENT
Start: 2023-01-01 | End: 2023-01-01

## 2023-01-01 RX ORDER — MAGNESIUM OXIDE 400 MG ORAL TABLET 241.3 MG
400 TABLET ORAL ONCE
Refills: 0 | Status: COMPLETED | OUTPATIENT
Start: 2023-01-01 | End: 2023-01-01

## 2023-01-01 RX ORDER — ISOSORBIDE DINITRATE 5 MG/1
10 TABLET ORAL ONCE
Refills: 0 | Status: COMPLETED | OUTPATIENT
Start: 2023-01-01 | End: 2023-01-01

## 2023-01-01 RX ORDER — MAGNESIUM SULFATE 500 MG/ML
1 VIAL (ML) INJECTION ONCE
Refills: 0 | Status: COMPLETED | OUTPATIENT
Start: 2023-01-01 | End: 2023-01-01

## 2023-01-01 RX ORDER — HYDRALAZINE HCL 50 MG
50 TABLET ORAL EVERY 12 HOURS
Refills: 0 | Status: DISCONTINUED | OUTPATIENT
Start: 2023-01-01 | End: 2023-01-01

## 2023-01-01 RX ORDER — METOPROLOL TARTRATE 50 MG
0.5 TABLET ORAL
Qty: 30 | Refills: 1
Start: 2023-01-01 | End: 2024-01-01

## 2023-01-01 RX ORDER — POTASSIUM CHLORIDE 20 MEQ
40 PACKET (EA) ORAL ONCE
Refills: 0 | Status: COMPLETED | OUTPATIENT
Start: 2023-01-01 | End: 2023-01-01

## 2023-01-01 RX ORDER — HYDRALAZINE HCL 50 MG
1 TABLET ORAL
Qty: 90 | Refills: 1
Start: 2023-01-01 | End: 2024-01-01

## 2023-01-01 RX ORDER — PANTOPRAZOLE SODIUM 20 MG/1
40 TABLET, DELAYED RELEASE ORAL
Refills: 0 | Status: DISCONTINUED | OUTPATIENT
Start: 2023-01-01 | End: 2023-01-01

## 2023-01-01 RX ORDER — HYDRALAZINE HCL 50 MG
50 TABLET ORAL EVERY 8 HOURS
Refills: 0 | Status: DISCONTINUED | OUTPATIENT
Start: 2023-01-01 | End: 2023-01-01

## 2023-01-01 RX ORDER — CLOPIDOGREL BISULFATE 75 MG/1
75 TABLET, FILM COATED ORAL ONCE
Refills: 0 | Status: COMPLETED | OUTPATIENT
Start: 2023-01-01 | End: 2023-01-01

## 2023-01-01 RX ORDER — PANTOPRAZOLE SODIUM 20 MG/1
1 TABLET, DELAYED RELEASE ORAL
Qty: 30 | Refills: 1
Start: 2023-01-01 | End: 2024-01-01

## 2023-01-01 RX ORDER — ASPIRIN/CALCIUM CARB/MAGNESIUM 324 MG
325 TABLET ORAL ONCE
Refills: 0 | Status: COMPLETED | OUTPATIENT
Start: 2023-01-01 | End: 2023-01-01

## 2023-01-01 RX ORDER — TICAGRELOR 90 MG/1
90 TABLET ORAL ONCE
Refills: 0 | Status: COMPLETED | OUTPATIENT
Start: 2023-01-01 | End: 2023-01-01

## 2023-01-01 RX ORDER — ATORVASTATIN CALCIUM 80 MG/1
1 TABLET, FILM COATED ORAL
Qty: 30 | Refills: 1
Start: 2023-01-01 | End: 2024-01-01

## 2023-01-01 RX ORDER — PANTOPRAZOLE SODIUM 20 MG/1
1 TABLET, DELAYED RELEASE ORAL
Qty: 0 | Refills: 0 | DISCHARGE
Start: 2023-01-01

## 2023-01-01 RX ORDER — APIXABAN 2.5 MG/1
1 TABLET, FILM COATED ORAL
Qty: 0 | Refills: 0 | DISCHARGE
Start: 2023-01-01

## 2023-01-01 RX ORDER — ISOSORBIDE DINITRATE 5 MG/1
1 TABLET ORAL
Qty: 0 | Refills: 0 | DISCHARGE
Start: 2023-01-01

## 2023-01-01 RX ORDER — OMEGA-3 ACID ETHYL ESTERS 1 G
5 CAPSULE ORAL
Qty: 0 | Refills: 0 | DISCHARGE

## 2023-01-01 RX ORDER — ATORVASTATIN CALCIUM 80 MG/1
1 TABLET, FILM COATED ORAL
Qty: 0 | Refills: 0 | DISCHARGE

## 2023-01-01 RX ORDER — HYDRALAZINE HCL 50 MG
75 TABLET ORAL EVERY 8 HOURS
Refills: 0 | Status: DISCONTINUED | OUTPATIENT
Start: 2023-01-01 | End: 2023-01-01

## 2023-01-01 RX ORDER — IPRATROPIUM BROMIDE 0.2 MG/ML
500 SOLUTION, NON-ORAL INHALATION EVERY 6 HOURS
Refills: 0 | Status: DISCONTINUED | OUTPATIENT
Start: 2023-01-01 | End: 2023-01-01

## 2023-01-01 RX ORDER — ISOSORBIDE DINITRATE 5 MG/1
20 TABLET ORAL THREE TIMES A DAY
Refills: 0 | Status: DISCONTINUED | OUTPATIENT
Start: 2023-01-01 | End: 2023-01-01

## 2023-01-01 RX ORDER — HYDRALAZINE HCL 50 MG
3 TABLET ORAL
Qty: 0 | Refills: 0 | DISCHARGE
Start: 2023-01-01

## 2023-01-01 RX ORDER — TICAGRELOR 90 MG/1
180 TABLET ORAL ONCE
Refills: 0 | Status: COMPLETED | OUTPATIENT
Start: 2023-01-01 | End: 2023-01-01

## 2023-01-01 RX ORDER — ATORVASTATIN CALCIUM 80 MG/1
40 TABLET, FILM COATED ORAL AT BEDTIME
Refills: 0 | Status: DISCONTINUED | OUTPATIENT
Start: 2023-01-01 | End: 2023-01-01

## 2023-01-01 RX ORDER — TICAGRELOR 90 MG/1
90 TABLET ORAL EVERY 12 HOURS
Refills: 0 | Status: DISCONTINUED | OUTPATIENT
Start: 2023-01-01 | End: 2023-01-01

## 2023-01-01 RX ORDER — VALSARTAN 80 MG/1
20 TABLET ORAL ONCE
Refills: 0 | Status: COMPLETED | OUTPATIENT
Start: 2023-01-01 | End: 2023-01-01

## 2023-01-01 RX ORDER — CEFTRIAXONE 500 MG/1
1000 INJECTION, POWDER, FOR SOLUTION INTRAMUSCULAR; INTRAVENOUS ONCE
Refills: 0 | Status: COMPLETED | OUTPATIENT
Start: 2023-01-01 | End: 2023-01-01

## 2023-01-01 RX ORDER — CLOPIDOGREL BISULFATE 75 MG/1
1 TABLET, FILM COATED ORAL
Qty: 30 | Refills: 2
Start: 2023-01-01 | End: 2024-01-01

## 2023-01-01 RX ORDER — ATORVASTATIN CALCIUM 80 MG/1
40 TABLET, FILM COATED ORAL ONCE
Refills: 0 | Status: COMPLETED | OUTPATIENT
Start: 2023-01-01 | End: 2023-01-01

## 2023-01-01 RX ORDER — ACETAMINOPHEN 500 MG
1000 TABLET ORAL ONCE
Refills: 0 | Status: COMPLETED | OUTPATIENT
Start: 2023-01-01 | End: 2023-01-01

## 2023-01-01 RX ADMIN — Medication 25 MILLIGRAM(S): at 13:05

## 2023-01-01 RX ADMIN — Medication 325 MILLIGRAM(S): at 00:31

## 2023-01-01 RX ADMIN — ISOSORBIDE DINITRATE 10 MILLIGRAM(S): 5 TABLET ORAL at 16:17

## 2023-01-01 RX ADMIN — ISOSORBIDE DINITRATE 10 MILLIGRAM(S): 5 TABLET ORAL at 10:20

## 2023-01-01 RX ADMIN — ISOSORBIDE DINITRATE 10 MILLIGRAM(S): 5 TABLET ORAL at 17:13

## 2023-01-01 RX ADMIN — ISOSORBIDE DINITRATE 10 MILLIGRAM(S): 5 TABLET ORAL at 11:39

## 2023-01-01 RX ADMIN — ISOSORBIDE DINITRATE 10 MILLIGRAM(S): 5 TABLET ORAL at 05:49

## 2023-01-01 RX ADMIN — Medication 25 MILLIGRAM(S): at 05:51

## 2023-01-01 RX ADMIN — APIXABAN 5 MILLIGRAM(S): 2.5 TABLET, FILM COATED ORAL at 22:55

## 2023-01-01 RX ADMIN — ISOSORBIDE DINITRATE 10 MILLIGRAM(S): 5 TABLET ORAL at 17:24

## 2023-01-01 RX ADMIN — APIXABAN 5 MILLIGRAM(S): 2.5 TABLET, FILM COATED ORAL at 11:19

## 2023-01-01 RX ADMIN — PANTOPRAZOLE SODIUM 40 MILLIGRAM(S): 20 TABLET, DELAYED RELEASE ORAL at 06:30

## 2023-01-01 RX ADMIN — APIXABAN 5 MILLIGRAM(S): 2.5 TABLET, FILM COATED ORAL at 09:03

## 2023-01-01 RX ADMIN — Medication 12.5 MILLIGRAM(S): at 05:54

## 2023-01-01 RX ADMIN — CLOPIDOGREL BISULFATE 75 MILLIGRAM(S): 75 TABLET, FILM COATED ORAL at 11:53

## 2023-01-01 RX ADMIN — ISOSORBIDE DINITRATE 20 MILLIGRAM(S): 5 TABLET ORAL at 05:54

## 2023-01-01 RX ADMIN — PANTOPRAZOLE SODIUM 40 MILLIGRAM(S): 20 TABLET, DELAYED RELEASE ORAL at 05:49

## 2023-01-01 RX ADMIN — Medication 81 MILLIGRAM(S): at 11:39

## 2023-01-01 RX ADMIN — Medication 25 MILLIGRAM(S): at 06:10

## 2023-01-01 RX ADMIN — Medication 100 GRAM(S): at 10:20

## 2023-01-01 RX ADMIN — TICAGRELOR 90 MILLIGRAM(S): 90 TABLET ORAL at 11:22

## 2023-01-01 RX ADMIN — Medication 12.5 MILLIGRAM(S): at 19:02

## 2023-01-01 RX ADMIN — PANTOPRAZOLE SODIUM 40 MILLIGRAM(S): 20 TABLET, DELAYED RELEASE ORAL at 06:26

## 2023-01-01 RX ADMIN — Medication 25 MILLIGRAM(S): at 22:55

## 2023-01-01 RX ADMIN — Medication 75 MILLIGRAM(S): at 07:02

## 2023-01-01 RX ADMIN — APIXABAN 5 MILLIGRAM(S): 2.5 TABLET, FILM COATED ORAL at 08:38

## 2023-01-01 RX ADMIN — Medication 25 MILLIGRAM(S): at 22:24

## 2023-01-01 RX ADMIN — PANTOPRAZOLE SODIUM 40 MILLIGRAM(S): 20 TABLET, DELAYED RELEASE ORAL at 08:32

## 2023-01-01 RX ADMIN — CEFTRIAXONE 100 MILLIGRAM(S): 500 INJECTION, POWDER, FOR SOLUTION INTRAMUSCULAR; INTRAVENOUS at 20:43

## 2023-01-01 RX ADMIN — Medication 81 MILLIGRAM(S): at 11:22

## 2023-01-01 RX ADMIN — ATORVASTATIN CALCIUM 80 MILLIGRAM(S): 80 TABLET, FILM COATED ORAL at 21:18

## 2023-01-01 RX ADMIN — Medication 12.5 MILLIGRAM(S): at 17:41

## 2023-01-01 RX ADMIN — APIXABAN 5 MILLIGRAM(S): 2.5 TABLET, FILM COATED ORAL at 09:47

## 2023-01-01 RX ADMIN — Medication 1 TABLET(S): at 11:58

## 2023-01-01 RX ADMIN — CLOPIDOGREL BISULFATE 75 MILLIGRAM(S): 75 TABLET, FILM COATED ORAL at 21:01

## 2023-01-01 RX ADMIN — ISOSORBIDE DINITRATE 20 MILLIGRAM(S): 5 TABLET ORAL at 12:36

## 2023-01-01 RX ADMIN — ISOSORBIDE DINITRATE 10 MILLIGRAM(S): 5 TABLET ORAL at 05:16

## 2023-01-01 RX ADMIN — ISOSORBIDE DINITRATE 10 MILLIGRAM(S): 5 TABLET ORAL at 12:05

## 2023-01-01 RX ADMIN — Medication 100 GRAM(S): at 07:04

## 2023-01-01 RX ADMIN — ISOSORBIDE DINITRATE 10 MILLIGRAM(S): 5 TABLET ORAL at 16:28

## 2023-01-01 RX ADMIN — Medication 75 MILLIGRAM(S): at 22:27

## 2023-01-01 RX ADMIN — ISOSORBIDE DINITRATE 10 MILLIGRAM(S): 5 TABLET ORAL at 06:10

## 2023-01-01 RX ADMIN — MAGNESIUM OXIDE 400 MG ORAL TABLET 800 MILLIGRAM(S): 241.3 TABLET ORAL at 11:20

## 2023-01-01 RX ADMIN — Medication 40 MILLIEQUIVALENT(S): at 09:07

## 2023-01-01 RX ADMIN — Medication 75 MILLIGRAM(S): at 02:39

## 2023-01-01 RX ADMIN — Medication 400 MILLIGRAM(S): at 19:31

## 2023-01-01 RX ADMIN — ATORVASTATIN CALCIUM 80 MILLIGRAM(S): 80 TABLET, FILM COATED ORAL at 22:24

## 2023-01-01 RX ADMIN — ISOSORBIDE DINITRATE 10 MILLIGRAM(S): 5 TABLET ORAL at 06:24

## 2023-01-01 RX ADMIN — Medication 12.5 MILLIGRAM(S): at 18:57

## 2023-01-01 RX ADMIN — Medication 25 MILLIGRAM(S): at 05:06

## 2023-01-01 RX ADMIN — HEPARIN SODIUM 10 UNIT(S)/HR: 5000 INJECTION INTRAVENOUS; SUBCUTANEOUS at 03:00

## 2023-01-01 RX ADMIN — APIXABAN 5 MILLIGRAM(S): 2.5 TABLET, FILM COATED ORAL at 10:12

## 2023-01-01 RX ADMIN — PANTOPRAZOLE SODIUM 40 MILLIGRAM(S): 20 TABLET, DELAYED RELEASE ORAL at 05:06

## 2023-01-01 RX ADMIN — Medication 12.5 MILLIGRAM(S): at 18:34

## 2023-01-01 RX ADMIN — Medication 10 MILLIGRAM(S): at 02:00

## 2023-01-01 RX ADMIN — Medication 12.5 MILLIGRAM(S): at 17:23

## 2023-01-01 RX ADMIN — PANTOPRAZOLE SODIUM 40 MILLIGRAM(S): 20 TABLET, DELAYED RELEASE ORAL at 06:10

## 2023-01-01 RX ADMIN — CLOPIDOGREL BISULFATE 75 MILLIGRAM(S): 75 TABLET, FILM COATED ORAL at 13:44

## 2023-01-01 RX ADMIN — Medication 650 MILLIGRAM(S): at 22:30

## 2023-01-01 RX ADMIN — CLOPIDOGREL BISULFATE 75 MILLIGRAM(S): 75 TABLET, FILM COATED ORAL at 13:06

## 2023-01-01 RX ADMIN — TICAGRELOR 180 MILLIGRAM(S): 90 TABLET ORAL at 00:57

## 2023-01-01 RX ADMIN — Medication 1000 MILLIGRAM(S): at 21:15

## 2023-01-01 RX ADMIN — Medication 35 MILLIGRAM(S): at 05:55

## 2023-01-01 RX ADMIN — ATORVASTATIN CALCIUM 80 MILLIGRAM(S): 80 TABLET, FILM COATED ORAL at 23:10

## 2023-01-01 RX ADMIN — Medication 10 MILLIGRAM(S): at 23:30

## 2023-01-01 RX ADMIN — ATORVASTATIN CALCIUM 80 MILLIGRAM(S): 80 TABLET, FILM COATED ORAL at 22:28

## 2023-01-01 RX ADMIN — Medication 25 MILLIGRAM(S): at 21:18

## 2023-01-01 RX ADMIN — Medication 12.5 MILLIGRAM(S): at 05:49

## 2023-01-01 RX ADMIN — Medication 12.5 MILLIGRAM(S): at 06:24

## 2023-01-01 RX ADMIN — Medication 75 MILLIGRAM(S): at 16:28

## 2023-01-01 RX ADMIN — MAGNESIUM OXIDE 400 MG ORAL TABLET 400 MILLIGRAM(S): 241.3 TABLET ORAL at 01:42

## 2023-01-01 RX ADMIN — Medication 12.5 MILLIGRAM(S): at 18:41

## 2023-01-01 RX ADMIN — ATORVASTATIN CALCIUM 80 MILLIGRAM(S): 80 TABLET, FILM COATED ORAL at 22:55

## 2023-01-01 RX ADMIN — ISOSORBIDE DINITRATE 10 MILLIGRAM(S): 5 TABLET ORAL at 06:26

## 2023-01-01 RX ADMIN — Medication 650 MILLIGRAM(S): at 21:32

## 2023-01-01 RX ADMIN — CLOPIDOGREL BISULFATE 75 MILLIGRAM(S): 75 TABLET, FILM COATED ORAL at 11:20

## 2023-01-01 RX ADMIN — Medication 12.5 MILLIGRAM(S): at 05:06

## 2023-01-01 RX ADMIN — ATORVASTATIN CALCIUM 80 MILLIGRAM(S): 80 TABLET, FILM COATED ORAL at 20:42

## 2023-01-01 RX ADMIN — CLOPIDOGREL BISULFATE 75 MILLIGRAM(S): 75 TABLET, FILM COATED ORAL at 11:57

## 2023-01-01 RX ADMIN — APIXABAN 5 MILLIGRAM(S): 2.5 TABLET, FILM COATED ORAL at 20:42

## 2023-01-01 RX ADMIN — TICAGRELOR 90 MILLIGRAM(S): 90 TABLET ORAL at 23:11

## 2023-01-01 RX ADMIN — MAGNESIUM OXIDE 400 MG ORAL TABLET 400 MILLIGRAM(S): 241.3 TABLET ORAL at 11:40

## 2023-01-01 RX ADMIN — CLOPIDOGREL BISULFATE 75 MILLIGRAM(S): 75 TABLET, FILM COATED ORAL at 12:37

## 2023-01-01 RX ADMIN — Medication 324 MILLIGRAM(S): at 19:31

## 2023-01-01 RX ADMIN — ISOSORBIDE DINITRATE 10 MILLIGRAM(S): 5 TABLET ORAL at 11:58

## 2023-01-01 RX ADMIN — Medication 50 MILLIGRAM(S): at 03:00

## 2023-01-01 RX ADMIN — APIXABAN 5 MILLIGRAM(S): 2.5 TABLET, FILM COATED ORAL at 22:24

## 2023-01-01 RX ADMIN — Medication 75 MILLIGRAM(S): at 17:13

## 2023-01-01 RX ADMIN — Medication 12.5 MILLIGRAM(S): at 06:10

## 2023-01-01 RX ADMIN — Medication 25 MILLIGRAM(S): at 14:39

## 2023-01-01 RX ADMIN — CEFTRIAXONE 100 MILLIGRAM(S): 500 INJECTION, POWDER, FOR SOLUTION INTRAMUSCULAR; INTRAVENOUS at 21:25

## 2023-01-01 RX ADMIN — Medication 1 TABLET(S): at 11:20

## 2023-01-01 RX ADMIN — Medication 75 MILLIGRAM(S): at 23:54

## 2023-01-01 RX ADMIN — Medication 25 MILLIGRAM(S): at 13:45

## 2023-01-01 RX ADMIN — ISOSORBIDE DINITRATE 10 MILLIGRAM(S): 5 TABLET ORAL at 06:30

## 2023-01-01 RX ADMIN — ISOSORBIDE DINITRATE 10 MILLIGRAM(S): 5 TABLET ORAL at 13:44

## 2023-01-01 RX ADMIN — Medication 12.5 MILLIGRAM(S): at 12:04

## 2023-01-01 RX ADMIN — PANTOPRAZOLE SODIUM 40 MILLIGRAM(S): 20 TABLET, DELAYED RELEASE ORAL at 05:51

## 2023-01-01 RX ADMIN — Medication 25 MILLIGRAM(S): at 20:42

## 2023-01-01 RX ADMIN — APIXABAN 5 MILLIGRAM(S): 2.5 TABLET, FILM COATED ORAL at 21:18

## 2023-01-01 RX ADMIN — Medication 35 MILLIGRAM(S): at 13:37

## 2023-01-01 RX ADMIN — Medication 1 TABLET(S): at 13:44

## 2023-01-01 RX ADMIN — VALSARTAN 20 MILLIGRAM(S): 80 TABLET ORAL at 00:57

## 2023-01-01 RX ADMIN — Medication 10 MILLIGRAM(S): at 00:09

## 2023-01-01 RX ADMIN — SODIUM CHLORIDE 500 MILLILITER(S): 9 INJECTION INTRAMUSCULAR; INTRAVENOUS; SUBCUTANEOUS at 19:58

## 2023-01-01 RX ADMIN — Medication 75 MILLIGRAM(S): at 06:26

## 2023-01-01 RX ADMIN — REMDESIVIR 200 MILLIGRAM(S): 5 INJECTION INTRAVENOUS at 04:58

## 2023-01-01 RX ADMIN — Medication 1 TABLET(S): at 12:37

## 2023-01-01 RX ADMIN — PANTOPRAZOLE SODIUM 40 MILLIGRAM(S): 20 TABLET, DELAYED RELEASE ORAL at 06:48

## 2023-01-01 RX ADMIN — ISOSORBIDE DINITRATE 10 MILLIGRAM(S): 5 TABLET ORAL at 18:56

## 2023-01-01 RX ADMIN — CEFTRIAXONE 100 MILLIGRAM(S): 500 INJECTION, POWDER, FOR SOLUTION INTRAMUSCULAR; INTRAVENOUS at 20:30

## 2023-01-01 RX ADMIN — ISOSORBIDE DINITRATE 10 MILLIGRAM(S): 5 TABLET ORAL at 10:48

## 2023-01-01 RX ADMIN — ISOSORBIDE DINITRATE 10 MILLIGRAM(S): 5 TABLET ORAL at 16:02

## 2023-01-01 RX ADMIN — ISOSORBIDE DINITRATE 10 MILLIGRAM(S): 5 TABLET ORAL at 16:19

## 2023-01-01 RX ADMIN — TICAGRELOR 90 MILLIGRAM(S): 90 TABLET ORAL at 11:39

## 2023-01-01 RX ADMIN — Medication 1 TABLET(S): at 13:06

## 2023-01-01 RX ADMIN — ISOSORBIDE DINITRATE 10 MILLIGRAM(S): 5 TABLET ORAL at 05:51

## 2023-01-01 RX ADMIN — Medication 62.5 MILLIMOLE(S): at 07:29

## 2023-01-01 RX ADMIN — Medication 12.5 MILLIGRAM(S): at 05:51

## 2023-01-01 RX ADMIN — Medication 50 MILLIGRAM(S): at 09:08

## 2023-01-01 RX ADMIN — ATORVASTATIN CALCIUM 40 MILLIGRAM(S): 80 TABLET, FILM COATED ORAL at 03:00

## 2023-01-01 RX ADMIN — APIXABAN 5 MILLIGRAM(S): 2.5 TABLET, FILM COATED ORAL at 10:20

## 2023-01-01 RX ADMIN — ISOSORBIDE DINITRATE 10 MILLIGRAM(S): 5 TABLET ORAL at 18:35

## 2023-01-01 RX ADMIN — Medication 75 MILLIGRAM(S): at 18:04

## 2023-01-01 RX ADMIN — ISOSORBIDE DINITRATE 10 MILLIGRAM(S): 5 TABLET ORAL at 17:40

## 2023-01-01 RX ADMIN — Medication 25 MILLIGRAM(S): at 05:49

## 2023-01-01 RX ADMIN — TICAGRELOR 90 MILLIGRAM(S): 90 TABLET ORAL at 12:05

## 2023-01-01 RX ADMIN — PANTOPRAZOLE SODIUM 40 MILLIGRAM(S): 20 TABLET, DELAYED RELEASE ORAL at 05:54

## 2023-01-01 RX ADMIN — Medication 81 MILLIGRAM(S): at 12:04

## 2023-01-01 RX ADMIN — ISOSORBIDE DINITRATE 10 MILLIGRAM(S): 5 TABLET ORAL at 11:20

## 2023-01-01 RX ADMIN — TICAGRELOR 90 MILLIGRAM(S): 90 TABLET ORAL at 22:30

## 2023-01-01 RX ADMIN — Medication 75 MILLIGRAM(S): at 18:41

## 2023-01-01 RX ADMIN — Medication 75 MILLIGRAM(S): at 11:39

## 2023-01-01 RX ADMIN — APIXABAN 5 MILLIGRAM(S): 2.5 TABLET, FILM COATED ORAL at 21:01

## 2023-01-01 RX ADMIN — MAGNESIUM OXIDE 400 MG ORAL TABLET 400 MILLIGRAM(S): 241.3 TABLET ORAL at 09:07

## 2023-01-01 RX ADMIN — CHLORHEXIDINE GLUCONATE 1 APPLICATION(S): 213 SOLUTION TOPICAL at 06:31

## 2023-01-01 RX ADMIN — Medication 25 MILLIGRAM(S): at 13:58

## 2023-01-03 ENCOUNTER — LABORATORY RESULT (OUTPATIENT)
Age: 88
End: 2023-01-03

## 2023-01-04 ENCOUNTER — NON-APPOINTMENT (OUTPATIENT)
Age: 88
End: 2023-01-04

## 2023-01-09 ENCOUNTER — LABORATORY RESULT (OUTPATIENT)
Age: 88
End: 2023-01-09

## 2023-01-16 ENCOUNTER — LABORATORY RESULT (OUTPATIENT)
Age: 88
End: 2023-01-16

## 2023-01-17 ENCOUNTER — NON-APPOINTMENT (OUTPATIENT)
Age: 88
End: 2023-01-17

## 2023-01-23 ENCOUNTER — NON-APPOINTMENT (OUTPATIENT)
Age: 88
End: 2023-01-23

## 2023-01-23 ENCOUNTER — LABORATORY RESULT (OUTPATIENT)
Age: 88
End: 2023-01-23

## 2023-01-24 ENCOUNTER — LABORATORY RESULT (OUTPATIENT)
Age: 88
End: 2023-01-24

## 2023-01-25 ENCOUNTER — NON-APPOINTMENT (OUTPATIENT)
Age: 88
End: 2023-01-25

## 2023-01-26 ENCOUNTER — NON-APPOINTMENT (OUTPATIENT)
Age: 88
End: 2023-01-26

## 2023-02-06 ENCOUNTER — LABORATORY RESULT (OUTPATIENT)
Age: 88
End: 2023-02-06

## 2023-02-09 ENCOUNTER — NON-APPOINTMENT (OUTPATIENT)
Age: 88
End: 2023-02-09

## 2023-02-13 ENCOUNTER — LABORATORY RESULT (OUTPATIENT)
Age: 88
End: 2023-02-13

## 2023-02-14 DIAGNOSIS — N39.0 URINARY TRACT INFECTION, SITE NOT SPECIFIED: ICD-10-CM

## 2023-02-15 ENCOUNTER — NON-APPOINTMENT (OUTPATIENT)
Age: 88
End: 2023-02-15

## 2023-02-19 ENCOUNTER — LABORATORY RESULT (OUTPATIENT)
Age: 88
End: 2023-02-19

## 2023-02-21 ENCOUNTER — NON-APPOINTMENT (OUTPATIENT)
Age: 88
End: 2023-02-21

## 2023-02-27 ENCOUNTER — LABORATORY RESULT (OUTPATIENT)
Age: 88
End: 2023-02-27

## 2023-03-06 ENCOUNTER — LABORATORY RESULT (OUTPATIENT)
Age: 88
End: 2023-03-06

## 2023-03-07 ENCOUNTER — APPOINTMENT (OUTPATIENT)
Dept: HEART AND VASCULAR | Facility: CLINIC | Age: 88
End: 2023-03-07
Payer: MEDICARE

## 2023-03-07 VITALS
SYSTOLIC BLOOD PRESSURE: 168 MMHG | DIASTOLIC BLOOD PRESSURE: 82 MMHG | TEMPERATURE: 97.3 F | RESPIRATION RATE: 14 BRPM | WEIGHT: 179 LBS | BODY MASS INDEX: 24.24 KG/M2 | HEIGHT: 72 IN | OXYGEN SATURATION: 98 % | HEART RATE: 62 BPM

## 2023-03-07 LAB
INR PPP: 2.72
PT BLD: 31.9

## 2023-03-07 PROCEDURE — 36415 COLL VENOUS BLD VENIPUNCTURE: CPT

## 2023-03-07 PROCEDURE — 99212 OFFICE O/P EST SF 10 MIN: CPT

## 2023-03-07 RX ORDER — CIPROFLOXACIN HYDROCHLORIDE 500 MG/1
500 TABLET, FILM COATED ORAL
Qty: 14 | Refills: 1 | Status: DISCONTINUED | COMMUNITY
Start: 2023-02-14 | End: 2023-03-07

## 2023-03-07 NOTE — PHYSICAL EXAM
[Well Developed] : well developed [Normal Conjunctiva] : normal conjunctiva [Normal S1, S2] : normal S1, S2 [Clear Lung Fields] : clear lung fields [No Edema] : no edema [Moves all extremities] : moves all extremities [Normal] : alert and oriented, normal memory [de-identified] : seen in wheelchair

## 2023-03-07 NOTE — HISTORY OF PRESENT ILLNESS
[FreeTextEntry1] : 87 year male who comes for followup after his UTI. His symptoms improved after 7 days. He has had intermittent spasm of his right thigh. His INR was 2.72 with [facility]. He denies having any chest pain, SOB, MULLER, dizziness, palpitations, orthopnea, PND or syncope. He did not see Neurology as directed

## 2023-03-07 NOTE — ASSESSMENT
[FreeTextEntry1] : A Fib, CVA-- labs reviewed. See Neurology\par \par Labs were drawn in the office and sent\par \par I encouraged continued risk factor reduction and gradual increase in aerobic activity as tolerated\par \par 19   minutes were spent discussing cardiac risk excluding procedure time

## 2023-03-09 ENCOUNTER — RX RENEWAL (OUTPATIENT)
Age: 88
End: 2023-03-09

## 2023-03-09 LAB
ALBUMIN SERPL ELPH-MCNC: 3.9 G/DL
ALP BLD-CCNC: 110 U/L
ALT SERPL-CCNC: 14 U/L
ANION GAP SERPL CALC-SCNC: 12 MMOL/L
AST SERPL-CCNC: 23 U/L
BASOPHILS # BLD AUTO: 0.03 K/UL
BASOPHILS NFR BLD AUTO: 0.4 %
BILIRUB SERPL-MCNC: 0.4 MG/DL
BUN SERPL-MCNC: 18 MG/DL
CALCIUM SERPL-MCNC: 9.5 MG/DL
CHLORIDE SERPL-SCNC: 103 MMOL/L
CHOLEST SERPL-MCNC: 226 MG/DL
CK SERPL-CCNC: 211 U/L
CO2 SERPL-SCNC: 25 MMOL/L
CREAT SERPL-MCNC: 1.07 MG/DL
EGFR: 67 ML/MIN/1.73M2
EOSINOPHIL # BLD AUTO: 0.21 K/UL
EOSINOPHIL NFR BLD AUTO: 3 %
ESTIMATED AVERAGE GLUCOSE: 128 MG/DL
GLUCOSE SERPL-MCNC: 88 MG/DL
HBA1C MFR BLD HPLC: 6.1 %
HCT VFR BLD CALC: 37.1 %
HDLC SERPL-MCNC: 43 MG/DL
HGB BLD-MCNC: 12.1 G/DL
IMM GRANULOCYTES NFR BLD AUTO: 0.1 %
LDLC SERPL CALC-MCNC: 128 MG/DL
LYMPHOCYTES # BLD AUTO: 2.86 K/UL
LYMPHOCYTES NFR BLD AUTO: 40.2 %
MAN DIFF?: NORMAL
MCHC RBC-ENTMCNC: 30.6 PG
MCHC RBC-ENTMCNC: 32.6 GM/DL
MCV RBC AUTO: 93.9 FL
MONOCYTES # BLD AUTO: 0.5 K/UL
MONOCYTES NFR BLD AUTO: 7 %
NEUTROPHILS # BLD AUTO: 3.5 K/UL
NEUTROPHILS NFR BLD AUTO: 49.3 %
NONHDLC SERPL-MCNC: 182 MG/DL
NT-PROBNP SERPL-MCNC: 518 PG/ML
PLATELET # BLD AUTO: 219 K/UL
POTASSIUM SERPL-SCNC: 4.7 MMOL/L
PROT SERPL-MCNC: 7.8 G/DL
RBC # BLD: 3.95 M/UL
RBC # FLD: 14.3 %
SODIUM SERPL-SCNC: 139 MMOL/L
TRIGL SERPL-MCNC: 271 MG/DL
TSH SERPL-ACNC: 2.6 UIU/ML
WBC # FLD AUTO: 7.11 K/UL

## 2023-03-13 ENCOUNTER — LABORATORY RESULT (OUTPATIENT)
Age: 88
End: 2023-03-13

## 2023-03-14 ENCOUNTER — LABORATORY RESULT (OUTPATIENT)
Age: 88
End: 2023-03-14

## 2023-03-15 ENCOUNTER — LABORATORY RESULT (OUTPATIENT)
Age: 88
End: 2023-03-15

## 2023-03-20 ENCOUNTER — LABORATORY RESULT (OUTPATIENT)
Age: 88
End: 2023-03-20

## 2023-03-21 ENCOUNTER — NON-APPOINTMENT (OUTPATIENT)
Age: 88
End: 2023-03-21

## 2023-03-27 ENCOUNTER — LABORATORY RESULT (OUTPATIENT)
Age: 88
End: 2023-03-27

## 2023-03-28 ENCOUNTER — NON-APPOINTMENT (OUTPATIENT)
Age: 88
End: 2023-03-28

## 2023-03-28 LAB
INR PPP: 2.96
PT BLD: 34.7

## 2023-04-03 ENCOUNTER — LABORATORY RESULT (OUTPATIENT)
Age: 88
End: 2023-04-03

## 2023-04-04 ENCOUNTER — NON-APPOINTMENT (OUTPATIENT)
Age: 88
End: 2023-04-04

## 2023-04-10 ENCOUNTER — LABORATORY RESULT (OUTPATIENT)
Age: 88
End: 2023-04-10

## 2023-04-10 ENCOUNTER — NON-APPOINTMENT (OUTPATIENT)
Age: 88
End: 2023-04-10

## 2023-04-10 LAB
INR PPP: 1.67
PT BLD: 19.6

## 2023-04-26 LAB
INR PPP: 2.78 RATIO
POCT-PROTHROMBIN TIME: 31.7 SECS

## 2023-05-01 ENCOUNTER — NON-APPOINTMENT (OUTPATIENT)
Age: 88
End: 2023-05-01

## 2023-05-01 LAB
INR PPP: 2.06
PT BLD: 24.5

## 2023-10-08 NOTE — ED ADULT NURSE NOTE - OBJECTIVE STATEMENT
Pt. to ED room 20  Pt. to ED for a witness syncopal episode  HX of HTN, DM  Pt. vomiting on arrival Pt. to ED room 20  Pt. to ED for a witness syncopal episode  HX of HTN, DM  Pt. vomiting on arrival  Pt. a&ox3  Denies CP/SOB

## 2023-10-08 NOTE — ED ADULT TRIAGE NOTE - CHIEF COMPLAINT QUOTE
Pt presents via EMS from home, hx CVA with rt sided deficits, MI and stent x 6. Witnessed syncopal episode, vomiting at time of arrival. Awake and alert.

## 2023-10-09 NOTE — ED PROVIDER NOTE - OBJECTIVE STATEMENT
87yM w/ CAD s/p 6 stents (last was 2013), DM, HTN, HLD, CVA w/ R sided weakness and mild aphasia, AF on coumadin, DVT s/p IVC filter, wheelchair bound, brought in as STEMI notification. Per daughter, guillermoight pt had syncopal episode, called EMS and pt reportedly bradycardic (40s) and hypotensive, then had 2nd syncopal episode w/ vomiting while EMS present. Denied chest pain. They called ALS unit who gave 8mg zofran and did EKG which they faxed over due to concern for STEMI- KAYLIN in II, III, aVF, STD in I and aVL.

## 2023-10-09 NOTE — ED PROVIDER NOTE - CLINICAL SUMMARY MEDICAL DECISION MAKING FREE TEXT BOX
CCU fellow evaluated pt upon arrival, EKG done here w/ findings as above (no KAYLIN, +STD in V2 V3 V4), pt vomiting, given reglan w/ improvement. pt denies CP currently.  pt got asa prior to arrival but vomited it up, per CCU fellow plan for medical management of ACS, no plan for cath lab immediate revascularization tonight, will load w/ asa and brillinta, admit to CCU

## 2023-10-09 NOTE — PROGRESS NOTE ADULT - SUBJECTIVE AND OBJECTIVE BOX
INTERVAL HPI/OVERNIGHT EVENTS:    SUBJECTIVE: Patient seen and examined at bedside.    OBJECTIVE:    VITAL SIGNS:  ICU Vital Signs Last 24 Hrs  T(C): 37 (09 Oct 2023 10:00), Max: 37.1 (09 Oct 2023 05:46)  T(F): 98.6 (09 Oct 2023 10:00), Max: 98.7 (09 Oct 2023 05:46)  HR: 74 (09 Oct 2023 09:00) (63 - 84)  BP: 157/69 (09 Oct 2023 09:00) (138/67 - 221/101)  BP(mean): 99 (09 Oct 2023 09:00) (97 - 145)  ABP: --  ABP(mean): --  RR: 19 (09 Oct 2023 09:00) (12 - 31)  SpO2: 100% (09 Oct 2023 09:00) (96% - 100%)    O2 Parameters below as of 09 Oct 2023 10:00  Patient On (Oxygen Delivery Method): room air              10-08 @ 07:01  -  10-09 @ 07:00  --------------------------------------------------------  IN: 40 mL / OUT: 200 mL / NET: -160 mL    10-09 @ 07:01  -  10-09 @ 09:30  --------------------------------------------------------  IN: 110 mL / OUT: 100 mL / NET: 10 mL      CAPILLARY BLOOD GLUCOSE          PHYSICAL EXAM:  CONSTITUTIONAL: Well groomed, no apparent distress  EYES: PERRLA and symmetric, EOMI, No conjunctival or scleral injection, non-icteric  ENMT: Oral mucosa with moist membranes. Appropriate dentition given age; no pharyngeal injection or exudates  RESP: No respiratory distress, no use of accessory muscles; CTA b/l, no WRR  CV: RRR, + loud S1, soft S2, no MRG; no JVD; no peripheral edema. No carotid bruit appreciated  GI: Soft, NT, ND, no rebound, no guarding; no palpable masses; no hepatosplenomegaly; no hernia palpated  SKIN: No rashes or ulcers noted; no subcutaneous nodules or induration palpable  NEURO: CN II-XII intact; normal reflexes in upper and lower extremities, sensation intact in upper and lower extremities b/l to light touch   PSYCH: A+O x 2, mood and affect appropriate, recent/remote memory intact    MEDICATIONS:  MEDICATIONS  (STANDING):  aspirin  chewable 81 milliGRAM(s) Oral every 24 hours  atorvastatin 40 milliGRAM(s) Oral at bedtime  chlorhexidine 4% Liquid 1 Application(s) Topical <User Schedule>  hydrALAZINE 50 milliGRAM(s) Oral every 8 hours  pantoprazole    Tablet 40 milliGRAM(s) Oral before breakfast  ticagrelor 90 milliGRAM(s) Oral every 12 hours    MEDICATIONS  (PRN):      ALLERGIES:  Allergies    No Known Allergies    Intolerances        LABS:                        11.6   8.29  )-----------( 239      ( 09 Oct 2023 05:30 )             34.6     10-09    140  |  107  |  26<H>  ----------------------------<  123<H>  4.8   |  24  |  1.34<H>    Ca    9.4      09 Oct 2023 05:30  Phos  2.3     10-09  Mg     1.9     10-09    TPro  7.5  /  Alb  3.4  /  TBili  0.4  /  DBili  x   /  AST  29  /  ALT  11  /  AlkPhos  116  10-09    LIVER FUNCTIONS - ( 09 Oct 2023 05:30 )  Alb: 3.4 g/dL / Pro: 7.5 g/dL / ALK PHOS: 116 U/L / ALT: 11 U/L / AST: 29 U/L / GGT: x           PT/INR - ( 09 Oct 2023 05:30 )   PT: 26.9 sec;   INR: 2.42          PTT - ( 09 Oct 2023 05:30 )  PTT:120.0 sec  Urinalysis Basic - ( 09 Oct 2023 05:30 )    Color: x / Appearance: x / SG: x / pH: x  Gluc: 123 mg/dL / Ketone: x  / Bili: x / Urobili: x   Blood: x / Protein: x / Nitrite: x   Leuk Esterase: x / RBC: x / WBC x   Sq Epi: x / Non Sq Epi: x / Bacteria: x      CARDIAC MARKERS ( 09 Oct 2023 05:30 )  x     / x     / 305 U/L / x     / 17.7 ng/mL  CARDIAC MARKERS ( 08 Oct 2023 22:48 )  x     / x     / 255 U/L / x     / 4.6 ng/mL      RADIOLOGY & ADDITIONAL TESTS: Reviewed. INTERVAL HPI/OVERNIGHT EVENTS: 5am telemetry w AIVR vs NSVT VT, resolved spontaneously    SUBJECTIVE: Patient seen and examined at bedside. no complaints of chest pain or shortness of breath    OBJECTIVE:    VITAL SIGNS:  ICU Vital Signs Last 24 Hrs  T(C): 37 (09 Oct 2023 10:00), Max: 37.1 (09 Oct 2023 05:46)  T(F): 98.6 (09 Oct 2023 10:00), Max: 98.7 (09 Oct 2023 05:46)  HR: 74 (09 Oct 2023 09:00) (63 - 84)  BP: 157/69 (09 Oct 2023 09:00) (138/67 - 221/101)  BP(mean): 99 (09 Oct 2023 09:00) (97 - 145)  ABP: --  ABP(mean): --  RR: 19 (09 Oct 2023 09:00) (12 - 31)  SpO2: 100% (09 Oct 2023 09:00) (96% - 100%)    O2 Parameters below as of 09 Oct 2023 10:00  Patient On (Oxygen Delivery Method): room air              10-08 @ 07:01  -  10-09 @ 07:00  --------------------------------------------------------  IN: 40 mL / OUT: 200 mL / NET: -160 mL    10-09 @ 07:01  -  10-09 @ 09:30  --------------------------------------------------------  IN: 110 mL / OUT: 100 mL / NET: 10 mL      CAPILLARY BLOOD GLUCOSE          PHYSICAL EXAM:  CONSTITUTIONAL: elderly male, no apparent distress/lying comfortably in bed, well-groomed  EYES: PERRLA and symmetric, EOMI, no conjunctival or scleral injection, non-icteric  ENMT: Oral mucosa with moist membranes, appropriate dentition given age; no pharyngeal injection or exudates  RESP: No respiratory distress, no use of accessory muscles; CTA b/l, no WRR  CV: RRR, + loud S1, soft S2, no MRG; no JVD; no peripheral edema. No carotid bruit appreciated  GI: Soft, NT, ND, no rebound, no guarding; no palpable masses; no hepatosplenomegaly; no hernia palpated  SKIN: No rashes or ulcers noted; no subcutaneous nodules or induration palpable  NEURO: normal reflexes in upper and lower extremities, sensation intact in upper and lower extremities b/l to light touch   PSYCH: A+O x 2, mood and affect appropriate, recent/remote memory intact    MEDICATIONS:  MEDICATIONS  (STANDING):  aspirin  chewable 81 milliGRAM(s) Oral every 24 hours  atorvastatin 40 milliGRAM(s) Oral at bedtime  chlorhexidine 4% Liquid 1 Application(s) Topical <User Schedule>  hydrALAZINE 50 milliGRAM(s) Oral every 8 hours  pantoprazole    Tablet 40 milliGRAM(s) Oral before breakfast  ticagrelor 90 milliGRAM(s) Oral every 12 hours    MEDICATIONS  (PRN):      ALLERGIES:  Allergies    No Known Allergies    Intolerances        LABS:                        11.6   8.29  )-----------( 239      ( 09 Oct 2023 05:30 )             34.6     10-09    140  |  107  |  26<H>  ----------------------------<  123<H>  4.8   |  24  |  1.34<H>    Ca    9.4      09 Oct 2023 05:30  Phos  2.3     10-09  Mg     1.9     10-09    TPro  7.5  /  Alb  3.4  /  TBili  0.4  /  DBili  x   /  AST  29  /  ALT  11  /  AlkPhos  116  10-09    LIVER FUNCTIONS - ( 09 Oct 2023 05:30 )  Alb: 3.4 g/dL / Pro: 7.5 g/dL / ALK PHOS: 116 U/L / ALT: 11 U/L / AST: 29 U/L / GGT: x           PT/INR - ( 09 Oct 2023 05:30 )   PT: 26.9 sec;   INR: 2.42          PTT - ( 09 Oct 2023 05:30 )  PTT:120.0 sec  Urinalysis Basic - ( 09 Oct 2023 05:30 )    Color: x / Appearance: x / SG: x / pH: x  Gluc: 123 mg/dL / Ketone: x  / Bili: x / Urobili: x   Blood: x / Protein: x / Nitrite: x   Leuk Esterase: x / RBC: x / WBC x   Sq Epi: x / Non Sq Epi: x / Bacteria: x      CARDIAC MARKERS ( 09 Oct 2023 05:30 )  x     / x     / 305 U/L / x     / 17.7 ng/mL  CARDIAC MARKERS ( 08 Oct 2023 22:48 )  x     / x     / 255 U/L / x     / 4.6 ng/mL      RADIOLOGY & ADDITIONAL TESTS: Reviewed. 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 Pt received valsartan 20mg x1 and IV hydralazine 10mg x1 for elevated BP, started on hydralazine 50mg po BID. Heparin gtt started but, given stable inr (pt on coumadin for afib at home) & supra-therapeutic ptt in unit, holding heparin. 1 episode of NSVT versus AIVR this am, resolved spontaneously <30 seconds, no intervention needed. No chest pain or shortness of breath throughout CCU admission, currently deemed clinically stable for step-down to cardiac telemetry.    SUBJECTIVE: Patient seen and examined at bedside. no complaints of chest pain or shortness of breath    OBJECTIVE:    VITAL SIGNS:  ICU Vital Signs Last 24 Hrs  T(C): 37 (09 Oct 2023 10:00), Max: 37.1 (09 Oct 2023 05:46)  T(F): 98.6 (09 Oct 2023 10:00), Max: 98.7 (09 Oct 2023 05:46)  HR: 74 (09 Oct 2023 09:00) (63 - 84)  BP: 157/69 (09 Oct 2023 09:00) (138/67 - 221/101)  BP(mean): 99 (09 Oct 2023 09:00) (97 - 145)  ABP: --  ABP(mean): --  RR: 19 (09 Oct 2023 09:00) (12 - 31)  SpO2: 100% (09 Oct 2023 09:00) (96% - 100%)    O2 Parameters below as of 09 Oct 2023 10:00  Patient On (Oxygen Delivery Method): room air      10-08 @ 07:01  -  10-09 @ 07:00  --------------------------------------------------------  IN: 40 mL / OUT: 200 mL / NET: -160 mL    10-09 @ 07:01  -  10-09 @ 09:30  --------------------------------------------------------  IN: 110 mL / OUT: 100 mL / NET: 10 mL      CAPILLARY BLOOD GLUCOSE      PHYSICAL EXAM:  CONSTITUTIONAL: elderly male, no apparent distress/lying comfortably in bed, well-groomed  EYES: PERRLA and symmetric, EOMI, no conjunctival or scleral injection, non-icteric  ENMT: Oral mucosa with moist membranes, appropriate dentition given age; no pharyngeal injection or exudates  RESP: No respiratory distress, no use of accessory muscles; CTA b/l, no WRR  CV: RRR, + loud S1, soft S2, no MRG; no JVD; no peripheral edema. No carotid bruit appreciated  GI: Soft, NT, ND, no rebound, no guarding; no palpable masses; no hepatosplenomegaly; no hernia palpated  SKIN: No rashes or ulcers noted; no subcutaneous nodules or induration palpable  NEURO: normal reflexes in upper and lower extremities, sensation intact in upper and lower extremities b/l to light touch   PSYCH: A+O x 2, mood and affect appropriate, recent/remote memory intact    MEDICATIONS:  MEDICATIONS  (STANDING):  aspirin  chewable 81 milliGRAM(s) Oral every 24 hours  atorvastatin 40 milliGRAM(s) Oral at bedtime  chlorhexidine 4% Liquid 1 Application(s) Topical <User Schedule>  hydrALAZINE 50 milliGRAM(s) Oral every 8 hours  pantoprazole    Tablet 40 milliGRAM(s) Oral before breakfast  ticagrelor 90 milliGRAM(s) Oral every 12 hours    MEDICATIONS  (PRN):      ALLERGIES:  Allergies    No Known Allergies    Intolerances        LABS:                        11.6   8.29  )-----------( 239      ( 09 Oct 2023 05:30 )             34.6     10-09    140  |  107  |  26<H>  ----------------------------<  123<H>  4.8   |  24  |  1.34<H>    Ca    9.4      09 Oct 2023 05:30  Phos  2.3     10-09  Mg     1.9     10-09    TPro  7.5  /  Alb  3.4  /  TBili  0.4  /  DBili  x   /  AST  29  /  ALT  11  /  AlkPhos  116  10-09    LIVER FUNCTIONS - ( 09 Oct 2023 05:30 )  Alb: 3.4 g/dL / Pro: 7.5 g/dL / ALK PHOS: 116 U/L / ALT: 11 U/L / AST: 29 U/L / GGT: x           PT/INR - ( 09 Oct 2023 05:30 )   PT: 26.9 sec;   INR: 2.42          PTT - ( 09 Oct 2023 05:30 )  PTT:120.0 sec  Urinalysis Basic - ( 09 Oct 2023 05:30 )    Color: x / Appearance: x / SG: x / pH: x  Gluc: 123 mg/dL / Ketone: x  / Bili: x / Urobili: x   Blood: x / Protein: x / Nitrite: x   Leuk Esterase: x / RBC: x / WBC x   Sq Epi: x / Non Sq Epi: x / Bacteria: x      CARDIAC MARKERS ( 09 Oct 2023 05:30 )  x     / x     / 305 U/L / x     / 17.7 ng/mL  CARDIAC MARKERS ( 08 Oct 2023 22:48 )  x     / x     / 255 U/L / x     / 4.6 ng/mL      RADIOLOGY & ADDITIONAL TESTS: Reviewed. 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 Pt received valsartan 20mg x1 and IV hydralazine 10mg x1 for elevated BP and started on hydralazine 50mg po TID which was increased to 75 mg TID as well as Isordil 10 mg TID w/ ace/arb deferral iso AGUSTÍN. High dose intensity statin prescribed. Asprin and brilinta continued. Heparin gtt started but given stable inr (pt on coumadin for afib at home) & supra-therapeutic ptt in unit, held heparin. 1 episode of NSVT versus AIVR this am, resolved spontaneously <30 seconds, no intervention needed. No chest pain or shortness of breath throughout CCU admission, currently deemed clinically stable for step-down to cardiac telemetry.    SUBJECTIVE: Patient seen and examined at bedside. no complaints of chest pain or shortness of breath    OBJECTIVE:    VITAL SIGNS:  ICU Vital Signs Last 24 Hrs  T(C): 37 (09 Oct 2023 10:00), Max: 37.1 (09 Oct 2023 05:46)  T(F): 98.6 (09 Oct 2023 10:00), Max: 98.7 (09 Oct 2023 05:46)  HR: 74 (09 Oct 2023 09:00) (63 - 84)  BP: 157/69 (09 Oct 2023 09:00) (138/67 - 221/101)  BP(mean): 99 (09 Oct 2023 09:00) (97 - 145)  ABP: --  ABP(mean): --  RR: 19 (09 Oct 2023 09:00) (12 - 31)  SpO2: 100% (09 Oct 2023 09:00) (96% - 100%)    O2 Parameters below as of 09 Oct 2023 10:00  Patient On (Oxygen Delivery Method): room air      10-08 @ 07:01  -  10-09 @ 07:00  --------------------------------------------------------  IN: 40 mL / OUT: 200 mL / NET: -160 mL    10-09 @ 07:01  -  10-09 @ 09:30  --------------------------------------------------------  IN: 110 mL / OUT: 100 mL / NET: 10 mL      CAPILLARY BLOOD GLUCOSE      PHYSICAL EXAM:  CONSTITUTIONAL: elderly male, no apparent distress/lying comfortably in bed, well-groomed  EYES: PERRLA and symmetric, EOMI, no conjunctival or scleral injection, non-icteric  ENMT: Oral mucosa with moist membranes, appropriate dentition given age; no pharyngeal injection or exudates  RESP: No respiratory distress, no use of accessory muscles; CTA b/l, no WRR  CV: RRR, + loud S1, soft S2, no MRG; no JVD; no peripheral edema. No carotid bruit appreciated  GI: Soft, NT, ND, no rebound, no guarding; no palpable masses; no hepatosplenomegaly; no hernia palpated  SKIN: No rashes or ulcers noted; no subcutaneous nodules or induration palpable  NEURO: normal reflexes in upper and lower extremities, sensation intact in upper and lower extremities b/l to light touch   PSYCH: A+O x 2, mood and affect appropriate, recent/remote memory intact    MEDICATIONS:  MEDICATIONS  (STANDING):  aspirin  chewable 81 milliGRAM(s) Oral every 24 hours  atorvastatin 40 milliGRAM(s) Oral at bedtime  chlorhexidine 4% Liquid 1 Application(s) Topical <User Schedule>  hydrALAZINE 50 milliGRAM(s) Oral every 8 hours  pantoprazole    Tablet 40 milliGRAM(s) Oral before breakfast  ticagrelor 90 milliGRAM(s) Oral every 12 hours    MEDICATIONS  (PRN):      ALLERGIES:  Allergies    No Known Allergies    Intolerances        LABS:                        11.6   8.29  )-----------( 239      ( 09 Oct 2023 05:30 )             34.6     10-09    140  |  107  |  26<H>  ----------------------------<  123<H>  4.8   |  24  |  1.34<H>    Ca    9.4      09 Oct 2023 05:30  Phos  2.3     10-09  Mg     1.9     10-09    TPro  7.5  /  Alb  3.4  /  TBili  0.4  /  DBili  x   /  AST  29  /  ALT  11  /  AlkPhos  116  10-09    LIVER FUNCTIONS - ( 09 Oct 2023 05:30 )  Alb: 3.4 g/dL / Pro: 7.5 g/dL / ALK PHOS: 116 U/L / ALT: 11 U/L / AST: 29 U/L / GGT: x           PT/INR - ( 09 Oct 2023 05:30 )   PT: 26.9 sec;   INR: 2.42          PTT - ( 09 Oct 2023 05:30 )  PTT:120.0 sec  Urinalysis Basic - ( 09 Oct 2023 05:30 )    Color: x / Appearance: x / SG: x / pH: x  Gluc: 123 mg/dL / Ketone: x  / Bili: x / Urobili: x   Blood: x / Protein: x / Nitrite: x   Leuk Esterase: x / RBC: x / WBC x   Sq Epi: x / Non Sq Epi: x / Bacteria: x      CARDIAC MARKERS ( 09 Oct 2023 05:30 )  x     / x     / 305 U/L / x     / 17.7 ng/mL  CARDIAC MARKERS ( 08 Oct 2023 22:48 )  x     / x     / 255 U/L / x     / 4.6 ng/mL      RADIOLOGY & ADDITIONAL TESTS: Reviewed.

## 2023-10-09 NOTE — PATIENT PROFILE ADULT - FALL HARM RISK - HARM RISK INTERVENTIONS

## 2023-10-09 NOTE — PROGRESS NOTE ADULT - ASSESSMENT
Assessment:  · Assessment	  87M PMHx MI, CAD s/p 6 stents, HTN, HLD, CVA p/w syncopal episode, vomiting, w/ positive HsT 72, and KAYLIN in II, III, aVF suggesting STEMI. Pt is too high risk for Cath per IC and was admitted to CCU for medical management    Neuro  A&Ox2 at baseline per patient's family    CVS  #Inferior wall MI  Case was reviewed by Interventional cardiology attending who deemed risk of primary PCI outweighed benefits given pt's age and comorbidities.    - c/w Brilinta 90mg BID, ASA 81mg qd, Atorvastatin 40mg qd  - Start Heparin drip ACS protocol, PTT q6  - Anterior and Posterior ECG to assess posterior involvement  - Full and Limited TTE   - Avoid Nitrates given inferior wall MI  - Trend troponins   - appreciate interventional cardiology recs    #CAD  Hx of prior MI with stents placed (last in 2013)  Follows with Dr. Olmedo    #Afib  Previously documented hx of afib on warfarin. Home regimen 7.5mg on Tues/Sat, 5mg on other 5 days  - Hold warfarin, c/w heparin gtt  - Follows with Dr. Olmedo    #HLD  - c/w Atorvastatin 40mg qd  - Lipid profile AM    #HTN urgency  Home meds: enalapril 20mg daily  - Hold enalapril, started on hydralazine 50mg BID    GI:  #ppx  - C/w protonix while on triple therapy    Renal  #AGUSTÍN - baseline Cr 1.0  - Fluids as tolerated  - Monitor for worsening renal status  - Strict I/O's    Endo  #DM?  - A1c AM      Prophylactic Measures:  F: Encourage PO intake  E: K>4, Mg>2  N: DASH diet  DVT ppx: Heparin gtt  GI ppx: Protonix 40mg qd   Assessment	  87M PMHx MI, CAD (s/p 6 stents, latest in 2013) HTN, HLD, CVA, BIBEMS following syncopal episode & vomiting, w positive HsT 72, and KAYLIN in II, III, aVF concerning for STEMI, admitted to CCU for medical management & observation.    Neuro  A&Ox2 at baseline per patient's family    CVS  #Inferior wall MI  Case was reviewed by Interventional cardiology attending who deemed risk of primary PCI outweighed benefits given pt's age and comorbidities.    - continue Brilinta 90mg BID, ASA 81mg qd  - increase Atorvastatin to 40mg qd  - given therapeutic INR on home coumadin, ******************Heparin drip ACS protocol, PTT q6  - Anterior and Posterior ECG to assess posterior involvement  - Full and Limited TTE   - Avoid Nitrates given inferior wall MI  - Trend troponins   - appreciate interventional cardiology recs    #CAD  Hx of prior MI with stents placed (last in 2013)  Follows with Dr. Olmedo    #Afib  Previously documented hx of afib on warfarin. Home regimen 7.5mg on Tues/Sat, 5mg on other 5 days  - Hold warfarin, c/w heparin gtt  - Follows with Dr. Olmedo    #HLD  - c/w Atorvastatin 40mg qd  - Lipid profile AM    #HTN urgency  Home meds: enalapril 20mg daily  - Hold enalapril, started on hydralazine 50mg BID    GI:  #ppx  - C/w protonix while on triple therapy    Renal  #AGUSTÍN - baseline Cr 1.0  - Fluids as tolerated  - Monitor for worsening renal status  - Strict I/O's    Endo  #DM?  - A1c AM      Prophylactic Measures:  F: Encourage PO intake  E: K>4, Mg>2  N: DASH diet  DVT ppx: Heparin gtt  GI ppx: Protonix 40mg qd   Assessment	  87M PMHx MI, CAD (s/p 6 stents, latest in 2013) HTN, HLD, CVA, BIBEMS following syncopal episode & vomiting, w positive HsT 72, and KAYLIN in II, III, aVF concerning for STEMI, admitted to CCU for medical management & observation.    Neuro  A&Ox2 at baseline per patient's family    CVS  #Inferior wall MI  Case was reviewed by Interventional cardiology attending who deemed risk of primary PCI outweighed benefits given pt's age and comorbidities.    - continue Brilinta 90mg BID, ASA 81mg qd  - increase Atorvastatin to 80mg qd  - hydralazine 50 po q8h  - given therapeutic INR on home coumadin & supratherapeutic ptt this am, will hold heparin   - Full and Limited TTE   - continue to avoid Nitrates given inferior wall MI  - trending troponins   - appreciate interventional cardiology recs    #CAD  Hx of prior MI with stents placed (last in 2013)  Follows with Dr. Olmedo    #Afib  Previously documented hx of afib on warfarin. Home regimen 7.5mg on Tues/Sat, 5mg on other 5 days  - Hold warfarin, c/w heparin gtt  - Follows with Dr. Olmedo    #HLD  - continue Atorvastatin 80mg qd  - Lipid profile AM    #HTN urgency  Home meds: enalapril 20mg daily  - Hold enalapril, started on hydralazine 50mg BID    GI:  #ppx  - C/w protonix while on triple therapy    Renal  #AGUSTÍN - baseline Cr 1.0, 1.38 on admission, now 1.34  - Fluids as tolerated  - Monitor for worsening renal status  - Strict I/O's    Endo  #DM  no known DM hx, A1c today 6.1, glucose 170 yesterday, 123 this am  - will continue to trend glucose & obtain further collateral from family    Prophylactic Measures:  F: Encourage PO intake  E: K>4, Mg>2  N: DASH diet  DVT ppx: Heparin gtt  GI ppx: Protonix 40mg qd   Assessment	  87M PMHx MI, CAD (s/p 6 stents, latest in 2013) HTN, HLD, CVA, BIBEMS following syncopal episode & vomiting, w positive HsT 72, and KAYLIN in II, III, aVF concerning for STEMI, admitted to CCU for medical management & observation, currently deemed stable to step-down to cardiac telemetry.    Neuro  A&Ox2 at baseline per patient's family    CVS  #Inferior wall MI  Case was reviewed by Interventional cardiology attending who deemed risk of primary PCI outweighed benefits given pt's presentation, age, comorbidities.    - continue Brilinta 90mg BID, ASA 81mg qd  - increase Atorvastatin to 80mg qd  - hydralazine 50 po q8h  - given therapeutic INR on home coumadin & supratherapeutic ptt this am, will hold heparin   - Full and Limited TTE   - continue to avoid Nitrates given inferior wall MI  - trending troponins   - appreciate interventional cardiology recs    #CAD  Hx of prior MI with stents placed (last in 2013)  Follows with Dr. Olmedo    #Afib  Previously documented hx of afib on warfarin. Home regimen 7.5mg on Tues/Sat, 5mg on other 5 days  - Hold warfarin, c/w heparin gtt  - Follows with Dr. Olmedo    #HLD  - continue Atorvastatin 80mg qd  - Lipid profile AM    #HTN urgency  Home meds: enalapril 20mg daily  - Hold enalapril, started on hydralazine 50mg BID    GI:  #ppx  - C/w protonix while on triple therapy    Renal  #AGUSTÍN - baseline Cr 1.0, 1.38 on admission, now 1.34  - Fluids as tolerated  - Monitor for worsening renal status  - Strict I/O's    Endo  #DM  no known DM hx, A1c today 6.1, glucose 170 yesterday, 123 this am  - will continue to trend glucose & obtain further collateral from family    Prophylactic Measures:  F: Encourage PO intake  E: K>4, Mg>2  N: DASH diet  DVT ppx: Heparin gtt  GI ppx: Protonix 40mg qd   Assessment	  87M PMHx MI, CAD (s/p 6 stents, latest in 2013) HTN, HLD, CVA, BIBEMS following syncopal episode & vomiting, w positive HsT 72, and KAYLIN in II, III, aVF concerning for STEMI, admitted to CCU for medical management & observation, currently deemed stable to step-down to cardiac telemetry.    Neuro  A&Ox2 at baseline per patient's family    CVS  #Inferior wall MI  Case was reviewed by Interventional cardiology attending who deemed risk of primary PCI outweighed benefits given pt's presentation, age, comorbidities.    - continue Brilinta 90mg BID, ASA 81mg qd (consider transition from Brilinta to Plavix later in week)  - increase Atorvastatin to 80mg qd  - Increase hydralazine to 75 mg po q8h  - Started Isordil 10 mg TID  - Consider initiation of Ace/arb once kidney function improves  - given therapeutic INR on home coumadin & supratherapeutic ptt this am, will hold heparin   - trending troponins   - Full and Limited TTE   - continue to avoid Nitrates given inferior wall MI  - appreciate interventional cardiology recs    #CAD  Hx of prior MI with stents placed (last in 2013)  Follows with Dr. Olmedo    #Afib  Previously documented hx of afib on warfarin. Home regimen 7.5mg on Tues/Sat, 5mg on other 5 days  - Hold warfarin and heparin iso therapeutic inr  - Consider transition to eliquis prior to discharge  - Follows with Dr. Olmedo    #HLD  - Increased Atorvastatin to 80mg qd  - Lipid profile AM    #HTN urgency  Home meds: enalapril 20mg daily  - Hold enalapril, started on hydralazine 50mg BID    GI:  #ppx  - C/w protonix while on dual therapy    Renal  #AGUSTÍN - baseline Cr 1.0, 1.38 on admission, now 1.34  - Fluids as tolerated  - Monitor for worsening renal status  - Strict I/O's    Endo  #DM  no known DM hx, A1c today 6.1, glucose 170 yesterday, 123 this am  - will continue to trend glucose & obtain further collateral from family    Prophylactic Measures:  F: Encourage PO intake  E: K>4, Mg>2  N: DASH diet  DVT ppx: Heparin gtt  GI ppx: Protonix 40mg qd

## 2023-10-09 NOTE — H&P ADULT - NSHPLABSRESULTS_GEN_ALL_CORE
.  LABS:                         11.9   9.11  )-----------( 238      ( 08 Oct 2023 22:48 )             35.7     10-08    140  |  105  |  26<H>  ----------------------------<  170<H>  4.4   |  25  |  1.39<H>    Ca    9.2      08 Oct 2023 22:48    TPro  7.7  /  Alb  3.5  /  TBili  0.4  /  DBili  0.2  /  AST  27  /  ALT  13  /  AlkPhos  118  10-08    PT/INR - ( 08 Oct 2023 22:48 )   PT: 26.3 sec;   INR: 2.37          PTT - ( 08 Oct 2023 22:48 )  PTT:34.9 sec  Urinalysis Basic - ( 08 Oct 2023 22:48 )    Color: x / Appearance: x / SG: x / pH: x  Gluc: 170 mg/dL / Ketone: x  / Bili: x / Urobili: x   Blood: x / Protein: x / Nitrite: x   Leuk Esterase: x / RBC: x / WBC x   Sq Epi: x / Non Sq Epi: x / Bacteria: x      CARDIAC MARKERS ( 08 Oct 2023 22:48 )  x     / x     / 255 U/L / x     / 4.6 ng/mL        Lactate, Blood: 2.0 mmol/L (10-08 @ 23:55)      RADIOLOGY, EKG & ADDITIONAL TESTS: Reviewed.

## 2023-10-09 NOTE — PROGRESS NOTE ADULT - ASSESSMENT
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 Pt was also noted to have elevated BP which improved after antihypertensive medication administration. Heparin gtt was started however given supra therapeutic PTT in unit now held. Pt step-down to cardiac telemetry unit as deemed clinically stable.     A&Ox2 at baseline per patient's family    CVS  #Inferior wall MI  Case was reviewed by Interventional cardiology attending who deemed risk of primary PCI outweighed benefits given pt's presentation, age, comorbidities.    - continue Brilinta 90mg BID, ASA 81mg qd (consider transition from Brilinta to Plavix later in week)  - increase Atorvastatin to 80mg qd  - Increase hydralazine to 75 mg po q8h  - Started Isordil 10 mg TID  - Consider initiation of Ace/arb once kidney function improves  - given therapeutic INR on home coumadin & supratherapeutic ptt this am, will hold heparin   - trending troponins   - Full and Limited TTE   - continue to avoid Nitrates given inferior wall MI  - appreciate interventional cardiology recs    #CAD  Hx of prior MI with stents placed (last in 2013)  Follows with Dr. Olmedo    #Afib  Previously documented hx of afib on warfarin. Home regimen 7.5mg on Tues/Sat, 5mg on other 5 days  - Hold warfarin and heparin iso therapeutic inr  - Consider transition to eliquis prior to discharge  - Follows with Dr. Olmedo    #HLD  - Increased Atorvastatin to 80mg qd  - Lipid profile AM    #HTN urgency  Home meds: enalapril 20mg daily  - Hold enalapril, started on hydralazine 50mg BID    GI:  #ppx  - C/w protonix while on dual therapy    Renal  #AGUSTÍN - baseline Cr 1.0, 1.38 on admission, now 1.34  - Fluids as tolerated  - Monitor for worsening renal status  - Strict I/O's    Endo  #DM  no known DM hx, A1c today 6.1, glucose 170 yesterday, 123 this am  - will continue to trend glucose & obtain further collateral from family    Prophylactic Measures:  F: Encourage PO intake  E: K>4, Mg>2  N: DASH diet  DVT ppx: Heparin gtt  GI ppx: Protonix 40mg qd     88 y/o M w/ medical history of MI, CAD (s/p 6 stents, latest in 2013), paroxysmal AFib (on coumadin), HTN, HLD, 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 Pt was also noted to have elevated BP which improved after antihypertensive medication administration. Heparin gtt was started however given supra therapeutic PTT in unit now held. Pt step-down to cardiac telemetry unit as deemed clinically stable.

## 2023-10-09 NOTE — PROVIDER CONTACT NOTE (CRITICAL VALUE NOTIFICATION) - ACTION/TREATMENT ORDERED:
Per LEONARDO Jewell, plan to repeat troponin in morning, no further interventions ordered at this time

## 2023-10-09 NOTE — PATIENT PROFILE ADULT - FALL HARM RISK - DEVICES
Left voicemail message for patient to call the office back at [phone] to schedule a NU2U appt with Dr. Rey.   Other

## 2023-10-09 NOTE — H&P ADULT - NSHPPHYSICALEXAM_GEN_ALL_CORE
T(C): 37 (10-09-23 @ 03:00), Max: 37 (10-09-23 @ 03:00)  HR: 75 (10-09-23 @ 02:00) (75 - 84)  BP: 178/86 (10-09-23 @ 02:00) (178/86 - 221/101)  RR: 22 (10-09-23 @ 02:00) (16 - 31)  SpO2: 100% (10-09-23 @ 02:00) (96% - 100%)    CONSTITUTIONAL: Well groomed, no apparent distress  EYES: PERRLA and symmetric, EOMI, No conjunctival or scleral injection, non-icteric  ENMT: Oral mucosa with moist membranes. Normal dentition; no pharyngeal injection or exudates  RESP: No respiratory distress, no use of accessory muscles; CTA b/l, no WRR  CV: RRR, +S1S2, no MRG; no JVD; no peripheral edema  GI: Soft, NT, ND, no rebound, no guarding; no palpable masses; no hepatosplenomegaly; no hernia palpated  SKIN: No rashes or ulcers noted; no subcutaneous nodules or induration palpable  NEURO: CN II-XII intact; normal reflexes in upper and lower extremities, sensation intact in upper and lower extremities b/l to light touch   PSYCH: A+O x 2, mood and affect appropriate, recent/remote memory intact T(C): 37 (10-09-23 @ 03:00), Max: 37 (10-09-23 @ 03:00)  HR: 75 (10-09-23 @ 02:00) (75 - 84)  BP: 178/86 (10-09-23 @ 02:00) (178/86 - 221/101)  RR: 22 (10-09-23 @ 02:00) (16 - 31)  SpO2: 100% (10-09-23 @ 02:00) (96% - 100%)    CONSTITUTIONAL: Well groomed, no apparent distress  EYES: PERRLA and symmetric, EOMI, No conjunctival or scleral injection, non-icteric  ENMT: Oral mucosa with moist membranes. Appropriate dentition given age; no pharyngeal injection or exudates  RESP: No respiratory distress, no use of accessory muscles; CTA b/l, no WRR  CV: RRR, + loud S1, soft S2, no MRG; no JVD; no peripheral edema. No carotid bruit appreciated  GI: Soft, NT, ND, no rebound, no guarding; no palpable masses; no hepatosplenomegaly; no hernia palpated  SKIN: No rashes or ulcers noted; no subcutaneous nodules or induration palpable  NEURO: CN II-XII intact; normal reflexes in upper and lower extremities, sensation intact in upper and lower extremities b/l to light touch   PSYCH: A+O x 2, mood and affect appropriate, recent/remote memory intact

## 2023-10-09 NOTE — H&P ADULT - ASSESSMENT
87M PMHx MI, CAD s/p 6 stents, HTN, HLD, CVA p/w syncopal episode, vomitting, w/ positive HsT 72, and KAYLIN in II, III, aVF suggesting STEMI. Pt is too high risk for Cath per IC and was admitted to CCU for medical management    CVS  #Inferior wall MI  [facility] who deemed risk of primary PCI outweighed benefits given pt's age and comorbidities.    - c/w Brilinta 75mg qd, ASA 81mg qd, Atorvastatin 80mg qd  - Start Heparin drip ACS protocol, PTT q6  - Start Valsartan 20mg PO qd  - Anterior and Posterior ECG to assess posterior involvement  - Full and Limited TTE to assess  - Avoid Nitrates given inferior wall MI  - Trend troponins     #HLD  - c/w Atorvastatin 80mg qd  - Lipid profile AM    #HTN  - Start Valsartan 20mg PO qd    Renal  #AGUSTÍN - baseline Cr 1.0  - Fluids as tolerated  - Monitor for worsening renal status  - Strict I/O's      Endo  #DM?  - A1c AM      Prophylactic Measures:  F: Encourage PO intake  E: K>4, Mg>2  N: DASH diet  DVT ppx: Heparin ACS  GI ppx: Protonix 40mg qd 87M PMHx MI, CAD s/p 6 stents, HTN, HLD, CVA p/w syncopal episode, vomitting, w/ positive HsT 72, and KAYLIN in II, III, aVF suggesting STEMI. Pt is too high risk for Cath per IC and was admitted to CCU for medical management    CVS  #Inferior wall MI  Case was reviewed by [facility] attending who deemed risk of primary PCI outweighed benefits given pt's age and comorbidities.    - c/w Brilinta 75mg qd, ASA 81mg qd, Atorvastatin 80mg qd  - Start Heparin drip ACS protocol, PTT q6  - Start Valsartan 20mg PO qd  - Anterior and Posterior ECG to assess posterior involvement  - Full and Limited TTE to assess  - Avoid Nitrates given inferior wall MI  - Trend troponins   - appreciate interventional cardiology recs    #HLD  - c/w Atorvastatin 80mg qd  - Lipid profile AM    #HTN  - Start Valsartan 20mg PO qd    Renal  #AGUSTÍN - baseline Cr 1.0  - Fluids as tolerated  - Monitor for worsening renal status  - Strict I/O's      Endo  #DM?  - A1c AM      Prophylactic Measures:  F: Encourage PO intake  E: K>4, Mg>2  N: DASH diet  DVT ppx: Heparin ACS  GI ppx: Protonix 40mg qd 87M PMHx MI, CAD s/p 6 stents, HTN, HLD, CVA p/w syncopal episode, vomitting, w/ positive HsT 72, and KAYLIN in II, III, aVF suggesting STEMI. Pt is too high risk for Cath per IC and was admitted to CCU for medical management    CVS  #Inferior wall MI  Case was reviewed by Interventional cardiology attending who deemed risk of primary PCI outweighed benefits given pt's age and comorbidities.    - c/w Brilinta 75mg qd, ASA 81mg qd, Atorvastatin 80mg qd  - Start Heparin drip ACS protocol, PTT q6  - Start Valsartan 20mg PO qd  - Anterior and Posterior ECG to assess posterior involvement  - Full and Limited TTE   - Avoid Nitrates given inferior wall MI  - Trend troponins   - appreciate interventional cardiology recs    #HLD  - c/w Atorvastatin 80mg qd  - Lipid profile AM    #HTN  - Start Valsartan 20mg PO qd    Renal  #AGUSTÍN - baseline Cr 1.0  - Fluids as tolerated  - Monitor for worsening renal status  - Strict I/O's      Endo  #DM?  - A1c AM      Prophylactic Measures:  F: Encourage PO intake  E: K>4, Mg>2  N: DASH diet  DVT ppx: Heparin ACS  GI ppx: Protonix 40mg qd 87M PMHx MI, CAD s/p 6 stents, HTN, HLD, CVA p/w syncopal episode, vomiting, w/ positive HsT 72, and KAYLIN in II, III, aVF suggesting STEMI. Pt is too high risk for Cath per IC and was admitted to CCU for medical management    Neuro  A&Ox2 at baseline per patient's family    CVS  #Inferior wall MI  Case was reviewed by Interventional cardiology attending who deemed risk of primary PCI outweighed benefits given pt's age and comorbidities.    - c/w Brilinta 90mg BID, ASA 81mg qd, Atorvastatin 40mg qd  - Start Heparin drip ACS protocol, PTT q6  - Anterior and Posterior ECG to assess posterior involvement  - Full and Limited TTE   - Avoid Nitrates given inferior wall MI  - Trend troponins   - appreciate interventional cardiology recs    #CAD  Hx of prior MI with stents placed (last in 2013)  Follows with Dr. Olmedo    #Afib  Previously documented hx of afib on warfarin. Home regimen 7.5mg on Tues/Sat, 5mg on other 5 days  - Hold warfarin, c/w heparin gtt  - Follows with Dr. Olmedo    #HLD  - c/w Atorvastatin 40mg qd  - Lipid profile AM    #HTN urgency  Home meds: enalapril 20mg daily  - Hold enalapril, started on hydralazine 50mg BID    GI:  #ppx  - C/w protonix while on triple therapy    Renal  #AGUSTÍN - baseline Cr 1.0  - Fluids as tolerated  - Monitor for worsening renal status  - Strict I/O's    Endo  #DM?  - A1c AM      Prophylactic Measures:  F: Encourage PO intake  E: K>4, Mg>2  N: DASH diet  DVT ppx: Heparin gtt  GI ppx: Protonix 40mg qd

## 2023-10-09 NOTE — PROGRESS NOTE ADULT - PROBLEM SELECTOR PLAN 1
- On admission, presented for syncope & vomiting episodes. EKG found to have KAYLIN in II, III, aVF. Interventional cardiology attending deemed risk of primary PCI outweighed benefits given pt's presentation, age, comorbidities.  - c/w Brilinta 90mg BID & ASA 81mg qd (consider transition from Brilinta to Plavix later in week)  - c/w Atorvastatin 80mg qd  - C/w hydralazine 75 mg po q8h and Isordil 10 mg TID  - given therapeutic INR on home coumadin & supratherapeutic ptt this am, holding heparin. Trend coags.  - Trend HsT to peak  - TTE 10/09/23 revealing mildly reduced left ventricular systolic function (LVEF 50%), basal and mid inferolateral wall severely hypokinetic, grade 1 LV diastolic dysfunction.   - Avoid Nitrates given inferior wall MI  - C/w pantoprazole given DAPT - On admission, presented for syncope & vomiting episodes. EKG found to have KAYLIN in II, III, aVF. Interventional cardiology attending deemed risk of primary PCI outweighed benefits given pt's presentation, age, comorbidities.  - c/w Brilinta 90mg BID & ASA 81mg qd (consider transition from Brilinta to Plavix later in week)  - c/w Atorvastatin 80mg qd  - C/w hydralazine 75 mg po q8h and Isordil 10 mg TID  - given therapeutic INR on home coumadin & supratherapeutic ptt this am, holding heparin. Trend coags.  - Trend HsT to peak  - TTE 10/09/23 revealing mildly reduced left ventricular systolic function (LVEF 50%), basal and mid inferolateral wall severely hypokinetic, grade 1 LV diastolic dysfunction.   - Avoid Nitrates given inferior wall MI  - C/w pantoprazole given triple therapy Admission

## 2023-10-09 NOTE — PROGRESS NOTE ADULT - SUBJECTIVE AND OBJECTIVE BOX
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 Pt received valsartan 20mg x1 and IV hydralazine 10mg x1 for elevated BP and started on hydralazine 50mg po TID which was increased to 75 mg TID as well as Isordil 10 mg TID. ACEi/ARB initiation was deferred iso AGUSTÍN. High dose intensity statin prescribed. Asprin and Brilinta continued. Heparin gtt started but given stable inr (pt on coumadin for afib at home) & supra-therapeutic ptt in unit, held heparin. 1 episode of NSVT versus AIVR this am, resolved spontaneously <30 seconds, no intervention needed. Pt underwent TTE 10/09/23 revealing mildly reduced left ventricular systolic function (LVEF 50%), basal and mid inferolateral wall severely hypokinetic, grade 1 LV diastolic dysfunction. No chest pain or shortness of breath throughout CCU admission, currently deemed clinically stable for step-down to cardiac telemetry.    SUBJECTIVE ASSESSMENT: Seen and examined at bedside with daughter. Pt reports he has no complaints. Denies CP, SOB, palpitations, syncope, dizziness, lightheadedness or any other symptoms.  	  MEDICATIONS:  hydrALAZINE 75 milliGRAM(s) Oral every 8 hours  isosorbide   dinitrate Tablet (ISORDIL) 10 milliGRAM(s) Oral three times a day  pantoprazole    Tablet 40 milliGRAM(s) Oral before breakfast  atorvastatin 80 milliGRAM(s) Oral at bedtime  aspirin  chewable 81 milliGRAM(s) Oral every 24 hours  chlorhexidine 4% Liquid 1 Application(s) Topical <User Schedule>  ticagrelor 90 milliGRAM(s) Oral every 12 hours  	  VITAL SIGNS:  T(C): 36.6 (10-09-23 @ 15:31), Max: 37.1 (10-09-23 @ 05:46)  HR: 72 (10-09-23 @ 15:31) (59 - 88)  BP: 160/72 (10-09-23 @ 15:31) (135/62 - 221/101)  RR: 17 (10-09-23 @ 15:31) (12 - 31)  SpO2: 96% (10-09-23 @ 15:31) (96% - 100%)  Wt(kg): --    I&O's Summary    08 Oct 2023 07:01  -  09 Oct 2023 07:00  --------------------------------------------------------  IN: 40 mL / OUT: 200 mL / NET: -160 mL    09 Oct 2023 07:01  -  09 Oct 2023 17:23  --------------------------------------------------------  IN: 110 mL / OUT: 350 mL / NET: -240 mL      Height (cm): 182.9 (10-09 @ 01:50)  Weight (kg): 78 (10-09 @ 01:50)  BMI (kg/m2): 23.3 (10-09 @ 01:50)  BSA (m2): 2 (10-09 @ 01:50)                                     PHYSICAL EXAM:  Appearance: Normal	  HEENT: Normal oral mucosa, PERRL, EOMI	  Neck: Supple, -JVP, Carotid Bruit   Cardiovascular: Normal S1 S2, No murmurs  Respiratory: Lungs clear to auscultation  Gastrointestinal:  Soft, Non-tender, + BS	  Skin: No rashes, No ecchymoses, No cyanosis  Extremities: Normal range of motion, No clubbing, cyanosis or edema  Vascular: Peripheral pulses palpable 2+ bilaterally  Neurologic: Non-focal  Psychiatry: A & O x 3, Mood & affect appropriate    LABS:	 	               11.6   8.29  )-----------( 239      ( 09 Oct 2023 05:30 )             34.6     10-09    139  |  105  |  x   ----------------------------<  108<H>  4.4   |  24  |  x     Ca    9.2      09 Oct 2023 16:41  Phos  2.3     10-09  Mg     1.9     10-09    TPro  7.5  /  Alb  3.4  /  TBili  0.4  /  DBili  x   /  AST  29  /  ALT  11  /  AlkPhos  116  10-09  TSH: Thyroid Stimulating Hormone, Serum: 3.330 uIU/mL (10-09 @ 05:30)  PT/INR - ( 09 Oct 2023 16:41 )   PT: 28.1 sec;   INR: 2.53     PTT - ( 09 Oct 2023 16:41 )  PTT:37.3 sec

## 2023-10-09 NOTE — H&P ADULT - HISTORY OF PRESENT ILLNESS
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 Pt had a witnessed syncopal episode associated w/ multiple episodes of NBNB emesis. EMS was called and noted pt was bradycardic in the 40s and hypotensive with additional emesis but denied any CP. ALS unit was called who administered 8mg Zofran and EKG which showed KAYLIN II, III, aVF and associated depressions in I and aVL        ED Course:  T  /90 HR 80 RR 17-96%  Labs: Trop , CK, CKMB  EKG: KAYLIN II, III, aVF w/ corresponding depression in aVL  Interventions:   Ticagrelor 180mg (load)  ASA 325mg (load)  Reglan   87M PMHx MI, CAD 6 stents (last placed in 2013) HTN, HLD, AFib on warfarin, CVA (2006) w/ residual R sided paralysis, carotid stenosis, DVT s/p IVC filter, wheelchair bound. Pt had a witnessed syncopal episode associated w/ multiple episodes of NBNB emesis. EMS was called and noted pt was bradycardic in the 40s and hypotensive with additional emesis but denied any CP. ALS unit was called who administered 8mg Zofran and EKG which showed KAYLIN II, III, aVF and associated depressions in I and aVL      ED Course:  T  /90 HR 80 RR 17-96%  Labs: Trop , CK, CKMB  EKG: KAYLIN II, III, aVF w/ corresponding depression in aVL  Interventions:   Ticagrelor 180mg (load)  ASA 325mg (load)  Reglan

## 2023-10-09 NOTE — PROVIDER CONTACT NOTE (CRITICAL VALUE NOTIFICATION) - BACKGROUND
Pt admitted for ACS/STEMI, no plan for cardiac cath intervention, stepped down from CCU this afternoon

## 2023-10-09 NOTE — ED PROVIDER NOTE - CARE PLAN
Principal Discharge DX:	ACS (acute coronary syndrome)  Secondary Diagnosis:	Elevated troponin  Secondary Diagnosis:	Nausea & vomiting  Secondary Diagnosis:	Syncope   1

## 2023-10-09 NOTE — H&P ADULT - NSICDXPASTMEDICALHX_GEN_ALL_CORE_FT
PAST MEDICAL HISTORY:  Aphasia     CAD (coronary artery disease)     Carotid artery stenosis     Cervical stenosis of spine     CVA (cerebral vascular accident)     DM (diabetes mellitus)     DVT (deep venous thrombosis)     HLD (hyperlipidemia)     HTN (hypertension)     Prostate cancer

## 2023-10-09 NOTE — ED PROVIDER NOTE - WR ORDER STATUS 1
SABI MENESES    Patient Age: 51 year old   Interpreting service used: No    Patient seen within 1 year by a provider in primary care? Yes-      Refill to be: ePrescribed    Medication requested to be refilled: See pended medications.    Addition Information:  Pharmacy faxed the request on Tuesday    Does patient have enough to medication for 72 business hours? No- Route message to provider clinical pool- HIGH PRIORITY    Caller informed to check with the pharmacy later for their refill.  If problems arise, we will contact patient.         WEIGHT AND HEIGHT:   Wt Readings from Last 1 Encounters:   05/20/21 76.2 kg (168 lb)     Ht Readings from Last 1 Encounters:   05/20/21 5' (1.524 m)     BMI Readings from Last 1 Encounters:   05/20/21 32.81 kg/m²       ALLERGIES:  Acetaminophen-codeine, Codeine, and Tramadol  Current Outpatient Medications   Medication   • escitalopram (LEXAPRO) 10 MG tablet   • metformin (GLUCOPHAGE) 1000 MG tablet   • pantoprazole (PROTONIX) 40 MG tablet   • lisinopril (ZESTRIL) 20 MG tablet   • Blood Glucose Monitoring Suppl w/Device Kit   • blood glucose test strip   • Lancets (OneTouch Delica Plus [id number]) Misc     No current facility-administered medications for this visit.         CALL BACK INFO: Ok to leave response (including medical information) with family member or on answering machine        PCP: Lisa Monzon DO         INS: Payor: [employer] / Plan: [id number] / Product Type: PPO MISC   PATIENT ADDRESS:  [address] [place] [id number]   Performed

## 2023-10-09 NOTE — PATIENT PROFILE ADULT - FUNCTIONAL ASSESSMENT - BASIC MOBILITY 6.
2-calculated by average/Not able to assess (calculate score using [facility] averaging method)

## 2023-10-09 NOTE — PROGRESS NOTE ADULT - ATTENDING COMMENTS
87M w/ pmh of CAD h/o PCI, h/o CVA in 2008 w/ residual R sided deficits, wheelchair bound, AF(on Coumadin) HTN,, HLD p/w CP, syncope and one episode of emesis, found to have inferior wall STEMI, admitted to CCU    -CAD - h/o PCI, now p/w Inferior Wall STEMI - Pt. was discussed with Interventional Attending - deemed too high risk PCI w/ risk outweighing benefits given pt's advanced age and comorbidities. Today pt. is currently CP free, HD/E stable; Bedside TTE overnight: grossly low normal EF, possible inf wall hypokinesis; Obtain formal TTE today; Plan c/w ASA/Brilinta, increase Lipitor to 80 daily; Pt. currently has therapeutic INR ~2.3(on Coumadin as an outpatient) as a result Hep gtt is held until INR <2; No plan for coronary intervention; Trop trend appears to be plateauing and pt. is otherwise HD/E stable, with no CP; Would consider transitioning to Plavix later in the week and changing Coumadin to Eliquis - then would consider Eliquis/Plavix moving forward; c/w Hydral PO TID uptitrated to 75 TID - would consider transition to Valsartan BID since no plans for intervention; Defer BB bc pt. is high risk for AVB given age and RCA territory infarct  -DASH diet  -DVT PPx  -Full Code  -Dispo: SD to Cardiac tele    Alfredito Sands MD  CCU Attending

## 2023-10-09 NOTE — PROGRESS NOTE ADULT - PROBLEM SELECTOR PLAN 5
- baseline Cr 1.0, 1.38 on admission, now 1.34  - Fluids as tolerated  - Monitor BMP  - Strict I/O's

## 2023-10-09 NOTE — ED PROVIDER NOTE - PHYSICAL EXAMINATION
CONST: nontoxic NAD sitting upright vomiting in stretcher  HEAD: atraumatic  EYES: conjunctivae clear  NECK: supple  CARD: regular rate  CHEST: breathing comfortably, no stridor/retractions/tripoding  SKIN: warm, dry  NEURO: awake alert AAOx2 following commands

## 2023-10-09 NOTE — PROGRESS NOTE ADULT - PROBLEM SELECTOR PLAN 3
- Previously documented hx of afib on warfarin. Home regimen 7.5mg on Tues/Sat, 5mg on other 5 days  - Hold warfarin and heparin iso therapeutic inr  - Consider transition to eliquis prior to d/c

## 2023-10-09 NOTE — PROGRESS NOTE ADULT - PROBLEM SELECTOR PLAN 2
-/70s  -C/w hydralazine 75 mg po q8h and Isordil 10 mg TID  -Consider initiation of Ace/arb once kidney function improves

## 2023-10-10 NOTE — PROGRESS NOTE ADULT - PROBLEM SELECTOR PLAN 2
-/70s  -C/w hydralazine 75 mg po q8h and Isordil 10 mg TID  -Consider initiation of Ace/arb once kidney function improves --130s   -C/w hydralazine 75 mg po q8h and Isordil 10 mg TID  -Consider initiation of Ace/arb once kidney function improves

## 2023-10-10 NOTE — PROGRESS NOTE ADULT - SUBJECTIVE AND OBJECTIVE BOX
Cardiology PA Adult Progress Note    Subjective Assessment: No acute events overnight. PT seen and examined by bedside this morning. Patient pleasantly demented laying in bed comfortably denying chest pain, SOB, MULLER, palpitations, dizziness, LOC, N/V/D, fever/chills, diaphoresis, orthopnea/PND, and leg swelling.    ROS negative except as noted above.  	  MEDICATIONS:  hydrALAZINE 75 milliGRAM(s) Oral every 8 hours  isosorbide   dinitrate Tablet (ISORDIL) 10 milliGRAM(s) Oral three times a day  pantoprazole    Tablet 40 milliGRAM(s) Oral before breakfast  atorvastatin 80 milliGRAM(s) Oral at bedtime  aspirin  chewable 81 milliGRAM(s) Oral every 24 hours  chlorhexidine 4% Liquid 1 Application(s) Topical <User Schedule>  magnesium oxide 400 milliGRAM(s) Oral once  ticagrelor 90 milliGRAM(s) Oral every 12 hours        [PHYSICAL EXAM:  TELEMETRY:  T(C): 36.7 (10-10-23 @ 08:00), Max: 37 (10-09-23 @ 13:00)  HR: 85 (10-10-23 @ 08:00) (65 - 88)  BP: 128/62 (10-10-23 @ 08:00) (121/59 - 160/72)  RR: 18 (10-10-23 @ 08:00) (16 - 20)  SpO2: 99% (10-10-23 @ 08:00) (94% - 100%)  Wt(kg): --  I&O's Summary    09 Oct 2023 07:01  -  10 Oct 2023 07:00  --------------------------------------------------------  IN: 110 mL / OUT: 1000 mL / NET: -890 mL    10 Oct 2023 07:01  -  10 Oct 2023 11:12  --------------------------------------------------------  IN: 180 mL / OUT: 200 mL / NET: -20 mL                                              Appearance: Normal	  HEENT:   Normal oral mucosa, PERRLA, EOMI	  Neck: Supple, - JVD; no Carotid Bruit   Cardiovascular: Normal S1 S2, No JVD, No murmurs,   Respiratory: CTAB   Gastrointestinal:  Soft, Non-tender, + BS	  Skin: No rashes, No ecchymoses, No cyanosis  Extremities: Normal range of motion, No clubbing, cyanosis or edema  Vascular: Peripheral pulses palpable 2+ bilaterally  Neurologic: Non-focal  Psychiatry: A & O x 3, Mood & affect appropriate      	    ECG:  	  RADIOLOGY:   DIAGNOSTIC TESTING:  [ ] Echocardiogram:   [ ]  Catheterization:  [ ] Stress Test:    [ ] JUAN  OTHER: 	    LABS:	 	  CARDIAC MARKERS:                            10.8   7.94  )-----------( 226      ( 10 Oct 2023 05:30 )             31.8     10-10    138  |  106  |  26<H>  ----------------------------<  102<H>  4.2   |  20<L>  |  1.46<H>    Ca    8.9      10 Oct 2023 05:30  Phos  2.8     10-10  Mg     1.9     10-10    TPro  6.9  /  Alb  3.0<L>  /  TBili  0.7  /  DBili  x   /  AST  28  /  ALT  10  /  AlkPhos  100  10-10    proBNP:   Lipid Profile:   HgA1c:   TSH:   PT/INR - ( 10 Oct 2023 08:33 )   PT: 25.6 sec;   INR: 2.30          PTT - ( 10 Oct 2023 08:33 )  PTT:37.6 sec

## 2023-10-10 NOTE — PROGRESS NOTE ADULT - PROBLEM SELECTOR PLAN 5
- baseline Cr 1.0, 1.38 on admission, now 1.34  - Fluids as tolerated  - Monitor BMP  - Strict I/O's - baseline Cr 1.0, 1.38 on admission, now 1.46   - Fluids as tolerated  - Monitor BMP  - Strict I/O's    F: None  E: Replete if K<4 or Mag<2  N: DASH Diet  GIppx:   VTEppx: on hold   Dispo: possible d/c tomorrow-- need palliative care team to chat with PT about code status

## 2023-10-10 NOTE — PROGRESS NOTE ADULT - PROBLEM SELECTOR PLAN 3
- Previously documented hx of afib on warfarin. Home regimen 7.5mg on Tues/Sat, 5mg on other 5 days  - Hold warfarin and heparin iso therapeutic inr  - Consider transition to eliquis prior to d/c - Previously documented hx of afib on warfarin. Home regimen 7.5mg on Tues/Sat, 5mg on other 5 days  - Hold warfarin and heparin iso therapeutic inr  - Consider transition to eliquis prior to d/c as discussed with Dr. Estrada   -f/u BROCK bernal

## 2023-10-10 NOTE — PROGRESS NOTE ADULT - PROBLEM SELECTOR PLAN 1
- On admission, presented for syncope & vomiting episodes. EKG found to have KAYLIN in II, III, aVF. Interventional cardiology attending deemed risk of primary PCI outweighed benefits given pt's presentation, age, comorbidities.  - c/w Brilinta 90mg BID & ASA 81mg qd (consider transition from Brilinta to Plavix later in week)  - c/w Atorvastatin 80mg qd  - C/w hydralazine 75 mg po q8h and Isordil 10 mg TID  - given therapeutic INR on home coumadin & supratherapeutic ptt this am, holding heparin. Trend coags.  - Trend HsT to peak  - TTE 10/09/23 revealing mildly reduced left ventricular systolic function (LVEF 50%), basal and mid inferolateral wall severely hypokinetic, grade 1 LV diastolic dysfunction.   - Avoid Nitrates given inferior wall MI  - C/w pantoprazole given triple therapy - On admission, presented for syncope & vomiting episodes. EKG found to have KAYLIN in II, III, aVF. Interventional cardiology attending deemed risk of primary PCI outweighed benefits given pt's presentation, age, comorbidities.  - c/w Brilinta 90mg BID & ASA 81mg qd (consider transition from Brilinta to Plavix before d/c)   - c/w Atorvastatin 80mg qd  - C/w hydralazine 75 mg po q8h and Isordil 10 mg TID  - given therapeutic INR on home coumadin & supratherapeutic ptt this am, holding heparin. Trend coags.  - Trend HsT to peak  - TTE 10/09/23 revealing mildly reduced left ventricular systolic function (LVEF 50%), basal and mid inferolateral wall severely hypokinetic, grade 1 LV diastolic dysfunction.   - Avoid Nitrates given inferior wall MI  - C/w pantoprazole given triple therapy  -Palliative care team consulted i/s/o medical management of STEMI-- discussed with wife and is agreeable.

## 2023-10-10 NOTE — PROGRESS NOTE ADULT - ASSESSMENT
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 Pt was also noted to have elevated BP which improved after antihypertensive medication administration. Heparin gtt was started however given supra therapeutic PTT in unit now held. Pt step-down to cardiac telemetry unit as deemed clinically stable.

## 2023-10-11 NOTE — PHYSICAL THERAPY INITIAL EVALUATION ADULT - SITTING BALANCE: DYNAMIC
Initial score Current Score   Pain Summary VAS 3 3   Functional questionnaire [place] 16    ROM Lumbar Flexion      Lumbar Ext      Lumbar SB L/R      Lumbar rotation L/R     Strength Hip ext      ABD      TrA       RESTRICTIONS/PRECAUTIONS:     Exercises/Interventions:     Therapeutic Ex sets/reps comments      hep   hep   Prone press up X 10 hep   hep   Clamshells GR VL 15 x      Piriformis s 3x  ;20 hep   Bridges X 20    hep   Supine HS stretch 3 x 20\"     LTR 10 x        Fig 4 stretch / Bozena's 3 x 20\" / 2 x 1 min     LBW Green 2 laps    Side bending reach overhead 10 x 5\"    Incline stretch 3 x 20\"    Cat / camel NV? Manual Intervention      Prone PA glides / SIJ mobilizations X 8 minutes    Prone quad stretch / right hip IR X 3 min     LAD ; hip and SI thrust X 3 min     Prone ext manipulation for SI X        Long sit test and MET X 3 min  Short to long - recturs             NMR re-education      Prone SB LE ext / Alt UE and LE 10 x / 10 x                           Therapeutic Exercise and NMR EXR  [x] (30252) Provided verbal/tactile cueing for activities related to strengthening, flexibility, endurance, ROM  for improvements in proximal hip and core control with self care, mobility, lifting and ambulation.  [] (58961) Provided verbal/tactile cueing for activities related to improving balance, coordination, kinesthetic sense, posture, motor skill, proprioception  to assist with core control in self care, mobility, lifting, and ambulation.      Therapeutic Activities:    [] (37827 or 26623) Provided verbal/tactile cueing for activities related to improving balance, coordination, kinesthetic sense, posture, motor skill, proprioception and motor activation to allow for proper function  with self care and ADLs  [] (25357) Provided training and instruction to the patient for proper core and proximal hip recruitment and positioning with ambulation re-education     Home Exercise Program:    [x] (16748)
poor plus

## 2023-10-11 NOTE — PROGRESS NOTE ADULT - ASSESSMENT
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

## 2023-10-11 NOTE — CONSULT NOTE ADULT - PROBLEM SELECTOR RECOMMENDATION 5
Patient wife made patient a DNR/DNI. Please see GO note above. Plan for patient to go to [facility].   Patient would benefit form follow up with Dr. Topete, outpatient geriatric medicine.  As discussed during the palliative IDT meeting and as identified during the patients PSSA screening the patient would benefit from: Music therapy  - Holiness/Spiritual practice: unknowns   - Coping: [x ] well [ ] with difficulty [ ] poor coping   - Support system: [ x] strong [ ] adequate [ ] inadequate  - All questions answered, emotional support provided  -  primary team   - Please contact Palliative Medicine 24/7 at [phone] for any acute symptoms or further questions  - Will continue to follow with you

## 2023-10-11 NOTE — PROGRESS NOTE ADULT - PROBLEM SELECTOR PLAN 3
- Previously documented hx of afib on warfarin. Home regimen 7.5mg on Tues/Sat, 5mg on other 5 days  - Hold warfarin and heparin iso therapeutic inr --> Fell to 1.9 on 10/11, plan to resume AC with Eliquis on 10/12 pending coags remain stable and Vivo-price check of Eliquis is affordable  - f/u AM coags

## 2023-10-11 NOTE — DIETITIAN INITIAL EVALUATION ADULT - ADD RECOMMEND
1. Current Diet: DASH.  - Suggest to dc given adv age and poor PO in pt pending palliative care consult.  - Add soft/ easy to chew on to diet to provide ease during meals.  - Add PO supplements to diet: ensure MAX x1 daily 150kcal/30gm prot.   2. Monitor %po intake, diet tolerance, skin, wt, Labs, pain.  3. Monitor for GOC- Honor at all times.   4. RD to remain available for additional nutrition interventions as needed.   ** High Nutrition Risk.

## 2023-10-11 NOTE — PHYSICAL THERAPY INITIAL EVALUATION ADULT - GENERAL OBSERVATIONS, REHAB EVAL
Pt received semi-supine in bed in no acute distress, cleared for PT by LEONARDO Cervantes despite elevated troponins +BLE knee flexion contracture +EKG +Heplock

## 2023-10-11 NOTE — PROGRESS NOTE ADULT - PROBLEM SELECTOR PLAN 5
- baseline Cr 1.0, 1.38 on admission, now stable at 1.45  - Monitor BMP  - Strict I/O's  - ACE/ARb on hold for now    F: None  E: Replete if K<4 or Mag<2  N: DASH Diet  GIppx: PPI  VTEppx: on hold   Dispo: possible d/c tomorrow pending Palliative/GOC discussion

## 2023-10-11 NOTE — PHYSICAL THERAPY INITIAL EVALUATION ADULT - IMPAIRED TRANSFERS: SIT/STAND, REHAB EVAL
Achieves ~50% stand with max A x 2, difficulty weight shifting anterior, requires b/l knee block, unable to extend trunk/impaired balance/impaired postural control/decreased ROM/decreased strength

## 2023-10-11 NOTE — CONSULT NOTE ADULT - PROBLEM SELECTOR RECOMMENDATION 3
On admission, patient presented for syncope & vomiting episodes. EKG found to have KAYLIN in II, III, aVF. Interventional cardiology attending deemed risk of primary PCI outweighed benefits given pt's presentation, age, comorbidities.  Patient is being medically managed with  c/w Brilinta 90mg BID & ASA 81mg qd,  Atorvastatin 80mg qd ,hydralazine 75 mg po q8h and Isordil 10 mg TID.   Continue care as per primary team.

## 2023-10-11 NOTE — DIETITIAN INITIAL EVALUATION ADULT - PERTINENT MEDS FT
MEDICATIONS  (STANDING):  atorvastatin 80 milliGRAM(s) Oral at bedtime  chlorhexidine 4% Liquid 1 Application(s) Topical <User Schedule>  hydrALAZINE 75 milliGRAM(s) Oral every 8 hours  isosorbide   dinitrate Tablet (ISORDIL) 10 milliGRAM(s) Oral three times a day  metoprolol tartrate 12.5 milliGRAM(s) Oral two times a day  pantoprazole    Tablet 40 milliGRAM(s) Oral before breakfast  ticagrelor 90 milliGRAM(s) Oral every 12 hours    MEDICATIONS  (PRN):

## 2023-10-11 NOTE — DIETITIAN INITIAL EVALUATION ADULT - PERTINENT LABORATORY DATA
10-11    138  |  106  |  27<H>  ----------------------------<  116<H>  4.3   |  22  |  1.45<H>    Ca    9.2      11 Oct 2023 05:30  Phos  2.8     10-10  Mg     2.0     10-11    TPro  6.9  /  Alb  3.0<L>  /  TBili  0.7  /  DBili  x   /  AST  28  /  ALT  10  /  AlkPhos  100  10-10  A1C with Estimated Average Glucose Result: 6.1 % (10-09-23 @ 05:30)

## 2023-10-11 NOTE — PROGRESS NOTE ADULT - PROBLEM SELECTOR PLAN 1
- On admission, presented for syncope & vomiting episodes. EKG found to have KAYLIN in II, III, aVF. Interventional cardiology attending deemed risk of primary PCI outweighed benefits given pt's presentation, age, comorbidities.  - c/w Brilinta 90mg BID & ASA 81mg qd --> [ ] f/u Price checks of Plavix and Eliquis with plan to start only Plavix/Eliquis on 10/12   - c/w Atorvastatin 80mg qd, hydralazine 75 mg po q8h and Isordil 10 mg TID  - Start Lopressor 12.5mg BID  - s/p supratherapeutic ptt 10/10 for which Heparin gtt was held; today PTT 1.9: Plan to resume AC with Eliquis as above if price check affordable and coags wnl 10/12. Continue to trend coags.  - TropHST peaked at 304 on 10/9, then fell to 249 10/10  - TTE 10/09/23 revealed LVEF 50%, basal and mid inferolateral wall severely hypokinetic, G1 LV diastolic dysfxn.   - Avoid Nitrates given inferior wall MI  - C/w pantoprazole given triple therapy  -Palliative care team consulted i/s/o medical management of STEMI-- discussed with wife and is agreeable. Palliative seeing today 10/11 to assist w/ GOC

## 2023-10-11 NOTE — CONSULT NOTE ADULT - PROBLEM SELECTOR RECOMMENDATION 4
Patient remains full code. Plan to meet with wife an patient later today. 30 minutes were spent discussing advance care planning face to face with the patients daughter and wife.

## 2023-10-11 NOTE — PHYSICAL THERAPY INITIAL EVALUATION ADULT - ADDITIONAL COMMENTS
As per pt, PTA he required assistance with all functional mobility, ADLs, and IADLs, has 24/7 assist from wife and kids. Pt is mostly bedbound however states he is able to transfer to a wheelchair and ambulate limited distances. Has a wheelchair, rolling walker, shower tub with shower chair / grab bars.

## 2023-10-11 NOTE — CONSULT NOTE ADULT - ASSESSMENT
87 year old man with Debility, Atrial Fibrillation ST elevation myocardial infarction (STEMI) of inferior wall and encounter for palliative care 87 year old man with Debility, Atrial Fibrillation,  ST elevation myocardial infarction (STEMI) of inferior wall, advance care planning and encounter for palliative care

## 2023-10-11 NOTE — CONSULT NOTE ADULT - SUBJECTIVE AND OBJECTIVE BOX
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 Pt had a witnessed syncopal episode associated w/ multiple episodes of NBNB emesis. EMS was called and noted pt was bradycardic in the 40s and hypotensive with additional emesis but denied any CP. ALS unit was called who administered 8mg Zofran and EKG which showed KAYLIN II, III, aVF and associated depressions in I and aVL    ED Course:  T  /90 HR 80 RR 17-96%  Labs: Trop , CK, CKMB  EKG: KAYLIN II, III, aVF w/ corresponding depression in aVL  Interventions:   Ticagrelor 180mg (load)  ASA 325mg (load)  Reglan   (09 Oct 2023 02:26)    PAST MEDICAL & SURGICAL HISTORY:  HLD (hyperlipidemia)    CAD (coronary artery disease)    DM (diabetes mellitus)    CVA (cerebral vascular accident)    Aphasia    Cervical stenosis of spine    Carotid artery stenosis    Prostate cancer    HTN (hypertension)    DVT (deep venous thrombosis)    S/P cervical discectomy    S/P IVC filter      S/P insertion of penile implant    FAMILY HISTORY:  Family history of essential hypertension (Father)    No family history of cardiovascular disease (Mother)    SOCIAL HISTORY:   Denies smoking, ETOH and Drug. Patient is wheelchair bound    ROS:    Unable to attain due to:  +weakness                     Dyspnea (Joe 0-10):      0                  N/V (Y/N):     N                         Secretions (Y/N) :     N           Agitation(Y/N): N  Pain (Y/N):     N  -Provocation/Palliation: n/a   -Quality/Quantity: n/a   -Radiating: n/a   -Severity: n/a   -Timing/Frequency: n/a    -Impact on ADLs: n/a     General:  + weakness  HEENT:    Denied  Neck:  Denied  CVS:  Denied  Resp:  Denied  GI:  Denied  :  Denied  Musc:  Denied  Neuro:  Denied  Psych:  Denied  Skin:  Denied  Lymph:  Denied    ALLERGIES:  Allergies    No Known Allergies    Intolerances    Opiate Naive (Y/N): Y  -iStop reviewed (Y/N): Y (Ref#:    [id number]        )    MEDICATIONS:      MEDICATIONS  (STANDING):  aspirin  chewable 81 milliGRAM(s) Oral every 24 hours  atorvastatin 80 milliGRAM(s) Oral at bedtime  chlorhexidine 4% Liquid 1 Application(s) Topical <User Schedule>  hydrALAZINE 75 milliGRAM(s) Oral every 8 hours  isosorbide   dinitrate Tablet (ISORDIL) 10 milliGRAM(s) Oral three times a day  pantoprazole    Tablet 40 milliGRAM(s) Oral before breakfast  ticagrelor 90 milliGRAM(s) Oral every 12 hours    MEDICATIONS  (PRN):    LABS:    CBC:                        10.8   8.82  )-----------( 225      ( 11 Oct 2023 05:30 )             32.7     CMP:    10-11    138  |  106  |  27<H>  ----------------------------<  116<H>  4.3   |  22  |  1.45<H>    Ca    9.2      11 Oct 2023 05:30  Phos  2.8     10-10  Mg     2.0     10-11    TPro  6.9  /  Alb  3.0<L>  /  TBili  0.7  /  DBili  x   /  AST  28  /  ALT  10  /  AlkPhos  100  10-10    PT/INR - ( 11 Oct 2023 05:30 )   PT: 21.4 sec;   INR: 1.91        PTT - ( 11 Oct 2023 05:30 )  PTT:33.1 sec  Urinalysis Basic - ( 11 Oct 2023 05:30 )    Color: x / Appearance: x / SG: x / pH: x  Gluc: 116 mg/dL / Ketone: x  / Bili: x / Urobili: x   Blood: x / Protein: x / Nitrite: x   Leuk Esterase: x / RBC: x / WBC x   Sq Epi: x / Non Sq Epi: x / Bacteria: x    IMAGING:   < from: Xray Chest 1 View- PORTABLE-Routine (Xray Chest 1 View- PORTABLE-Routine in AM.) (10.10.23 @ 07:15) >    ACC: [id number] EXAM:  XR CHEST PORTABLE ROUTINE 1V   ORDERED BY: ALEXA PLASCENCIA     PROCEDURE DATE:  10/10/2023  Lungs clear. No infiltrate pleural effusion or pneumothorax. Unremarkable   cardiac silhouette. No acute bone abnormality.    < end of copied text >    < from: TTE Echo Complete w/o Contrast w/ Doppler (10.09.23 @ 10:04) >  CONCLUSIONS:     1. Mildly reduced left ventricular systolic function.   2. Basal and mid inferolateral wall is severely hypokinetic.   3. Grade I left ventricular diastolic dysfunction.   4. No significant valvular disease.   5. No evidence of pulmonary hypertension.   6. No pericardial effusion.  LVIDs (2D):     2.34 cm  LV EF (MOD BP):  50 %      (>55%)      < end of copied text >      PHYSICAL EXAM:  T(C): 36.7 (10-11-23 @ 08:30), Max: 36.8 (10-10-23 @ 15:40)  HR: 113 (10-11-23 @ 08:30) (71 - 113)  BP: 151/90 (10-11-23 @ 08:30) (110/57 - 205/88)  RR: 18 (10-11-23 @ 08:30) (18 - 18)  SpO2: 97% (10-11-23 @ 08:30) (96% - 98%)  Wt(kg): 78kg  Daily     Daily Weight in k.3 (11 Oct 2023 05:50)  CAPILLARY BLOOD GLUCOSE    I&O's Summary    10 Oct 2023 07:  -  11 Oct 2023 07:00  --------------------------------------------------------  IN: 360 mL / OUT: 1350 mL / NET: -990 mL    11 Oct 2023 07:01  -  11 Oct 2023 10:56  --------------------------------------------------------  IN: 180 mL / OUT: 0 mL / NET: 180 mL    GENERAL:  [x ]Alert  [x ]Oriented x 3 [ ]Lethargic due to sedation  [ ]Cachexia [x ]Verbal  [ ]Non-Verbal  Behavioral:   [ ] Anxiety  [ ] Delirium [ ] Agitation [x ] Other - calm   HEENT:  [ x]Normal   [ ]Dry mouth   [ ]ET Tube  [ ]Oral lesions  PULMONARY:   [x ]Clear  [ ]Tachypnea  [ ]Audible excessive secretions   [ ]Rhonchi     [ ]Right [ ]Left [ ]Bilateral  [ ]Crackles        [ ]Right [ ]Left [ ]Bilateral  [ ]Wheezing     [ ]Right [ ]Left [ ]Bilateral  CARDIOVASCULAR:    [ ]Regular [ ]Irregular [x]Tachy  [ ]Donny [ ]Murmur [ ]Other  GASTROINTESTINAL:  [x ]Soft  [ ]Distended   [ ]+BS  [x ]Non tender [ ]Tender  [ ]PEG [ ]OGT/NGT  [] flexiseal with output  Last BM: 10/10  GENITOURINARY:  [ ]Normal [x ] Incontinent   [ ]Oliguria/Anuria   [ ]Ortiz  MUSCULOSKELETAL:   [ ]Normal   [ ]Weakness  [x ]Bed/Wheelchair bound [ ]Edema  NEUROLOGIC:   [ x]No focal deficits  [ ]Cognitive impairment  [ ] Dysphagia [ ]Dysarthria [ ] Paresis [  ]Other   SKIN:   [x ]Normal   [ ]Pressure ulcer(s)  [ ]Rash     Preadmit Karnofsky: 50 %           Current Karnofsky:    50 %  Cachexia (Y/N): N  BMI: 23.3kg/m2    ADVANCED DIRECTIVES:     Full Code      HCP form found on patient window under admit legacy from 2013 stating that Mani Sánchez is his HCP     No Living will / POA / Advance directives found on [facility] / [facility].     No documented GOC notes on [facility]    DECISION MAKER: The patient is able to participate in symptomatic assessment but may not be able to make complex medical decisions  LEGAL SURROGATE: No documented HCP in paper chart / [facility] / [facility].  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 Pt had a witnessed syncopal episode associated w/ multiple episodes of NBNB emesis. EMS was called and noted pt was bradycardic in the 40s and hypotensive with additional emesis but denied any CP. ALS unit was called who administered 8mg Zofran and EKG which showed KAYLIN II, III, aVF and associated depressions in I and aVL    ED Course:  T  /90 HR 80 RR 17-96%  Labs: Trop , CK, CKMB  EKG: KAYLIN II, III, aVF w/ corresponding depression in aVL  Interventions:   Ticagrelor 180mg (load)  ASA 325mg (load)  Reglan   (09 Oct 2023 02:26)    PAST MEDICAL & SURGICAL HISTORY:  HLD (hyperlipidemia)    CAD (coronary artery disease)    DM (diabetes mellitus)    CVA (cerebral vascular accident)    Aphasia    Cervical stenosis of spine    Carotid artery stenosis    Prostate cancer    HTN (hypertension)    DVT (deep venous thrombosis)    S/P cervical discectomy    S/P IVC filter      S/P insertion of penile implant    FAMILY HISTORY:  Family history of essential hypertension (Father)    No family history of cardiovascular disease (Mother)    SOCIAL HISTORY:   Denies smoking, ETOH and Drug. Patient is wheelchair bound    ROS:    Unable to attain due to:  +weakness                     Dyspnea (Joe 0-10):      0                  N/V (Y/N):     N                         Secretions (Y/N) :     N           Agitation(Y/N): N  Pain (Y/N):     N  -Provocation/Palliation: n/a   -Quality/Quantity: n/a   -Radiating: n/a   -Severity: n/a   -Timing/Frequency: n/a    -Impact on ADLs: n/a     General:  + weakness  HEENT:    Denied  Neck:  Denied  CVS:  Denied  Resp:  Denied  GI:  Denied  :  Denied  Musc:  Denied  Neuro:  Denied  Psych:  Denied  Skin:  Denied  Lymph:  Denied    ALLERGIES:  Allergies    No Known Allergies    Intolerances    Opiate Naive (Y/N): Y  -iStop reviewed (Y/N): Y (Ref#:    [id number]        )    MEDICATIONS:      MEDICATIONS  (STANDING):  aspirin  chewable 81 milliGRAM(s) Oral every 24 hours  atorvastatin 80 milliGRAM(s) Oral at bedtime  chlorhexidine 4% Liquid 1 Application(s) Topical <User Schedule>  hydrALAZINE 75 milliGRAM(s) Oral every 8 hours  isosorbide   dinitrate Tablet (ISORDIL) 10 milliGRAM(s) Oral three times a day  pantoprazole    Tablet 40 milliGRAM(s) Oral before breakfast  ticagrelor 90 milliGRAM(s) Oral every 12 hours    MEDICATIONS  (PRN):    LABS:    CBC:                        10.8   8.82  )-----------( 225      ( 11 Oct 2023 05:30 )             32.7     CMP:    10-11    138  |  106  |  27<H>  ----------------------------<  116<H>  4.3   |  22  |  1.45<H>    Ca    9.2      11 Oct 2023 05:30  Phos  2.8     10-10  Mg     2.0     10-11    TPro  6.9  /  Alb  3.0<L>  /  TBili  0.7  /  DBili  x   /  AST  28  /  ALT  10  /  AlkPhos  100  10-10    PT/INR - ( 11 Oct 2023 05:30 )   PT: 21.4 sec;   INR: 1.91        PTT - ( 11 Oct 2023 05:30 )  PTT:33.1 sec  Urinalysis Basic - ( 11 Oct 2023 05:30 )    Color: x / Appearance: x / SG: x / pH: x  Gluc: 116 mg/dL / Ketone: x  / Bili: x / Urobili: x   Blood: x / Protein: x / Nitrite: x   Leuk Esterase: x / RBC: x / WBC x   Sq Epi: x / Non Sq Epi: x / Bacteria: x    IMAGING:   < from: Xray Chest 1 View- PORTABLE-Routine (Xray Chest 1 View- PORTABLE-Routine in AM.) (10.10.23 @ 07:15) >    ACC: [id number] EXAM:  XR CHEST PORTABLE ROUTINE 1V   ORDERED BY: ALEXA PLASCENCIA     PROCEDURE DATE:  10/10/2023  Lungs clear. No infiltrate pleural effusion or pneumothorax. Unremarkable   cardiac silhouette. No acute bone abnormality.    < end of copied text >    < from: TTE Echo Complete w/o Contrast w/ Doppler (10.09.23 @ 10:04) >  CONCLUSIONS:     1. Mildly reduced left ventricular systolic function.   2. Basal and mid inferolateral wall is severely hypokinetic.   3. Grade I left ventricular diastolic dysfunction.   4. No significant valvular disease.   5. No evidence of pulmonary hypertension.   6. No pericardial effusion.  LVIDs (2D):     2.34 cm  LV EF (MOD BP):  50 %      (>55%)      < end of copied text >      PHYSICAL EXAM:  T(C): 36.7 (10-11-23 @ 08:30), Max: 36.8 (10-10-23 @ 15:40)  HR: 113 (10-11-23 @ 08:30) (71 - 113)  BP: 151/90 (10-11-23 @ 08:30) (110/57 - 205/88)  RR: 18 (10-11-23 @ 08:30) (18 - 18)  SpO2: 97% (10-11-23 @ 08:30) (96% - 98%)  Wt(kg): 78kg  Daily     Daily Weight in k.3 (11 Oct 2023 05:50)  CAPILLARY BLOOD GLUCOSE    I&O's Summary    10 Oct 2023 07:  -  11 Oct 2023 07:00  --------------------------------------------------------  IN: 360 mL / OUT: 1350 mL / NET: -990 mL    11 Oct 2023 07:01  -  11 Oct 2023 10:56  --------------------------------------------------------  IN: 180 mL / OUT: 0 mL / NET: 180 mL    GENERAL:  [x ]Alert  [x ]Oriented x 3 [ ]Lethargic due to sedation  [ ]Cachexia [x ]Verbal  [ ]Non-Verbal  Behavioral:   [ ] Anxiety  [ ] Delirium [ ] Agitation [x ] Other - calm   HEENT:  [ x]Normal   [ ]Dry mouth   [ ]ET Tube  [ ]Oral lesions  PULMONARY:   [x ]Clear  [ ]Tachypnea  [ ]Audible excessive secretions   [ ]Rhonchi     [ ]Right [ ]Left [ ]Bilateral  [ ]Crackles        [ ]Right [ ]Left [ ]Bilateral  [ ]Wheezing     [ ]Right [ ]Left [ ]Bilateral  CARDIOVASCULAR:    [ ]Regular [ ]Irregular [x]Tachy  [ ]Donny [ ]Murmur [ ]Other  GASTROINTESTINAL:  [x ]Soft  [ ]Distended   [ ]+BS  [x ]Non tender [ ]Tender  [ ]PEG [ ]OGT/NGT  [] flexiseal with output  Last BM: 10/10  GENITOURINARY:  [ ]Normal [x ] Incontinent   [ ]Oliguria/Anuria   [ ]Ortiz  MUSCULOSKELETAL:   [ ]Normal   [ ]Weakness  [x ]Bed/Wheelchair bound [ ]Edema  NEUROLOGIC:   [ x]No focal deficits  [ ]Cognitive impairment  [ ] Dysphagia [ ]Dysarthria [ ] Paresis [  ]Other   SKIN:   [x ]Normal   [ ]Pressure ulcer(s)  [ ]Rash     Preadmit Karnofsky: 50 %           Current Karnofsky:    50 %  Cachexia (Y/N): N  BMI: 23.3kg/m2    ADVANCED DIRECTIVES:     Full Code      HCP form found on patient window under admit legacy from 2013 stating that Mani Sánchez is his HCP     No Living will / POA / Advance directives found on [facility] / [facility].     No documented GOC notes on [facility]    DECISION MAKER: The patient is able to participate in symptomatic assessment but may not be able to make complex medical decisions  LEGAL SURROGATE: No documented HCP in paper chart / [facility] / [facility].  Alternate surrogate: Mani Sánchez [phone]    GOALS OF CARE DISCUSSION:       Palliative care info/counseling provided	           Family meeting       Advanced Directives addressed please see Advance Care Planning Note	           See previous Palliative Medicine Note       Documentation of GOC: DNR/DNI          REFERRALS:	        Unit SW/Case Mgmt       PT/OT

## 2023-10-11 NOTE — DIETITIAN INITIAL EVALUATION ADULT - NUTRITION DIAGNOSIS
Nephrology Daily Progress Note    Date of Service  10/3/2020    Chief Complaint  A 57-year-old lady with end-stage renal disease, on chronic hemodialysis Monday, Wednesday, and Friday.  The patient was admitted on 10/02. On 09/29, when she was eating dinner at her kitchen table,she lost consciousness without warning.  She fell, hit her head and right hip  area.  She was unable to walk after that and she did not go to dialysis on  09/30. She came to the emergency room on 10/02. She was found to have nondisplaced fracture of the right inferior obturator ring. The patient has been admitted for inpatient evaluation and therapy.  We have been asked to assist in her management.      Interval Problem Update  10/2 - Consult done  10/3 - HD yesterday with 2L net UF, pt says her pain is controlled, she did not notice some LUE access arm pain post HD yesterday, none at present, BP elevated    Review of Systems  Review of Systems   Constitutional: Negative.    HENT: Negative.    Eyes: Negative.    Respiratory: Negative.    Cardiovascular: Negative.         LUE access arm pain after HD yesterday    Gastrointestinal: Negative.    Genitourinary: Negative.    Musculoskeletal: Positive for back pain and falls.        R hip pain   Skin: Negative.    Neurological: Negative.    Endo/Heme/Allergies: Negative.    Psychiatric/Behavioral: Negative.         Physical Exam  Temp:  [35.9 °C (96.6 °F)-36.8 °C (98.2 °F)] 35.9 °C (96.6 °F)  Pulse:  [60-75] 62  Resp:  [16-22] 18  BP: (169-190)/(77-91) 169/77  SpO2:  [91 %-99 %] 99 %    Physical Exam  Constitutional:       Appearance: She is ill-appearing.   HENT:      Head: Normocephalic and atraumatic.      Nose: Nose normal.      Mouth/Throat:      Mouth: Mucous membranes are moist.   Eyes:      Extraocular Movements: Extraocular movements intact.      Pupils: Pupils are equal, round, and reactive to light.   Neck:      Musculoskeletal: Normal range of motion and neck supple.   Cardiovascular:       Rate and Rhythm: Normal rate and regular rhythm.      Pulses: Normal pulses.      Comments: LUE AVF +T/B  Pulmonary:      Effort: Pulmonary effort is normal.      Breath sounds: Normal breath sounds.   Abdominal:      General: Bowel sounds are normal.      Palpations: Abdomen is soft.   Musculoskeletal:         General: Tenderness (R hip) present.      Right lower leg: No edema.      Left lower leg: No edema.      Comments: R BKA   Skin:     General: Skin is warm and dry.   Neurological:      General: No focal deficit present.      Mental Status: She is alert and oriented to person, place, and time.   Psychiatric:         Mood and Affect: Mood normal.         Behavior: Behavior normal.         Fluids    Intake/Output Summary (Last 24 hours) at 10/3/2020 1024  Last data filed at 10/2/2020 1400  Gross per 24 hour   Intake 500 ml   Output 2500 ml   Net -2000 ml       Laboratory  Recent Labs     10/01/20  1527 10/02/20  0320 10/03/20  0449   WBC 7.0 9.0 5.2   RBC 3.16* 3.12* 3.11*   HEMOGLOBIN 9.4* 9.5* 9.2*   HEMATOCRIT 30.3* 30.4* 30.2*   MCV 95.9 97.4 97.1   MCH 29.7 30.4 29.6   MCHC 31.0* 31.3* 30.5*   RDW 56.8* 56.8* 58.5*   PLATELETCT 68* 65* 59*   MPV 11.3 10.9 10.7     Recent Labs     10/02/20  0320 10/02/20  0714 10/03/20  0449   SODIUM 137 135 137   POTASSIUM 6.0* 6.2* 5.5   CHLORIDE 95* 93* 96   CO2 25 25 28   GLUCOSE 96 89 97   BUN 80* 82* 53*   CREATININE 10.42* 10.80* 6.97*   CALCIUM 9.2 9.2 9.1         No results for input(s): NTPROBNP in the last 72 hours.  Recent Labs     10/02/20  0320   TRIGLYCERIDE 66   HDL 37*   LDL 58       Imaging  Available labs, imaging and clinical documentation reviewed.     Assessment/Plan  #  End-stage renal disease, chronic hemodialysis Monday, Wednesday, and   Friday.  The patient missed last dialysis because of the fracture.      # Hyperkalemia, secondary to missing dialysis. Improved.     # Chronic obstructive pulmonary disease, not on home oxygen,  History of    chronic obstructive pulmonary disease exacerbation previously.      # Anemia of chronic kidney disease, below target.  Tsat 15%. Ferritin 582.     # Hypertension, currently not controlled.      # Chronic mild thrombocytopenia. Monitor.    # Status post syncopal episodes.      # Status post right inferior obturator ring fracture. Non-surgical.    # History of supraventricular tachycardia, atrial fibrillation and atrial   flutter, status post ablation with recurrent supraventricular arrhythmias.      # Chronic kidney disease -- Metabolic bone disorder.  Patient has   hyperphosphatemia at this time.  Apparently, she has not been taking her phosphorus   binders.  This is followed at the dialysis unit.      # Dyslipidemia, not on statin.       # SULFA ALLERGY.      # Left upper extremity AV fistula. Reports some pain after HD 10/2. Will have OP HD unit refer to AC for eval.     # Ongoing tobacco use. Cessation advised.     # Past history of alcohol and drug use.      # Status post right below-the-knee amputation.      # History of multiple surgeries.       PLAN:    - Patient was seen by orthopedics.  No surgery is needed.    - Continue iHD qMWF, next treatment MON via L AVF   - WOO with HD, goal Hgb 10-11.   - Iron load  - Home phos binders with meals, renal diet   - No dietary protein restrictions  - Continue home medications  - Goal BP <140/90, consider increasing carvedilol and/or hydralazine  - Furosemide on non HD days  - All of her medicines should be dosed for a GFR less than 10.          Thank you,           yes...

## 2023-10-11 NOTE — PROGRESS NOTE ADULT - SUBJECTIVE AND OBJECTIVE BOX
Interventional Cardiology PA Adult Progress Note    Subjective Assessment: Patient seen and examined at bedside, eating breakfast, reports he is in "primo" condition today; specifically denies CP/SOB, N/V/D, F/C.  	  MEDICATIONS:  hydrALAZINE 75 milliGRAM(s) Oral every 8 hours  isosorbide   dinitrate Tablet (ISORDIL) 10 milliGRAM(s) Oral three times a day    pantoprazole    Tablet 40 milliGRAM(s) Oral before breakfast    atorvastatin 80 milliGRAM(s) Oral at bedtime    aspirin  chewable 81 milliGRAM(s) Oral every 24 hours  chlorhexidine 4% Liquid 1 Application(s) Topical <User Schedule>  ticagrelor 90 milliGRAM(s) Oral every 12 hours        [PHYSICAL EXAM:  TELEMETRY: intermittent sinus tachycardia runs from 120 to peak 150s   T(C): 36.7 (10-11-23 @ 08:30), Max: 36.8 (10-10-23 @ 15:40)  HR: 113 (10-11-23 @ 08:30) (71 - 113)  BP: 151/90 (10-11-23 @ 08:30) (110/57 - 205/88)  RR: 18 (10-11-23 @ 08:30) (18 - 18)  SpO2: 97% (10-11-23 @ 08:30) (96% - 98%)  Wt(kg): --  I&O's Summary    10 Oct 2023 07:01  -  11 Oct 2023 07:00  --------------------------------------------------------  IN: 360 mL / OUT: 1350 mL / NET: -990 mL    11 Oct 2023 07:01  -  11 Oct 2023 11:04  --------------------------------------------------------  IN: 180 mL / OUT: 0 mL / NET: 180 mL                                             Appearance: Pt seen in bed in NAD, eating breakfast with PCA help	  HEENT:   Moist oral mucosa, PERRL, EOMI	  Neck: Supple   Cardiovascular: +S1 S2, No JVD, No M/R/G appreciated  Respiratory: No Rales or wheeze; +  Rhonchi R>L - cleared by coughing on exam	  Gastrointestinal:  Soft, Non-tender, + BS	  Skin: No rashes seen  Extremities: No clubbing, cyanosis or edema  Vascular: WWP x4 extremities  Neurologic: RLE deficits 1/5; sensation grossly intact x4 extremities; b/l UE 4/5  Psychiatry: A & O x 2- to self and place (says year 2008), Pleasant mood & affect appropriate      	    LABS:	 	  CARDIAC MARKERS:                            10.8   8.82  )-----------( 225      ( 11 Oct 2023 05:30 )             32.7     10-11    138  |  106  |  27<H>  ----------------------------<  116<H>  4.3   |  22  |  1.45<H>    Ca    9.2      11 Oct 2023 05:30  Phos  2.8     10-10  Mg     2.0     10-11    TPro  6.9  /  Alb  3.0<L>  /  TBili  0.7  /  DBili  x   /  AST  28  /  ALT  10  /  AlkPhos  100  10-10    proBNP:   Lipid Profile:   HgA1c:   TSH:   PT/INR - ( 11 Oct 2023 05:30 )   PT: 21.4 sec;   INR: 1.91          PTT - ( 11 Oct 2023 05:30 )  PTT:33.1 sec

## 2023-10-11 NOTE — DIETITIAN INITIAL EVALUATION ADULT - OTHER CALCULATIONS
6'0''  pounds +-10%  Wt 171 pounds BMI 23.2 %IBW=96  current body wt used for energy calculations as pt falls within % IBW  adjust for age and ongoing conditions

## 2023-10-11 NOTE — CONSULT NOTE ADULT - CONVERSATION DETAILS
In addition to the EM visit, an advance care planning meeting was performed  Start time: 100pm  End time: 130pm  Total time: 30 minutes   A face to face meeting to discuss advance care planning was held today regarding: SARAH SÁNCHEZ  Primary decision maker: Patient is unable to make decisions for himself   Alternate/surrogate: Mani Sánchez  Discussed advance directives including, but not limited to, healthcare proxy and code status.  Decision regarding code status:  DNR, trial of intubation  Documentation completed today: GODFREY     Discussion had with patient wife and daughter. Geriatric and palliative medicine was introduced In addition to the EM visit, an advance care planning meeting was performed  Start time: 100pm  End time: 130pm  Total time: 30 minutes   A face to face meeting to discuss advance care planning was held today regarding: SARAH SÁNCHEZ  Primary decision maker: Patient is unable to make decisions for himself   Alternate/surrogate: Mani Sánchez  Discussed advance directives including, but not limited to, healthcare proxy and code status.  Decision regarding code status:  DNR, trial of intubation  Documentation completed today: MOLST    Discussion had with patient wife and daughter. Geriatric and palliative medicine was introduced, and Advance care planning was discussed and a MOLST was reviewed. Wife stated that she would want her  to have trial of intubation, but does want him to have a natural death. She would not want him to have a Feeding tube and would want him get antibiotics and fluids. She would also want him to go back to the hospital as needed. MOLST was filled out and copy given to family.

## 2023-10-11 NOTE — CONSULT NOTE ADULT - PROBLEM SELECTOR RECOMMENDATION 2
Patient with known atrial Fibrillation and was taking Coumadin.  Possibly will be transitioned to Eliquis. Continue care as per primary team.

## 2023-10-11 NOTE — PROGRESS NOTE ADULT - PROBLEM SELECTOR PLAN 2
- prior to AM meds then fell to 134mmHg  -C/w hydralazine 75 mg po q8h and Isordil 10 mg TID  -Starting Lopressor 12.5mg BID as above  -Consider initiation of Ace/arb once kidney function improves

## 2023-10-11 NOTE — DIETITIAN INITIAL EVALUATION ADULT - OTHER INFO
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 Pt was also noted to have elevated BP which improved after antihypertensive medication administration. Heparin gtt was started however given supra therapeutic PTT in unit now held. Pt step-down to cardiac telemetry unit as deemed clinically stable 10/9. Pt is not a candidate for repeat LHC given dementia and comorbidities - Palliative care team consulted i/s/o medical management of STEMI. Noted pallative Plan to meet with wife an patient later today 10/11.     Pt seen this AM on 5UR. Family at bedside. Diet: DASH TLC. Lunch this AM was ~25% consumed. Reported somewhat better PO intake during breakfast. Unclear on %PO intake PTA - reported by family sometimes cleaning his plate 100% as well as eating in a way similar to how he has been eating during admit. Reported pt preferring dessert foods at this time as they are softer - pt missing several teeth and having issues chewing. Agreeable to a softer diet. No issues swallowing PTA. NKFA. Not taking PO supplements PTA, agreeable to trying now. Noted with some wasting/loss to temples  which may be age related in nature. Unable to DX malnutrition at this time. Denies NVDC. Per flow sheets GI WDL, BM+ 10/1. Protonix ordered. No pressure ulcer (L thigh skin tear noted). No pain. Ramiro 14. No Edema. CHOL 213, , NON , A1c 6.1%, BUN/Cr 27/1.45, .   Please see below for nutritions recommendations. Recs made with team.

## 2023-10-11 NOTE — PROGRESS NOTE ADULT - PROBLEM SELECTOR PROBLEM 1
ST elevation myocardial infarction (STEMI) of inferior wall

## 2023-10-12 NOTE — DISCHARGE NOTE PROVIDER - NSDCMRMEDTOKEN_GEN_ALL_CORE_FT
acetaminophen 325 mg oral tablet: 2 tab(s) orally every 6 hours, As needed, Mild Pain (1 - 3)  aspirin 81 mg oral tablet: 1 tab(s) orally once a day  Eliquis 5 mg oral tablet: 1 tab(s) orally 2 times a day Price Check Cliff Nielsen on Teams thanks!  enalapril 20 mg oral tablet: 1 tab(s) orally once a day  multivitamin: 1 tab  once a day  warfarin 5 mg oral tablet: 1 tab(s) orally once a day, 5 days a week; 7.5 mg on Tues and Sat   acetaminophen 325 mg oral tablet: 2 tab(s) orally every 6 hours, As needed, Mild Pain (1 - 3)  apixaban 5 mg oral tablet: 1 tab(s) orally every 12 hours  atorvastatin 80 mg oral tablet: 1 tab(s) orally once a day (at bedtime)  clopidogrel 75 mg oral tablet: 1 tab(s) orally once a day  hydrALAZINE 25 mg oral tablet: 3 tab(s) orally every 8 hours 3 tabs (for 75mg dose) every 8 hours  isosorbide dinitrate 10 mg oral tablet: 1 tab(s) orally every 8 hours  metoprolol tartrate 25 mg oral tablet: 0.5 tab(s) orally 2 times a day HALF tablet to take a 12.5mg dose two times a day (every 12 hours)  multivitamin: 1 tab  once a day  pantoprazole 40 mg oral delayed release tablet: 1 tab(s) orally once a day (before a meal)

## 2023-10-12 NOTE — OCCUPATIONAL THERAPY INITIAL EVALUATION ADULT - GENERAL OBSERVATIONS, REHAB EVAL
Pt's RN Priya aware of intent to eval/tx; cleared Pt. Pt received in semi pompa - +telemetry, external cath. Pt w/ good affect, agreeable to OT. Rehab balbina Callejas present.

## 2023-10-12 NOTE — DISCHARGE NOTE PROVIDER - NSDCQMASPIRINOTHER_CARD_A_CORE_FT
Single antiplatelet only with Plavix and full anticoagulation with Eliquis/Apixaban for AFib; no PCI this admission as risks outweighed benefits.

## 2023-10-12 NOTE — DISCHARGE NOTE PROVIDER - ATTENDING DISCHARGE PHYSICAL EXAMINATION:
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 Pt had a witnessed syncopal episode associated w/ multiple episodes of NBNB emesis. EMS was called and noted pt was bradycardic in the 40s and hypotensive with additional emesis but denied any CP. ALS unit was called who administered 8mg Zofran and EKG which showed KAYLIN II, III, aVF and associated depressions in I and aVL.    1. STEMI  Inferolateral wall STEMI medically managed. Now chest pain free, atorvastatin, eliquis and plavix.    2. 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   Asymptomatic at rest, warm and euvolemic.  On hydralazine/imdur. Start low dose lopressor today  Not on MRA/ARNI/SGTI2 due no AGUSTÍN on CKD, if Cr stabilizes, may become a candidate.    3. AF  Eliquis 5 mg bid.  Metoprolol 12.5 mg BID.    4. DVT  S/p IVC, on eliquis for AF. No falls or bleeding.    Patient wife made patient a DNR/DNI. Please see GO note above. Plan for patient to go to [facility].   Patient would benefit form follow up with Dr. Topete, outpatient geriatric medicine.

## 2023-10-12 NOTE — DISCHARGE NOTE PROVIDER - CARE PROVIDER_API CALL
Saul Topete  Geriatric Medicine  [address] [place] [address],  [place] [address]  Phone: [phone]  Fax: [phone]  Follow Up Time:    Saul Topete  Geriatric Medicine  [address] [place] [address], [place] [address]  Phone: [phone]  Fax: [phone]  Follow Up Time:     José Antonio Reyez  Cardiology  [address] [address]  Phone: [phone]  Fax: [phone]  Scheduled Appointment: 10/19/2023 01:20 PM

## 2023-10-12 NOTE — DISCHARGE NOTE PROVIDER - NSDCCPCAREPLAN_GEN_ALL_CORE_FT
PRINCIPAL DISCHARGE DIAGNOSIS  Diagnosis: STEMI (ST elevation myocardial infarction)  Assessment and Plan of Treatment:       SECONDARY DISCHARGE DIAGNOSES  Diagnosis: Atrial fibrillation  Assessment and Plan of Treatment:     Diagnosis: HLD (hyperlipidemia)  Assessment and Plan of Treatment: Your LDL cholesterol was elevated at 156. Please continue to take your atorvastatin  80mg every night at bedtime.    Diagnosis: Syncope  Assessment and Plan of Treatment:     Diagnosis: Nausea & vomiting  Assessment and Plan of Treatment:     Diagnosis: Elevated troponin  Assessment and Plan of Treatment:      PRINCIPAL DISCHARGE DIAGNOSIS  Diagnosis: STEMI (ST elevation myocardial infarction)  Assessment and Plan of Treatment: You came in after losing consciousness and an episode of vomiting. Further testing revealed a STEMI, which we managed medically. If you are having any chest pain, shortness of breath, dizziness, nausea, vomiting, or any other associated symptoms please return to the hospital. Please take all your medications as instructed every single day.      SECONDARY DISCHARGE DIAGNOSES  Diagnosis: Atrial fibrillation  Assessment and Plan of Treatment: You have a previous history of atrial fibrillation. Please continue taking your medications as instructed.    Diagnosis: HLD (hyperlipidemia)  Assessment and Plan of Treatment: Your LDL cholesterol was elevated at 156. Please continue to take your atorvastatin  80mg every night at bedtime.     PRINCIPAL DISCHARGE DIAGNOSIS  Diagnosis: STEMI (ST elevation myocardial infarction)  Assessment and Plan of Treatment: You came in after losing consciousness and an episode of vomiting. Further testing revealed a STEMI or heart attack, which we managed medically due to the risks of stent/catheterization intervention outweighing benefits due to your overall health conditions. Please continue medications as listed in this discharge medication section exactly as listed (including Plavix/Clopidogrel, Metoprolol, Atorvastatin/Lipitor, Hydralazine, Isordil/isosorbide dinitrate for your heart). Please see Dr. Reyez on Thursday 10/19/23 at 1:20pm at [address] location. Your goals of care CURRENTLY with a MOLST was made to be DNR (Do NOT Rescusitate) but DO Trial Intubation.  If you are having any chest pain, shortness of breath, dizziness, nausea, vomiting, or any other associated symptoms please get prompt medical attention and/or call 911 or return to the hospital.      SECONDARY DISCHARGE DIAGNOSES  Diagnosis: Atrial fibrillation  Assessment and Plan of Treatment: You have a previous history of atrial fibrillation. Please take Eliquis/Apixaban as listed in this discharge. (we STOPPED your previous medication of Coumadin).  Due to your risk factors, you need a BLOOD THINNER called Eliquis twice a day. This medication prevents blood clots from AFib. Please look out for DARK OR RED STOOLS, extreme fatigue, altered mental status, extreme weakness, or severe headache, or new visual changes -- if these occur return to ER or call 911. Return to hospital for above concerning symptoms or any chest pain, palpitations (heart fluttering), shortness of breath, dizziness, feeling like you may faint, or other new concerning symptoms.    Diagnosis: HLD (hyperlipidemia)  Assessment and Plan of Treatment: Your LDL cholesterol was elevated at 156. Please continue to take your atorvastatin/Lipitor  80mg every night at bedtime.    Diagnosis: AGUSTÍN (acute kidney injury)  Assessment and Plan of Treatment: You had a slight kidney injury found via your hospital bloodwork. For this reason, at this time, STOP taking your previous home medication of Enalapril as this affects the kidneys.  In order to prevent further disease progression, continue to follow recommendations made by your primary provider. Continue your medications/supplementations as directed and avoid over-the-counter drugs that are harmful to kidneys, such as, Non-Steroidal Anti-Inflammatory Drugs (NSAIDs).

## 2023-10-12 NOTE — DISCHARGE NOTE PROVIDER - NSDCFUADDAPPT_GEN_ALL_CORE_FT
Please see Dr. Reyez on Thursday 10/19/23 at 1:20pm at [address] location.

## 2023-10-12 NOTE — OCCUPATIONAL THERAPY INITIAL EVALUATION ADULT - ADDITIONAL COMMENTS
Patient lives in an apartment building with elevator access. Per wife, patient is wheelchair bound. Patient also utilizes a commode at bedside. Patient has assistance with ADL's/IADL's from wife and HHA. Patient has HHA services (16 hrs weekly).

## 2023-10-12 NOTE — DISCHARGE NOTE NURSING/CASE MANAGEMENT/SOCIAL WORK - PATIENT PORTAL LINK FT
You can access the FollowMyHealth Patient Portal offered by [facility] by registering at the following website: [email or web address]. By joining [employer]’s FollowMyHealth portal, you will also be able to view your health information using other applications (apps) compatible with our system.

## 2023-10-12 NOTE — DISCHARGE NOTE PROVIDER - NSDCFUADDINST_GEN_ALL_CORE_FT
get stronger at [facility] PT as able to get back your baseline strength as able (you have a history of stoke and usually have right sided weakness and are wheelchair-bound).

## 2023-10-12 NOTE — DISCHARGE NOTE PROVIDER - HOSPITAL COURSE
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 Pt had a witnessed syncopal episode associated w/ multiple episodes of NBNB emesis. EMS was called and noted pt was bradycardic in the 40s and hypotensive with additional emesis but denied any CP. ALS unit was called who administered 8mg Zofran and EKG which showed KAYLIN II, III, aVF and associated depressions in I and aVL. Patient was admitted to the CCU for medical management.     ED Course:  T  /90 HR 80 RR 17-96%  Labs: Trop , CK, CKMB  EKG: KAYLIN II, III, aVF w/ corresponding depression in aVL  Interventions:   Ticagrelor 180mg (load)  ASA 325mg (load)  Reglan    CXR 10/9/23: Monitoring device Right basilar focal atelectasis. Heart size   within normal limits, thoracic aortic tortuosity. Unremarkable   mediastinum.. Thoracic spine degenerative changes, dextroscoliosis.   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 Pt had a witnessed syncopal episode associated w/ multiple episodes of NBNB emesis. EMS was called and noted pt was bradycardic in the 40s and hypotensive with additional emesis but denied any CP. ALS unit was called who administered 8mg Zofran and EKG which showed KAYLIN II, III, aVF and associated depressions in I and aVL.     ED Course:  T  /90 HR 80 RR 17-96%  Labs: Trop , CK, CKMB  EKG: KAYLIN II, III, aVF w/ corresponding depression in aVL  Interventions:   Ticagrelor 180mg (load)  ASA 325mg (load)  Reglan    CXR 10/9/23: Monitoring device Right basilar focal atelectasis. Heart size   within normal limits, thoracic aortic tortuosity. Unremarkable   mediastinum.. Thoracic spine degenerative changes, dextroscoliosis.   Elevation of the left hemidiaphragm    Patient was then admitted to the CCU for medical management. Interventional cardiology attending deemed risk of primary PCI outweighed the benefits given the patient's presentation, age and comorbidities.      Palliative was consulted to discuss GOC and was made DNR.      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 Pt had a witnessed syncopal episode associated w/ multiple episodes of NBNB emesis. EMS was called and noted pt was bradycardic in the 40s and hypotensive with additional emesis but denied any CP. ALS unit was called who administered 8mg Zofran and EKG which showed KAYLIN II, III, aVF and associated depressions in I and aVL.     ED Course:  T  /90 HR 80 RR 17-96%  Labs: Trop , CK, CKMB  EKG: KAYLIN II, III, aVF w/ corresponding depression in aVL  Interventions:   Ticagrelor 180mg (load)  ASA 325mg (load)  Reglan    CXR 10/9/23: Monitoring device Right basilar focal atelectasis. Heart size   within normal limits, thoracic aortic tortuosity. Unremarkable   mediastinum.. Thoracic spine degenerative changes, dextroscoliosis.   Elevation of the left hemidiaphragm    Patient was then admitted to the CCU for medical management. Interventional cardiology attending deemed risk of primary PCI outweighed the benefits given the patient's presentation, age and comorbidities.     88 y/o M w/ medical history of MI, CAD (s/p 6 stents, latest in 2013), paroxysmal AFib (on coumadin), HTN, HLD, CVA 2008 w residual deficits (r-sided weakness / wheelchair & aox2 at baseline), DVT (IVC filter), BIBEMS following syncopal episode & vomiting, w positive HsT 72, and KAYLIN in II, III, aVF concerning for Inferior wall STEMI, loaded w asa & brilinta in ED & admitted to CCU for medical management of ACS (case discussed w interventional cardiology, PCI deferred in the setting of multiple co-morbidities & risk/benefit considerations). Pt received valsartan 20mg x1 and IV hydralazine 10mg x1 for elevated BP and started on hydralazine 50mg po TID which was increased to 75 mg TID as well as Isordil 10 mg TID. ACEi/ARB initiation was deferred iso AGUSTÍN. High dose intensity statin prescribed. Asprin and Brilinta continued. Heparin gtt started but given stable inr (pt on coumadin for afib at home) & supra-therapeutic ptt in unit, held heparin. Infrequent episodes of NSVT on Telemetry, resolved spontaneously, no intervention needed. Pt underwent TTE 10/09/23 revealing mildly reduced left ventricular systolic function (LVEF 50%), basal and mid inferolateral wall severely hypokinetic, grade 1 LV diastolic dysfunction. No chest pain or shortness of breath throughout CCU admission, and deemed clinically stable for step-down to cardiac telemetry unit on 10/9 evening. Once coagulation panel therapeutic/normalized, new regimen of Eliquis and Plavix started on 10/11/23 (price check, Eliquis only $20 per month).  Pt was seen by Palliative for assistance with GOC and made DNR with a trial of intubation on 10/11/23. Pt recommended for [facility] by PT and cleared for [facility] on 10/12/23 with accepting facility.     Patient seen and examined at bedside this AM without any complaints or events overnight. Pt is feeling well, specifically denies complaints of chest pain, dizziness, shortness of breath, palpitations, pain, LE edema, fever, chills, N/V. Physical exam stable, VSS, labs and telemetry reviewed; pt is stable for discharge as discussed with attending Dr. Estrada. Appropriate discharge instructions, including medication regimen endorsed in paperwork for discharge/transfer to [facility].  All discharge medications were confirmed with attending for [facility] to continue.     Dx: Heart Failure this Admit, History of Heart Failure, Unstable Angina/ACS/NSTEMI/STEMI: YES            If YES: 7 day Follow-Up Appointment Made: Yes           Cardiac Rehab (STEMI/NSTEMI/ACS/Unstable Angina/CHF):   Yes; discharge to [facility]; not candidate for outpt Cardiac PT as active medically managed STEMI and [facility] recommendation.    Statin Prescribed (STEMI/NSTEMI/UA &/OR PCI this admission):  Yes  DAPT: Prescriptions for Aspirin/Plavix e-prescribed to patient's pharmacy: No; medically managed on Eliquis/Plavix only as no PCI given risks outweighed benefits. Medications continued correctly on this discharge for [facility] to continue.

## 2023-10-12 NOTE — DISCHARGE NOTE PROVIDER - PROVIDER TOKENS
PROVIDER:[TOKEN:[[id number]]] PROVIDER:[TOKEN:[[id number]]],PROVIDER:[TOKEN:[[id number]],SCHEDULEDAPPT:[10/19/2023],SCHEDULEDAPPTTIME:[01:20 PM]]

## 2023-10-12 NOTE — OCCUPATIONAL THERAPY INITIAL EVALUATION ADULT - PERTINENT HX OF CURRENT PROBLEM, REHAB EVAL
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 Pt had a witnessed syncopal episode associated w/ multiple episodes of NBNB emesis. EMS was called and noted pt was bradycardic in the 40s and hypotensive with additional emesis but denied any CP.

## 2023-10-19 PROBLEM — I69.319 CVA, OLD, COGNITIVE DEFICITS: Status: ACTIVE | Noted: 2020-07-01

## 2023-11-03 NOTE — ED PROVIDER NOTE - NS ED ROS FT
CONSTITUTIONAL: +fever, no chills, no fatigue  EYES: No eye redness, no visual changes  ENT: No ear pain, no sore throat  CARDIOVASCULAR: No chest pain, no palpitations  RESPIRATORY: No cough, +SOB  GI: No abdominal pain, no nausea, no vomiting, no constipation, no diarrhea  GENITOURINARY: No dysuria, no frequency, no hematuria  MUSCULOSKELETAL: No back pain, no joint pain, no myalgias  SKIN: No rash, no peripheral edema  NEURO: No headache, no confusion    ALL OTHER SYSTEMS NEGATIVE.

## 2023-11-03 NOTE — H&P ADULT - PROBLEM SELECTOR PLAN 3
hx of recent STEMI 10/6/23, with ECG KAYLIN in inferior leads peak Troponin 304-->249 pt. medically managed in CCU 2/2 to comorbidities   ECG on admission no changes from prior, Troponin T now 119 down trended from prior admission  (in setting COVID-19) CKMB neg 3.6  on Plavix 75mg daily and Eliquis 5mg bid

## 2023-11-03 NOTE — ED ADULT NURSE NOTE - NSFALLHARMRISKINTERV_ED_ALL_ED

## 2023-11-03 NOTE — ED ADULT NURSE NOTE - NS ED NURSE RECORD ANOTHER HT AND WT
CSE    Patient location during procedure: OB  Start time: 8/23/2018 2:50 AM  Timeout: 8/23/2018 2:40 AM  End time: 8/23/2018 2:58 AM  Staffing  Anesthesiologist: Kyle Franklin MD  Performed: anesthesiologist   Preanesthetic Checklist  Completed: patient identified, site marked, pre-op evaluation, timeout performed, IV checked, risks and benefits discussed and monitors and equipment checked  CSE  Patient position: sitting  Prep: ChloraPrep  Patient monitoring: heart rate, cardiac monitor and frequent blood pressure checks  Approach: midline  Spinal Needle  Needle type: pencil-tip   Needle gauge: 25 G  Needle length: 3.5 in  Epidural Needle  Injection technique: NICHOLAS saline  Needle type: Tuohy   Needle gauge: 17 G  Needle length: 3.5 in  Needle insertion depth: 7 cm  Location: L3-4  Needle localization: anatomical landmarks  Catheter  Catheter type: [place]  Catheter size: 19 G  Catheter at skin depth: 13 cm  Test dose: lidocaine 1.5% with Epi 1-to-200,000  Additional Documentation: incremental injection, negative aspiration for CSF, negative aspiration for heme, no paresthesia on injection and negative test dose  Assessment  Sensory level: T7   Dermatomal levels determined by alcohol swab  Intrathecal Medications:  administered: primary anesthetic mcg of              
Yes

## 2023-11-03 NOTE — CONSULT NOTE ADULT - SUBJECTIVE AND OBJECTIVE BOX
SARAH SÁNCHEZ, 88y, Male  MRN-[id number]  Patient is a 88y old  Male who presents with a chief complaint of Syncope in setting of   COVID-19 Positive (03 Nov 2023 23:41)      HPI:    89 y/o male w/ PMHx of MI, CAD (s/p 6 stents, latest in 2013), hx STEMI 10/6/23 ECG with inferior MI (peak Troponin T High Sensitivity 304) medically managed @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 Pt. found to be COVID-19 positive. Medicine consulted for COVID management.      12 Point ROS Negative unless noted otherwise above.  -------------------------------------------------------------------------------  VITAL SIGNS:  Vital Signs Last 24 Hrs  T(C): 37.2 (03 Nov 2023 21:40), Max: 38.9 (03 Nov 2023 18:44)  T(F): 98.9 (03 Nov 2023 21:40), Max: 102.1 (03 Nov 2023 18:44)  HR: 70 (03 Nov 2023 21:40) (70 - 93)  BP: 110/53 (03 Nov 2023 21:40) (110/53 - 181/76)  BP(mean): --  RR: 16 (03 Nov 2023 21:40) (16 - 18)  SpO2: 99% (03 Nov 2023 21:40) (95% - 99%)    Parameters below as of 03 Nov 2023 21:40  Patient On (Oxygen Delivery Method): room air      I&O's Summary      PHYSICAL EXAM:    PHYSICAL EXAM:  Constitutional: resting comfortably in bed; NAD  HEENT: NC/AT, PER, anicteric sclera, no nasal discharge; MMM  Respiratory: CTA B/L; no W/R/R  Cardiac: +S1/S2; RRR; no M/R/G  Gastrointestinal: soft, NT/ND; no rebound or guarding  Vascular: 2+ radial  Dermatologic: skin warm, dry and intact; no rashes, wounds, or scars  Neurologic: AAOx2  Psychiatric: affect and characteristics of appearance, verbalizations, behaviors are appropriate   ALLERGIES:  Allergies    No Known Allergies    Intolerances        MEDICATIONS:  MEDICATIONS  (STANDING):  apixaban 5 milliGRAM(s) Oral two times a day  atorvastatin 80 milliGRAM(s) Oral at bedtime  clopidogrel Tablet 75 milliGRAM(s) Oral daily  hydrALAZINE 25 milliGRAM(s) Oral three times a day  isosorbide   dinitrate Tablet (ISORDIL) 10 milliGRAM(s) Oral three times a day  magnesium oxide 400 milliGRAM(s) Oral once  metoprolol tartrate 12.5 milliGRAM(s) Oral two times a day  multivitamin 1 Tablet(s) Oral daily  pantoprazole    Tablet 40 milliGRAM(s) Oral before breakfast    MEDICATIONS  (PRN):  acetaminophen     Tablet .. 650 milliGRAM(s) Oral every 6 hours PRN Temp greater or equal to 38C (100.4F), Moderate Pain (4 - 6)  ipratropium    for Nebulization 500 MICROGram(s) Nebulizer every 6 hours PRN Shortness of Breath and/or Wheezing      -------------------------------------------------------------------------------  LABS:                        10.9   7.60  )-----------( 164      ( 03 Nov 2023 19:54 )             32.8     11-03    140  |  105  |  16  ----------------------------<  127<H>  4.0   |  23  |  1.14    Ca    9.3      03 Nov 2023 19:54  Mg     1.7     11-03        PT/INR - ( 03 Nov 2023 19:54 )   PT: 12.3 sec;   INR: 1.08          PTT - ( 03 Nov 2023 19:54 )  PTT:29.0 sec  Urinalysis Basic - ( 03 Nov 2023 19:54 )    Color: x / Appearance: x / SG: x / pH: x  Gluc: 127 mg/dL / Ketone: x  / Bili: x / Urobili: x   Blood: x / Protein: x / Nitrite: x   Leuk Esterase: x / RBC: x / WBC x   Sq Epi: x / Non Sq Epi: x / Bacteria: x      CAPILLARY BLOOD GLUCOSE      POCT Blood Glucose.: 124 mg/dL (03 Nov 2023 19:03)      COVID-19 PCR: Negative (12 Oct 2023 15:39)      RADIOLOGY & ADDITIONAL TESTS: Reviewed.

## 2023-11-03 NOTE — ED PROVIDER NOTE - CLINICAL SUMMARY MEDICAL DECISION MAKING FREE TEXT BOX
Pt presents with fever, likely infection  Suspected source PNA or urine  Provider decided not to initiate IVF resuscitation at 30 cc/kg/hr d/t cardiac history  Noted to show KAYLIN with Q waves in inferior leads in ED w reciprocal depression in AvL and septal leads, similar to prior EKG, cardiology consulted, will take admission.  Pt did not receive card cath prior due to comorbid status and DNR/DNI. Will be medically managed. Given ASA and plavix in the ED.  Provider decided to initiate empiric antibiotic coverage with Ceftriaxone  Will collect basic labs, BCx, UA/Cx, lactate  Pt placed on monitor, will continue to reassess condition  HDS

## 2023-11-03 NOTE — CONSULT NOTE ADULT - ATTENDING COMMENTS
87 yo M with PMHx CAD (s/p PCI x6, recent 2013), AFib (warfarin), HTN, HLD, CVA (2008, c/b residual R-sided weakness, wheelchair-bound), dementia (AOx2), hx DVT (s/p IVC filter), recent CCU admission for STEMI (cath deferred d/t high risk, 10/9-10/13) BIBEMS from home following syncopal episode at home admitted to cardiac telemetry. Medicine consulted for COVID+ PCR.      #Sepsis 2/2 COVID – Meeting 2/4 SIRS in ED (T, HR). Reporting SOB/MULLER likely 2/2 cardiac pathology given hx/EKG however also found to be COVID+ PCR. CXR without consolidations or effusions. Satting well on RA without O2 supplementation. Not a candidate for decadron. Recommend Remdesivir x5d in high risk pt, meeting SIRS/sepsis criteria, and sx shortness of breath. Monitor liver enzymes while on remdesevir. Does not need renal adjustment.      Agree with remainder of resident plan as above.

## 2023-11-03 NOTE — H&P ADULT - PROBLEM SELECTOR PLAN 4
HFpEF 10/6/23 TTE EF 50%with mild reduced LV systolic function, basal and mid inferolateral wall severely hypokinetic and grade l LV diastolic dysfunction,  -Euvolemic  -continue Core Measures Daily weights and I&O q 4hrs with VSS

## 2023-11-03 NOTE — ED ADULT NURSE NOTE - OBJECTIVE STATEMENT
88y Male A&ox2 confused to date. AT baseline per wife at bedside. to ED c/o syncope today, generalized weakness and fever. Pt was sitting at time of syncope. Denies head strike. Pt upgraded to MD Romero for rule our inferior stemi/ sepsis. Pt denies chest pain, nor sob at this time. Noted to be contracted at baseline. Hx of cardiac stents, past dvt, htn, prostate ca, cervical stenosis, CAD, Aphasia, DM, Isolated for rule out covid.

## 2023-11-03 NOTE — H&P ADULT - PROBLEM SELECTOR PLAN 2
Medicine consult for Remdesivir (high risk) no steroids on RA O2 99%  -Follow up daily CBC, CMP, CRP, Ferritin, D-dimer, LDH, CK, LFT's  Troponin T 115-->164   -Atrovent neb q6hrs PRN

## 2023-11-03 NOTE — CONSULT NOTE ADULT - ASSESSMENT
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 Found to be COVID-19 positive.    #COVID  Patient currently on room air sat'ing well, denies any dyspnea, no indication for dexamethason. 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 Found to be COVID-19 positive.    #Sepsis 2/2 COVID  Patient presents with fever and HR>90 meeting 2/4 SIRS criteria, found to be COVID+. Patient currently on room air sat'ing well, denies any dyspnea, no indication for dexamethasone In the setting of multiple comorbidities would be a candidate for 5 day course of Remdesivir.

## 2023-11-03 NOTE — H&P ADULT - HISTORY OF PRESENT ILLNESS
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 Pt. is COVID-19 positive.    In ER ECG reveals NSR@99bpm with ST depression leads 1 and AVL and ST elevation lll and AVF (unchanged from prior ECG 10/6/23), Troponin T High Sensitivity 119 (prior Troponin T High Sensitivity 10/6/23 304-->249, , CKMB 3.6, WBC 7.6, Neutrophil 81.9, H/H 10.9/32.8 Plts 164,  BUN/Cr 16/1.14, Blood Glucose 127, Mg 1.7.  CXR no acute pathology seen f/u official report.     COIVD -19 Positive.  VSS Temp 98.9F, /53, HR 70bpm, O2 on RA 99%.  Pt. denies fever, chills, HA, chest pain, palpitations, SOB, dizziness, diaphoresis, abdominal discomfort and n/v/d. 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 Pt. is COVID-19 positive.  Pt.'s family also reports 30lb weight loss this year.    In ER ECG reveals NSR@99bpm with ST depression leads 1 and AVL and ST elevation lll and AVF (unchanged from prior ECG 10/6/23), Troponin T High Sensitivity 119 (prior Troponin T High Sensitivity 10/6/23 304-->249, , CKMB 3.6, WBC 7.6, Neutrophil 81.9, H/H 10.9/32.8 Plts 164,  BUN/Cr 16/1.14, Blood Glucose 127, Mg 1.7.  CXR no acute pathology seen f/u official report.     COIVD -19 Positive.  VSS Temp 98.9F, /53, HR 70bpm, O2 on RA 99%.  Pt. denies fever, chills, HA, chest pain, palpitations, SOB, dizziness, diaphoresis, abdominal discomfort and n/v/d.

## 2023-11-03 NOTE — H&P ADULT - NSHPPHYSICALEXAM_GEN_ALL_CORE
T(C): 37.2 (11-03-23 @ 21:40), Max: 38.9 (11-03-23 @ 18:44)  HR: 70 (11-03-23 @ 21:40) (70 - 93)  BP: 110/53 (11-03-23 @ 21:40) (110/53 - 181/76)  RR: 16 (11-03-23 @ 21:40) (16 - 18)  SpO2: 99% (11-03-23 @ 21:40) (95% - 99%)  Wt(kg): --    Appearance: NAD	  HEENT:   Normal oral mucosa, , EOMI	  Neck: Supple, - JVD; No Carotid Bruit and 2+ pulses B/L  Cardiovascular: Normal S1 S2, No JVD, No murmurs  Respiratory: Lungs clear to auscultation/No Rales, Rhonchi, Wheezing	  Gastrointestinal:  Soft, Non-tender, + BS	  Skin: No rashes, No ecchymoses, No cyanosis  Extremities: Normal range of motion, No clubbing, cyanosis or edema  Vascular: Femoral pulses 2+ b/l without bruit, DP 1+ b/l, PT 1+ b/l  Neurologic: Non-focal  Psychiatry: A & O x 2, Mood & affect appropriate

## 2023-11-03 NOTE — H&P ADULT - NSHPLABSRESULTS_GEN_ALL_CORE
10.9   7.60  )-----------( 164      ( 03 Nov 2023 19:54 )             32.8       11-03    140  |  105  |  16  ----------------------------<  127<H>  4.0   |  23  |  1.14    Ca    9.3      03 Nov 2023 19:54  Mg     1.7     11-03        PT/INR - ( 03 Nov 2023 19:54 )   PT: 12.3 sec;   INR: 1.08          PTT - ( 03 Nov 2023 19:54 )  PTT:29.0 sec    CARDIAC MARKERS ( 03 Nov 2023 19:54 )  x     / x     / 297 U/L / x     / 3.6 ng/mL        Urinalysis Basic - ( 03 Nov 2023 19:54 )    Color: x / Appearance: x / SG: x / pH: x  Gluc: 127 mg/dL / Ketone: x  / Bili: x / Urobili: x   Blood: x / Protein: x / Nitrite: x   Leuk Esterase: x / RBC: x / WBC x   Sq Epi: x / Non Sq Epi: x / Bacteria: x        EKG: NSR@99bpm with ST depression leads 1 AVL and non specific ST-T wave changes (unchanged from prior ECG)

## 2023-11-03 NOTE — H&P ADULT - PROBLEM SELECTOR PLAN 1
-Syncope likely in setting of COVID-19 (pt. denies chest pain)  -Continue Telemetry, serial ECG, CE  -Orthostatic  -Ambulate with assistance  -TTE in AM

## 2023-11-03 NOTE — H&P ADULT - ASSESSMENT
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 Pt. is COVID-19 positive.    10/9/23 TTECONCLUSIONS:     1. Mildly reduced left ventricular systolic function.   2. Basal and mid inferolateral wall is severely hypokinetic.   3. Grade I left ventricular diastolic dysfunction.   4. No significant valvular disease.   5. No evidence of pulmonary hypertension.   6. No pericardial effusion.   87 y/o male w/ PMHx of MI, CAD (s/p 6 stents, latest in 2013), hx STEMI 10/6/23 ECG with inferior MI (peak Troponin T High Sensitivity 304) medically managed @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 Pt. is COVID-19 positive. Pt.'s family also reports 30lb weight loss this year.    10/9/23 TTECONCLUSIONS:     1. Mildly reduced left ventricular systolic function.   2. Basal and mid inferolateral wall is severely hypokinetic.   3. Grade I left ventricular diastolic dysfunction.   4. No significant valvular disease.   5. No evidence of pulmonary hypertension.   6. No pericardial effusion.

## 2023-11-03 NOTE — ED PROVIDER NOTE - CARE PLAN
Principal Discharge DX:	Fever  Secondary Diagnosis:	ST elevation   1 Principal Discharge DX:	Fever  Secondary Diagnosis:	ST elevation  Secondary Diagnosis:	COVID-19

## 2023-11-03 NOTE — ED ADULT TRIAGE NOTE - CHIEF COMPLAINT QUOTE
Pt BIBA accompanied by family AMS at baseline for N/V, fever and syncope while sitting. Family denies head injury.

## 2023-11-03 NOTE — H&P ADULT - NSHPREVIEWOFSYSTEMS_GEN_ALL_CORE
GENERAL, CONSTITUTIONAL :Admits Fever,  denies recent weight loss,    EYES, VISION: denies changes in vision   EARS, NOSE, THROAT: denies hearing loss  HEART, CARDIOVASCULAR: denies chest pain, arrhythmia, palpitations, SOB,  LE edema, claudication  RESPIRATORY: Admits to  cough, SOB,  denieswheezing, PND, orthopnea  GASTROINTESTINAL: Denies abdominal pain, heartburn, bloody stool, dark tarry stool  GENITOURINARY: Admits to  frequent urination, urgency  MUSCULOSKELETAL Admits to  joint pain or swelling, restricted motion, musculoskeletal pain.   SKIN & INTEGUMENTARY Denies rashes, sores, blisters, blisters, growths.  NEUROLOGICAL: Denies numbness or tingling sensations, sensation loss, burning.   PSYCHIATRIC: Denies nervousness, anxiety, depression  ENDOCRINE Denies heat or cold intolerance, excessive thirst  HEMATOLOGIC/LYMPHATIC: Denies abnormal bleeding, bleeding of any kind GENERAL, CONSTITUTIONAL :Admits Fever,   recent weight loss 30lb this year  EYES, VISION: denies changes in vision   EARS, NOSE, THROAT: denies hearing loss  HEART, CARDIOVASCULAR: denies chest pain, arrhythmia, palpitations, SOB,  LE edema, claudication  RESPIRATORY: Admits to  cough, SOB,  denieswheezing, PND, orthopnea  GASTROINTESTINAL: Denies abdominal pain, heartburn, bloody stool, dark tarry stool  GENITOURINARY: Admits to  frequent urination, urgency  MUSCULOSKELETAL Admits to  joint pain or swelling, restricted motion, musculoskeletal pain.   SKIN & INTEGUMENTARY Denies rashes, sores, blisters, blisters, growths.  NEUROLOGICAL: Denies numbness or tingling sensations, sensation loss, burning.   PSYCHIATRIC: Denies nervousness, anxiety, depression  ENDOCRINE Denies heat or cold intolerance, excessive thirst  HEMATOLOGIC/LYMPHATIC: Denies abnormal bleeding, bleeding of any kind

## 2023-11-03 NOTE — H&P ADULT - PROBLEM SELECTOR PLAN 5
Monitor BP  -continue Hydralazine 25mg tid, Metoprolol Tartrate 12.5mg bid and Isosorbide Dinitrate 10mg tid

## 2023-11-03 NOTE — ED PROVIDER NOTE - OBJECTIVE STATEMENT
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 Pt denies chest pain. Noted to show KAYLIN with Q waves in inferior leads in ED w reciprocal depression in AvL and septal leads. SOB is mild, not notably dyspneic in ED, no O2 requirement.

## 2023-11-03 NOTE — ED PROVIDER NOTE - WR INTERPRETATION 1
mild Right lower lobe effusion, consistent with prior, no focal consolidation, no free air, no pneumothorax

## 2023-11-04 NOTE — PATIENT PROFILE ADULT - FALL HARM RISK - FACTORS
Bed bound, Covid Positive/Frequent toileting needed/Impaired gait/Poor balance/Syncope/Weakness/Other

## 2023-11-04 NOTE — PATIENT PROFILE ADULT - FUNCTIONAL ASSESSMENT - DAILY ACTIVITY 4.
[facility]  [address]  [place] [address]  Phone: [phone]    08/21/21    Shaneka Alvarez  [address] [address]      Dear Shaneka,      We have tried to reach you via phone without success.  We have filled your Effexor for a short time only.  You are due for an office visit prior to any further refills.      Sincerely,      Spenser Thomas PA-C            
2 = A lot of assistance

## 2023-11-04 NOTE — PROGRESS NOTE ADULT - SUBJECTIVE AND OBJECTIVE BOX
CARDIOLOGY NP PROGRESS NOTE    Subjective:   Remainder ROS otherwise negative.    Overnight Events:     TELEMETRY:    EKG:      VITAL SIGNS:  T(C): 36.9 (11-04-23 @ 11:50), Max: 38.9 (11-03-23 @ 18:44)  HR: 71 (11-04-23 @ 11:50) (66 - 93)  BP: 159/78 (11-04-23 @ 11:50) (110/53 - 181/76)  RR: 17 (11-04-23 @ 11:50) (16 - 18)  SpO2: 98% (11-04-23 @ 11:50) (95% - 100%)  Wt(kg): --    I&O's Summary    03 Nov 2023 07:01  -  04 Nov 2023 07:00  --------------------------------------------------------  IN: 360 mL / OUT: 0 mL / NET: 360 mL          PHYSICAL EXAM:    General: A/ox 3, No acute Distress  Neck: Supple, NO JVD  Cardiac: S1 S2, No M/R/G  Pulmonary: CTAB, Breathing unlabored, No Rhonchi/Rales/Wheezing  Abdomen: Soft, Non -tender, +BS x 4 quads  Extremities: No Rashes, No edema  Neuro: A/o x 3, No focal deficits          LABS:                          10.0   6.46  )-----------( 160      ( 04 Nov 2023 06:32 )             29.9                              11-04    139  |  104  |  16  ----------------------------<  92  3.7   |  23  |  1.16    Ca    8.7      04 Nov 2023 06:32  Phos  2.8     11-04  Mg     1.8     11-04    TPro  6.9  /  Alb  3.2<L>  /  TBili  0.7  /  DBili  0.2  /  AST  38  /  ALT  15  /  AlkPhos  96  11-04    LIVER FUNCTIONS - ( 04 Nov 2023 06:32 )  Alb: 3.2 g/dL / Pro: 6.9 g/dL / ALK PHOS: 96 U/L / ALT: 15 U/L / AST: 38 U/L / GGT: x                                 PT/INR - ( 03 Nov 2023 19:54 )   PT: 12.3 sec;   INR: 1.08          PTT - ( 03 Nov 2023 19:54 )  PTT:29.0 sec  CAPILLARY BLOOD GLUCOSE      POCT Blood Glucose.: 124 mg/dL (03 Nov 2023 19:03)    CARDIAC MARKERS ( 04 Nov 2023 06:32 )  x     / x     / 514 U/L / x     / 34.6 ng/mL  CARDIAC MARKERS ( 03 Nov 2023 23:22 )  x     / x     / 313 U/L / x     / 6.7 ng/mL  CARDIAC MARKERS ( 03 Nov 2023 19:54 )  x     / x     / 297 U/L / x     / 3.6 ng/mL          Allergies:  No Known Allergies    MEDICATIONS  (STANDING):  apixaban 5 milliGRAM(s) Oral two times a day  atorvastatin 80 milliGRAM(s) Oral at bedtime  clopidogrel Tablet 75 milliGRAM(s) Oral daily  hydrALAZINE 25 milliGRAM(s) Oral three times a day  isosorbide   dinitrate Tablet (ISORDIL) 10 milliGRAM(s) Oral three times a day  metoprolol tartrate 12.5 milliGRAM(s) Oral two times a day  multivitamin 1 Tablet(s) Oral daily  pantoprazole    Tablet 40 milliGRAM(s) Oral before breakfast  remdesivir  IVPB   IV Intermittent     MEDICATIONS  (PRN):  acetaminophen     Tablet .. 650 milliGRAM(s) Oral every 6 hours PRN Temp greater or equal to 38C (100.4F), Moderate Pain (4 - 6)  ipratropium    for Nebulization 500 MICROGram(s) Nebulizer every 6 hours PRN Shortness of Breath and/or Wheezing        DIAGNOSTIC TESTS:        CARDIOLOGY NP PROGRESS NOTE    Subjective: Pt seen and examined at bedside. Reports feeling okay and does not remember why he came into hospital, no specific symptoms. Denies chest pain, sob, lightheadedness, dizziness, palpitations, fever, chills. Remainder ROS otherwise negative.    Overnight Events: Trop, CK, CKMB uptrending    TELEMETRY: SR 60s, occasional PVCs         VITAL SIGNS:  T(C): 36.9 (11-04-23 @ 11:50), Max: 38.9 (11-03-23 @ 18:44)  HR: 71 (11-04-23 @ 11:50) (66 - 93)  BP: 159/78 (11-04-23 @ 11:50) (110/53 - 181/76)  RR: 17 (11-04-23 @ 11:50) (16 - 18)  SpO2: 98% (11-04-23 @ 11:50) (95% - 100%)  Wt(kg): --    I&O's Summary    03 Nov 2023 07:01  -  04 Nov 2023 07:00  --------------------------------------------------------  IN: 360 mL / OUT: 0 mL / NET: 360 mL          PHYSICAL EXAM:    General: A/ox 2 (name and place), No acute Distress  Neck: Supple, NO JVD  Cardiac: S1 S2, No M/R/G  Pulmonary: CTAB, Breathing unlabored on RA, No Rhonchi/Rales/Wheezing  Abdomen: Soft, Non -tender, +BS x 4 quads  Extremities: No Rashes, No edema. contracted LLE  Neuro: A/o x 2, No focal deficits          LABS:                          10.0   6.46  )-----------( 160      ( 04 Nov 2023 06:32 )             29.9                              11-04    139  |  104  |  16  ----------------------------<  92  3.7   |  23  |  1.16    Ca    8.7      04 Nov 2023 06:32  Phos  2.8     11-04  Mg     1.8     11-04    TPro  6.9  /  Alb  3.2<L>  /  TBili  0.7  /  DBili  0.2  /  AST  38  /  ALT  15  /  AlkPhos  96  11-04    LIVER FUNCTIONS - ( 04 Nov 2023 06:32 )  Alb: 3.2 g/dL / Pro: 6.9 g/dL / ALK PHOS: 96 U/L / ALT: 15 U/L / AST: 38 U/L / GGT: x         PT/INR - ( 03 Nov 2023 19:54 )   PT: 12.3 sec;   INR: 1.08          PTT - ( 03 Nov 2023 19:54 )  PTT:29.0 sec  CAPILLARY BLOOD GLUCOSE      POCT Blood Glucose.: 124 mg/dL (03 Nov 2023 19:03)    CARDIAC MARKERS ( 04 Nov 2023 06:32 )  x     / x     / 514 U/L / x     / 34.6 ng/mL  CARDIAC MARKERS ( 03 Nov 2023 23:22 )  x     / x     / 313 U/L / x     / 6.7 ng/mL  CARDIAC MARKERS ( 03 Nov 2023 19:54 )  x     / x     / 297 U/L / x     / 3.6 ng/mL          Allergies:  No Known Allergies    MEDICATIONS  (STANDING):  apixaban 5 milliGRAM(s) Oral two times a day  atorvastatin 80 milliGRAM(s) Oral at bedtime  clopidogrel Tablet 75 milliGRAM(s) Oral daily  hydrALAZINE 25 milliGRAM(s) Oral three times a day  isosorbide   dinitrate Tablet (ISORDIL) 10 milliGRAM(s) Oral three times a day  metoprolol tartrate 12.5 milliGRAM(s) Oral two times a day  multivitamin 1 Tablet(s) Oral daily  pantoprazole    Tablet 40 milliGRAM(s) Oral before breakfast  remdesivir  IVPB   IV Intermittent     MEDICATIONS  (PRN):  acetaminophen     Tablet .. 650 milliGRAM(s) Oral every 6 hours PRN Temp greater or equal to 38C (100.4F), Moderate Pain (4 - 6)  ipratropium    for Nebulization 500 MICROGram(s) Nebulizer every 6 hours PRN Shortness of Breath and/or Wheezing        DIAGNOSTIC TESTS:        CARDIOLOGY NP PROGRESS NOTE    Subjective: Pt seen and examined at bedside. Reports feeling okay and does not remember why he came into hospital, no specific symptoms. Denies chest pain, sob, lightheadedness, dizziness, palpitations, fever, chills. Remainder ROS otherwise negative.    Overnight Events: Trop, CK, CKMB uptrending    TELEMETRY: SR 60s, occasional PVCs         VITAL SIGNS:  T(C): 36.9 (11-04-23 @ 11:50), Max: 38.9 (11-03-23 @ 18:44)  HR: 71 (11-04-23 @ 11:50) (66 - 93)  BP: 159/78 (11-04-23 @ 11:50) (110/53 - 181/76)  RR: 17 (11-04-23 @ 11:50) (16 - 18)  SpO2: 98% (11-04-23 @ 11:50) (95% - 100%)  Wt(kg): --    I&O's Summary    03 Nov 2023 07:01  -  04 Nov 2023 07:00  --------------------------------------------------------  IN: 360 mL / OUT: 0 mL / NET: 360 mL          PHYSICAL EXAM:    General: A/ox 2 (name and place), No acute Distress  Neck: Supple, NO JVD  Cardiac: S1 S2, No M/R/G  Pulmonary: CTAB, Breathing unlabored on RA, No Rhonchi/Rales/Wheezing  Abdomen: Soft, Non -tender, +BS x 4 quads  Extremities: No Rashes, No edema. contracted LLE  Neuro: A/o x 2, No focal deficits          LABS:                          10.0   6.46  )-----------( 160      ( 04 Nov 2023 06:32 )             29.9                              11-04    139  |  104  |  16  ----------------------------<  92  3.7   |  23  |  1.16    Ca    8.7      04 Nov 2023 06:32  Phos  2.8     11-04  Mg     1.8     11-04    TPro  6.9  /  Alb  3.2<L>  /  TBili  0.7  /  DBili  0.2  /  AST  38  /  ALT  15  /  AlkPhos  96  11-04    LIVER FUNCTIONS - ( 04 Nov 2023 06:32 )  Alb: 3.2 g/dL / Pro: 6.9 g/dL / ALK PHOS: 96 U/L / ALT: 15 U/L / AST: 38 U/L / GGT: x         PT/INR - ( 03 Nov 2023 19:54 )   PT: 12.3 sec;   INR: 1.08          PTT - ( 03 Nov 2023 19:54 )  PTT:29.0 sec  CAPILLARY BLOOD GLUCOSE      POCT Blood Glucose.: 124 mg/dL (03 Nov 2023 19:03)    CARDIAC MARKERS ( 04 Nov 2023 06:32 )  x     / x     / 514 U/L / x     / 34.6 ng/mL  CARDIAC MARKERS ( 03 Nov 2023 23:22 )  x     / x     / 313 U/L / x     / 6.7 ng/mL  CARDIAC MARKERS ( 03 Nov 2023 19:54 )  x     / x     / 297 U/L / x     / 3.6 ng/mL          Allergies:  No Known Allergies    MEDICATIONS  (STANDING):  apixaban 5 milliGRAM(s) Oral two times a day  atorvastatin 80 milliGRAM(s) Oral at bedtime  clopidogrel Tablet 75 milliGRAM(s) Oral daily  hydrALAZINE 25 milliGRAM(s) Oral three times a day  isosorbide   dinitrate Tablet (ISORDIL) 10 milliGRAM(s) Oral three times a day  metoprolol tartrate 12.5 milliGRAM(s) Oral two times a day  multivitamin 1 Tablet(s) Oral daily  pantoprazole    Tablet 40 milliGRAM(s) Oral before breakfast  remdesivir  IVPB   IV Intermittent     MEDICATIONS  (PRN):  acetaminophen     Tablet .. 650 milliGRAM(s) Oral every 6 hours PRN Temp greater or equal to 38C (100.4F), Moderate Pain (4 - 6)  ipratropium    for Nebulization 500 MICROGram(s) Nebulizer every 6 hours PRN Shortness of Breath and/or Wheezing        DIAGNOSTIC TESTS:     < from: TTE Echo Complete w/o Contrast w/ Doppler (10.09.23 @ 10:04) >  -----  CONCLUSIONS:     1. Mildly reduced left ventricular systolic function.   2. Basal and mid inferolateral wall is severely hypokinetic.   3. Grade I left ventricular diastolic dysfunction.   4. No significant valvular disease.   5. No evidence of pulmonary hypertension.   6. No pericardial effusion.    < end of copied text >    < from: CT Head No Cont (11.04.23 @ 09:26) >  IMPRESSION:  Moderate chronic microvascular changes without evidence of   an acute transcortical infarction or hemorrhage.    < end of copied text >

## 2023-11-04 NOTE — PROGRESS NOTE ADULT - PROBLEM SELECTOR PLAN 4
HFpEF 10/6/23 TTE EF 50%with mild reduced LV systolic function, basal and mid inferolateral wall severely hypokinetic and grade l LV diastolic dysfunction,  -Euvolemic  -continue Core Measures Daily weights and I&O q 4hrs with VSS HFpEF 10/6/23 TTE EF 50%with mild reduced LV systolic function, basal and mid inferolateral wall severely hypokinetic and grade l LV diastolic dysfunction.  -Euvolemic, laying flat. Not on diuretic  -c/w Hydralazine 25mg TID, Isordil 10mg TID, Lopressor 12.5mg BID  -Core Measures Daily weights and I&O q 4hrs with VSS

## 2023-11-04 NOTE — PROGRESS NOTE ADULT - PROBLEM SELECTOR PLAN 9
On Eliquis 5mg bid for pAfib; Ambulate with Assistance F: No IVF  N: DASH/TLC diet  E: Replete lytes PRN K<4, Mg<2  P: DVT PPX: on Eliquis  C: DNR. NOT DNI per previous [facility] last hospitalization palliative discussion  Dispo: Admit to tele 5 [facility]

## 2023-11-04 NOTE — PROGRESS NOTE ADULT - PROBLEM SELECTOR PLAN 3
hx of recent STEMI 10/6/23, with ECG KAYLIN in inferior leads peak Troponin 304-->249 pt. medically managed in CCU 2/2 to comorbidities   ECG on admission no changes from prior, Troponin T now 119 down trended from prior admission  (in setting COVID-19) CKMB neg 3.6  on Plavix 75mg daily and Eliquis 5mg bid Hx of recent inferior STEMI 10/6/23, with ECG KAYLIN in inferior leads w/ peak Troponin 304-->249. Pt was medically managed in CCU 2/2 to comorbidities.  -Trop 119, , CKMB 3.6 in ED. Currently uptrending. Trend cardiac enzymes to peak   -Initial EKG in ED noting NSR w/ ST elevations w/ deep Q waves inferiorly. Repeat EKG w/ complete resolution and low voltage inferiorly. Unclear if erroneous initially EKG in ED  -F/u repeat EKG  -Consider Heparin initiation if EKG changes concern for ACS  -c/w Plavix 75mg daily and Eliquis 5mg BID  -c/w Lopressor 12.5mg BID. Transition to Toprol 25 qd prior to discharge

## 2023-11-04 NOTE — PROGRESS NOTE ADULT - PROBLEM SELECTOR PLAN 6
F/U Lipid and LFT's  -Continue Atorvastatin 80mg daily Lipid panel WNL  -Continue Atorvastatin 80mg daily  -Family reports previous possible intolerance to statins.

## 2023-11-04 NOTE — PROGRESS NOTE ADULT - SUBJECTIVE AND OBJECTIVE BOX
Patient is a 88y old  Male who presents with a chief complaint of Syncope in setting of  COVID-19 Positive (04 Nov 2023 14:04)    INTERVAL EVENTS:    SUBJECTIVE:  Patient was seen and examined at bedside at 9:15am, and just returned from [facility], result was negative ICH. Pt satting well on RA, denies cough, SOB,CP,etc. s/p Remdesivir 200mg iv @ 4am this am given high risk for developing severe COVID.    Review of systems: No fever, chills, dizziness, HA, Changes in vision, CP, dyspnea, nausea or vomiting, dysuria, changes in bowel movements, LE edema. Rest of 12 point Review of systems negative unless otherwise documented elsewhere in note.     Diet, DASH/TLC:   Sodium & Cholesterol Restricted (11-03-23 @ 23:33) [Active]      MEDICATIONS:  MEDICATIONS  (STANDING):  apixaban 5 milliGRAM(s) Oral two times a day  atorvastatin 80 milliGRAM(s) Oral at bedtime  clopidogrel Tablet 75 milliGRAM(s) Oral daily  hydrALAZINE 25 milliGRAM(s) Oral three times a day  isosorbide   dinitrate Tablet (ISORDIL) 10 milliGRAM(s) Oral three times a day  metoprolol tartrate 12.5 milliGRAM(s) Oral two times a day  multivitamin 1 Tablet(s) Oral daily  pantoprazole    Tablet 40 milliGRAM(s) Oral before breakfast  remdesivir  IVPB   IV Intermittent     MEDICATIONS  (PRN):  acetaminophen     Tablet .. 650 milliGRAM(s) Oral every 6 hours PRN Temp greater or equal to 38C (100.4F), Moderate Pain (4 - 6)  ipratropium    for Nebulization 500 MICROGram(s) Nebulizer every 6 hours PRN Shortness of Breath and/or Wheezing      Allergies    No Known Allergies    Intolerances        OBJECTIVE:  Vital Signs Last 24 Hrs  T(C): 36.9 (04 Nov 2023 11:50), Max: 38.9 (03 Nov 2023 18:44)  T(F): 98.5 (04 Nov 2023 11:50), Max: 102.1 (03 Nov 2023 18:44)  HR: 80 (04 Nov 2023 13:51) (66 - 93)  BP: 163/84 (04 Nov 2023 13:51) (110/53 - 181/76)  BP(mean): --  RR: 17 (04 Nov 2023 13:51) (16 - 18)  SpO2: 100% (04 Nov 2023 13:51) (95% - 100%)    Parameters below as of 04 Nov 2023 13:51  Patient On (Oxygen Delivery Method): room air      I&O's Summary    03 Nov 2023 07:01  -  04 Nov 2023 07:00  --------------------------------------------------------  IN: 360 mL / OUT: 0 mL / NET: 360 mL        PHYSICAL EXAM:  Gen: Reclining in bed at time of exam, appears stated age  HEENT: NCAT, MMM, clear OP  Neck: supple, trachea at midline  CV: RRR, +S1/S2  Pulm: adequate respiratory effort, no increase in work of breathing  Abd: soft, NTND  Skin: warm and dry,   Ext: WWP, no LE edema  Neuro: AOx3, no gross focal neurological deficits  Psych: affect and behavior appropriate, pleasant at time of interview  :     LABS:                        10.0   6.46  )-----------( 160      ( 04 Nov 2023 06:32 )             29.9     11-04    139  |  104  |  16  ----------------------------<  92  3.7   |  23  |  1.16    Ca    8.7      04 Nov 2023 06:32  Phos  2.8     11-04  Mg     1.8     11-04    TPro  6.9  /  Alb  3.2<L>  /  TBili  0.7  /  DBili  0.2  /  AST  38  /  ALT  15  /  AlkPhos  96  11-04    LIVER FUNCTIONS - ( 04 Nov 2023 06:32 )  Alb: 3.2 g/dL / Pro: 6.9 g/dL / ALK PHOS: 96 U/L / ALT: 15 U/L / AST: 38 U/L / GGT: x           PT/INR - ( 03 Nov 2023 19:54 )   PT: 12.3 sec;   INR: 1.08          PTT - ( 03 Nov 2023 19:54 )  PTT:29.0 sec  CAPILLARY BLOOD GLUCOSE      POCT Blood Glucose.: 124 mg/dL (03 Nov 2023 19:03)    Urinalysis Basic - ( 04 Nov 2023 06:32 )    Color: x / Appearance: x / SG: x / pH: x  Gluc: 92 mg/dL / Ketone: x  / Bili: x / Urobili: x   Blood: x / Protein: x / Nitrite: x   Leuk Esterase: x / RBC: x / WBC x   Sq Epi: x / Non Sq Epi: x / Bacteria: x        MICRODATA:    Culture - Blood (collected 03 Nov 2023 21:16)  Source: .Blood Blood-Peripheral  Preliminary Report (04 Nov 2023 11:00):    No growth at 12 hours    Culture - Blood (collected 03 Nov 2023 21:16)  Source: .Blood Blood-Peripheral  Preliminary Report (04 Nov 2023 11:00):    No growth at 12 hours        RADIOLOGY/OTHER STUDIES:    PCP  Pharmacy:   Emergency contact:

## 2023-11-04 NOTE — PROGRESS NOTE ADULT - PROBLEM SELECTOR PLAN 1
-Syncope likely in setting of COVID-19 (pt. denies chest pain)  -Continue Telemetry, serial ECG, CE  -Orthostatic  -Ambulate with assistance  -TTE in AM Wife reports syncope episode where pt slumped over in wheelchair w/o head strike. Denied prodrome. Wife reports pt may have taken extra BP meds accidently prior to syncopal episode.  -Syncope likely in setting of COVID-19 and presumed hypotension w/ accidental BP med ingestion, (although hypertensive SBP 180s on arrival to ED)  -F/u orthostatics  -Pending TTE    -Echo 10/2023: EF 50%, basal ad mid inferolateral wall severely hypokinetic, G1DD  -CT head w/ moderate chronic microvascular changes without evidence of acute transcortical infarction or hemorrhage.  -Trend cardiac enzymes.   -Initial EKG in ED noting NSR w/ ST elevations w/ deep Q waves inferiorly. Repeat EKG w/ complete resolution and low voltage. Unclear if erroneous EKG in ED

## 2023-11-04 NOTE — PATIENT PROFILE ADULT - FALL HARM RISK - HARM RISK INTERVENTIONS
Assistance with ambulation/Assistance OOB with selected safe patient handling equipment/Communicate Risk of Fall with Harm to all staff/Discuss with provider need for PT consult/Monitor gait and stability/Move patient closer to nurses' station/Orthostatic vital signs/Provide patient with walking aids - walker, cane, crutches/Reinforce activity limits and safety measures with patient and family/Reorient to person, place and time as needed/Review medications for side effects contributing to fall risk/Sit up slowly, dangle for a short time, stand at bedside before walking/Tailored Fall Risk Interventions/Toileting schedule using arm’s reach rule for commode and bathroom/Use of alarms - bed, chair and/or voice tab/Visual Cue: Yellow wristband and red socks/Bed in lowest position, wheels locked, appropriate side rails in place/Call bell, personal items and telephone in reach/Instruct patient to call for assistance before getting out of bed or chair/Non-slip footwear when patient is out of bed/[place] to call system/Physically safe environment - no spills, clutter or unnecessary equipment/Purposeful Proactive Rounding/Room/bathroom lighting operational, light cord in reach

## 2023-11-04 NOTE — PROGRESS NOTE ADULT - PROBLEM SELECTOR PLAN 8
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

## 2023-11-04 NOTE — PATIENT PROFILE ADULT - HISTORY OF COVID-19 VACCINATION
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

## 2023-11-04 NOTE — PROGRESS NOTE ADULT - ASSESSMENT
9 yo M with PMHx CAD (s/p PCI x6, recent 2013), AFib (eliquis), HTN, HLD, CVA (2008, c/b residual R-sided weakness, wheelchair-bound), dementia (AOx2), hx DVT (s/p IVC filter), recent CCU admission for STEMI (cath deferred d/t high risk, 10/9-10/13) BIBEMS from home following syncopal episode at home admitted to cardiac telemetry. Medicine consulted for COVID+ PCR.      # Sepsis 2/2 COVID ( mild)   - special droplets and contact precautions ( COVID+ 11/3)   - no need for Dexamethaxone since no O2 requirement   - Pt is at high risk for developing severe COVID  - Remdesivir 200mg iv x1 @ 4am 11/3, to continue Remdesivir 100mg iv daily for 4 more days ( 11/4-11/7)  - check CMP daily to monitor transaminitis    # HTN  # HLD  # hx CAD/STEMI   - clopidogrel Tablet 75 milliGRAM(s) Oral daily  - apixaban 5 milliGRAM(s) Oral two times a day  - metoprolol tartrate 12.5 milliGRAM(s) Oral two times a day  - atorvastatin 80 milliGRAM(s) Oral at bedtime  - hydrALAZINE 25 milliGRAM(s) Oral three times a day  - isosorbide   dinitrate Tablet (ISORDIL) 10 milliGRAM(s) Oral three times a day       7 yo M with PMHx CAD (s/p PCI x6, recent 2013), AFib (eliquis), HTN, HLD, CVA (2008, c/b residual R-sided weakness, wheelchair-bound), dementia (AOx2), hx DVT (s/p IVC filter), recent CCU admission for STEMI (cath deferred d/t high risk, 10/9-10/13) BIBEMS from home following syncopal episode at home admitted to cardiac telemetry. Medicine consulted for COVID+ PCR.      # Sepsis 2/2 COVID ( mild)   - special droplets and contact precautions ( COVID+ 11/3)   - no need for Dexamethaxone since no O2 requirement   - Pt is at high risk for developing severe COVID  - Remdesivir 200mg iv x1 @ 4am 11/4.  - met with pt's family to discuss pro and cons of Remdesir which is still recommended by CDC and hospital COVID guideline to offer high risk COVID pt for developing severe COVID. Daughter declined further Remdesivir concerning it's side effect on the kidney.  - d/c further Remdesivir as requested by family   - Will continue monitoring pulse ox and clinical course     # HTN  # HLD  # hx CAD/STEMI   - active care per cardiology services   - clopidogrel Tablet 75 milliGRAM(s) Oral daily  - apixaban 5 milliGRAM(s) Oral two times a day  - metoprolol tartrate 12.5 milliGRAM(s) Oral two times a day  - atorvastatin 80 milliGRAM(s) Oral at bedtime  - hydrALAZINE 25 milliGRAM(s) Oral three times a day  - isosorbide   dinitrate Tablet (ISORDIL) 10 milliGRAM(s) Oral three times a day    Family also concerns about statin induced myopathy and weakness   Rehab/PT eval OOBTC as tolerated     POC to be d/w cardiology ACP [facility]

## 2023-11-04 NOTE — PROGRESS NOTE ADULT - PROBLEM SELECTOR PLAN 2
Medicine consult for Remdesivir (high risk) no steroids on RA O2 99%  -Follow up daily CBC, CMP, CRP, Ferritin, D-dimer, LDH, CK, LFT's  Troponin T 115-->164   -Atrovent neb q6hrs PRN COVID positive in ED, fever 102F. Has productive cough w/ yellow sputum.  -Medicine consulted for COVID management.   -Initially started Remdesivir 2/2 high risk for severe COVID progression given multiple comorbidities. But after further discussion w/ family, they wish to decline further Remdesivir treatment due to side effects  -no need for Dexamethaxone since no O2 requirement, satting 100% RA  -Atrovent neb q6hrs PRN  -Tylenol PRN fever

## 2023-11-04 NOTE — PROGRESS NOTE ADULT - ASSESSMENT
89 y/o male w/ PMHx of MI, CAD (s/p 6 stents, latest in 2013), hx STEMI 10/6/23 ECG with inferior MI (peak Troponin T High Sensitivity 304) medically managed @[facility] CCU 2/2 to comorbidities (went to rehab for two weeks), HFpEF 10/6/23 TTE EF 50%with mild reduced LV systolic function, basal and mid inferolateral wall severely hypokinetic and grade l LV diastolic dysfunction,,  paroxysmal AFib (on Eliquis), HTN, HLD, CVA 2008 w residual deficits (r-sided weakness / wheelchair & aox2 at baseline), DVT (IVC filter), BIBEMS following syncopal episode, SOB, and fever since today. Pt. is COVID-19 positive. Pt.'s family also reports 30lb weight loss this year.    10/9/23 TTECONCLUSIONS:     1. Mildly reduced left ventricular systolic function.   2. Basal and mid inferolateral wall is severely hypokinetic.   3. Grade I left ventricular diastolic dysfunction.   4. No significant valvular disease.   5. No evidence of pulmonary hypertension.   6. No pericardial effusion.   89 y/o male w/ PMHx of MI, CAD (s/p 6 stents, latest in 2013), hx STEMI 10/6/23 ECG with inferior MI (peak Troponin T High Sensitivity 304) medically managed @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 Found to be COVID-19 positive, Medicine following. Trop 119->388. Admitted to cardiac tele for syncope eval.

## 2023-11-04 NOTE — PROGRESS NOTE ADULT - PROBLEM SELECTOR PLAN 7
Currently in NSR@99bpm   -continue Eliquis 5mg bid, Metoprolol Tartrate 12.5mg bid Currently in NSR @99bpm   -continue Eliquis 5mg bid, Metoprolol Tartrate 12.5mg bid

## 2023-11-05 NOTE — PROGRESS NOTE ADULT - ASSESSMENT
9 yo M with PMHx CAD (s/p PCI x6, recent 2013), AFib (eliquis), HTN, HLD, CVA (2008, c/b residual R-sided weakness, wheelchair-bound), dementia (AOx2), hx DVT (s/p IVC filter), recent CCU admission for STEMI (cath deferred d/t high risk, 10/9-10/13) BIBEMS from home following syncopal episode at home admitted to cardiac telemetry. Medicine consulted for COVID+ PCR.      # Covid19 positive   Covid19 is overall stable, patient on room air, does not meet criteria for Dexamethasone.  Given that he is high-risk due to his medical comorbidities, Remdesivir was recommended but declined by family due to concern for renal function.    - Monitor O2 saturation     # Sepsis  Patient with sepsis on admission likely in setting of UTI and Covid19.  Patient is being treated for UTI. Covid overall stable, SaO2>95% on room air.  Sepsis now resolved, patient afebrile, no leukocytosis.    - Treat UTI and f/u urine culture     # Weakness  Patient with generalized weakness likely in setting of current illness.   - Physical therapy evaluation and OOBTC as tolerated    Medicine consult will continue to follow

## 2023-11-05 NOTE — PROGRESS NOTE ADULT - PROBLEM SELECTOR PLAN 3
Hx of recent inferior STEMI 10/6/23, with ECG KAYLIN in inferior leads w/ peak Troponin 304-->249. Pt was medically managed in CCU 2/2 to comorbidities.  -Trop 119, , CKMB 3.6 in ED. Currently uptrending. Trend cardiac enzymes to peak   -Initial EKG in ED noting NSR w/ ST elevations w/ deep Q waves inferiorly. Repeat EKG w/ complete resolution and low voltage inferiorly. Unclear if erroneous initially EKG in ED  -F/u repeat EKG  -Consider Heparin initiation if EKG changes concern for ACS  -c/w Plavix 75mg daily and Eliquis 5mg BID  -c/w Lopressor 12.5mg BID. Transition to Toprol 25 qd prior to discharge Hx of recent inferior STEMI 10/6/23, with ECG KAYLIN in inferior leads w/ peak Troponin 304-->249. Pt was medically managed in CCU 2/2 to comorbidities.  -Trop 119, , CKMB 3.6 in ED. Currently uptrending. Trend cardiac enzymes to peak   -Initial EKG in ED noting NSR w/ ST elevations w/ deep Q waves inferiorly. Repeat EKG w/ complete resolution and low voltage inferiorly. Unclear if erroneous initially EKG in ED  -F/u repeat EKG  -Consider Heparin initiation if EKG changes concern for ACS or pt becomes symptomatic  -c/w Plavix 75mg daily and Eliquis 5mg BID  -c/w Lopressor 12.5mg BID. Transition to Toprol 25 qd prior to discharge

## 2023-11-05 NOTE — PROGRESS NOTE ADULT - SUBJECTIVE AND OBJECTIVE BOX
Interventional Cardiology PA Adult Progress Note    Subjective Assessment:  Patient seen and examined at bedside. Patient denied chest pain, shortness of breath, abdominal pain, fatigue, chills, N/V/D.    ROS Negative except as per Subjective and HPI  	  MEDICATIONS:  hydrALAZINE 25 milliGRAM(s) Oral three times a day  isosorbide   dinitrate Tablet (ISORDIL) 10 milliGRAM(s) Oral three times a day  metoprolol tartrate 12.5 milliGRAM(s) Oral two times a day  cefTRIAXone   IVPB 1000 milliGRAM(s) IV Intermittent every 24 hours  ipratropium    for Nebulization 500 MICROGram(s) Nebulizer every 6 hours PRN  acetaminophen     Tablet .. 650 milliGRAM(s) Oral every 6 hours PRN  pantoprazole    Tablet 40 milliGRAM(s) Oral before breakfast  atorvastatin 80 milliGRAM(s) Oral at bedtime  apixaban 5 milliGRAM(s) Oral two times a day  clopidogrel Tablet 75 milliGRAM(s) Oral daily  multivitamin 1 Tablet(s) Oral daily        [PHYSICAL EXAM:  TELEMETRY:  T(C): 36.4 (11-05-23 @ 08:46), Max: 38.2 (11-04-23 @ 21:29)  HR: 55 (11-05-23 @ 08:46) (50 - 82)  BP: 153/66 (11-05-23 @ 08:46) (123/65 - 163/84)  RR: 17 (11-05-23 @ 08:46) (17 - 18)  SpO2: 100% (11-05-23 @ 08:46) (96% - 100%)  Wt(kg): --  I&O's Summary    04 Nov 2023 08:01  -  05 Nov 2023 07:00  --------------------------------------------------------  IN: 360 mL / OUT: 600 mL / NET: -240 mL        Ortiz:  Central/PICC/Mid Line:                                         Appearance: Normal	  HEENT:   Normal oral mucosa, PERRL, EOMI	  Neck: Supple, + JVD/ - JVD; Carotid Bruit   Cardiovascular: Normal S1 S2, No JVD, No murmurs,   Respiratory: Lungs clear to auscultation/Decreased Breath Sounds/No Rales, Rhonchi, Wheezing	  Gastrointestinal:  Soft, Non-tender, + BS	  Skin: No rashes, No ecchymoses, No cyanosis  Extremities: Normal range of motion, No clubbing, cyanosis or edema  Vascular: Peripheral pulses palpable 2+ bilaterally  Neurologic: Non-focal  Psychiatry: A & O x 3, Mood & affect appropriate      	    ECG:  	  RADIOLOGY:   DIAGNOSTIC TESTING:  [ ] Echocardiogram:  [ ]  Catheterization:  [ ] Stress Test:    [ ] JUAN  OTHER: 	    LABS:	 	  CARDIAC MARKERS:                                  9.5    5.72  )-----------( 153      ( 05 Nov 2023 05:30 )             28.7     11-05    139  |  107  |  19  ----------------------------<  91  4.3   |  23  |  1.00    Ca    8.9      05 Nov 2023 05:30  Phos  2.8     11-04  Mg     1.8     11-05    TPro  6.4  /  Alb  2.7<L>  /  TBili  0.5  /  DBili  x   /  AST  54<H>  /  ALT  17  /  AlkPhos  89  11-05    proBNP:   Lipid Profile:   HgA1c:   TSH:   PT/INR - ( 03 Nov 2023 19:54 )   PT: 12.3 sec;   INR: 1.08          PTT - ( 03 Nov 2023 19:54 )  PTT:29.0 sec    ASSESSMENT/PLAN: 	        DVT ppx:  Dispo:     Interventional Cardiology PA Adult Progress Note    Subjective Assessment:  Patient seen and examined at bedside. Patient denied chest pain, shortness of breath, abdominal pain, fatigue, chills, N/V/D.    ROS Negative except as per Subjective and HPI  	  MEDICATIONS:  hydrALAZINE 25 milliGRAM(s) Oral three times a day  isosorbide   dinitrate Tablet (ISORDIL) 10 milliGRAM(s) Oral three times a day  metoprolol tartrate 12.5 milliGRAM(s) Oral two times a day  cefTRIAXone   IVPB 1000 milliGRAM(s) IV Intermittent every 24 hours  ipratropium    for Nebulization 500 MICROGram(s) Nebulizer every 6 hours PRN  acetaminophen     Tablet .. 650 milliGRAM(s) Oral every 6 hours PRN  pantoprazole    Tablet 40 milliGRAM(s) Oral before breakfast  atorvastatin 80 milliGRAM(s) Oral at bedtime  apixaban 5 milliGRAM(s) Oral two times a day  clopidogrel Tablet 75 milliGRAM(s) Oral daily  multivitamin 1 Tablet(s) Oral daily        [PHYSICAL EXAM:  TELEMETRY:  T(C): 36.4 (11-05-23 @ 08:46), Max: 38.2 (11-04-23 @ 21:29)  HR: 55 (11-05-23 @ 08:46) (50 - 82)  BP: 153/66 (11-05-23 @ 08:46) (123/65 - 163/84)  RR: 17 (11-05-23 @ 08:46) (17 - 18)  SpO2: 100% (11-05-23 @ 08:46) (96% - 100%)  Wt(kg): --  I&O's Summary    04 Nov 2023 08:01  -  05 Nov 2023 07:00  --------------------------------------------------------  IN: 360 mL / OUT: 600 mL / NET: -240 mL      Appearance: Normal	  Neck: Supple,  - JVD  Cardiovascular: No JVD, No murmurs,   Respiratory: no Rales, Rhonchi, Wheezing	  Gastrointestinal:  Soft, Non-tender, + BS	  Skin: No rashes, No ecchymoses, No cyanosis  Extremities: No edema  Neurologic: Non-focal  Psychiatry: A & O x name and place, Mood & affect appropriate                            9.5    5.72  )-----------( 153      ( 05 Nov 2023 05:30 )             28.7     11-05    139  |  107  |  19  ----------------------------<  91  4.3   |  23  |  1.00    Ca    8.9      05 Nov 2023 05:30  Phos  2.8     11-04  Mg     1.8     11-05    TPro  6.4  /  Alb  2.7<L>  /  TBili  0.5  /  DBili  x   /  AST  54<H>  /  ALT  17  /  AlkPhos  89  11-05    PT/INR - ( 03 Nov 2023 19:54 )   PT: 12.3 sec;   INR: 1.08          PTT - ( 03 Nov 2023 19:54 )  PTT:29.0 sec Interventional Cardiology PA Adult Progress Note    Subjective Assessment:  Patient seen and examined at bedside. Patient denied chest pain, shortness of breath, abdominal pain, fatigue, chills, N/V/D.    ROS Negative except as per Subjective and HPI  	  MEDICATIONS:  hydrALAZINE 25 milliGRAM(s) Oral three times a day  isosorbide   dinitrate Tablet (ISORDIL) 10 milliGRAM(s) Oral three times a day  metoprolol tartrate 12.5 milliGRAM(s) Oral two times a day  cefTRIAXone   IVPB 1000 milliGRAM(s) IV Intermittent every 24 hours  ipratropium    for Nebulization 500 MICROGram(s) Nebulizer every 6 hours PRN  acetaminophen     Tablet .. 650 milliGRAM(s) Oral every 6 hours PRN  pantoprazole    Tablet 40 milliGRAM(s) Oral before breakfast  atorvastatin 80 milliGRAM(s) Oral at bedtime  apixaban 5 milliGRAM(s) Oral two times a day  clopidogrel Tablet 75 milliGRAM(s) Oral daily  multivitamin 1 Tablet(s) Oral daily        [PHYSICAL EXAM:  TELEMETRY:  T(C): 36.4 (11-05-23 @ 08:46), Max: 38.2 (11-04-23 @ 21:29)  HR: 55 (11-05-23 @ 08:46) (50 - 82)  BP: 153/66 (11-05-23 @ 08:46) (123/65 - 163/84)  RR: 17 (11-05-23 @ 08:46) (17 - 18)  SpO2: 100% (11-05-23 @ 08:46) (96% - 100%)  Wt(kg): --  I&O's Summary    04 Nov 2023 08:01  -  05 Nov 2023 07:00  --------------------------------------------------------  IN: 360 mL / OUT: 600 mL / NET: -240 mL      Appearance: Normal	  Neck: Supple,  - JVD  Cardiovascular: No JVD, No murmurs,   Respiratory: no Rales, Rhonchi, Wheezing	  Gastrointestinal:  Soft, Non-tender, + BS	  Skin: No rashes, No ecchymoses, No cyanosis  Extremities: No edema  Neurologic: Non-focal  Psychiatry: A & O x name and place, Mood & affect appropriate                      9.5    5.72  )-----------( 153      ( 05 Nov 2023 05:30 )             28.7     11-05    139  |  107  |  19  ----------------------------<  91  4.3   |  23  |  1.00    Ca    8.9      05 Nov 2023 05:30  Phos  2.8     11-04  Mg     1.8     11-05    TPro  6.4  /  Alb  2.7<L>  /  TBili  0.5  /  DBili  x   /  AST  54<H>  /  ALT  17  /  AlkPhos  89  11-05    PT/INR - ( 03 Nov 2023 19:54 )   PT: 12.3 sec;   INR: 1.08          PTT - ( 03 Nov 2023 19:54 )  PTT:29.0 sec

## 2023-11-05 NOTE — PROGRESS NOTE ADULT - PROBLEM SELECTOR PLAN 6
Lipid panel WNL  -Continue Atorvastatin 80mg daily  -Family reports previous possible intolerance to statins.

## 2023-11-05 NOTE — PROGRESS NOTE ADULT - SUBJECTIVE AND OBJECTIVE BOX
INTERVAL HPI/OVERNIGHT EVENTS:  Patient was seen and examined at bedside. As per nurse and patient, no o/n events, patient resting comfortably. No complaints at this time, reports he has not been out of bed. Has been on room air.      VITAL SIGNS:  T(F): 97.5 (11-05-23 @ 08:46)  HR: 55 (11-05-23 @ 08:46)  BP: 153/66 (11-05-23 @ 08:46)  RR: 17 (11-05-23 @ 08:46)  SpO2: 100% (11-05-23 @ 08:46)  Wt(kg): --    PHYSICAL EXAM:  Constitutional: resting comfortably, no acute distress  HEENT: sclera non-icteric,  Respiratory: CTA b/l, good air entry b/l, no wheezing, no rhonchi, no rales, without accessory muscle use and no intercostal retractions  Cardiovascular: RRR, normal S1S2, no M/R/G  Gastrointestinal: soft, NTND  Extremities: Warm, well perfused no edema, no clubbing  Neurological: CN Grossly intact  Skin: Normal temperature, warm, dry    MEDICATIONS  (STANDING):  apixaban 5 milliGRAM(s) Oral two times a day  atorvastatin 80 milliGRAM(s) Oral at bedtime  cefTRIAXone   IVPB 1000 milliGRAM(s) IV Intermittent every 24 hours  clopidogrel Tablet 75 milliGRAM(s) Oral daily  hydrALAZINE 25 milliGRAM(s) Oral three times a day  isosorbide   dinitrate Tablet (ISORDIL) 10 milliGRAM(s) Oral three times a day  metoprolol tartrate 12.5 milliGRAM(s) Oral two times a day  multivitamin 1 Tablet(s) Oral daily  pantoprazole    Tablet 40 milliGRAM(s) Oral before breakfast    MEDICATIONS  (PRN):  acetaminophen     Tablet .. 650 milliGRAM(s) Oral every 6 hours PRN Temp greater or equal to 38C (100.4F), Moderate Pain (4 - 6)  ipratropium    for Nebulization 500 MICROGram(s) Nebulizer every 6 hours PRN Shortness of Breath and/or Wheezing    Allergies    No Known Allergies    Intolerances    LABS:                        9.5    5.72  )-----------( 153      ( 05 Nov 2023 05:30 )             28.7     11-05    139  |  107  |  19  ----------------------------<  91  4.3   |  23  |  1.00    Ca    8.9      05 Nov 2023 05:30  Phos  2.8     11-04  Mg     1.8     11-05    TPro  6.4  /  Alb  2.7<L>  /  TBili  0.5  /  DBili  x   /  AST  54<H>  /  ALT  17  /  AlkPhos  89  11-05    PT/INR - ( 03 Nov 2023 19:54 )   PT: 12.3 sec;   INR: 1.08       PTT - ( 03 Nov 2023 19:54 )  PTT:29.0 sec  Urinalysis Basic - ( 05 Nov 2023 05:30 )    Color: x / Appearance: x / SG: x / pH: x  Gluc: 91 mg/dL / Ketone: x  / Bili: x / Urobili: x   Blood: x / Protein: x / Nitrite: x   Leuk Esterase: x / RBC: x / WBC x   Sq Epi: x / Non Sq Epi: x / Bacteria: x    RADIOLOGY & ADDITIONAL TESTS:  Reviewed

## 2023-11-05 NOTE — PROGRESS NOTE ADULT - PROBLEM SELECTOR PLAN 2
COVID positive in ED, fever 102F. Has productive cough w/ yellow sputum.  -Medicine consulted for COVID management.   -Initially started Remdesivir 2/2 high risk for severe COVID progression given multiple comorbidities. But after further discussion w/ family, they wish to decline further Remdesivir treatment due to side effects  -no need for Dexamethaxone since no O2 requirement, satting 100% RA  -Atrovent neb q6hrs PRN  -Tylenol PRN fever

## 2023-11-05 NOTE — PROGRESS NOTE ADULT - PROBLEM SELECTOR PLAN 9
F: No IVF  N: DASH/TLC diet  E: Replete lytes PRN K<4, Mg<2  P: DVT PPX: on Eliquis  C: DNR. NOT DNI per previous [facility] last hospitalization palliative discussion  Dispo: Admit to tele 5 Uris

## 2023-11-05 NOTE — PROGRESS NOTE ADULT - PROBLEM SELECTOR PLAN 4
HFpEF 10/6/23 TTE EF 50%with mild reduced LV systolic function, basal and mid inferolateral wall severely hypokinetic and grade l LV diastolic dysfunction.  -Euvolemic, laying flat. Not on diuretic  -c/w Hydralazine 25mg TID, Isordil 10mg TID, Lopressor 12.5mg BID  -Core Measures Daily weights and I&O q 4hrs with VSS

## 2023-11-05 NOTE — PROGRESS NOTE ADULT - ASSESSMENT
87 y/o male w/ PMHx of MI, CAD (s/p 6 stents, latest in 2013), hx STEMI 10/6/23 ECG with inferior MI (peak Troponin T High Sensitivity 304) medically managed @[facility] CCU 2/2 to comorbidities (went to rehab for two weeks), HFpEF EF 50% 10/6/23, paroxysmal AFib (on Eliquis), HTN, HLD, CVA 2008 w/ residual deficits (R sided weakness / wheelchair & A&Ox2 at baseline), DVT (IVC filter), BIBEMS following syncopal episode, SOB, and fever 102. Found to be COVID-19 positive, Medicine following. Trop 119->388. Admitted to cardiac tele for syncope eval.  89 y/o male w/ PMHx of MI, CAD (s/p 6 stents, latest in 2013), hx STEMI 10/6/23 ECG with inferior MI (peak Troponin T High Sensitivity 304) medically managed @[facility] CCU 2/2 to comorbidities (went to rehab for two weeks), HFpEF EF 50% 10/6/23, paroxysmal AFib (on Eliquis), HTN, HLD, CVA 2008 w/ residual deficits (R sided weakness / wheelchair & A&Ox2 at baseline), DVT (IVC filter), BIBEMS following syncopal episode, SOB, and fever 102. Found to be COVID-19 positive, Medicine following. Trop 119->388->508->562 w/o EKG ischemic changes TWI. Admitted to cardiac tele for syncope eval.  87 y/o male w/ PMHx of MI, CAD (s/p 6 stents, latest in 2013), hx STEMI 10/6/23 ECG with inferior MI (peak Troponin T High Sensitivity 304) medically managed @[facility] CCU 2/2 to comorbidities (went to rehab for two weeks), HFpEF EF 50% 10/6/23, paroxysmal AFib (on Eliquis), HTN, HLD, CVA 2008 w/ residual deficits (R sided weakness / wheelchair & A&Ox2 at baseline), DVT (IVC filter), BIBEMS following syncopal episode, SOB, and fever 102. Found to be COVID-19 positive, Medicine following. Trop 119->388->508->562 w/o EKG ischemic changes TWI and CK/CKMB downtrending. Admitted to cardiac tele for syncope eval.

## 2023-11-05 NOTE — PROGRESS NOTE ADULT - PROBLEM SELECTOR PLAN 1
Wife reports syncope episode where pt slumped over in wheelchair w/o head strike. Denied prodrome. Wife reports pt may have taken extra BP meds accidently prior to syncopal episode.  -Syncope likely in setting of COVID-19 and presumed hypotension w/ accidental BP med ingestion, (although hypertensive SBP 180s on arrival to ED)  -F/u orthostatics  -Pending TTE    -Echo 10/2023: EF 50%, basal ad mid inferolateral wall severely hypokinetic, G1DD  -CT head w/ moderate chronic microvascular changes without evidence of acute transcortical infarction or hemorrhage.  -Trend cardiac enzymes.   -Initial EKG in ED noting NSR w/ ST elevations w/ deep Q waves inferiorly. Repeat EKG w/ complete resolution and low voltage. Unclear if erroneous EKG in ED Wife reports syncope episode where pt slumped over in wheelchair w/o head strike. Denied prodrome. Wife reports pt may have taken extra BP meds accidently prior to syncopal episode.  -Syncope likely in setting of COVID-19 and presumed hypotension w/ accidental BP med ingestion, (although hypertensive SBP 180s on arrival to ED)  -Echo 10/2023: EF 50%, basal ad mid inferolateral wall severely hypokinetic, G1DD  -CT head w/ moderate chronic microvascular changes without evidence of acute transcortical infarction or hemorrhage.  -F/u orthostatics  -Pending TTE    -Trend cardiac enzymes, possibly elevated due to COVID infection and recent STEMI 10/6/23 tx with medical management, pt denied chest pain, SOB, abd pain, back pain  -Initial EKG in ED noting NSR w/ ST elevations w/ deep Q waves inferiorly. Repeat EKG w/ complete resolution and low voltage. Unclear if erroneous EKG in ED Wife reports syncope episode where pt slumped over in wheelchair w/o head strike. Denied prodrome. Wife reports pt may have taken extra BP meds accidently prior to syncopal episode.  -Syncope likely in setting of COVID-19 and presumed hypotension w/ accidental BP med ingestion, (although hypertensive SBP 180s on arrival to ED)  -Echo 10/2023: EF 50%, basal ad mid inferolateral wall severely hypokinetic, G1DD  -CT head w/ moderate chronic microvascular changes without evidence of acute transcortical infarction or hemorrhage.  -F/u orthostatics  -Pending TTE    -Trops likely elevated due to COVID infection pt denied chest pain, SOB, abd pain, back pain, CKMB/CK downtrending  -Initial EKG in ED noting NSR w/ ST elevations w/ deep Q waves inferiorly. Repeat EKG w/ complete resolution and low voltage. Unclear if erroneous EKG in ED

## 2023-11-06 NOTE — DIETITIAN INITIAL EVALUATION ADULT - OTHER CALCULATIONS
Based on Standards of Care pt within % IBW (97%) thus actual body weight (78kg) used for all calculations. Needs adjusted for advanced age and COVID. Fluid recs per team.

## 2023-11-06 NOTE — PROGRESS NOTE ADULT - ASSESSMENT
9 yo M with PMHx CAD (s/p PCI x6, recent 2013), AFib (eliquis), HTN, HLD, CVA (2008, c/b residual R-sided weakness, wheelchair-bound), dementia (AOx2), hx DVT (s/p IVC filter), recent CCU admission for STEMI (cath deferred d/t high risk, 10/9-10/13) BIBEMS from home following syncopal episode at home admitted to cardiac telemetry, medicine consulted for COVID+ PCR.      # Covid19 positive  Covid19 diagnosed 11/3, is overall stable, patient on room air, does not meet criteria for Dexamethasone.  Given that he is high-risk due to his medical comorbidities, Remdesivir was recommended but declined by family due to concern for renal function.    - Monitor O2 saturation     # Sepsis  Patient with sepsis on admission likely in setting of UTI and Covid19.  Patient is being treated for UTI. Covid overall stable, SaO2>95% on room air.  Sepsis now resolved, patient afebrile, no leukocytosis.    - Treat UTI for at least 5 days Ceftriaxone (11/3-11/7)     # Weakness  Patient with generalized weakness likely in setting of current illness.   - Physical therapy evaluation and OOBTC as tolerated    Medicine consult will continue to follow    7 yo M with PMHx CAD (s/p PCI x6, recent 2013), AFib (eliquis), HTN, HLD, CVA (2008, c/b residual R-sided weakness, wheelchair-bound), dementia (AOx2), hx DVT (s/p IVC filter), recent CCU admission for STEMI (cath deferred d/t high risk, 10/9-10/13) BIBEMS from home following syncopal episode at home admitted to cardiac telemetry, medicine consulted for COVID+ PCR.      # Covid19 positive  Covid19 diagnosed 11/3, is overall stable, patient on room air, does not meet criteria for Dexamethasone.  Given that he is high-risk due to his medical comorbidities, Remdesivir was recommended but declined by family due to concern for renal function.  CO  - Monitor O2 saturation     # Sepsis  Patient with sepsis on admission likely in setting of Covid19.  Patient was being treated for UTI, but it's likely asymptomatic bacteriuria. Urine culture with 40,ooo cfu/ml E. Faecalis.  Covid overall stable, SaO2>95% on room air.  Sepsis now resolved, patient afebrile, no leukocytosis.    - Can discontinue antibiotics and monitor off     # Weakness  Patient with generalized weakness likely in setting of current illness.   - Physical therapy evaluation and OOBTC as tolerated    Medicine consult will continue to follow    7 yo M with PMHx CAD (s/p PCI x6, recent 2013), AFib (eliquis), HTN, HLD, CVA (2008, c/b residual R-sided weakness, wheelchair-bound), dementia (AOx2), hx DVT (s/p IVC filter), recent CCU admission for STEMI (cath deferred d/t high risk, 10/9-10/13) BIBEMS from home following syncopal episode at home admitted to cardiac telemetry, medicine consulted for COVID+ PCR.      # Covid19 positive  Covid19 diagnosed 11/3, is overall stable, patient on room air, does not meet criteria for Dexamethasone.  Given that he is high-risk due to his medical comorbidities, Remdesivir was recommended but declined by family due to concern for renal function.  CO  - Monitor O2 saturation     # Sepsis  Patient with sepsis on admission likely in setting of Covid19.  Patient was being treated for UTI s/p Ceftriaxone 11/3-11/5, but it's likely asymptomatic bacteriuria. Urine culture with 40,ooo cfu/ml E. Faecalis.  Covid overall stable, SaO2>95% on room air.  Sepsis now resolved, patient afebrile, no leukocytosis.    - Can discontinue antibiotics and monitor off     # Weakness  Patient with generalized weakness likely in setting of current illness.   - Physical therapy evaluation and OOBTC as tolerated    Medicine consult will continue to follow

## 2023-11-06 NOTE — PROGRESS NOTE ADULT - SUBJECTIVE AND OBJECTIVE BOX
Interventional Cardiology PA Adult Progress Note    C.C.:     Subjective Assessment:      ROS Negative except as per Subjective and HPI  	  MEDICATIONS:  hydrALAZINE 25 milliGRAM(s) Oral three times a day  isosorbide   dinitrate Tablet (ISORDIL) 10 milliGRAM(s) Oral three times a day  metoprolol tartrate 12.5 milliGRAM(s) Oral two times a day    cefTRIAXone   IVPB 1000 milliGRAM(s) IV Intermittent every 24 hours    ipratropium    for Nebulization 500 MICROGram(s) Nebulizer every 6 hours PRN    acetaminophen     Tablet .. 650 milliGRAM(s) Oral every 6 hours PRN    pantoprazole    Tablet 40 milliGRAM(s) Oral before breakfast    atorvastatin 80 milliGRAM(s) Oral at bedtime    apixaban 5 milliGRAM(s) Oral two times a day  clopidogrel Tablet 75 milliGRAM(s) Oral daily  multivitamin 1 Tablet(s) Oral daily      	    [PHYSICAL EXAM:  TELEMETRY:  T(C): 36.6 (11-06-23 @ 05:01), Max: 36.7 (11-05-23 @ 13:40)  HR: 64 (11-06-23 @ 06:08) (55 - 85)  BP: 128/62 (11-06-23 @ 06:08) (128/62 - 185/82)  RR: 18 (11-06-23 @ 06:08) (17 - 18)  SpO2: 98% (11-06-23 @ 06:08) (90% - 100%)  Wt(kg): --  I&O's Summary    05 Nov 2023 07:01  -  06 Nov 2023 07:00  --------------------------------------------------------  IN: 250 mL / OUT: 900 mL / NET: -650 mL        Ortiz:  Central/PICC/Mid Line:                                         Appearance: Normal	  HEENT:   Normal oral mucosa, PERRL, EOMI	  Neck: Supple, + JVD/ - JVD; Carotid Bruit   Cardiovascular: Normal S1 S2, No JVD, No murmurs,   Respiratory: Lungs clear to auscultation/Decreased Breath Sounds/No Rales, Rhonchi, Wheezing	  Gastrointestinal:  Soft, Non-tender, + BS	  Skin: No rashes, No ecchymoses, No cyanosis  Extremities: Normal range of motion, No clubbing, cyanosis or edema  Vascular: Peripheral pulses palpable 2+ bilaterally  Neurologic: Non-focal  Psychiatry: A & O x 3, Mood & affect appropriate                            9.4    5.38  )-----------( 150      ( 06 Nov 2023 05:30 )             28.7     11-06    135  |  104  |  14  ----------------------------<  101<H>  4.1   |  27  |  0.92    Ca    8.8      06 Nov 2023 05:30  Mg     1.8     11-06    TPro  6.2  /  Alb  2.7<L>  /  TBili  0.4  /  DBili  x   /  AST  46<H>  /  ALT  16  /  AlkPhos  93  11-06   Interventional Cardiology PA Adult Progress Note    Subjective Assessment:  Patient seen and examined at bedside. Patient denies chest pain, shortness of breath, abdominal pain, fatigue, N/V/D.     ROS Negative except as per Subjective and HPI  	  MEDICATIONS:  hydrALAZINE 25 milliGRAM(s) Oral three times a day  isosorbide   dinitrate Tablet (ISORDIL) 10 milliGRAM(s) Oral three times a day  metoprolol tartrate 12.5 milliGRAM(s) Oral two times a day  cefTRIAXone   IVPB 1000 milliGRAM(s) IV Intermittent every 24 hours  ipratropium    for Nebulization 500 MICROGram(s) Nebulizer every 6 hours PRN  acetaminophen     Tablet .. 650 milliGRAM(s) Oral every 6 hours PRN  pantoprazole    Tablet 40 milliGRAM(s) Oral before breakfast  atorvastatin 80 milliGRAM(s) Oral at bedtime  apixaban 5 milliGRAM(s) Oral two times a day  clopidogrel Tablet 75 milliGRAM(s) Oral daily  multivitamin 1 Tablet(s) Oral daily  	    [PHYSICAL EXAM:  TELEMETRY:  T(C): 36.6 (11-06-23 @ 05:01), Max: 36.7 (11-05-23 @ 13:40)  HR: 64 (11-06-23 @ 06:08) (55 - 85)  BP: 128/62 (11-06-23 @ 06:08) (128/62 - 185/82)  RR: 18 (11-06-23 @ 06:08) (17 - 18)  SpO2: 98% (11-06-23 @ 06:08) (90% - 100%)  Wt(kg): --  I&O's Summary    05 Nov 2023 07:01  -  06 Nov 2023 07:00  --------------------------------------------------------  IN: 250 mL / OUT: 900 mL / NET: -650 mL    Appearance: Normal	  Neck: - JVD  Cardiovascular: No murmurs,   Respiratory: Lungs clear to auscultation	  Extremities: No edema  Neurologic: Non-focal  Psychiatry: A & O x name place and year, Mood & affect appropriate                            9.4    5.38  )-----------( 150      ( 06 Nov 2023 05:30 )             28.7     11-06    135  |  104  |  14  ----------------------------<  101<H>  4.1   |  27  |  0.92    Ca    8.8      06 Nov 2023 05:30  Mg     1.8     11-06    TPro  6.2  /  Alb  2.7<L>  /  TBili  0.4  /  DBili  x   /  AST  46<H>  /  ALT  16  /  AlkPhos  93  11-06

## 2023-11-06 NOTE — DIETITIAN INITIAL EVALUATION ADULT - REASON FOR ADMISSION
FEVER AND ST ELEVATION  "87 yo M with PMHx CAD (s/p PCI x6, recent 2013), AFib (eliquis), HTN, HLD, CVA (2008, c/b residual R-sided weakness, wheelchair-bound), dementia (AOx2), hx DVT (s/p IVC filter), recent CCU admission for STEMI (cath deferred d/t high risk, 10/9-10/13) BIBEMS from home following syncopal episode at home admitted to cardiac telemetry."

## 2023-11-06 NOTE — PROGRESS NOTE ADULT - SUBJECTIVE AND OBJECTIVE BOX
INTERVAL HPI/OVERNIGHT EVENTS:  Patient was seen and examined at bedside. As per nurse and patient, no o/n events, patient resting comfortably. No complaints at this time.     VITAL SIGNS:  T(F): 97.8 (11-06-23 @ 09:00)  HR: 59 (11-06-23 @ 09:00)  BP: 146/69 (11-06-23 @ 09:00)  RR: 18 (11-06-23 @ 09:00)  SpO2: 99% (11-06-23 @ 09:00)  Wt(kg): --    PHYSICAL EXAM:  Constitutional: resting comfortably, no acute distress   HEENT: sclera non-icteric  Respiratory: CTA b/l, good air entry b/l, no wheezing, no rhonchi, no rales, without accessory muscle use and no intercostal retractions  Cardiovascular: RRR, normal S1S2, no M/R/G  Gastrointestinal: soft, NTND  Extremities: Warm, well perfused, no edema, no clubbing  Neurological: CN Grossly intact  Skin: Normal temperature, warm, dry    MEDICATIONS  (STANDING):  apixaban 5 milliGRAM(s) Oral two times a day  atorvastatin 80 milliGRAM(s) Oral at bedtime  cefTRIAXone   IVPB 1000 milliGRAM(s) IV Intermittent every 24 hours  clopidogrel Tablet 75 milliGRAM(s) Oral daily  hydrALAZINE 25 milliGRAM(s) Oral three times a day  isosorbide   dinitrate Tablet (ISORDIL) 10 milliGRAM(s) Oral three times a day  metoprolol tartrate 12.5 milliGRAM(s) Oral two times a day  multivitamin 1 Tablet(s) Oral daily  pantoprazole    Tablet 40 milliGRAM(s) Oral before breakfast    MEDICATIONS  (PRN):  acetaminophen     Tablet .. 650 milliGRAM(s) Oral every 6 hours PRN Temp greater or equal to 38C (100.4F), Moderate Pain (4 - 6)  ipratropium    for Nebulization 500 MICROGram(s) Nebulizer every 6 hours PRN Shortness of Breath and/or Wheezing    Allergies    No Known Allergies    Intolerances    LABS:                        9.4    5.38  )-----------( 150      ( 06 Nov 2023 05:30 )             28.7     11-06    135  |  104  |  14  ----------------------------<  101<H>  4.1   |  27  |  0.92    Ca    8.8      06 Nov 2023 05:30  Mg     1.8     11-06    TPro  6.2  /  Alb  2.7<L>  /  TBili  0.4  /  DBili  x   /  AST  46<H>  /  ALT  16  /  AlkPhos  93  11-06    Urinalysis Basic - ( 06 Nov 2023 05:30 )    Color: x / Appearance: x / SG: x / pH: x  Gluc: 101 mg/dL / Ketone: x  / Bili: x / Urobili: x   Blood: x / Protein: x / Nitrite: x   Leuk Esterase: x / RBC: x / WBC x   Sq Epi: x / Non Sq Epi: x / Bacteria: x    RADIOLOGY & ADDITIONAL TESTS:  Reviewed INTERVAL HPI/OVERNIGHT EVENTS:  Patient was seen and examined at bedside. As per nurse and patient, no o/n events, patient resting comfortably. No complaints at this time.     VITAL SIGNS:  T(F): 97.8 (11-06-23 @ 09:00)  HR: 59 (11-06-23 @ 09:00)  BP: 146/69 (11-06-23 @ 09:00)  RR: 18 (11-06-23 @ 09:00)  SpO2: 99% (11-06-23 @ 09:00)  Wt(kg): --    PHYSICAL EXAM:  Constitutional: resting comfortably, no acute distress   HEENT: sclera non-icteric  Respiratory: CTA b/l, good air entry b/l, no wheezing, no rhonchi, no rales, without accessory muscle use and no intercostal retractions  Cardiovascular: RRR, normal S1S2, no M/R/G  Gastrointestinal: soft, NTND  Extremities: Warm, well perfused, no edema, no clubbing  Neurological: CN Grossly intact  Skin: Normal temperature, warm, dry    MEDICATIONS  (STANDING):  apixaban 5 milliGRAM(s) Oral two times a day  atorvastatin 80 milliGRAM(s) Oral at bedtime  cefTRIAXone   IVPB 1000 milliGRAM(s) IV Intermittent every 24 hours  clopidogrel Tablet 75 milliGRAM(s) Oral daily  hydrALAZINE 25 milliGRAM(s) Oral three times a day  isosorbide   dinitrate Tablet (ISORDIL) 10 milliGRAM(s) Oral three times a day  metoprolol tartrate 12.5 milliGRAM(s) Oral two times a day  multivitamin 1 Tablet(s) Oral daily  pantoprazole    Tablet 40 milliGRAM(s) Oral before breakfast    MEDICATIONS  (PRN):  acetaminophen     Tablet .. 650 milliGRAM(s) Oral every 6 hours PRN Temp greater or equal to 38C (100.4F), Moderate Pain (4 - 6)  ipratropium    for Nebulization 500 MICROGram(s) Nebulizer every 6 hours PRN Shortness of Breath and/or Wheezing    Allergies    No Known Allergies    Intolerances    LABS:                        9.4    5.38  )-----------( 150      ( 06 Nov 2023 05:30 )             28.7     11-06    135  |  104  |  14  ----------------------------<  101<H>  4.1   |  27  |  0.92    Ca    8.8      06 Nov 2023 05:30  Mg     1.8     11-06    TPro  6.2  /  Alb  2.7<L>  /  TBili  0.4  /  DBili  x   /  AST  46<H>  /  ALT  16  /  AlkPhos  93  11-06    Urinalysis Basic - ( 06 Nov 2023 05:30 )    Color: x / Appearance: x / SG: x / pH: x  Gluc: 101 mg/dL / Ketone: x  / Bili: x / Urobili: x   Blood: x / Protein: x / Nitrite: x   Leuk Esterase: x / RBC: x / WBC x   Sq Epi: x / Non Sq Epi: x / Bacteria: x    RADIOLOGY & ADDITIONAL TESTS:  Reviewed

## 2023-11-06 NOTE — PROGRESS NOTE ADULT - PROBLEM SELECTOR PLAN 1
Wife reports syncope episode where pt slumped over in wheelchair w/o head strike. Denied prodrome. Wife reports pt may have taken extra BP meds accidently prior to syncopal episode.  -Syncope likely in setting of COVID-19 and presumed hypotension w/ accidental BP med ingestion, (although hypertensive SBP 180s on arrival to ED)  -Echo 10/2023: EF 50%, basal ad mid inferolateral wall severely hypokinetic, G1DD  -CT head w/ moderate chronic microvascular changes without evidence of acute transcortical infarction or hemorrhage.  -F/u orthostatics  -Pending TTE    -Trops likely elevated due to COVID infection pt denied chest pain, SOB, abd pain, back pain, CKMB/CK downtrending  -Initial EKG in ED noting NSR w/ ST elevations w/ deep Q waves inferiorly. Repeat EKG w/ complete resolution and low voltage. Unclear if erroneous EKG in ED Wife reports syncope episode where pt slumped over in wheelchair w/o head strike. Denied prodrome. Wife reports pt may have taken extra BP meds accidently prior to syncopal episode.  -Syncope likely in setting of COVID-19 and presumed hypotension w/ accidental BP med ingestion, (although hypertensive SBP 180s on arrival to ED)  -Echo 10/2023: EF 50%, basal ad mid inferolateral wall severely hypokinetic, G1DD  -CT head w/ moderate chronic microvascular changes without evidence of acute transcortical infarction or hemorrhage.  -Pending TTE    -Trops likely elevated due to COVID infection pt denied chest pain, SOB, abd pain, back pain, CKMB/CK downtrending  -Initial EKG in ED noting NSR w/ ST elevations w/ deep Q waves inferiorly. Repeat EKG w/ complete resolution and low voltage. Unclear if erroneous EKG in ED

## 2023-11-06 NOTE — DIETITIAN INITIAL EVALUATION ADULT - ENERGY INTAKE
Adequate (%) Pt is tolerating current diet: DASH/TLC. States that he has been consuming ~75+ of the meals, and RD observed % of breakfast consumed this morning.

## 2023-11-06 NOTE — DIETITIAN INITIAL EVALUATION ADULT - ORAL INTAKE PTA/DIET HISTORY
Visited pt at bedside on 5UR. Pt states that at home his appetite has been good and endorses consuming 3 meals per day. States that he did not follow any specific diet at home. No cultural, ethnic, Jehovah's witness food preferences noted. Confirms No known food allergies.

## 2023-11-06 NOTE — DIETITIAN INITIAL EVALUATION ADULT - EDUCATION DIETARY MODIFICATIONS
Educated/Discussed on ways to promote intake in setting of poor appetite. Educated/Discussed the importance to prioritize protein at meal times. Reviewed good sources of protein on the menu. Pt is amenable to oral nutrition supplement and understands to be consumed in-between meals and not as a meal replacement. Pt receptive and agreeable to education./(2) meets goals/outcomes/verbalization

## 2023-11-06 NOTE — PROVIDER CONTACT NOTE (OTHER) - ASSESSMENT
Patient asymptomatic. Patient awake at time. Vitals taken /75, vital signs otherwise stable. ECG completed and reviewed by LEONARDO Hines.
pt denies any symptoms
asymptomatic
Pt in bed resting breathing easy on room air, no apparent distress noted.

## 2023-11-06 NOTE — PROGRESS NOTE ADULT - ATTENDING COMMENTS
89 yo M with PMHx CAD (s/p PCI x6, recent 2013), AFib (eliquis), HTN, HLD, CVA (2008, c/b residual R-sided weakness, wheelchair-bound), dementia (AOx2), hx DVT (s/p IVC filter), recent CCU admission for STEMI (cath deferred d/t high risk, 10/9-10/13) BIBEMS from home following syncopal episode at home admitted to cardiac telemetry. Medicine consulted for COVID+ PCR.      # Sepsis 2/2 COVID ( mild)   - special droplets and contact precautions for 10 days ( COVID+ 11/3)   - no need for Dexamethaxone since no O2 requirement   - Pt is at high risk for developing severe COVID  - Remdesivir 200mg iv x1 @ 4am 11/4.  - On 11/4, met with pt's family to discuss pro and cons of Remdesir which is still recommended by CDC and hospital COVID guideline to offer high risk COVID pt for developing severe COVID. Daughter declined further Remdesivir concerning it's side effect on the kidney.  - discontinued further Remdesivir as requested by family   - Will continue monitoring pulse ox and clinical course, stable so far    # complicated UTI  - started on Ceftriaxone 1gm iv q24hr ( 11/3-) Day 4 of total 5 days   - f/u UCx   - f/u BCX( 11/3): ngtd     # HTN  # HLD  # hx CAD/STEMI   - active care per cardiology services for medical management   - trend Trop   - continue conservative management   - clopidogrel Tablet 75 milliGRAM(s) Oral daily  - apixaban 5 milliGRAM(s) Oral two times a day  - metoprolol tartrate 12.5 milliGRAM(s) Oral two times a day  - atorvastatin 80 milliGRAM(s) Oral at bedtime  - hydrALAZINE 25 milliGRAM(s) Oral three times a day  - isosorbide   dinitrate Tablet (ISORDIL) 10 milliGRAM(s) Oral three times a day    Family also concerns about statin induced myopathy and weakness   Rehab/PT eval OOBTC as tolerated, likely needs NITZA    POC to be d/w cardiology ACP Wnag    Medicine consult continues to follow, thank you. 89 yo M with PMHx CAD (s/p PCI x6, recent 2013), AFib (eliquis), HTN, HLD, CVA (2008, c/b residual R-sided weakness, wheelchair-bound), dementia (AOx2), hx DVT (s/p IVC filter), recent CCU admission for STEMI (cath deferred d/t high risk, 10/9-10/13) BIBEMS from home following syncopal episode at home admitted to cardiac telemetry. Medicine consulted for COVID+ PCR.      # Sepsis 2/2 COVID ( mild)   - special droplets and contact precautions for 10 days ( COVID+ 11/3)   - no need for Dexamethaxone since no O2 requirement   - Pt is at high risk for developing severe COVID  - Remdesivir 200mg iv x1 @ 4am 11/4.  - On 11/4, met with pt's family to discuss pro and cons of Remdesir which is still recommended by CDC and hospital COVID guideline to offer high risk COVID pt for developing severe COVID. Daughter declined further Remdesivir concerning it's side effect on the kidney.  - discontinued further Remdesivir as requested by family   - Will continue monitoring pulse ox and clinical course, stable so far    # complicated UTI?   - started on Ceftriaxone 1gm iv q24hr ( 11/3-) Day 4, will d/c since pt is asymptomatic  - f/u UCx 40K Enterococcal faecalis    - f/u BCX( 11/3): ngtd     # HTN  # HLD  # hx CAD/STEMI   - active care per cardiology services for medical management   - trend Trop   - continue conservative management   - clopidogrel Tablet 75 milliGRAM(s) Oral daily  - apixaban 5 milliGRAM(s) Oral two times a day  - metoprolol tartrate 12.5 milliGRAM(s) Oral two times a day  - atorvastatin 80 milliGRAM(s) Oral at bedtime  - hydrALAZINE 25 milliGRAM(s) Oral three times a day  - isosorbide   dinitrate Tablet (ISORDIL) 10 milliGRAM(s) Oral three times a day    Family also concerns about statin induced myopathy and weakness   Rehab/PT eval OOBTC as tolerated, likely needs NITZA    POC to be d/w cardiology ACP Wang    Medicine consult continues to follow, thank you.

## 2023-11-06 NOTE — DIETITIAN INITIAL EVALUATION ADULT - NSFNSNUTRCHEWSWALLOWFT_GEN_A_CORE
Pt states that due to poor dentition, he has some difficulty chewing. Endorses no issues with swallowing. States that he has a preference for softer foods. States that his appetite/intake has not been affected.

## 2023-11-06 NOTE — PROGRESS NOTE ADULT - PROBLEM SELECTOR PLAN 9
F: No IVF  N: DASH/TLC diet  E: Replete lytes PRN K<4, Mg<2  P: DVT PPX: on Eliquis  C: DNR. NOT DNI per previous [facility] last hospitalization palliative discussion  Dispo: Admit to tele 5 Uris CVA 2008 w/ residual deficits (r-sided weakness / wheelchair & aox2)

## 2023-11-06 NOTE — DISCHARGE NOTE PROVIDER - HOSPITAL COURSE
87 y/o male w/ PMHx of MI, CAD (s/p 6 stents, latest in 2013), hx STEMI 10/6/23 ECG with inferior MI (peak Troponin T High Sensitivity 304) medically managed @[facility] CCU 2/2 to comorbidities (went to two weeks of  Rehab), HFpEF 10/6/23 TTE EF 50%with mild reduced LV systolic function, basal and mid inferolateral wall severely hypokinetic and grade l LV diastolic dysfunction, TTE EF 50%,  paroxysmal AFib (on Eliquis), HTN, HLD, CVA 2008 w residual deficits (r-sided weakness / wheelchair & aox2 at baseline), DVT (IVC filter), BIBEMS following syncopal episode, SOB, and fever since today. Pt. is COVID-19 positive.  Pt.'s family also reports 30lb weight loss this year. In ER ECG reveals NSR@99bpm with ST depression leads 1 and AVL and ST elevation lll and AVF (unchanged from prior ECG 10/6/23), Troponin T High Sensitivity 119 (prior Troponin T High Sensitivity 10/6/23 304-->249, , CKMB 3.6, WBC 7.6, Neutrophil 81.9, H/H 10.9/32.8 Plts 164,  BUN/Cr 16/1.14, Blood Glucose 127, Mg 1.7.  CXR no acute pathology seen f/u official report.   COIVD -19 Positive. VSS Temp 98.9F, /53, HR 70bpm, O2 on RA 99%.  Pt. denies fever, chills, HA, chest pain, palpitations, SOB, dizziness, diaphoresis, abdominal discomfort and n/v/d.    -UA came back positve-- medicine following and does not want to initiate abx for asymptomatic +UCx (40K E. faecallis).   -CT Head 11/4/23: w/ moderate chronic microvascular changes w/o evidence of acute transcortical infarction or hemorrhage   -TTE 11/7/23: borderline redcued RV systolic function, no significant valvular disease, no pericardial effusion, EF 70-75%     Syncope likely in setting of COVID+ and presumed hypotension with accidental BP med ingestion. Patient had Elevated trops + CK/CKMB on admission likely 2/2 covid infection, patient denies CP, SOB, abd pain, back pain. CE downtrending prior to d/c. Repaet EKG with complete resolution of ST elevations and showed low voltage--unclear if initial EKG was erroneous. Due to comorbities, patient is at risk for worseing COVD infection however patient's family denied Remdesivir treatment due to side effects. No need for dexamethasone since no oxygen requirement during admission.     Pt is asymptomatic at this time and denies chest pain, SOB, MULLER, palpitations, dizziness, LOC, N/V, diaphoresis, orthopnea/PND, and leg swelling. Pt able to ambulate and void without complication. VSS. Labs and telemetry reviewed.  Pt is a candidate for discharge per  ________. Pt given appropriate discharge instructions, pt states they have an appropriate amount of their previous home meds unchanged from this visit at home, and any new medications were sent to their pharmacy. Pt instructed to f/u with ________ in 1-2 weeks.    DISCHARGE MEDS:    89 y/o male w/ PMHx of MI, CAD (s/p 6 stents, latest in 2013), hx STEMI 10/6/23 ECG with inferior MI (peak Troponin T High Sensitivity 304) medically managed @[facility] CCU 2/2 to comorbidities (went to two weeks of  Rehab), HFpEF 10/6/23 TTE EF 50%with mild reduced LV systolic function, basal and mid inferolateral wall severely hypokinetic and grade l LV diastolic dysfunction, TTE EF 50%,  paroxysmal AFib (on Eliquis), HTN, HLD, CVA 2008 w residual deficits (r-sided weakness / wheelchair & aox2 at baseline), DVT (IVC filter), BIBEMS following syncopal episode, SOB, and fever since today. Pt. is COVID-19 positive.  Pt.'s family also reports 30lb weight loss this year. In ER ECG reveals NSR@99bpm with ST depression leads 1 and AVL and ST elevation lll and AVF (unchanged from prior ECG 10/6/23), Troponin T High Sensitivity 119 (prior Troponin T High Sensitivity 10/6/23 304-->249, , CKMB 3.6, WBC 7.6, Neutrophil 81.9, H/H 10.9/32.8 Plts 164,  BUN/Cr 16/1.14, Blood Glucose 127, Mg 1.7.  CXR no acute pathology seen f/u official report.   COIVD -19 Positive. VSS Temp 98.9F, /53, HR 70bpm, O2 on RA 99%.  Pt. denies fever, chills, HA, chest pain, palpitations, SOB, dizziness, diaphoresis, abdominal discomfort and n/v/d.    -UA came back positve-- medicine following and does not want to initiate abx for asymptomatic +UCx (40K E. faecallis).   -CT Head 11/4/23: w/ moderate chronic microvascular changes w/o evidence of acute transcortical infarction or hemorrhage   -TTE 11/7/23: borderline redcued RV systolic function, no significant valvular disease, no pericardial effusion, EF 70-75%     Syncope likely in setting of COVID+ and presumed hypotension with accidental BP med ingestion. Patient had Elevated trops + CK/CKMB on admission likely 2/2 covid infection, patient denies CP, SOB, abd pain, back pain. CE downtrending prior to d/c. Repaet EKG with complete resolution of ST elevations and showed low voltage--unclear if initial EKG was erroneous. Due to comorbities, patient is at risk for worseing COVD infection however patient's family denied Remdesivir treatment due to side effects. No need for dexamethasone since no oxygen requirement during admission.     Pt is asymptomatic at this time and denies chest pain, SOB, MULLER, palpitations, dizziness, LOC, N/V, diaphoresis, orthopnea/PND, and leg swelling. Pt able to ambulate and void without complication. VSS. Labs and telemetry reviewed.  Pt is a candidate for discharge per Dr. Meyer. Pt given appropriate discharge instructions, pt states they have an appropriate amount of their previous home meds unchanged from this visit at home, and any new medications were sent to their pharmacy. Pt instructed to f/u with Dr. pérez in 1-2 weeks.    DISCHARGE MEDS:   acetaminophen 325 mg oral tablet: 2 tab(s) orally every 6 hours, As needed, Mild Pain (1 - 3)  apixaban 5 mg oral tablet: 1 tab(s) orally every 12 hours  atorvastatin 80 mg oral tablet: 1 tab(s) orally once a day (at bedtime)  clopidogrel 75 mg oral tablet: 1 tab(s) orally once a day  hydrALAZINE 10 mg oral tablet: 1 tab(s) orally 3 times a day  hydrALAZINE 25 mg oral tablet: 1 tab(s) orally 3 times a day  isosorbide dinitrate 20 mg oral tablet: 1 tab(s) orally every 8 hours  Metoprolol Tartrate 25 mg oral tablet: 0.5 tab(s) orally 2 times a day  multivitamin: 1 tab  once a day  pantoprazole 40 mg oral delayed release tablet: 1 tab(s) orally once a day (before a meal)

## 2023-11-06 NOTE — PROGRESS NOTE ADULT - PROBLEM SELECTOR PLAN 3
Hx of recent inferior STEMI 10/6/23, with ECG KAYLIN in inferior leads w/ peak Troponin 304-->249. Pt was medically managed in CCU 2/2 to comorbidities.  -Trop 119, , CKMB 3.6 in ED. Currently uptrending. Trend cardiac enzymes to peak   -Initial EKG in ED noting NSR w/ ST elevations w/ deep Q waves inferiorly. Repeat EKG w/ complete resolution and low voltage inferiorly. Unclear if erroneous initially EKG in ED  -F/u repeat EKG  -Consider Heparin initiation if EKG changes concern for ACS or pt becomes symptomatic  -c/w Plavix 75mg daily and Eliquis 5mg BID  -c/w Lopressor 12.5mg BID. Transition to Toprol 25 qd prior to discharge

## 2023-11-06 NOTE — PROVIDER CONTACT NOTE (OTHER) - SITUATION
SBP >170's consistently. BP meds given and pt still has elevated SBP.
13 beats of vtach
Patient had 11 beats of V tach as per telemetry tech.
Per tele tech, pt noted to have 6 beats of VT on tele as well as idioventricular rhythm

## 2023-11-06 NOTE — PROGRESS NOTE ADULT - ASSESSMENT
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 Found to be COVID-19 positive, Medicine following. Trop 119->388->508->562 w/o EKG ischemic changes TWI and CK/CKMB downtrending. Admitted to cardiac tele for syncope eval.  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 Found to be COVID-19 positive, Medicine following. Trop 119->388->508->562-->749 w/o EKG ischemic changes TWI and CK/CKMB downtrending. Admitted to cardiac tele for syncope eval.  89 y/o male w/ PMHx of MI, CAD (s/p 6 stents, latest in 2013), hx STEMI 10/6/23 ECG with inferior MI (peak Troponin T High Sensitivity 304) medically managed @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 Found to be COVID-19 positive, Medicine following. Trop 119->388->508->562-->749 w/o EKG ischemic changes TWI and CK/CKMB, downtrending, will continue trop to peak. No heparin indicated at this time as pt is asx. Admitted to cardiac tele for syncope eval.  89 y/o male w/ PMHx of MI, CAD (s/p 6 stents, latest in 2013), hx STEMI 10/6/23 ECG with inferior MI (peak Troponin T High Sensitivity 304) medically managed @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 Found to be COVID-19 positive, Medicine following. Trop 119->388->508->562-->749 w/o EKG ischemic changes TWI and CK/CKMB downtrending and patient has remained asymptomatic, will continue trop to peak. Echo to be done 11/06/23, if LV function decreased compared to previous echo, will consult interventional cardiology. Admitted to cardiac tele for syncope eval.  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 Found to be COVID-19 positive, Medicine following. Trop 119->388->508->562-->749 w/o EKG ischemic changes TWI and CK/CKMB downtrending and patient has remained asymptomatic, will continue trop to peak. Echo to be done 11/06/23, if LV function decreased compared to previous echo, will consult interventional cardiology.

## 2023-11-06 NOTE — DIETITIAN INITIAL EVALUATION ADULT - NSFNSGIASSESSMENTFT_GEN_A_CORE
No issues with nausea, vomiting, diarrhea, constipation reported at time of visit. Last BM per flow sheets 11/3. Pt is not currently on a bowel regimen.

## 2023-11-06 NOTE — DIETITIAN INITIAL EVALUATION ADULT - PERTINENT LABORATORY DATA
11-06    135  |  104  |  14  ----------------------------<  101<H>  4.1   |  27  |  0.92    Ca    8.8      06 Nov 2023 05:30  Mg     1.8     11-06    TPro  6.2  /  Alb  2.7<L>  /  TBili  0.4  /  DBili  x   /  AST  46<H>  /  ALT  16  /  AlkPhos  93  11-06  A1C with Estimated Average Glucose Result: 6.1 % (10-09-23 @ 05:30)

## 2023-11-06 NOTE — DISCHARGE NOTE PROVIDER - NSDCCPCAREPLAN_GEN_ALL_CORE_FT
PRINCIPAL DISCHARGE DIAGNOSIS  Diagnosis: 2019 novel coronavirus disease (COVID-19)  Assessment and Plan of Treatment:       SECONDARY DISCHARGE DIAGNOSES  Diagnosis: ST elevation  Assessment and Plan of Treatment:     Diagnosis: COVID-19  Assessment and Plan of Treatment:      PRINCIPAL DISCHARGE DIAGNOSIS  Diagnosis: 2019 novel coronavirus disease (COVID-19)  Assessment and Plan of Treatment: You presented to the hospital after losing consciousness. We related this syncopal epsisde to your positive COVID test. We evaluated your heart's function and it was normal. We did imaging of your head and saw no acute processes. Please wear a face mask if you are around other individuals. Please follow up with your primary care physician within 2-3 weeks of your discharge from the hospital. Please take all medications as prescribed. If you experience any worsening or recurrence of your symptoms, particularly worsening or high fever, shortness of breathe, extreme fatigue, or bloody cough please call [phone] immediately or report to the nearest Emergency Department.  Please follow up with Dr. Olmedo on 11/16/23      SECONDARY DISCHARGE DIAGNOSES  Diagnosis: Paroxysmal atrial fibrillation  Assessment and Plan of Treatment: You have an abnormal heart rhythm (arrhythmia) called atrial fibrillation. With this condition, the hearts 2 upper chambers (the atria) quiver rather than squeeze the blood out in a normal pattern. This leads to an irregular and sometimes rapid heartbeat. Atrial fibrillation is serious condition as it affects the heart’s ability to fill with blood as it should and blood clots may form, which increases the risk for stroke.  -Please CONTINUE  ELIQUIS 5MG TWICE A DAY to prevent a stroke.  -Please CONTINUE Metoprolol 12.5 mg TWICE A DAY to keep your heart rate regular  Please follow up with your cardiologist Dr. Olmedo on 11/16/23.    Diagnosis: Essential hypertension  Assessment and Plan of Treatment: Please START Hydralazine 35mg three times a day, Metoprolol tartate two times a day and Imdur 20mg three times a day as listed to keep your blood pressure controlled. For blood pressure that is too high or too low please see your doctor or go to the emergency room as necessary.

## 2023-11-06 NOTE — DIETITIAN INITIAL EVALUATION ADULT - ADD RECOMMEND
1. Continue current diet as tolerated: DASH/TLC. Fluids deferred to medical team. Defer diet texture/fluid consistencies to team.   2. Continue micronutrient supplementation: multivitamin  3. Recommend Ensure 2x per day (350 kcal, 20 g protein in each) to optimize intake.  4. Encourage PO intake and honor food preferences as able unless otherwise contraindicated.   5. Monitor PO intake, diet tolerance, weight trends, labs, and skin integrity and bowel movement regularity.   6. RD remains available upon request and will follow-up per protocol.  1. Continue current diet as tolerated: DASH/TLC. Fluids deferred to medical team. Defer diet texture/fluid consistencies to team.   2. Continue micronutrient supplementation: multivitamin  3. Recommend Ensure Enlive High Protein 2x per day (350 kcal, 20 g protein in each) to optimize intake.  4. Encourage PO intake and honor food preferences as able unless otherwise contraindicated.   5. Monitor PO intake, diet tolerance, weight trends, labs, and skin integrity and bowel movement regularity.   6. RD remains available upon request and will follow-up per protocol.

## 2023-11-06 NOTE — DIETITIAN INITIAL EVALUATION ADULT - PHYSCIAL ASSESSMENT
Weights:  11/3 171 pounds (dosing)    Per HIE:  10/10 171 pounds   3/10 179 pounds     UBW: Pt unable to report UBW, however states that he feels like he has lost some weight; unable to quantify amount.   IBW: 176 pounds

## 2023-11-06 NOTE — DISCHARGE NOTE PROVIDER - NSDCFUSCHEDAPPT_GEN_ALL_CORE_FT
Bert Olmedo  [facility]  [facility] [address]  Scheduled Appointment: 11/16/2023

## 2023-11-06 NOTE — DIETITIAN INITIAL EVALUATION ADULT - REASON
Nutrition focused physical exam deferred at this time as pt is eating well and weights appear to be stable. Observed with no overt signs and symptoms of muscle or fat wasting. Based on ASPEN guidelines, pt does not meet criteria for malnutrition.

## 2023-11-06 NOTE — DIETITIAN INITIAL EVALUATION ADULT - OTHER INFO
-Nutritionally Pertinent Labs 11/6: Gluc: 101 (H), 10/9 A1C: 6.1%.   - Pt is ordered for multivitamin

## 2023-11-06 NOTE — DIETITIAN INITIAL EVALUATION ADULT - PERTINENT MEDS FT
MEDICATIONS  (STANDING):  apixaban 5 milliGRAM(s) Oral two times a day  atorvastatin 80 milliGRAM(s) Oral at bedtime  cefTRIAXone   IVPB 1000 milliGRAM(s) IV Intermittent every 24 hours  clopidogrel Tablet 75 milliGRAM(s) Oral daily  hydrALAZINE 25 milliGRAM(s) Oral three times a day  isosorbide   dinitrate Tablet (ISORDIL) 10 milliGRAM(s) Oral three times a day  metoprolol tartrate 12.5 milliGRAM(s) Oral two times a day  multivitamin 1 Tablet(s) Oral daily  pantoprazole    Tablet 40 milliGRAM(s) Oral before breakfast    MEDICATIONS  (PRN):  acetaminophen     Tablet .. 650 milliGRAM(s) Oral every 6 hours PRN Temp greater or equal to 38C (100.4F), Moderate Pain (4 - 6)  ipratropium    for Nebulization 500 MICROGram(s) Nebulizer every 6 hours PRN Shortness of Breath and/or Wheezing

## 2023-11-06 NOTE — PROVIDER CONTACT NOTE (OTHER) - REASON
13 beats of vtach
6 beats of VT and idioventricular rhythm
Patient had 11 beats of V tach as per telemetry tech.
SBP >170's

## 2023-11-06 NOTE — DISCHARGE NOTE PROVIDER - NSDCMRMEDTOKEN_GEN_ALL_CORE_FT
acetaminophen 325 mg oral tablet: 2 tab(s) orally every 6 hours, As needed, Mild Pain (1 - 3)  apixaban 5 mg oral tablet: 1 tab(s) orally every 12 hours  atorvastatin 80 mg oral tablet: 1 tab(s) orally once a day (at bedtime)  clopidogrel 75 mg oral tablet: 1 tab(s) orally once a day  hydrALAZINE 25 mg oral tablet: 3 tab(s) orally every 8 hours 3 tabs (for 75mg dose) every 8 hours  isosorbide dinitrate 10 mg oral tablet: 1 tab(s) orally every 8 hours  metoprolol tartrate 25 mg oral tablet: 0.5 tab(s) orally 2 times a day HALF tablet to take a 12.5mg dose two times a day (every 12 hours)  multivitamin: 1 tab  once a day  pantoprazole 40 mg oral delayed release tablet: 1 tab(s) orally once a day (before a meal)   acetaminophen 325 mg oral tablet: 2 tab(s) orally every 6 hours, As needed, Mild Pain (1 - 3)  apixaban 5 mg oral tablet: 1 tab(s) orally every 12 hours  atorvastatin 80 mg oral tablet: 1 tab(s) orally once a day (at bedtime)  clopidogrel 75 mg oral tablet: 1 tab(s) orally once a day  hydrALAZINE 10 mg oral tablet: 1 tab(s) orally 3 times a day  hydrALAZINE 25 mg oral tablet: 1 tab(s) orally 3 times a day  isosorbide dinitrate 20 mg oral tablet: 1 tab(s) orally every 8 hours  Metoprolol Tartrate 25 mg oral tablet: 0.5 tab(s) orally 2 times a day  multivitamin: 1 tab  once a day  pantoprazole 40 mg oral delayed release tablet: 1 tab(s) orally once a day (before a meal)

## 2023-11-07 NOTE — OCCUPATIONAL THERAPY INITIAL EVALUATION ADULT - PERTINENT HX OF CURRENT PROBLEM, REHAB EVAL
89 yo M with PMHx CAD (s/p PCI x6, recent 2013), AFib (eliquis), HTN, HLD, CVA (2008, c/b residual R-sided weakness, wheelchair-bound), dementia (AOx2), hx DVT (s/p IVC filter), recent CCU admission for STEMI (cath deferred d/t high risk, 10/9-10/13) BIBEMS from home following syncopal episode at home admitted to cardiac telemetry, medicine consulted for COVID+ PCR.

## 2023-11-07 NOTE — PHYSICAL THERAPY INITIAL EVALUATION ADULT - ORIENTATION, REHAB EVAL
able to stat month with options/person/place/situation able to state month with options/person/place

## 2023-11-07 NOTE — PROGRESS NOTE ADULT - PROBLEM SELECTOR PLAN 2
COVID positive in ED, fever 102F. Has productive cough w/ yellow sputum.  - Medicine consulted for COVID management.   - Initially started Remdesivir as patient is high risk for severe COVID progression given multiple comorbidities. But after further discussion w/ family, they wish to decline further Remdesivir treatment due to side effects  - No need for Dexamethaxone since no O2 requirement, satting 100% RA  - Atrovent neb q6hrs PRN  - Tylenol PRN fever COVID positive in ED, fever 102F. Has productive cough w/ yellow sputum, afebrile   - CXR (11/3/23): monitoring device, R pleural effusion, thoracic aortic tortuosity, C-spine hardware  - Patient is high risk for severe COVID progression given multiple comorbidities; after further discussion w/ family, they wish to decline further Remdesivir treatment due to side effects; no need for Dexamethaxone since no O2 requirement, satting 100% RA  - Continue: Atrovent neb q6hrs PRN, tylenol PRN fever   - Medicine following for comanagement

## 2023-11-07 NOTE — PROGRESS NOTE ADULT - PROBLEM SELECTOR PLAN 4
Patient is euvolemic w/o orthopnea, satting high 90s on room air   - EKG, TTE as above   - CXR (11/3/23): monitoring device, R pleural effusion, thoracic aortic tortuosity, C-spine hardware  - GDMT:   HFpEF 10/6/23 TTE EF 50%with mild reduced LV systolic function, basal and mid inferolateral wall severely hypokinetic and grade l LV diastolic dysfunction.  - Euvolemic, laying flat. Not on diuretic  - Continue: hydralazine 25mg TID, isordil 10mg TID, lopressor 12.5mg BID  - Core Measures daily weights and I&O q4hrs with VSS Patient is euvolemic w/o orthopnea, satting high 90s on room air   - EKG, TTE, CXR as above   - GDMT: hydralazine 25 mg TID, Lopressor 12.5 mg BID, Isordil 20 mg TID (increased)   HFpEF 10/6/23 TTE EF 50%with mild reduced LV systolic function, basal and mid inferolateral wall severely hypokinetic and grade l LV diastolic dysfunction.  - Diuretic: none  - Core Measures, daily weights, strict I&Os

## 2023-11-07 NOTE — PROGRESS NOTE ADULT - NS ATTEND AMEND GEN_ALL_CORE FT
Elevated trops noted, ? related to Covid.. He clinically is stable
Trops have trended up but pt acytually feels well.  Would echo for EF and wall motion and compare to 10/6 echo  On good med therapy
Trop has not yet peaked.  Echo pending for EF and wall motion
Trops trending down  Covid (+)  DC planning for Wed

## 2023-11-07 NOTE — PROGRESS NOTE ADULT - PROBLEM SELECTOR PLAN 3
Hx of recent inferior STEMI 10/6/23, with ECG KAYLIN in inferior leads w/ peak Troponin 304-->249. Pt was medically managed in CCU 2/2 to comorbidities.  - Trop 119, , CKMB 3.6 in ED. Currently uptrending. Trend cardiac enzymes to peak   - Initial EKG in ED noting NSR w/ ST elevations w/ deep Q waves inferiorly. Repeat EKG w/ complete resolution and low voltage inferiorly. Unclear if erroneous initially EKG in ED  - Consider Heparin initiation if EKG changes concern for ACS or pt becomes symptomatic  - Continue: Plavix 75mg daily and Eliquis 5mg BID  - Continue: Lopressor 12.5mg BID. Transition to Toprol 25 QD prior to discharge Hx of recent inferior STEMI 10/6/23, with ECG KAYLIN in inferior leads w/ peak Troponin 304-->249. Pt was medically managed in CCU 2/2 to comorbidities.  - Trops peaked at 745, CK/CKMB downtrending  - Initial EKG in ED noting NSR w/ ST elevations w/ deep Q waves inferiorly. Repeat EKG w/ complete resolution and low voltage inferiorly. Unclear if erroneous initially EKG in ED  - Consider Heparin gtt if EKG changes concern for ACS or pt becomes symptomatic  - Continue: Plavix 75mg daily and Eliquis 5mg BID  - Continue: Lopressor 12.5mg BID. Transition to Toprol XL 25 QD prior to discharge

## 2023-11-07 NOTE — PROGRESS NOTE ADULT - SUBJECTIVE AND OBJECTIVE BOX
Cardiology PA Progress Note    S: Pt seen and examined bedside.  Patient denies C/P, SOB, N/V, dizziness, palpitations, and diaphoresis.  Pt denies fever/chills, dysuria, abdominal pain, diarrhea, and cough  12 Point ROS otherwise negative except as per HPI/subjective.     O: Vital Signs Last 24 Hrs  T(C): 36.4 (07 Nov 2023 13:00), Max: 36.7 (06 Nov 2023 20:39)  T(F): 97.6 (07 Nov 2023 13:00), Max: 98 (06 Nov 2023 20:39)  HR: 74 (07 Nov 2023 14:48) (61 - 83)  BP: 178/84 (07 Nov 2023 14:48) (141/74 - 181/88)  BP(mean): 100 (07 Nov 2023 13:00) (97 - 126)  RR: 18 (07 Nov 2023 13:00) (18 - 18)  SpO2: 98% (07 Nov 2023 14:48) (95% - 100%)    Parameters below as of 07 Nov 2023 14:48  Patient On (Oxygen Delivery Method): room air        PHYSICAL EXAM:  GEN: NAD  HEENT: No JVD  PULM:  CTA B/L  CARD:  RRR, S1 and S2   ABD: +BS, NT, soft/ND	  EXT: No Edema B/L LE  NEURO: A+Ox3, no focal deficit  PSYCH: Mood Appropriate    LABS:                        10.0   6.60  )-----------( 174      ( 07 Nov 2023 05:30 )             30.3     11-07    135  |  103  |  16  ----------------------------<  104<H>  4.2   |  26  |  0.95    Ca    9.1      07 Nov 2023 05:30  Mg     1.7     11-07    TPro  6.8  /  Alb  2.9<L>  /  TBili  0.5  /  DBili  x   /  AST  35  /  ALT  16  /  AlkPhos  98  11-07 11-06 @ 07:01  -  11-07 @ 07:00  --------------------------------------------------------  IN: 450 mL / OUT: 1025 mL / NET: -575 mL    11-07 @ 07:01  -  11-07 @ 17:48  --------------------------------------------------------  IN: 300 mL / OUT: 0 mL / NET: 300 mL      Daily     Daily    Cardiology PA Progress Note    S: Pt seen and examined bedside.  Patient denies C/P, SOB, N/V, dizziness, palpitations, and diaphoresis.  Pt denies fever/chills, dysuria, abdominal pain, diarrhea, and cough  12 Point ROS otherwise negative except as per HPI/subjective.     O: Vital Signs Last 24 Hrs  T(C): 36.4 (07 Nov 2023 13:00), Max: 36.7 (06 Nov 2023 20:39)  T(F): 97.6 (07 Nov 2023 13:00), Max: 98 (06 Nov 2023 20:39)  HR: 74 (07 Nov 2023 14:48) (61 - 83)  BP: 178/84 (07 Nov 2023 14:48) (141/74 - 181/88)  BP(mean): 100 (07 Nov 2023 13:00) (97 - 126)  RR: 18 (07 Nov 2023 13:00) (18 - 18)  SpO2: 98% (07 Nov 2023 14:48) (95% - 100%)    Parameters below as of 07 Nov 2023 14:48  Patient On (Oxygen Delivery Method): room air    PHYSICAL EXAM:  GEN: NAD  HEENT: No JVD  PULM:  CTA B/L  CARD:  RRR, S1 and S2   ABD: +BS, NT, soft/ND	  EXT: No Edema B/L LE  NEURO: A+Ox3, no focal deficit  PSYCH: Mood Appropriate    LABS:                        10.0   6.60  )-----------( 174      ( 07 Nov 2023 05:30 )             30.3     11-07    135  |  103  |  16  ----------------------------<  104<H>  4.2   |  26  |  0.95    Ca    9.1      07 Nov 2023 05:30  Mg     1.7     11-07    TPro  6.8  /  Alb  2.9<L>  /  TBili  0.5  /  DBili  x   /  AST  35  /  ALT  16  /  AlkPhos  98  11-07 11-06 @ 07:01  -  11-07 @ 07:00  --------------------------------------------------------  IN: 450 mL / OUT: 1025 mL / NET: -575 mL    11-07 @ 07:01  -  11-07 @ 17:48  --------------------------------------------------------  IN: 300 mL / OUT: 0 mL / NET: 300 mL

## 2023-11-07 NOTE — PROGRESS NOTE ADULT - PROBLEM SELECTOR PLAN 1
Wife reports syncope episode where pt slumped over in wheelchair w/o head strike. Denied prodrome. Wife reports pt may have taken extra BP meds accidently prior to syncopal episode.  - Syncope likely in setting of COVID-19 and presumed hypotension w/ accidental BP med ingestion, (although hypertensive SBP 180s on arrival to ED)  - Trops likely elevated secondary to COVID infection; patient denies CP, SOB, abd pain, back pain, CKMB/CK downtrending   - Initial EKG in ED noting NSR w/ ST elevations w/ deep Q waves inferiorly. Repeat EKG w/ complete resolution and low voltage. Unclear if erroneous EKG in ED  - TTE (11/7/23): borderline reduced RV systolic function, no significant valvular disease, no pericardial effusion.   - CT head (11/4/23): w/ moderate chronic microvascular changes without evidence of acute transcortical infarction or hemorrhage Wife reports syncope episode where pt slumped over in wheelchair w/o head strike. Denied prodrome. Wife reports pt may have taken extra BP meds accidently prior to syncopal episode.  - Syncope likely in setting of COVID-19 and presumed hypotension w/ accidental BP med ingestion, (although hypertensive SBP 180s on arrival to ED); orthostatics negative   - Trops likely elevated secondary to COVID infection; patient denies CP, SOB, abd pain, back pain, CKMB/CK downtrending   - Initial EKG in ED noting NSR w/ ST elevations w/ deep Q waves inferiorly. Repeat EKG w/ complete resolution and low voltage. Unclear if erroneous EKG in ED  - TTE (11/7/23): borderline reduced RV systolic function, no significant valvular disease, no pericardial effusion.   - CT head (11/4/23): w/ moderate chronic microvascular changes without evidence of acute transcortical infarction or hemorrhage

## 2023-11-07 NOTE — PROGRESS NOTE ADULT - SUBJECTIVE AND OBJECTIVE BOX
INTERVAL HPI/OVERNIGHT EVENTS:  Patient was seen and examined at bedside. No o/n events, patient resting comfortably. No complaints at this time.     VITAL SIGNS:  T(F): 98 (11-07-23 @ 08:45)  HR: 65 (11-07-23 @ 09:45)  BP: 147/73 (11-07-23 @ 09:45)  RR: 18 (11-07-23 @ 09:45)  SpO2: 99% (11-07-23 @ 09:45)  Wt(kg): --    PHYSICAL EXAM:  Constitutional: lying in bed, no acute distress  HEENT: sclera non-icteric  Respiratory: CTA b/l, good air entry b/l, no wheezing, no rhonchi, no rales, without accessory muscle use and no intercostal retractions  Cardiovascular: RRR, normal S1S2, no M/R/G  Gastrointestinal: soft, NTND, no masses palpable, BS normal  Extremities: Warm, well perfused, no edema, no clubbing  Neurological: CN Grossly intact  Skin: Normal temperature, warm, dry    MEDICATIONS  (STANDING):  apixaban 5 milliGRAM(s) Oral two times a day  atorvastatin 80 milliGRAM(s) Oral at bedtime  clopidogrel Tablet 75 milliGRAM(s) Oral daily  hydrALAZINE 25 milliGRAM(s) Oral three times a day  isosorbide   dinitrate Tablet (ISORDIL) 10 milliGRAM(s) Oral three times a day  metoprolol tartrate 12.5 milliGRAM(s) Oral two times a day  multivitamin 1 Tablet(s) Oral daily  pantoprazole    Tablet 40 milliGRAM(s) Oral before breakfast    MEDICATIONS  (PRN):  acetaminophen     Tablet .. 650 milliGRAM(s) Oral every 6 hours PRN Temp greater or equal to 38C (100.4F), Moderate Pain (4 - 6)  ipratropium    for Nebulization 500 MICROGram(s) Nebulizer every 6 hours PRN Shortness of Breath and/or Wheezing      Allergies    No Known Allergies    Intolerances        LABS:                        10.0   6.60  )-----------( 174      ( 07 Nov 2023 05:30 )             30.3     11-07    135  |  103  |  16  ----------------------------<  104<H>  4.2   |  26  |  0.95    Ca    9.1      07 Nov 2023 05:30  Mg     1.7     11-07    TPro  6.8  /  Alb  2.9<L>  /  TBili  0.5  /  DBili  x   /  AST  35  /  ALT  16  /  AlkPhos  98  11-07      Urinalysis Basic - ( 07 Nov 2023 05:30 )    Color: x / Appearance: x / SG: x / pH: x  Gluc: 104 mg/dL / Ketone: x  / Bili: x / Urobili: x   Blood: x / Protein: x / Nitrite: x   Leuk Esterase: x / RBC: x / WBC x   Sq Epi: x / Non Sq Epi: x / Bacteria: x          RADIOLOGY & ADDITIONAL TESTS:  Reviewed

## 2023-11-07 NOTE — PROGRESS NOTE ADULT - ATTENDING COMMENTS
89 yo M with PMHx CAD (s/p PCI x6, recent 2013), AFib (eliquis), HTN, HLD, CVA (2008, c/b residual R-sided weakness, wheelchair-bound), dementia (AOx2), hx DVT (s/p IVC filter), recent CCU admission for STEMI (cath deferred d/t high risk, 10/9-10/13) BIBEMS from home following syncopal episode at home admitted to cardiac telemetry. Medicine consulted for COVID+ PCR.      # Sepsis 2/2 COVID ( mild)   - special droplets and contact precautions for 10 days ( COVID+ 11/3)   - no need for Dexamethaxone since no O2 requirement   - Pt is at high risk for developing severe COVID  - Remdesivir 200mg iv x1 @ 4am 11/4.  - On 11/4, met with pt's family to discuss pro and cons of Remdesir which is still recommended by CDC and hospital COVID guideline to offer high risk COVID pt for developing severe COVID. Daughter declined further Remdesivir concerning it's side effect on the kidney.  - discontinued further Remdesivir as requested by family   - Will continue monitoring pulse ox and clinical course, stable so far    # complicated UTI?   - discontinued Ceftriaxone 1gm iv q24hr ( 11/3-11/5) since pt is asymptomatic  - f/u UCx 40K Enterococcal faecalis    - f/u BCX( 11/3): ngtd     # HTN  # HLD  # hx CAD/STEMI   - active care per cardiology services for medical management   - trend Trop 734  - TTE ordered   - continue conservative management   - clopidogrel Tablet 75 milliGRAM(s) Oral daily  - apixaban 5 milliGRAM(s) Oral two times a day  - metoprolol tartrate 12.5 milliGRAM(s) Oral two times a day  - atorvastatin 80 milliGRAM(s) Oral at bedtime  - hydrALAZINE 25 milliGRAM(s) Oral three times a day  - isosorbide   dinitrate Tablet (ISORDIL) 10 milliGRAM(s) Oral three times a day    Family also concerns about statin induced myopathy and weakness   Rehab/PT eval OOBTC as tolerated, likely needs NITZA    POC to be d/w cardiology ACP Ros     Medicine consult continues to follow, thank you.

## 2023-11-07 NOTE — OCCUPATIONAL THERAPY INITIAL EVALUATION ADULT - MANUAL MUSCLE TESTING RESULTS, REHAB EVAL
Diffused contractures w/ spacticy - BUE shoulders 2-/5; elbows wrists and digits 2+/5; BLEs 2-/5/grossly assessed due to

## 2023-11-07 NOTE — PROGRESS NOTE ADULT - PROBLEM SELECTOR PLAN 10
F: No IVF  N: DASH/TLC diet  E: Replete lytes PRN K<4, Mg<2  P: DVT PPX: on Eliquis  C: DNR. NOT DNI per previous [facility] last hospitalization palliative discussion  Dispo: pending clinical progression     Case discussed with Dr. Meyer. F: No IVF  N: DASH/TLC diet  E: Replete lytes PRN K<4, Mg<2  P: DVT PPX: on Eliquis  C: DNR. NOT DNI per previous [facility] last hospitalization palliative discussion  Dispo: pending clinical progression; PT/OT recommending home PT/OT    Case discussed with Dr. Myeer.

## 2023-11-07 NOTE — PROGRESS NOTE ADULT - PROBLEM SELECTOR PLAN 5
Monitor BP  -continue Hydralazine 25mg tid, Metoprolol Tartrate 12.5mg bid and Isosorbide Dinitrate 10mg tid
Monitor BP  -continue Hydralazine 25mg tid, Metoprolol Tartrate 12.5mg bid and Isosorbide Dinitrate 10mg tid
SBP 140s-170s  - Continue: Hydralazine 25mg TID, Lopressor 12.5mg BID  - Increase: Isosorbide Dinitrate 20mg TID (from 10 mg TID)
Monitor BP  -continue Hydralazine 25mg tid, Metoprolol Tartrate 12.5mg bid and Isosorbide Dinitrate 10mg tid

## 2023-11-07 NOTE — PHYSICAL THERAPY INITIAL EVALUATION ADULT - IMPAIRED TRANSFERS: BED/CHAIR, REHAB EVAL
squat pivot 2 person dependent transfer/impaired balance/abnormal muscle tone/impaired postural control/decreased ROM/decreased strength

## 2023-11-07 NOTE — PHYSICAL THERAPY INITIAL EVALUATION ADULT - GENERAL OBSERVATIONS, REHAB EVAL
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

## 2023-11-07 NOTE — PROGRESS NOTE ADULT - PROBLEM SELECTOR PLAN 8
UA abnormal, afebrile, WBC WNL   - UCx 40K E. faecalis, BCx 11/3/23 NGTD   - Discontinued Ceftriaxone 1 g IV q24h (11/3-11/5); patient is asymptomatic UA abnormal, afebrile, WBC WNL   - UCx 40K E. faecalis, BCx 11/3/23 NGTD   - Discontinued Ceftriaxone 1 g IV q24h (11/3-11/5); medicine agrees with no abx at this time as patient is asymptomatic

## 2023-11-07 NOTE — PROGRESS NOTE ADULT - ASSESSMENT
87 yo M with PMHx CAD (s/p PCI x6, recent 2013), AFib (eliquis), HTN, HLD, CVA (2008, c/b residual R-sided weakness, wheelchair-bound), dementia (AOx2), hx DVT (s/p IVC filter), recent CCU admission for STEMI (cath deferred d/t high risk, 10/9-10/13) BIBEMS from home following syncopal episode at home admitted to cardiac telemetry, medicine consulted for COVID+ PCR.      # Covid19 positive  Covid19 diagnosed 11/3, is overall stable, patient on room air, did not meet criteria for Dexamethasone.  Given that he is high-risk due to his medical comorbidities, Remdesivir was recommended but declined by family due to concern for renal function.  - Monitor O2 saturation     # Sepsis  Patient with sepsis on admission likely in setting of Covid19.  Patient was being treated for UTI s/p Ceftriaxone 11/3-11/5, but it's likely asymptomatic bacteriuria. Urine culture with 40,ooo cfu/ml E. Faecalis.  Covid overall stable, SaO2>95% on room air.  Sepsis now resolved, patient afebrile, no leukocytosis.  Blood culture 11/3 negative to date.   - Can monitor off antibiotics    # Weakness  Patient with generalized weakness likely in setting of current illness.   - Physical therapy evaluation and OOBTC as tolerated    # Hypertension  Patient with elevated BP overnight.  Hypertension management per cardiology.      Medicine consult will continue to follow

## 2023-11-07 NOTE — PHYSICAL THERAPY INITIAL EVALUATION ADULT - ADDITIONAL COMMENTS
Quality 226: Preventive Care And Screening: Tobacco Use: Screening And Cessation Intervention: Patient screened for tobacco use and is an ex/non-smoker Detail Level: Simple Quality 431: Preventive Care And Screening: Unhealthy Alcohol Use - Screening: Patient not identified as an unhealthy alcohol user when screened for unhealthy alcohol use using a systematic screening method As per pt, PTA he was completely dependent with all functional mobility, ADLs, and IADLs. Pt requires 2 person assist (from wife/daughters) for transfers from bed<>wheelchair. Pt has a walk in shower with a shower chair.

## 2023-11-07 NOTE — OCCUPATIONAL THERAPY INITIAL EVALUATION ADULT - ADDITIONAL COMMENTS
No assistance needed Pt states that he lives w/ his wife and daughters in an apartment building with elevator access. Pt states that he is wheelchair bound and that his wife and daughter assisted him w his ADLs and functional transfers (bed<>w/c x2 person assist per Pt). Pt also reports that he has HHA coverage for 16Hrs/wk. Patient has w/c, commode and shower seat.

## 2023-11-07 NOTE — PROGRESS NOTE ADULT - PROBLEM SELECTOR PROBLEM 10
No contraindication to deep vein thrombosis (DVT) prophylaxis
No contraindication to deep vein thrombosis (DVT) prophylaxis

## 2023-11-07 NOTE — PROGRESS NOTE ADULT - ASSESSMENT
88-year-old male with PMHx HFpEF (EF 50% 10/6/23), CAD (s/p PCI x6, recent 2013), AFib (on Eliquis), HTN, HLD, CVA (2008, c/b residual R-sided weakness, wheelchair-bound), dementia (AOx2), hx DVT (s/p IVC filter), recent CCU admission for STEMI (cath deferred d/t high risk, 10/9-10/13) BIBEMS from home following syncopal episode at home admitted to cardiac telemetry. Medicine consulted for COVID+ PCR.  Trops trended to peak, TTE without significant changes.  88-year-old male with PMHx HFpEF (EF 50% 10/6/23), CAD (s/p PCI x6, recent 2013), AFib (on Eliquis), HTN, HLD, CVA (2008, c/b residual R-sided weakness, wheelchair-bound), dementia (AOx2), hx DVT (s/p IVC filter), recent CCU admission for STEMI (cath deferred d/t high risk, 10/9-10/13) BIBEMS from home following syncopal episode at home admitted to cardiac telemetry. Medicine consulted for COVID+ PCR.  Trops trended to peak, TTE with borderline reduced RV systolic function otherwise no apparent changes.

## 2023-11-07 NOTE — PROGRESS NOTE ADULT - PROBLEM SELECTOR PLAN 6
Lipid panel WNL  - Continue Atorvastatin 80mg daily Lipid panel WNL  - Continue: Atorvastatin 80mg daily

## 2023-11-07 NOTE — PHYSICAL THERAPY INITIAL EVALUATION ADULT - PERTINENT HX OF CURRENT PROBLEM, REHAB EVAL
89 y/o male w/ PMHx of MI, CAD (s/p 6 stents, latest in 2013), hx STEMI 10/6/23 ECG with inferior MI medically managed @[facility] CCU 2/2 to comorbidities (went to two weeks of Rehab), HFpEF 10/6/23 TTE EF 50%with mild reduced LV systolic function, basal and mid inferolateral wall severely hypokinetic and grade l LV diastolic dysfunction, paroxysmal AFib (on Eliquis), HTN, HLD, CVA 2008 w residual deficits (r-sided weakness / wheelchair & aox2 at baseline), DVT (IVC filter), BIBEMS following syncopal episode, SOB, and fever since today. Pt. is COVID-19 positive.  Pt.'s family also reports 30lb weight loss this year.

## 2023-11-07 NOTE — OCCUPATIONAL THERAPY INITIAL EVALUATION ADULT - GENERAL OBSERVATIONS, REHAB EVAL
Pt's RN Iman aware of intent to eval/tx; cleared Pt. PT Yolie present. Pt received in supine - +telemetry, yoli paul b/rj gamble. Pt agreeable to OT. PT Yolie present.

## 2023-11-08 NOTE — PROGRESS NOTE ADULT - REASON FOR ADMISSION
Syncope in setting of   COVID-19 Positive

## 2023-11-08 NOTE — DISCHARGE NOTE NURSING/CASE MANAGEMENT/SOCIAL WORK - NSDCPEFALRISK_GEN_ALL_CORE
For information on Fall & Injury Prevention, visit: [email or web address] OR  [email or web address] OR  https://www.cdc.gov/steadi/patient.html

## 2023-11-08 NOTE — DISCHARGE NOTE NURSING/CASE MANAGEMENT/SOCIAL WORK - NSDCPEPTCAREGIVEDUMATLIST _GEN_ALL_CORE
Heart Failure/Stroke/Influenza Vaccination/Coronavirus/COVID19 Heart Failure/Stroke/Influenza Vaccination/Apixaban/Eliquis/Coronavirus/COVID19

## 2023-11-08 NOTE — DISCHARGE NOTE NURSING/CASE MANAGEMENT/SOCIAL WORK - PATIENT PORTAL LINK FT
You can access the FollowMyHealth Patient Portal offered by [facility] by registering at the following website: [email or web address]. By joining [employer]’s FollowMyHealth portal, you will also be able to view your health information using other applications (apps) compatible with our system.

## 2023-11-08 NOTE — PROGRESS NOTE ADULT - ATTENDING COMMENTS
89 yo M with PMHx CAD (s/p PCI x6, recent 2013), AFib (eliquis), HTN, HLD, CVA (2008, c/b residual R-sided weakness, wheelchair-bound), dementia (AOx2), hx DVT (s/p IVC filter), recent CCU admission for STEMI (cath deferred d/t high risk, 10/9-10/13) BIBEMS from home following syncopal episode at home admitted to cardiac telemetry. Medicine consulted for COVID+ PCR.      pt seen with Dr. Fay  physical therapy seeing him  saturating well on room air, no shortness of breath.     # COVID ( mild) - clinically stable on room air.   - special droplets and contact precautions for 10 days ( COVID+ 11/3)   - no need for Dexamethaxone since no O2 requirement -  - Remdesivir 200mg iv x1 @ 4am 11/4. given one dose -  - discontinued further Remdesivir as requested by family     # complicated UTI?   - discontinued Ceftriaxone 1gm iv q24hr ( 11/3-11/5) since pt is asymptomatic  - f/u UCx 40K Enterococcal faecalis    - f/u BCX( 11/3): ngtd     # HTN  # HLD  # hx CAD/STEMI   - active care per cardiology services for medical management   - continue conservative management   - clopidogrel Tablet 75 milliGRAM(s) Oral daily  - apixaban 5 milliGRAM(s) Oral two times a day  - metoprolol tartrate 12.5 milliGRAM(s) Oral two times a day  - atorvastatin 80 milliGRAM(s) Oral at bedtime  - hydrALAZINE 25 milliGRAM(s) Oral three times a day  - isosorbide   dinitrate Tablet (ISORDIL) 10 milliGRAM(s) Oral three times a day    poc dw cardiology ACP, pt is medically stable  follow up with cardiologist as scheduled.

## 2023-11-08 NOTE — DISCHARGE NOTE NURSING/CASE MANAGEMENT/SOCIAL WORK - NSDCPEELIQUISDIET_GEN_ALL_CORE
no Eat healthy foods you enjoy. Apixaban/Eliquis DOES NOT have a special diet. Limit your alcohol intake.

## 2023-11-08 NOTE — PROGRESS NOTE ADULT - PROVIDER SPECIALTY LIST ADULT
Cardiology
Internal Medicine
Cardiology
Internal Medicine
Cardiology
Hospitalist
Cardiology

## 2023-11-08 NOTE — PROGRESS NOTE ADULT - SUBJECTIVE AND OBJECTIVE BOX
INTERVAL HPI/OVERNIGHT EVENTS:  Patient was seen and examined at bedside. As per nurse and patient, no o/n events, patient resting comfortably. No complaints at this time. Patient denies: fever, chills, dizziness, weakness, HA, Changes in vision, CP, palpitations, SOB, cough, N/V/D/C, dysuria, changes in bowel movements, LE edema. ROS otherwise negative.    VITAL SIGNS:  T(F): 98 (11-08-23 @ 05:53)  HR: 64 (11-08-23 @ 08:58)  BP: 143/74 (11-08-23 @ 08:58)  RR: 18 (11-08-23 @ 08:58)  SpO2: 99% (11-08-23 @ 08:58)  Wt(kg): --    PHYSICAL EXAM:  Constitutional: lying in bed, appears comfortable, no acute distress  HEENT: sclera non-icteric  Respiratory: CTA b/l, good air entry b/l, no wheezing, no rhonchi, no rales, without accessory muscle use and no intercostal retractions  Cardiovascular: RRR, normal S1S2, no M/R/G  Gastrointestinal: soft, NTND, no masses palpable, BS normal  Extremities: Warm, well perfused, no edema, no clubbing  Neurological: AAOx3, CN Grossly intact  Skin: Normal temperature, warm, dry    MEDICATIONS  (STANDING):  apixaban 5 milliGRAM(s) Oral two times a day  atorvastatin 80 milliGRAM(s) Oral at bedtime  clopidogrel Tablet 75 milliGRAM(s) Oral daily  hydrALAZINE 35 milliGRAM(s) Oral three times a day  isosorbide   dinitrate Tablet (ISORDIL) 20 milliGRAM(s) Oral three times a day  metoprolol tartrate 12.5 milliGRAM(s) Oral two times a day  multivitamin 1 Tablet(s) Oral daily  pantoprazole    Tablet 40 milliGRAM(s) Oral before breakfast    MEDICATIONS  (PRN):  acetaminophen     Tablet .. 650 milliGRAM(s) Oral every 6 hours PRN Temp greater or equal to 38C (100.4F), Moderate Pain (4 - 6)  ipratropium    for Nebulization 500 MICROGram(s) Nebulizer every 6 hours PRN Shortness of Breath and/or Wheezing    Allergies    No Known Allergies    Intolerances    LABS:                        10.2   6.63  )-----------( 173      ( 08 Nov 2023 05:30 )             31.4     11-08    139  |  105  |  11  ----------------------------<  98  4.3   |  24  |  0.89    Ca    8.9      08 Nov 2023 05:30  Mg     2.0     11-08    TPro  6.7  /  Alb  2.7<L>  /  TBili  0.5  /  DBili  x   /  AST  37  /  ALT  18  /  AlkPhos  105  11-08    Urinalysis Basic - ( 08 Nov 2023 05:30 )    Color: x / Appearance: x / SG: x / pH: x  Gluc: 98 mg/dL / Ketone: x  / Bili: x / Urobili: x   Blood: x / Protein: x / Nitrite: x   Leuk Esterase: x / RBC: x / WBC x   Sq Epi: x / Non Sq Epi: x / Bacteria: x    RADIOLOGY & ADDITIONAL TESTS:  Reviewed

## 2023-11-16 PROBLEM — I47.29 NSVT (NONSUSTAINED VENTRICULAR TACHYCARDIA): Status: ACTIVE | Noted: 2019-03-05

## 2024-01-01 ENCOUNTER — TRANSCRIPTION ENCOUNTER (OUTPATIENT)
Age: 89
End: 2024-01-01

## 2024-01-01 ENCOUNTER — APPOINTMENT (OUTPATIENT)
Dept: HEART AND VASCULAR | Facility: CLINIC | Age: 89
End: 2024-01-01

## 2024-01-01 ENCOUNTER — EMERGENCY (EMERGENCY)
Facility: HOSPITAL | Age: 89
LOS: 1 days | Discharge: HOME | End: 2024-01-01
Attending: STUDENT IN AN ORGANIZED HEALTH CARE EDUCATION/TRAINING PROGRAM | Admitting: STUDENT IN AN ORGANIZED HEALTH CARE EDUCATION/TRAINING PROGRAM
Payer: MEDICARE

## 2024-01-01 ENCOUNTER — APPOINTMENT (OUTPATIENT)
Dept: HEART AND VASCULAR | Facility: CLINIC | Age: 89
End: 2024-01-01
Payer: MEDICARE

## 2024-01-01 ENCOUNTER — NON-APPOINTMENT (OUTPATIENT)
Age: 89
End: 2024-01-01

## 2024-01-01 VITALS — SYSTOLIC BLOOD PRESSURE: 124 MMHG | DIASTOLIC BLOOD PRESSURE: 77 MMHG | HEIGHT: 72 IN | HEART RATE: 109 BPM

## 2024-01-01 VITALS
DIASTOLIC BLOOD PRESSURE: 65 MMHG | TEMPERATURE: 98 F | OXYGEN SATURATION: 100 % | WEIGHT: 149.91 LBS | RESPIRATION RATE: 20 BRPM | SYSTOLIC BLOOD PRESSURE: 137 MMHG | HEIGHT: 70 IN | HEART RATE: 90 BPM

## 2024-01-01 VITALS
OXYGEN SATURATION: 98 % | SYSTOLIC BLOOD PRESSURE: 134 MMHG | DIASTOLIC BLOOD PRESSURE: 68 MMHG | HEART RATE: 83 BPM | RESPIRATION RATE: 18 BRPM

## 2024-01-01 DIAGNOSIS — I25.10 ATHEROSCLEROTIC HEART DISEASE OF NATIVE CORONARY ARTERY WITHOUT ANGINA PECTORIS: ICD-10-CM

## 2024-01-01 DIAGNOSIS — F03.90 UNSPECIFIED DEMENTIA WITHOUT BEHAVIORAL DISTURBANCE: ICD-10-CM

## 2024-01-01 DIAGNOSIS — S06.5XAA TRAUMATIC SUBDURAL HEMORRHAGE WITH LOSS OF CONSCIOUSNESS STATUS UNKNOWN, INITIAL ENCOUNTER: ICD-10-CM

## 2024-01-01 DIAGNOSIS — I48.91 UNSPECIFIED ATRIAL FIBRILLATION: ICD-10-CM

## 2024-01-01 DIAGNOSIS — Z95.828 PRESENCE OF OTHER VASCULAR IMPLANTS AND GRAFTS: Chronic | ICD-10-CM

## 2024-01-01 DIAGNOSIS — Z98.890 OTHER SPECIFIED POSTPROCEDURAL STATES: Chronic | ICD-10-CM

## 2024-01-01 DIAGNOSIS — Z99.3 DEPENDENCE ON WHEELCHAIR: ICD-10-CM

## 2024-01-01 DIAGNOSIS — R47.01 APHASIA: ICD-10-CM

## 2024-01-01 DIAGNOSIS — I10 ESSENTIAL (PRIMARY) HYPERTENSION: ICD-10-CM

## 2024-01-01 DIAGNOSIS — Z96.0 PRESENCE OF UROGENITAL IMPLANTS: Chronic | ICD-10-CM

## 2024-01-01 DIAGNOSIS — I25.10 ATHEROSCLEROTIC HEART DISEASE OF NATIVE CORONARY ARTERY W/OUT ANGINA PECTORIS: ICD-10-CM
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
## 2024-01-01 DIAGNOSIS — E78.5 HYPERLIPIDEMIA, UNSPECIFIED: ICD-10-CM
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
## 2024-01-01 DIAGNOSIS — N30.90 CYSTITIS, UNSPECIFIED WITHOUT HEMATURIA: ICD-10-CM

## 2024-01-01 DIAGNOSIS — I25.2 OLD MYOCARDIAL INFARCTION: ICD-10-CM

## 2024-01-01 DIAGNOSIS — Z79.01 LONG TERM (CURRENT) USE OF ANTICOAGULANTS: ICD-10-CM

## 2024-01-01 DIAGNOSIS — G93.89 OTHER SPECIFIED DISORDERS OF BRAIN: ICD-10-CM

## 2024-01-01 DIAGNOSIS — Z86.718 PERSONAL HISTORY OF OTHER VENOUS THROMBOSIS AND EMBOLISM: ICD-10-CM

## 2024-01-01 DIAGNOSIS — Z95.828 PRESENCE OF OTHER VASCULAR IMPLANTS AND GRAFTS: ICD-10-CM

## 2024-01-01 LAB
-  AMPICILLIN/SULBACTAM: SIGNIFICANT CHANGE UP
-  AMPICILLIN: SIGNIFICANT CHANGE UP
-  CEFAZOLIN: SIGNIFICANT CHANGE UP
-  CEFTRIAXONE: SIGNIFICANT CHANGE UP
-  CIPROFLOXACIN: SIGNIFICANT CHANGE UP
-  ERTAPENEM: SIGNIFICANT CHANGE UP
-  GENTAMICIN: SIGNIFICANT CHANGE UP
-  MEROPENEM: SIGNIFICANT CHANGE UP
-  NITROFURANTOIN: SIGNIFICANT CHANGE UP
-  PIPERACILLIN/TAZOBACTAM: SIGNIFICANT CHANGE UP
-  TOBRAMYCIN: SIGNIFICANT CHANGE UP
-  TRIMETHOPRIM/SULFAMETHOXAZOLE: SIGNIFICANT CHANGE UP
ANION GAP SERPL CALC-SCNC: 11 MMOL/L — SIGNIFICANT CHANGE UP (ref 5–17)
APPEARANCE UR: ABNORMAL
BACTERIA # UR AUTO: ABNORMAL /HPF
BILIRUB UR-MCNC: NEGATIVE — SIGNIFICANT CHANGE UP
BUN SERPL-MCNC: 34 MG/DL — HIGH (ref 7–23)
CALCIUM SERPL-MCNC: 9.8 MG/DL — SIGNIFICANT CHANGE UP (ref 8.4–10.5)
CAST: 3 /LPF — SIGNIFICANT CHANGE UP (ref 0–4)
CHLORIDE SERPL-SCNC: 103 MMOL/L — SIGNIFICANT CHANGE UP (ref 96–108)
CO2 SERPL-SCNC: 24 MMOL/L — SIGNIFICANT CHANGE UP (ref 22–31)
COLOR SPEC: SIGNIFICANT CHANGE UP
CREAT SERPL-MCNC: 1.26 MG/DL — SIGNIFICANT CHANGE UP (ref 0.5–1.3)
CULTURE RESULTS: ABNORMAL
DIFF PNL FLD: NEGATIVE — SIGNIFICANT CHANGE UP
EGFR: 55 ML/MIN/1.73M2 — LOW
GLUCOSE SERPL-MCNC: 136 MG/DL — HIGH (ref 70–99)
GLUCOSE UR QL: NEGATIVE MG/DL — SIGNIFICANT CHANGE UP
HCT VFR BLD CALC: 31.9 % — LOW (ref 39–50)
HGB BLD-MCNC: 10.7 G/DL — LOW (ref 13–17)
KETONES UR-MCNC: NEGATIVE MG/DL — SIGNIFICANT CHANGE UP
LEUKOCYTE ESTERASE UR-ACNC: ABNORMAL
MAGNESIUM SERPL-MCNC: 1.9 MG/DL — SIGNIFICANT CHANGE UP (ref 1.6–2.6)
MCHC RBC-ENTMCNC: 30.7 PG — SIGNIFICANT CHANGE UP (ref 27–34)
MCHC RBC-ENTMCNC: 33.5 GM/DL — SIGNIFICANT CHANGE UP (ref 32–36)
MCV RBC AUTO: 91.7 FL — SIGNIFICANT CHANGE UP (ref 80–100)
METHOD TYPE: SIGNIFICANT CHANGE UP
NITRITE UR-MCNC: NEGATIVE — SIGNIFICANT CHANGE UP
NRBC # BLD: 0 /100 WBCS — SIGNIFICANT CHANGE UP (ref 0–0)
ORGANISM # SPEC MICROSCOPIC CNT: ABNORMAL
ORGANISM # SPEC MICROSCOPIC CNT: SIGNIFICANT CHANGE UP
PH UR: 5.5 — SIGNIFICANT CHANGE UP (ref 5–8)
PLATELET # BLD AUTO: 158 K/UL — SIGNIFICANT CHANGE UP (ref 150–400)
POTASSIUM SERPL-MCNC: 4.3 MMOL/L — SIGNIFICANT CHANGE UP (ref 3.5–5.3)
POTASSIUM SERPL-SCNC: 4.3 MMOL/L — SIGNIFICANT CHANGE UP (ref 3.5–5.3)
PROT UR-MCNC: SIGNIFICANT CHANGE UP MG/DL
RBC # BLD: 3.48 M/UL — LOW (ref 4.2–5.8)
RBC # FLD: 13.8 % — SIGNIFICANT CHANGE UP (ref 10.3–14.5)
RBC CASTS # UR COMP ASSIST: 1 /HPF — SIGNIFICANT CHANGE UP (ref 0–4)
SODIUM SERPL-SCNC: 138 MMOL/L — SIGNIFICANT CHANGE UP (ref 135–145)
SP GR SPEC: 1.02 — SIGNIFICANT CHANGE UP (ref 1–1.03)
SPECIMEN SOURCE: SIGNIFICANT CHANGE UP
SQUAMOUS # UR AUTO: 2 /HPF — SIGNIFICANT CHANGE UP (ref 0–5)
UROBILINOGEN FLD QL: 1 MG/DL — SIGNIFICANT CHANGE UP (ref 0.2–1)
WBC # BLD: 10.45 K/UL — SIGNIFICANT CHANGE UP (ref 3.8–10.5)
WBC # FLD AUTO: 10.45 K/UL — SIGNIFICANT CHANGE UP (ref 3.8–10.5)
WBC UR QL: 223 /HPF — HIGH (ref 0–5)

## 2024-01-01 PROCEDURE — 99214 OFFICE O/P EST MOD 30 MIN: CPT

## 2024-01-01 PROCEDURE — 99284 EMERGENCY DEPT VISIT MOD MDM: CPT | Mod: 25

## 2024-01-01 PROCEDURE — 80048 BASIC METABOLIC PNL TOTAL CA: CPT

## 2024-01-01 PROCEDURE — 87186 SC STD MICRODIL/AGAR DIL: CPT

## 2024-01-01 PROCEDURE — 70450 CT HEAD/BRAIN W/O DYE: CPT | Mod: 26,MA

## 2024-01-01 PROCEDURE — 93000 ELECTROCARDIOGRAM COMPLETE: CPT

## 2024-01-01 PROCEDURE — 36415 COLL VENOUS BLD VENIPUNCTURE: CPT

## 2024-01-01 PROCEDURE — 99284 EMERGENCY DEPT VISIT MOD MDM: CPT

## 2024-01-01 PROCEDURE — G2211 COMPLEX E/M VISIT ADD ON: CPT

## 2024-01-01 PROCEDURE — 70450 CT HEAD/BRAIN W/O DYE: CPT | Mod: MA

## 2024-01-01 PROCEDURE — 96374 THER/PROPH/DIAG INJ IV PUSH: CPT

## 2024-01-01 PROCEDURE — 81001 URINALYSIS AUTO W/SCOPE: CPT

## 2024-01-01 PROCEDURE — 87086 URINE CULTURE/COLONY COUNT: CPT

## 2024-01-01 PROCEDURE — 85027 COMPLETE CBC AUTOMATED: CPT

## 2024-01-01 PROCEDURE — 83735 ASSAY OF MAGNESIUM: CPT

## 2024-01-01 RX ORDER — AMLODIPINE BESYLATE 10 MG/1
10 TABLET ORAL DAILY
Qty: 1 | Refills: 1 | Status: ACTIVE | COMMUNITY
Start: 2024-01-01

## 2024-01-01 RX ORDER — SODIUM CHLORIDE 9 MG/ML
1000 INJECTION INTRAMUSCULAR; INTRAVENOUS; SUBCUTANEOUS ONCE
Refills: 0 | Status: COMPLETED | OUTPATIENT
Start: 2024-01-01 | End: 2024-01-01

## 2024-01-01 RX ORDER — PANTOPRAZOLE 40 MG/1
40 TABLET, DELAYED RELEASE ORAL
Qty: 30 | Refills: 3 | Status: ACTIVE | COMMUNITY
Start: 2024-01-01

## 2024-01-01 RX ORDER — ISOSORBIDE DINITRATE 10 MG/1
10 TABLET ORAL
Qty: 90 | Refills: 2 | Status: DISCONTINUED | COMMUNITY
End: 2024-01-01

## 2024-01-01 RX ORDER — MEGESTROL ACETATE 40 MG/ML
40 SUSPENSION ORAL DAILY
Qty: 10 | Refills: 0 | Status: ACTIVE | COMMUNITY
Start: 2024-01-01

## 2024-01-01 RX ORDER — APIXABAN 2.5 MG/1
2.5 TABLET, FILM COATED ORAL
Qty: 10 | Refills: 0 | Status: ACTIVE | COMMUNITY
Start: 2024-01-01 | End: 1900-01-01

## 2024-01-01 RX ORDER — SODIUM CHLORIDE 9 MG/ML
500 INJECTION INTRAMUSCULAR; INTRAVENOUS; SUBCUTANEOUS ONCE
Refills: 0 | Status: COMPLETED | OUTPATIENT
Start: 2024-01-01 | End: 2024-01-01

## 2024-01-01 RX ORDER — METOPROLOL TARTRATE 25 MG/1
25 TABLET, FILM COATED ORAL
Qty: 30 | Refills: 3 | Status: DISCONTINUED | COMMUNITY
Start: 2023-01-01 | End: 2024-01-01

## 2024-01-01 RX ORDER — APIXABAN 5 MG/1
5 TABLET, FILM COATED ORAL
Qty: 60 | Refills: 0 | Status: DISCONTINUED | COMMUNITY
End: 2024-01-01

## 2024-01-01 RX ORDER — MULTIVIT-MIN/FOLIC/VIT K/LYCOP 400-300MCG
500 TABLET ORAL DAILY
Qty: 30 | Refills: 0 | Status: ACTIVE | COMMUNITY
Start: 2024-01-01

## 2024-01-01 RX ADMIN — SODIUM CHLORIDE 1000 MILLILITER(S): 9 INJECTION INTRAMUSCULAR; INTRAVENOUS; SUBCUTANEOUS at 20:42

## 2024-01-01 RX ADMIN — SODIUM CHLORIDE 500 MILLILITER(S): 9 INJECTION INTRAMUSCULAR; INTRAVENOUS; SUBCUTANEOUS at 21:49

## 2024-02-07 NOTE — ED PROVIDER NOTE - PATIENT PORTAL LINK FT
You can access the FollowMyHealth Patient Portal offered by [facility] by registering at the following website: [email or web address]. By joining [employer]’s FollowMyHealth portal, you will also be able to view your health information using other applications (apps) compatible with our system.

## 2024-02-07 NOTE — ED PROVIDER NOTE - OBJECTIVE STATEMENT
88M pmhx CAD (s/p PCI x6, recent 2013), AFib (eliquis), HTN, HLD, CVA (2008, c/b residual R-sided weakness, wheelchair-bound), dementia (AOx2), hx DVT (s/p IVC filter), recent CCU admission for STEMI (cath deferred d/t high risk, 10/9-10/13), subdural hematoma at [facility] 2 weeks ago, at rehab center today pateint ate less than usual and per family at bedside communicated perhaps a bit less than usual. othercelena eicarole usual state of health. was Dx with uti at rehab center eysterday and started on iv levofloxacin.

## 2024-02-07 NOTE — ED PROVIDER NOTE - PROGRESS NOTE DETAILS
repeat scan unchanged from prior. likely subacute SDH. will transfer back to NH
Simple: Patient demonstrates quick and easy understanding/Verbalized Understanding

## 2024-02-07 NOTE — ED PROVIDER NOTE - NSFOLLOWUPINSTRUCTIONS_ED_ALL_ED_FT
Urinary Tract Infection in Older Adults    WHAT YOU NEED TO KNOW:    A urinary tract infection (UTI) is caused by bacteria that get inside your urinary tract. Your urinary tract includes your kidneys, ureters, bladder, and urethra. A UTI is more common in your lower urinary tract, which includes your bladder and urethra.    DISCHARGE INSTRUCTIONS:    Return to the emergency department if:    You become confused or agitated.    You fall down.    You are urinating very little or not at all.    You are vomiting.    You have a high fever with shaking chills.    You have side or back pain that gets worse.  Call your doctor if:    You have a fever.    You are a woman and you have increased white or yellow discharge from your vagina.    You do not feel better after 2 days of taking antibiotics.    You have questions or concerns about your condition or care.  Medicines:    Medicines help treat the bacterial infection or decrease pain and burning when you urinate. You may also need medicines to decrease the urge to urinate often. If you have UTIs often (called recurrent UTIs), you may be given antibiotics to take regularly. You will be given directions for when and how to use antibiotics. The goal is to prevent UTIs but not cause antibiotic resistance by using antibiotics too often.    Take your medicine as directed. Contact your healthcare provider if you think your medicine is not helping or if you have side effects. Tell your provider if you are allergic to any medicine. Keep a list of the medicines, vitamins, and herbs you take. Include the amounts, and when and why you take them. Bring the list or the pill bottles to follow-up visits. Carry your medicine list with you in case of an emergency.  Self-care:    Drink liquids as directed. Liquids can help flush bacteria from your urinary tract. Ask how much liquid to drink each day and which liquids are best for you. You may need to drink more liquids than usual to help flush out the bacteria. Do not drink alcohol, caffeine, and citrus juices. These can irritate your bladder and increase your symptoms.    Apply heat on your abdomen for 20 to 30 minutes every 2 hours for as many days as directed. Heat helps decrease discomfort and pressure in your bladder.  Prevent a UTI:    Urinate when you feel the urge. Do not hold your urine. Bacteria can grow if urine stays in the bladder too long. It may be helpful to urinate at least every 3 to 4 hours.    Urinate after you have sex. This will help flush away bacteria that can enter your urinary tract during sex.    Wear cotton underwear and clothes that are loose. Tight pants and nylon underwear can trap moisture and cause bacteria to grow.    Drink cranberry juice or take cranberry supplements. These may help prevent UTIs. Your healthcare provider can recommend the right juice or supplement for you.    Women should wipe front to back after urinating or having a bowel movement. This may prevent germs from getting into the urinary tract. Do not douche or use feminine deodorants. These can change the chemical balance in your vagina. You may also be given vaginal estrogen medicine. This medicine helps prevent recurrent UTIs in women who have gone through menopause or are in richard-menopause.  Follow up with your doctor as directed: Write down your questions so you remember to ask them during your visits.      Subdural Hematoma  A subdural hematoma is a collection of blood between the brain and its outer covering (dura). As the amount of blood increases, pressure builds on the brain.    There are two types of subdural hematomas:  Acute. This type develops shortly after a hard, direct hit to the head and causes blood to collect very quickly. This is a medical emergency. If it is not diagnosed and treated quickly, it can lead to severe brain injury or death.  Chronic. This is when bleeding develops more slowly, over weeks or months. In some cases, this type does not cause symptoms.    What are the causes?  This condition is caused by bleeding from a broken blood vessel (hemorrhage). In most cases, this is because of a head injury, such as from a hard, direct hit.  Head injuries can be caused by:  Motor vehicle accidents.  Falls.  Assaults.  Sports injuries.  In rare cases, a hemorrhage can happen without a known cause, especially if you take blood thinners (anticoagulants).    What increases the risk?  This condition is more likely to develop in:  Older people.  Infants.  People who take blood thinners.  People who have head injuries.  People who abuse alcohol.    What are the signs or symptoms?  Symptoms of this condition can be mild, severe, or life-threatening, depending on the size of the hematoma.    Symptoms of this condition include:  Headache.  Nausea or vomiting.  Changes in vision, such as double vision or loss of vision.  Changes in speech or trouble understanding what people say.  Loss of balance or trouble walking.  Weakness, numbness, or tingling in the arms or legs, especially on one side of the body.  Seizures.  Change in personality.  Increased sleepiness.  Memory loss.  Loss of consciousness.  Coma.  Symptoms of acute subdural hematoma can develop over minutes or hours. Symptoms of chronic subdural hematoma may develop over weeks or months.    How is this diagnosed?  This condition is diagnosed based on:  A physical exam.  Tests of strength, reflexes, coordination, senses, manner of walking, and facial and eye movements (neurological exam).  Imaging tests, such as an MRI or CT scan.  How is this treated?  Treatment for this condition depends on the type of hematoma and how severe it is.    Treatment for acute hematoma may include:  Emergency surgery to drain blood or remove a blood clot.  Medicines that help the body get rid of excess fluids (diuretics). These may help reduce pressure in the brain.  Assisted breathing (ventilation).  Treatment for chronic hematoma may include:  Observation and bed rest at the hospital.  Surgery.  If you take blood thinners, you may need to stop taking them for a short time. You may also be given anti-seizure medicine.    Sometimes, no treatment is needed for chronic hematoma.    Follow these instructions at home:    Activity    Avoid situations where you could injure your head again, such as competitive sports, downhill snow sports, and horseback riding. Do not do these activities until your health care provider approves.  Wear protective gear, such as a helmet, when participating in activities such as biking or contact sports.  Always wear your seat belt when you are in a motor vehicle.  Rest as told by your health care provider. Rest helps the brain heal.  You may have to avoid lifting. Ask your health care provider how much you can safely lift.  Do not drive, ride a bike, or use machinery until your health care provider says that it is safe.  Avoid too much visual stimulation while recovering. This includes working on the computer, watching TV, and reading.  Try to avoid activities that cause physical or mental stress.  Return to your normal activities as told by your health care provider. Ask your health care provider what activities are safe for you.    Alcohol use    Do not drink alcohol if:  Your health care provider tells you not to drink.  You are pregnant, may be pregnant, or are planning to become pregnant.  If you drink alcohol:  Limit how much you have to:  0–1 drink a day for women.  0–2 drinks a day for men.  Know how much alcohol is in your drink. In the [place] one drink equals one 12 oz bottle of beer (355 mL), one 5 oz glass of wine (148 mL), or one 1½ oz glass of hard liquor (44 mL).    General instructions    Watch your symptoms and ask others to do the same. Recovery from brain injuries varies. Talk with your health care provider about what to expect.  Take over-the-counter and prescription medicines only as told by your health care provider. Do not take blood thinners or NSAIDs unless your health care provider approves. These include aspirin, ibuprofen, naproxen, and warfarin.  Keep your home environment safe to reduce the risk of falling.  Keep all follow-up visits. Your health care team may need to watch you over time to check for serious changes in your condition.  Where to find more information  [employer] [place]: www.biausa.org  Brain Trauma Foundation: www.braintrauma.org    Get help right away if:  You are taking blood thinners or have a bleeding disorder and fall or experience minor trauma to the head. If you take blood thinners, even a small injury can cause a subdural hematoma.  You develop any of these symptoms after a head injury:  Clear fluid draining from your nose or ears.  Nausea or vomiting.  Changes in speech or trouble understanding what people say.  Seizures.  Sleepiness or a decrease in alertness.  Double vision.  Numbness or inability to move in any part of your body.  Difficulty walking or poor coordination.  Difficulty thinking.  Confusion or forgetfulness.  Personality changes.  Irrational or aggressive behavior.  These symptoms may be an emergency. Get help right away. Call 911.  Do not wait to see if the symptoms will go away.  Do not drive yourself to the hospital.    Summary  A subdural hematoma is a collection of blood between the brain and its outer covering (dura).  Treatment for this condition depends on the type of subdural hematoma and how severe it is.  Symptoms can vary from mild to severe to life-threatening.  Watch your symptoms and ask others to do the same.  This information is not intended to replace advice given to you by your health care provider. Make sure you discuss any questions you have with your health care provider.

## 2024-02-07 NOTE — ED ADULT NURSE NOTE - NSFALLHARMRISKINTERV_ED_ALL_ED
Assistance OOB with selected safe patient handling equipment if applicable/Assistance with ambulation/Communicate risk of Fall with Harm to all staff, patient, and family/Monitor gait and stability/Monitor for mental status changes and reorient to person, place, and time, as needed/Provide visual cue: red socks, yellow wristband, yellow gown, etc/Reinforce activity limits and safety measures with patient and family/Toileting schedule using arm’s reach rule for commode and bathroom/Use of alarms - bed, stretcher, chair and/or video monitoring/Bed in lowest position, wheels locked, appropriate side rails in place/Call bell, personal items and telephone in reach/Instruct patient to call for assistance before getting out of bed/chair/stretcher/Non-slip footwear applied when patient is off stretcher/[place] to call system/Physically safe environment - no spills, clutter or unnecessary equipment/Purposeful Proactive Rounding/Room/bathroom lighting operational, light cord in reach

## 2024-02-07 NOTE — ED PROVIDER NOTE - PHYSICAL EXAMINATION
22-Apr-2018 09:32
General: Awak  Skin:  Appropriate color for ethnicity  HENMT: head normocephalic and atraumatic  EYES: Conjunctiva clear. nonicteric sclera  Cardiac: well perfused  Respiratory: breathing comfortably on room air  Abdominal: nondistended  MSK:  no visualized external signs of trauma. contracted extemities  Neurological: The patient is awake, alert expressive aphasia. intermittently follows commands. contracted extretmities

## 2024-02-07 NOTE — ED ADULT TRIAGE NOTE - CHIEF COMPLAINT QUOTE
Pt bibems from a rehab center c/o new O2 needs. pt dx with UTI yesterday. today wife noticed pt to be more lethargic. Per ems SpO2 77% on RA, pt placed on 4L NC and pt SpO2 100% in triage. Pt denies any SOB, Cp, dizziness, fever, or chills at this time.

## 2024-02-07 NOTE — ED PROVIDER NOTE - CARE PLAN
1 Principal Discharge DX:	Cystitis   Principal Discharge DX:	Cystitis  Secondary Diagnosis:	Subacute subdural hematoma

## 2024-02-07 NOTE — ED PROVIDER NOTE - CLINICAL SUMMARY MEDICAL DECISION MAKING FREE TEXT BOX
UTI present, patient receieving levaquin at NH, will treat over here. CT with subdural, likely the SDH that was dx 2 weeks ago, nan obtain repeat ct to eval for possibility of new, expanding bleed. per family at bedside patient at mental status baseline

## 2024-02-07 NOTE — ED ADULT NURSE NOTE - OBJECTIVE STATEMENT
88yM presents to ED for med eval. As per family, the nurses at his rehab center told them the pt has a UTI. Family states he isn't at baseline, "not as alert," pt not answering questions. Family also states he had a seizure today which is common. Recent fall and was previously evaluated for it and was found to have subdural hematoma which is why pt is at a rehab center. PIC line noted to left upper arm.

## 2024-02-27 NOTE — ED POST DISCHARGE NOTE - DETAILS
Certified letter was sent to pt's home and wife contacted ED to discuss test results. Pt is currently admitted to [facility] for tx of uti. Ucx results d/w wife who was advised on communicating this information to the treating team at [place].

## 2024-04-16 NOTE — ASSESSMENT
[FreeTextEntry1] : An EKG was performed to evaluate for arrhythmia and ischemia.  A Fib-- back on Eliquis. If HR remains over 100 bpm then reduce Amlodipine to 5 mg and add Metoprolol Succinate 25 mg at bedtime  Constipation-- add Colace 100 mg tid. May be related to Amlodipine as well.   Sleepiness-- change Amlodipine to qhs  Electrolyte imbalance-- stop HCTZ and continue potassium until back in normal range, then stop potassium  We discussed that his quality of life is of concern and low expectation of recovery  I encouraged continued risk factor reduction and gradual increase in aerobic activity as tolerated

## 2024-04-16 NOTE — HISTORY OF PRESENT ILLNESS
[FreeTextEntry1] : 88 year male who had hematoma and anticoagulation stopped and he underwent an embolization for his brain. He went to [facility] rehab and then back to the hospital with bouts of non-responsiveness and was re-admitted with a UTI and obstruction of his bile duct. He needed stent to bile duct and antibiotics. He was transferred to [facility] for OT, PT and Speech. He was hospitalized a third time to [facility] with a swallowing issue. The family felt it might be due to dementia.  He is back at [facility] with pure diet He has not been able to stand. His family describes he is alert in morning and then gets fatigued after Amlodipine. He is also constipated. He is on Megace but continues to lose weight. He is responsive to commands

## 2024-04-16 NOTE — PHYSICAL EXAM
[Well Developed] : well developed [Normal Conjunctiva] : normal conjunctiva [Normal S1, S2] : normal S1, S2 [Clear Lung Fields] : clear lung fields [No Edema] : no edema [Moves all extremities] : moves all extremities [Normal] : alert and oriented, normal memory [de-identified] : poor dentition [de-identified] : seen in wheelchair

## 2024-05-22 NOTE — ED PROVIDER NOTE - NS ED ATTENDING STATEMENT MOD
[facility] RADIATION ONCOLOGY  [address] [address]  Dept Phone: [phone]     Radiation Oncology Treatment Completion    5/21/2024    Patient Name:         Shaunna Boyce  YOB: 1952  Medical Record #:   [id number]  Referring Physician:  No Ref Provider  Diagnosis: Cancer of overlapping sites of left breast  (CMD) C50.812, Malignant neoplasm of central portion of left breast in female, estrogen receptor positive  (CMD) C50.112, Z17.0, Breast cancer in female  (CMD) C50.919    Cancer Staging   Malignant neoplasm of central portion of left breast in female, estrogen receptor positive  (CMD)  Staging form: Breast, AJCC 8th Edition  - Clinical stage from 9/28/2023: Stage IIB (cT2, cN2b(f), cM0, G3, ER+, IN+, HER2+) - Signed by Radha Carroll MD on 2/8/2024  - Pathologic stage from 2/20/2024: No Stage Recommended (ypTis (DCIS), pN0(sn), cM0, ER+, IN+, HER2: Not Assessed) - Signed by Radha Carroll MD on 3/5/2024      Clinical History:  Shaunna Boyce is a 72 year old female with .  We were consulted for consideration of adjuvant radiation therapy.    Following a comprehensive consultation and discussion, radiation was recommended to Shaunna Boyce.  The rationale and the anticipated acute- and long-term side effects of this recommendation were discussed in detail with Shaunna Boyce.  Informed consent was obtained and documented with a signed consent form.    Radiation Treatments       Active   No active radiation treatments to show.     Historical   SIB_LBreast+NodeDIBH (Started on 4/17/2024)   Most recent fraction: 250 cGy given on 5/21/2024   Total given: 6,250 cGy / 6,250 cGy  (25 of 25 fractions)   Elapsed Days: 34   Technique: IMRT STATIC                     The dosimetry to organs at risk was carefully reviewed, and is available in Physics documents, for the entire treated volume and was deemed to be acceptable.  All organs at risk  were carefully contoured and doses minimized, where appropriate, to the extent possible.    Treatment Course:  Shaunna Boyce was seen weekly on treatment reviews and the acute treatment symptoms were accordingly managed as the treatment progressed.  These toxicities included skin reaction which was managed with radioplex.     Shaunna Boyce was seen at the end of the treatment and given a return follow-up appointment in 1 month.       Attending Only

## 2024-06-03 NOTE — DISCHARGE NOTE ADULT - APPOINTMENTS. PLEASE FOLLOW UP WITH YOUR DOCTOR WITHIN 3 DAYS OF DISCHARGE TO SCHEDULE YOUR NEXT BLOOD TEST.
"Chief Complaint  Pneumonia (Diagnosed yesterday with [facility], pt was given quite a few prescriptions. States that [facility]  advised them that since pt is a smoker, that nebulizer treatments would help but that they would have to get the nebulizer. In addition, pt is a smoker and [facility]  advised that prednisone would do well, but pt is on fosamax. Advised to give Pulmicort solution, but no nebulizer. )    Subjective        Kirk Livingston presents to [facility]  History of Present Illness  Here for follow-up pneumonia and requesting a nebulizer.    He presented to urgent care yesterday, 2 June 2024 with complaint of 3-week history of upper respiratory symptoms.  He was then complaining of shortness of breath and worsening cough with some increased sinus pain and pressure.    He was diagnosed with pneumonia of the right lower lobe due to infectious organism.  He is also a greater than 40-year pack smoker.  He was prescribed Azelastine-Fluticasone nasal spray, a Z-hermilo, benzonatate (TESSALON) perles, Cefdinir 300 mg PO BID, loratadine, and montelukast. Per patient, phyiscian recommended a nebulizer.     He states he used to use Anoro Ellipta daily, but stopped using it per he stated he did not think he got up very good relief.          Objective   Vital Signs:  /64 (BP Location: Left arm, Patient Position: Sitting, Cuff Size: Adult)   Pulse 65   Temp 98.6 °F (37 °C) (Infrared)   Resp 16   SpO2 95%   Estimated body mass index is 28.66 kg/m² as calculated from the following:    Height as of 6/2/24: 170.2 cm (67\").    Weight as of 6/2/24: 83 kg (183 lb).             Physical Exam  Vitals reviewed.   Constitutional:       General: He is not in acute distress.     Appearance: He is not ill-appearing, toxic-appearing or diaphoretic.   Cardiovascular:      Rate and Rhythm: Normal rate and regular rhythm.   Pulmonary:      Effort: Pulmonary effort is normal.      Breath sounds: Examination of the " right-upper field reveals decreased breath sounds and wheezing. Examination of the left-upper field reveals decreased breath sounds and wheezing. Examination of the right-lower field reveals decreased breath sounds and wheezing. Examination of the left-lower field reveals decreased breath sounds and wheezing. Decreased breath sounds and wheezing present. No rhonchi or rales.   Neurological:      General: No focal deficit present.      Mental Status: He is oriented to person, place, and time. Mental status is at baseline.   Psychiatric:         Mood and Affect: Mood normal.         Behavior: Behavior normal.         Thought Content: Thought content normal.         Judgment: Judgment normal.        Result Review :                   Assessment and Plan   Patient is a pleasant 72-year-old male with multiple comorbidities including hypertension, coronary artery disease, GERD, alcohol use, greater than 40-pack-year history of smoking, simple chronic bronchitis here with pneumonia already being treated with dual antibiotic therapy requesting nebulizer treatment as needed for wheezing.        Diagnoses and all orders for this visit:    1. Smokes with greater than 40 pack year history (Primary)    2. Simple chronic bronchitis  -      Nebulizer Kit - Administration Set  -     albuterol (ACCUNEB) 0.63 MG/3ML nebulizer solution; Take 3 mL by nebulization Every 6 (Six) Hours As Needed for Wheezing.  Dispense: 1 each; Refill: 12    3. Pneumonia of both lower lobes due to infectious organism  Comments:  Continue current medication regimen.  Follow-up with Dr. Leon in 1 to 2 weeks after treatment is completed for further evaluation.             Follow Up   Return in about 2 weeks (around 6/17/2024) for Follow up with Dr. Leon as needed..  Patient was given instructions and counseling regarding his condition or for health maintenance advice. Please see specific information pulled into the AVS if appropriate.          Statement Selected

## 2024-07-23 NOTE — REASON FOR VISIT
Agapito Arriaga [Follow-Up - Clinic] : a clinic follow-up of [FreeTextEntry2] : anemia [Spouse] : spouse

## 2024-08-27 NOTE — ED ADULT NURSE NOTE - NURSING NEURO LEVEL OF CONSCIOUSNESS
Caller: Ericka    Doctor: Kocher    Reason for call: calling to make sure you saw she put in request for     nabumetone (RELAFEN) 500 mg tablet   When she calls it in they don't have on file that it has 5 refill please advise     Also Lidocaine 5%     Call back#: [phone]   alert and awake/follows commands

## 2024-12-19 NOTE — ED ADULT TRIAGE NOTE - STATUS:
Critical Care Medicine Consult      Reason For Consult  S/p left femoral-below knee bypass with graft, postoperative critical care management    History Of Present Illness  Dereck Shen is a 58 y.o. male with past medical history of CAD s/p CABG, severe lower extremity PAD with multiple prior revascularizations (right fem-pop bypass 2/24/20, left fem-pop bypass 9/14/20, left fem PTA of the graft, right iliac PTA 2/22, LLE and DANYA PTA 4/24, angioplasty of left SFA 10/21/24, and left femoral and tibial thrombectomy with patch angioplasty 11/19/24), HFrEF, HTN, COPD, BRUNA, multiple prior CVA, HCV, neuropathy, pancreatic insufficiency, GERD, BPH, cLBP, bipolar disorder, PTSD, and tobacco use presented to [facility] ED on 12/17/24 with complaints of left lower extremity pain, redness, and ice cold when he took his sock off.  Upon ED workup, temperature 36.6 °C, heart rate 81, respiratory rate 18, blood pressure 169/91, and SpO2 99% on room air.  ED labs revealed blood glucose 162, BUN 39, creatinine 1.53, ALT 6, AST 7, , RBC 4.18, hemoglobin 13.2, hematocrit 38.1, D-dimer 1012, INR 1.2, PT 13.1, and remainder of CBC and chemistry profile unremarkable.  CT angio aorta and bilateral iliofemoral runoff including without contrast revealed no significant aortic disease, mild bilateral common iliac artery stenosis, patent left external iliac and common femoral arteries, chronic occlusion of the left femoral-popliteal bypass graft, chronic occlusion of the native left superficial femoral artery, a 2 cm segment of the left P1 popliteal artery reconstitutes, the terminal left popliteal artery also reconstitutes, other portions of the popliteal artery are occluded, patent three-vessel infrapopliteal runoff to the left hindfoot beyond which contrast opacification was too weak to visualize, thick subcutaneous soft tissue presumed scar tissue overlying the left femoral vessels of the left groin, patent right iliac and  femoral stents, patent right popliteal artery, patent three-vessel infrapopliteal runoff to the right ankle, and no findings of an acute process in the abdomen or pelvis.  ED provider discussed case with Dr. Kruse who was on for vascular and recommended admission, consultation with Dr. Peace vascular surgery and initiation of heparin infusion. Patient admitted to general medicine with consult to vascular surgery. Patient underwent left femoral-below knee bypass with graft with Dr. Peace on 12/18/24. Patient admitted to ICU post-procedure and critical care medicine was consulted.     Patient seen and examined in ICU bed 8. Patient alert and oriented x3 and in no acute distress. Patient reported that he came to the hospital on Monday night after having left lower extremity pain, redness to left lower extremity, left foot ice cold, and right foot was pale. He reports that he had been doing good after his most recent vascular procedure in November and had not had any pain after getting his staples out on 12/3. He reports that he has been following with wound care for his fasciotomy sites and left groin incision and had recently completed a course of antibiotics because his groin incision was infected. He stated that he has been taking his Eliquis, brilinta, and aspirin as directed and had not missed any doses. 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 He reports that uses medical marijuana but denies any other drug use. He denies any alcohol use. He reports that he still smokes but has cut back to 3 cigarettes per day and is trying to quit. His only complaint at this time is burning sensation to left lower extremity.  He reports that he has chronic right-sided weakness from prior strokes.  He denies any headache, vision changes, dizziness, lightheadedness, chest pain, palpitations, shortness of breath,  cough, congestion, wheezes, abdominal pain, nausea, vomiting, diarrhea, constipation, or any focal or lateralizing deficits other than noted above.     Past Medical History:   Diagnosis Date    (HFpEF) heart failure with preserved ejection fraction     Aphasia     Atherosclerotic heart disease of native coronary artery with unspecified angina pectoris 11/05/2019    Bipolar disorder, unspecified (Multi)     BPH (benign prostatic hyperplasia)     Diabetes (Multi)     DVT (deep venous thrombosis) (Multi) 02/2022    RLE    Erectile dysfunction     GERD (gastroesophageal reflux disease)     Hepatitis C     HLD (hyperlipidemia)     HTN (hypertension)     Obstructive sleep apnea (adult) (pediatric)     Opioid abuse, in remission (Multi)     PAD (peripheral artery disease) (CMS-[facility])     Polymyalgia rheumatica (Multi)     Post-traumatic stress disorder, unspecified     Stroke (Multi) 07/24/2023    multiple     Past Surgical History:   Procedure Laterality Date    BACK SURGERY      CORONARY ANGIOPLASTY WITH STENT PLACEMENT      CORONARY ARTERY BYPASS GRAFT      CT ANGIO AORTA AND BILATERAL ILIOFEMORAL RUN OFF INCLUDING WITHOUT CONTRAST IF PERFORMED  07/20/2020    CT ANGIO AORTA AND BILATERAL ILIOFEMORAL RUN OFF INCLUDING WITHOUT CONTRAST IF PERFORMED  01/14/2022    DENTAL SURGERY      ELBOW SURGERY      INVASIVE VASCULAR PROCEDURE Bilateral 10/04/2024    Procedure: Lower Extremity Angiogram;  Surgeon: Baljinder Wright MD;  Location: [facility];  Service: Cardiovascular;  Laterality: Bilateral;  w / anesthesia  3 hr hydration  / arrive 7:30    INVASIVE VASCULAR PROCEDURE N/A 10/21/2024    Procedure: Lower Extremity Angiogram;  Surgeon: Baljinder Wright MD;  Location: [facility];  Service: Cardiovascular;  Laterality: N/A;  w/ anesthesia    INVASIVE VASCULAR PROCEDURE N/A 10/21/2024    Procedure: Angioplasty - Lower Extremity;  Surgeon: Baljinder Wright MD;  Location: [facility];  Service: Cardiovascular;   Laterality: N/A;    KNEE SURGERY Left     MR HEAD ANGIO WO IV CONTRAST  01/04/2022    MR NECK ANGIO WO IV CONTRAST  01/04/2022    TONSILLECTOMY      TRANSLUMINAL ATHERECTOMY FEMORAL-POPLITEAL / TIBIOPERONEAL       Medications Prior to Admission   Medication Sig Dispense Refill Last Dose/Taking    apixaban (Eliquis) 5 mg tablet Take 2 tablets (10 mg) by mouth 2 times a day for 6 days, THEN 1 tablet (5 mg) 2 times a day. 84 tablet 0     aspirin 81 mg chewable tablet Chew 1 tablet (81 mg) once daily.       atorvastatin (Lipitor) 80 mg tablet Take 1 tablet (80 mg) by mouth once daily at bedtime. 90 tablet 2     blood sugar diagnostic strip Use as directed twice daily 100 each 1     carvedilol (Coreg) 6.25 mg tablet Take 1 tablet (6.25 mg) by mouth every 12 hours.       Creon 36,000-114,000- 180,000 unit capsule,delayed release(DR/EC) capsule Take 2 capsules by mouth 3 times a day as needed.       dapagliflozin propanediol (Farxiga) 10 mg Take 1 tablet (10 mg) by mouth once daily with breakfast. 90 tablet 2     ELDERBERRY FRUIT ORAL Take 1 tablet by mouth once daily as needed.       Entresto  mg tablet Take 1 tablet by mouth twice a day.       ezetimibe (Zetia) 10 mg tablet Take 1 tablet (10 mg) by mouth once daily.       flash glucose scanning reader misc USE TO CHECK SUGARS FOUR TIMES A DAY 1 each 0     flash glucose sensor kit (FreeStyle En 2 Sensor) kit use as directed daily and change every 14 days 2 each 11     furosemide (Lasix) 40 mg tablet Take 1 tablet (40 mg) by mouth once daily as needed.       gabapentin (Neurontin) 400 mg capsule Take 1 capsule (400 mg) by mouth 3 times a day. 90 capsule 5     GINGER ROOT EXTRACT ORAL Take 1 tablet by mouth once daily as needed.       hydrALAZINE (Apresoline) 50 mg tablet Take 1 tablet (50 mg) by mouth 3 times a day. 270 tablet 1     insulin glargine (Lantus) 100 unit/mL (3 mL) pen Inject 20 Units under the skin once daily at bedtime. 10 mL 5     insulin lispro  "(HumaLOG) 100 unit/mL injection Inject 10 Units under the skin 3 times a day with meals. Plus sliding scale if BS is over 200 15 mL 0     isosorbide mononitrate ER (Imdur) 60 mg 24 hr tablet Take 1 tablet (60 mg) by mouth once daily. 90 tablet 1     lancets misc 1 each 4 times a day before meals. 200 each 11     medical cannabis Not [facility] prescribed       nicotine (Nicoderm CQ) 21 mg/24 hr patch APPLY 1 PATCH TO SKIN EVERY 24 HOURS AS DIRECTED 28 patch 0     OXcarbazepine (Trileptal) 150 mg tablet Take 3 tablets (450 mg) by mouth twice a day.       [] oxyCODONE-acetaminophen (Percocet) 5-325 mg tablet Take 1 tablet by mouth every 6 hours if needed for severe pain (7 - 10) for up to 4 days. (Patient not taking: Reported on 2024) 16 tablet 0 Not Taking    pantoprazole (ProtoNix) 40 mg EC tablet TAKE 1 TABLET BY MOUTH ONCE DAILY 90 tablet 3     pen needle, diabetic (BD Ultra-Fine Jocy Pen Needle) 32 gauge x 5/32\" needle Pen Needles for Insulin. 100 each 2     pen needle, diabetic (BD Ultra-Fine Jocy Pen Needle) 32 gauge x 5/32\" needle Use to inject insulin up to 8 times per day. 400 each 0     pen needle, diabetic (TechLITE Pen Needle) 32 gauge x 1/4\" needle Use to inject 1-4 times daily as directed 100 each 11     ranolazine (Ranexa) 500 mg 12 hr tablet Take 1 tablet (500 mg) by mouth twice a day.       tamsulosin (Flomax) 0.4 mg 24 hr capsule Take 1 capsule (0.4 mg) by mouth once daily as needed.       ticagrelor (Brilinta) 90 mg tablet Take 1 tablet (90 mg) by mouth 2 times a day. 180 tablet 0     tiZANidine (Zanaflex) 2 mg tablet Take 1 tablet (2 mg) by mouth once daily as needed for muscle spasms. 30 tablet 0      Celecoxib; Ibuprofen; Tetanus toxoid, adsorbed; [id number] antimigraine agents; Cephalexin; Hydrocodone; Latex; and Tryptophan  Social History     Tobacco Use    Smoking status: Every Day     Current packs/day: 0.50     Types: Cigarettes     Passive exposure: Current (5-7 cigarettes a day) "    Smokeless tobacco: Never   Vaping Use    Vaping status: Never Used   Substance Use Topics    Alcohol use: Never    Drug use: Yes     Types: Marijuana     Comment: Medical card     Family History   Problem Relation Name Age of Onset    No Known Problems Mother      No Known Problems Father         Scheduled Medications:   acetaminophen, 650 mg, oral, q6h  aspirin, 81 mg, oral, Daily  atorvastatin, 80 mg, oral, Nightly  [Held by provider] carvedilol, 6.25 mg, oral, BID  clindamycin, 600 mg, intravenous, q8h  [Held by provider] dapagliflozin propanediol, 10 mg, oral, Daily  docusate sodium, 100 mg, oral, BID  ezetimibe, 10 mg, oral, Nightly  gabapentin, 400 mg, oral, TID  [Held by provider] hydrALAZINE, 50 mg, oral, TID  insulin glargine, 24 Units, subcutaneous, Nightly  insulin lispro, 0-10 Units, subcutaneous, TID AC  insulin lispro, 14 Units, subcutaneous, TID AC  isosorbide mononitrate ER, 60 mg, oral, Daily  nicotine, 1 patch, transdermal, Daily  OXcarbazepine, 450 mg, oral, BID  oxygen, , inhalation, Continuous - Inhalation  pantoprazole, 40 mg, oral, Daily before breakfast  polyethylene glycol, 17 g, oral, Daily  ranolazine, 500 mg, oral, BID  [Held by provider] sacubitriL-valsartan, 1 tablet, oral, BID  tamsulosin, 0.4 mg, oral, Daily  ticagrelor, 90 mg, oral, BID         Continuous Medications:   heparin, 0-4,500 Units/hr, Last Rate: 1,300 Units/hr (12/18/24 2036)  phenylephrine, 0-2 mcg/kg/min, Last Rate: 0.1 mcg/kg/min (12/18/24 1945)  sodium chloride 0.9%, 20 mL/hr, Last Rate: 50 mL/hr (12/18/24 1500)         PRN Medications:   PRN medications: dextrose, dextrose, glucagon, glucagon, HYDROmorphone, naloxone, ondansetron ODT **OR** ondansetron, oxyCODONE, oxyCODONE    Review of Systems:  Review of Systems     Objective   Vitals:  Most Recent:  Vitals:    12/18/24 2030   BP: 131/74   Pulse: 71   Resp: (!) 4   Temp:    SpO2:        24hr Min/Max:  Temp  Min: 35.8 °C (96.4 °F)  Max: 36.9 °C (98.4 °F)  Pulse   Min: 54  Max: 92  BP  Min: 68/46  Max: 145/77  Resp  Min: 0  Max: 23  SpO2  Min: 93 %  Max: 100 %    LDA:   Urethral Catheter Non-latex 18 Fr. (Active)   Placement Date: 12/18/24   Catheter Type: (c) Non-latex  Tube Size (Fr.): 18 Fr.  Catheter Balloon Size: 10 mL  Urine Returned: Yes   Number of days: 0         Vent settings:       Hemodynamic parameters for last 24 hours:         Intake/Output Summary (Last 24 hours) at 12/18/2024 2042  Last data filed at 12/18/2024 1730  Gross per 24 hour   Intake 1543.17 ml   Output 2025 ml   Net -481.83 ml           Physical exam:    Physical Exam  Constitutional:       General: He is awake. He is not in acute distress.     Appearance: Normal appearance. He is not ill-appearing.   HENT:      Head: Normocephalic.      Nose: Nose normal.      Mouth/Throat:      Mouth: Mucous membranes are moist.   Eyes:      Extraocular Movements: Extraocular movements intact.      Conjunctiva/sclera: Conjunctivae normal.      Pupils: Pupils are equal, round, and reactive to light.   Cardiovascular:      Rate and Rhythm: Normal rate and regular rhythm.      Pulses:           Dorsalis pedis pulses are detected w/ Doppler on the right side and detected w/ Doppler on the left side.        Posterior tibial pulses are detected w/ Doppler on the right side and detected w/ Doppler on the left side.      Heart sounds: Normal heart sounds. No murmur heard.  Pulmonary:      Effort: Pulmonary effort is normal. No respiratory distress.      Breath sounds: Normal breath sounds and air entry.   Abdominal:      General: Bowel sounds are normal. There is no distension.      Palpations: Abdomen is soft.      Tenderness: There is no abdominal tenderness. There is no guarding.   Musculoskeletal:      Cervical back: Normal range of motion.      Right lower leg: No edema.      Left lower leg: No edema.   Skin:     General: Skin is warm and dry.      Capillary Refill: Capillary refill takes less than 2 seconds.       Findings: Wound present.      Comments: Surgical incision to left groin covered with surgical dressing. Incision/wound to left lower extremity covered with dressing with scant amount of drainage outlined on dressing. See wound images in media.    Neurological:      Mental Status: He is alert and oriented to person, place, and time. Mental status is at baseline.      Cranial Nerves: Cranial nerves 2-12 are intact.      Sensory: Sensation is intact.      Comments: Right hand grasp weaker compared to left -chronic right-sided weakness s/p multiple strokes.   Psychiatric:         Attention and Perception: Attention normal.         Mood and Affect: Mood normal.         Speech: Speech normal.         Behavior: Behavior normal. Behavior is cooperative.         Thought Content: Thought content normal.         Judgment: Judgment normal.          Lab/Radiology/Diagnostic Review:  Results for orders placed or performed during the hospital encounter of 12/17/24 (from the past 24 hours)   Heparin Assay, [facility]   Result Value Ref Range    Heparin Unfractionated 0.6 See Comment Below for Therapeutic Ranges IU/mL   Heparin Assay, [facility]   Result Value Ref Range    Heparin Unfractionated 0.6 See Comment Below for Therapeutic Ranges IU/mL   CBC   Result Value Ref Range    WBC 6.3 4.4 - 11.3 x10*3/uL    nRBC 0.0 0.0 - 0.0 /100 WBCs    RBC 4.36 (L) 4.50 - 5.90 x10*6/uL    Hemoglobin 14.0 13.5 - 17.5 g/dL    Hematocrit 40.6 (L) 41.0 - 52.0 %    MCV 93 80 - 100 fL    MCH 32.1 26.0 - 34.0 pg    MCHC 34.5 32.0 - 36.0 g/dL    RDW 13.3 11.5 - 14.5 %    Platelets 252 150 - 450 x10*3/uL   Basic metabolic panel   Result Value Ref Range    Glucose 190 (H) 74 - 99 mg/dL    Sodium 137 136 - 145 mmol/L    Potassium 4.2 3.5 - 5.3 mmol/L    Chloride 102 98 - 107 mmol/L    Bicarbonate 29 21 - 32 mmol/L    Anion Gap 10 10 - 20 mmol/L    Urea Nitrogen 36 (H) 6 - 23 mg/dL    Creatinine 1.41 (H) 0.50 - 1.30 mg/dL    eGFR 58 (L) >60 mL/min/1.73m*2    Calcium 9.1  8.6 - 10.3 mg/dL   POCT GLUCOSE   Result Value Ref Range    POCT Glucose 213 (H) 74 - 99 mg/dL   POCT GLUCOSE   Result Value Ref Range    POCT Glucose 157 (H) 74 - 99 mg/dL   Type and Screen   Result Value Ref Range    ABO TYPE O     Rh TYPE NEG     ANTIBODY SCREEN NEG    POCT GLUCOSE   Result Value Ref Range    POCT Glucose 182 (H) 74 - 99 mg/dL   POCT GLUCOSE   Result Value Ref Range    POCT Glucose 149 (H) 74 - 99 mg/dL     *Note: Due to a large number of results and/or encounters for the requested time period, some results have not been displayed. A complete set of results can be found in Results Review.     Vascular US upper extremity vein mapping bilateral    Result Date: 12/18/2024              [place] [address]      Phone [phone] Fax [phone]  Vascular Lab Report  VASC US UPPER EXTREMITY VEIN MAPPING BILATERAL Patient Name:      CARMENZA Presley Physician:  [id number] Fabian Phillips MD Study Date:        12/18/2024          Ordering Provider:  [id number] DARIUS BURT MRN/PID:           [id number]            Fellow: Accession#:        [id number]        Technologist:       Danielle Kelley                                                            RVT Date of Birth/Age: 1966 / 58      Technologist 2:     Deb Allen RVT                    years Gender:            M                   Encounter#:         [id number] Admission Status:  Outpatient          Location Performed: [facility]  Diagnosis/ICD: Encounter for other preprocedural examination-Z01.818 CPT Codes:     89357 Vein mapping complete  CONCLUSIONS: Right Upper Vein Mapping: Chronic disease is documented in the right cephalic proximal forearm, cephalic mid forearm, cephalic distal forearm, basilic proximal forearm, basilic mid forearm and basilic distal forearm veins. Left Upper Vein Mapping: Left cephalic and basilic  veins appear widely patent with no evidence of thrombosis or fibrosis.  Imaging & Doppler Findings:  Right                   Compress Thrombus  Diam Cephalic Prox Arm         Yes      None   2.4 mm Cephalic Mid Arm          Yes      None   2.3 mm Cephalic Distal Arm       Yes      None   1.7 mm Cephalic Prox Forearm      No    Fibrotic Cephalic Mid Forearm       No    Fibrotic Cephalic Distal Forearm    No    Fibrotic Basilic Prox Arm          Yes      None   4.2 mm Basilic Mid Arm           Yes      None   3.7 mm Basilic Distal Arm        Yes      None   3.5 mm Basilic Prox Forearm       No    Fibrotic Basilic Mid Forearm        No    Fibrotic Basilic Distal Forearm     No    Fibrotic  Left                    Compress Thrombus  Diam Cephalic Prox Arm         Yes      None   2.4 mm Cephalic Mid Arm          Yes      None   2.3 mm Cephalic Distal Arm       Yes      None   2.1 mm Cephalic Prox Forearm     Yes      None   3.1 mm Cephalic Mid Forearm      Yes      None   1.8 mm Cephalic Distal Forearm   Yes      None   1.4 mm Basilic Prox Arm          Yes      None   3.3 mm Basilic Mid Arm           Yes      None   2.7 mm Basilic Distal Arm        Yes      None   2.2 mm Basilic Prox Forearm      Yes      None   1.9 mm Basilic Mid Forearm       Yes      None   1.1 mm Basilic Distal Forearm    Yes      None   0.7 mm  [id number] Fabian Phillips MD Electronically signed by [id number] Fabian Phillips MD on 12/18/2024 at 6:21:05 PM  ** Final **     ECG 12 lead    Result Date: 12/17/2024  Sinus rhythm Inferior infarct, old Baseline wander in lead(s) II,III,aVR,aVL,aVF,V1,V2,V5 See ED provider note for full interpretation and clinical correlation Confirmed by Nay Black ([id number]) on 12/17/2024 11:40:07 AM    Vascular US ankle brachial index (ANSON) without exercise    Result Date: 12/17/2024  Preliminary Cardiology Report              [place] [id number]      Phone [phone] Fax [phone]             Preliminary Vascular Lab Report  [facility] US ANKLE BRACHIAL INDEX (ANSON) WITHOUT EXERCISE  Patient Name:     CARMENZA URBINA       Fernandez Physician: [id number] Darlyn OLEARY MD Study Date:       12/17/2024       Ordering Provider: [id number] CHERELLE POON MRN/PID:          [id number]         Fellow: Accession#:       [id number]     Technologist:      Deb Allen RVYUSUF YOB: 1966        Technologist 2: Gender:           M                Encounter#:        [id number] Admission Status: Emergency        Location           [facility]                                    Performed:  Diagnosis/ICD: Peripheral vascular disease, unspecified-I73.9 CPT Codes:     15522 Peripheral artery ANSON Only  Pertinent History: Pt presents to the ER with LLE pain and a cold left foot.  **CRITICAL RESULT** Critical Result: severe arterial disease of LLE Notification called to Cherelle Poon on 12/17/2024 at 9:31:33 AM.  PRELIMINARY CONCLUSIONS:  Right Lower PVR: No evidence of arterial occlusive disease in the right lower extremity at rest. Normal digital perfusion noted. Multiphasic flow is noted in the right common femoral artery, right popliteal artery, right posterior tibial artery and right dorsalis pedis artery. Left Lower PVR: Evidence of severe arterial occlusive disease in the left lower extremity at rest. Decreased digital perfusion noted. Monophasic flow is noted in the left common femoral artery, left popliteal artery, left posterior tibial artery and left dorsalis pedis artery. PTA is inaudible. Flat toe waveform(TBI=0).  Imaging & Doppler Findings:  RIGHT Lower PVR                Pressures Ratios Right Posterior Tibial (Ankle) 141 mmHg  1.22 Right Dorsalis Pedis (Ankle)   126 mmHg  1.09 Right Digit (Great Toe)        112 mmHg  0.97   LEFT Lower PVR                Pressures Ratios Left Posterior Tibial (Ankle) 0 mmHg    0.00 Left Dorsalis Pedis (Ankle)    33 mmHg   0.28 Left Digit (Great Toe)        0 mmHg    0.00                      Right Brachial Pressure 116 mmHg   VASCULAR PRELIMINARY REPORT completed by Deb Allen T on 12/17/2024 at 9:32:24 AM  ** Final **     CT angio aorta and bilateral iliofemoral runoff including without contrast if performed    Result Date: 12/17/2024  STUDY: CT Angiogram of the Abdomen, Pelvis, and Bilateral Lower Extremities; 12/17/2024 6:38 AM INDICATION: Cold left foot. COMPARISON: CTA AP w/runoffs 11/19/2024, CTA chest 11/19/2024, CTA AP w/runoffs 09/13/2024. ACCESSION NUMBER(S): [id number] ORDERING CLINICIAN: BASIM POON TECHNIQUE:  Helical CT is performed from the aortic diaphragmatic hiatus through the feet after bolus administration of 90 mL of Omnipaque 350.  Images are reviewed and processed at a workstation according to the CT angiogram protocol with 3-D and/or MIP post processing imaging generated. Automated mA/kV exposure control was utilized and patient examination was performed in strict accordance with principles of ALARA. FINDINGS: Vascular: Abdominal Aorta: The celiac, SMA, and DENISE demonstrate no significant stenosis.  The right and left renal arteries demonstrate no significant stenosis. The abdominal aorta is not aneurysmal and demonstrates no significant occlusive disease. Right Run-off: The right common iliac artery shows mild stenosis diffusely.  There is a stent along the entire right external iliac and right common femoral arteries.  The stent demonstrates areas of mild in-stent stenosis. There is an occluded right femoral-popliteal bypass graft. The native right femoral popliteal arteries demonstrate mild atherosclerotic contour.  There is a stent extending from the mid SFA to the proximal popliteal artery.  At the distal SFA the stent demonstrates crush distortion but maintains a patent lumen, though with areas of mild in-stent stenosis. The popliteal artery remains patent throughout its course.  There  is patent three-vessel runoff to the ankle, and 2 vessels into the foot. Opacification became too weak to follow the vessels to the plantar arch. Left Run-off: The left common iliac artery demonstrates mild diffuse atherosclerotic stenosis.  The left external iliac artery and left common femoral arteries are relatively normal.  The common femoral artery bifurcation is patent.  The native left SFA occludes just beyond its origin.  The profunda femoral artery and its branches remain patent. There is a chronically occluded left femoral-popliteal bypass graft. The native left SFA does not reconstitute, remaining occluded throughout its course. There is a 2 cm length patent segment of the left popliteal artery, which is otherwise occluded.  Distal to the patent segment, the popliteal artery is occluded across the level of the joint. The distal popliteal artery reconstitutes remains patent into its trifurcation. 3 patent infrapopliteal vessels are visualized proximally.  The tibial vessels remain patent to the ankle, where the dorsalis pedis and lateral plantar seen across the ankle joint.  The vessels cannot be followed to the plantar arch due to weak contrast opacification. Abdomen: The lung bases are clear.  The liver is unremarkable.  The pancreas, spleen, adrenal glands, and kidneys demonstrate no acute pathology. There is no free air or lymph node enlargement. Pelvis: There is no bowel wall thickening or obstruction.  There is no free fluid.  Lymph nodes are not enlarged.  Urinary bladder is unremarkable. At the left inguinal region there is thick soft tissue in the subcutaneous fat overlying the femoral vessels, as well as 2 prominent lymph nodes. Skeleton:  There are no acute fractures.  No suspicious bony lesions.    No significant aortic disease.  Mild bilateral common iliac artery stenosis.  Patent left external iliac and common femoral arteries. Chronic occlusion of the left femoral-popliteal bypass graft.   Chronic occlusion of the native left superficial femoral artery. A 2 cm segment of the left P1 popliteal artery reconstitutes.  The terminal left popliteal artery also reconstitutes.  Other portions of the popliteal artery are occluded. Patent three-vessel infrapopliteal runoff to the left hindfoot, beyond which contrast opacification was too weak to visualize. Thick subcutaneous soft tissue - presumed scarred tissue - overlying the left femoral vessels at the left groin.  Correlate for possibility of cellulitis. Patent right iliac and right femoral stents.  Patent right popliteal artery.  Patent three-vessel infrapopliteal runoff to the right ankle. No findings of an acute process in the abdomen or pelvis. Signed by Simeon Barrera MD    Vascular US Lower Extremity Arterial Duplex Bilateral    Result Date: 12/3/2024              [place] [address]      Phone [phone] Fax [phone]  Vascular Lab Report  [facility] US LOWER EXTREMITY ARTERIAL DUPLEX BILATERAL Patient Name:      CARMENZA OLEARY    Reading Physician:  [id number] Britney Rush MD Study Date:        12/2/2024            Ordering Provider:  [id number] ADELA LYLES MRN/PID:           [id number]             Fellow: Accession#:        [id number]         Technologist:       Deb Allen RVYUSUF Date of Birth/Age: 1966 / 58 years Technologist 2: Gender:            M                    Encounter#:         [id number] Admission Status:  Outpatient           Location Performed: [facility]  Diagnosis/ICD: Peripheral vascular disease, unspecified-I73.9 CPT Codes:     41697 Peripheral artery Lower arterial Duplex complete  Pertinent History: LLE intervention(11/19/2024).  CONCLUSIONS: Right Lower Arterial: The profunda was monophasic and dampened on prior exam and today is not well visualized and appears occluded. All remaining vessels appear patent. Left  Lower Arterial: No occlusion or stenosis noted in the vessels visualized. The distal BARB is dampened and appears nearly occluded. Working around staples and dressings. The EIA is not visualized. The proximal and mid CFA and the profunda is not visualized. Minimal visualization of calf vessels.  Imaging & Doppler Findings:  Right                       Left   PSV                        PSV 89 cm/s         EIA 95 cm/s         CFA        78 cm/s  0 cm/s  Profunda Proximal 80 cm/s    SFA Proximal    88 cm/s 148 cm/s      SFA Mid      100 cm/s 112 cm/s    SFA Distal     108 cm/s 73 cm/s      Popliteal     74 cm/s 100 cm/s   BARB Proximal 86 cm/s       BARB Mid 53 cm/s     BARB Distal     11 cm/s 48 cm/s  Peroneal Proximal 55 cm/s    Peroneal Mid    77 cm/s 40 cm/s   Peroneal Distal 34 cm/s    PTA Proximal    48 cm/s 31 cm/s       PTA Mid 52 cm/s     PTA Distal  [id number] Britney Rush MD Electronically signed by [id number] Britney Rush MD on 12/3/2024 at 11:43:03 AM  ** Final **     Vascular US ankle brachial index (ANSON) without exercise    Result Date: 12/3/2024              [place] [address]      Phone [phone] Fax [phone]  Vascular Lab Report  [facility] US ANKLE BRACHIAL INDEX (ANSON) WITHOUT EXERCISE Patient Name:      CARMENZA Presley Physician:  [id number] Britney Rush MD Study Date:        12/2/2024            Ordering Provider:  [id number] ADELA LYLES MRN/PID:           [id number]             Fellow: Accession#:        [id number]         Technologist:       Deb Allen RVT Date of Birth/Age: 1966 / 58 years Technologist 2: Gender:            M                    Encounter#:         [id number] Admission Status:  Outpatient           Location Performed: [facility]  Diagnosis/ICD: Peripheral vascular disease, unspecified-I73.9 CPT Codes:     17514 Peripheral artery ANSON Only   Pertinent History: Recent LLE intervention(11/19/2024).  CONCLUSIONS: Right Lower PVR: No evidence of arterial occlusive disease in the right lower extremity at rest. Normal digital perfusion noted. Multiphasic flow is noted in the right common femoral artery, right posterior tibial artery and right dorsalis pedis artery. Left Lower PVR: Evidence of moderate arterial occlusive disease in the left lower extremity at rest. Decreased digital perfusion noted. Monophasic flow is noted in the left posterior tibial artery and left dorsalis pedis artery. Multiphasic flow is noted in the left common femoral artery.  Comparison: Compared with study from 5/23/2024, The left ANSON decreased from .96 to .67 today. No significant change on the right.  Imaging & Doppler Findings:  RIGHT Lower PVR                Pressures Ratios Right Posterior Tibial (Ankle) 169 mmHg  0.94 Right Dorsalis Pedis (Ankle)   158 mmHg  0.88 Right Digit (Great Toe)        113 mmHg  0.63   LEFT Lower PVR                Pressures Ratios Left Posterior Tibial (Ankle) 109 mmHg  0.61 Left Dorsalis Pedis (Ankle)   120 mmHg  0.67 Left Digit (Great Toe)        105 mmHg  0.58                      Right Brachial Pressure 180 mmHg   [id number] Rush MD Electronically signed by Enma Rush MD on 12/3/2024 at 11:38:55 AM  ** Final **     Transthoracic Echo (TTE) Complete    Result Date: 11/20/2024              [place] [address]      Phone [phone] Fax [phone] TRANSTHORACIC ECHOCARDIOGRAM REPORT Patient Name:       CARMENZA OLEARY   Reading Physician:    [id number] Rush MD Study Date:         11/20/2024          Ordering Provider:    [id number] DANNA SEARS MRN/PID:            [id number]            Fellow: Accession#:         [id number]        Nurse:                Noemi Villa RN Date of Birth/Age:  1966 / 58       Sonographer:          Laxmi crisostomo                                     RDRADHA Gender Assigned at  M                   Additional Staff: Birth: Height:             180.34 cm           Admit Date:           11/19/2024 Weight:             87.09 kg            Admission Status:     Inpatient -                                                               Routine BSA / BMI:          2.07 m2 / 26.78     Department Location:  [place] [facility]                     kg/m2 Blood Pressure: 107 /64 mmHg Study Type:    TRANSTHORACIC ECHO (TTE) COMPLETE Diagnosis/ICD: Shortness of breath-R06.02; Unspecified systolic (congestive)                heart failure (CHF)-I50.20 Indication:    CHF, SHORTNESS OF BREATH CPT Codes:     Echo Complete w Full Doppler-07839 Patient History: Pertinent History: CABG x5 2019, HTN. Study Detail: The following Echo studies were performed: 2D, M-Mode, Doppler and               color flow. Technically challenging study due to prominent lung               artifact and body habitus. Optison used as a contrast agent for               endocardial border definition. Total contrast used for this               procedure was 3 mL via IV push.  PHYSICIAN INTERPRETATION: Left Ventricle: The left ventricular systolic function is moderately decreased, with a Salinas's biplane calculated ejection fraction of 38%. Wall motion is abnormal. The left ventricular cavity size is normal. There is normal septal and normal posterior left ventricular wall thickness. Spectral Doppler shows an abnormal pattern of left ventricular diastolic filling. Akinetic inferolateral segment, and basal inferior wall. Left Atrium: The left atrium is normal in size. Right Ventricle: The right ventricle was not well visualized. Right ventricular systolic function not assessed. Right Atrium: The right atrium is normal in size. Aortic Valve: The aortic valve appears structurally normal. The aortic valve dimensionless index is  0.73. There is no evidence of aortic valve regurgitation. The peak instantaneous gradient of the aortic valve is 9 mmHg. The mean gradient of the aortic valve is 4 mmHg. Mitral Valve: The mitral valve is normal in structure. There is no evidence of mitral valve regurgitation. Tricuspid Valve: The tricuspid valve is structurally normal. There is trace tricuspid regurgitation. Pulmonic Valve: The pulmonic valve is structurally normal. There is no indication of pulmonic valve regurgitation. Pericardium: No pericardial effusion noted. Aorta: The aortic root is normal. Systemic Veins: The inferior vena cava appears normal in size, with IVC inspiratory collapse greater than 50%.  CONCLUSIONS:  1. Poorly visualized anatomical structures due to suboptimal image quality.  2. The left ventricular systolic function is moderately decreased, with a Salinas's biplane calculated ejection fraction of 38%.  3. Abnormal wall motion.  4. Spectral Doppler shows an abnormal pattern of left ventricular diastolic filling.  5. Akinetic inferolateral segment, and basal inferior wall. QUANTITATIVE DATA SUMMARY:  2D MEASUREMENTS:           Normal Ranges: Ao Root d:       2.80 cm   (2.0-3.7cm) LAs:             3.60 cm   (2.7-4.0cm) IVSd:            0.70 cm   (0.6-1.1cm) LVPWd:           0.70 cm   (0.6-1.1cm) LVIDd:           6.10 cm   (3.9-5.9cm) LVIDs:           5.30 cm LV Mass Index:   78.5 g/m2 LV % FS          13.1 %  LA VOLUME:                    Normal Ranges: LA Vol A4C:        48.7 ml    (22+/-6mL/m2) LA Vol A2C:        49.8 ml LA Vol BP:         51.4 ml LA Vol Index A4C:  23.5ml/m2 LA Vol Index A2C:  24.0 ml/m2 LA Vol Index BP:   24.8 ml/m2 LA Area A4C:       17.6 cm2 LA Area A2C:       18.6 cm2 LA Major Axis A4C: 5.4 cm LA Major Axis A2C: 5.9 cm LA Volume Index:   24.8 ml/m2  RA VOLUME BY A/L METHOD:          Normal Ranges: RA Area A4C:             13.5 cm2  AORTA MEASUREMENTS:         Normal Ranges: Ao Sinus, d:        2.80 cm  (2.1-3.5cm) Ao STJ, d:          2.60 cm (1.7-3.4cm) Asc Ao, d:          2.90 cm (2.1-3.4cm)  LV SYSTOLIC FUNCTION BY 2D PLANIMETRY (MOD):                      Normal Ranges: EF-A4C View:    38 % (>=55%) EF-A2C View:    39 % EF-Biplane:     38 % LV EF Reported: 38 %  LV DIASTOLIC FUNCTION:             Normal Ranges: MV Peak E:             0.75 m/s    (0.7-1.2 m/s) MV Peak A:             0.65 m/s    (0.42-0.7 m/s) E/A Ratio:             1.17        (1.0-2.2) MV e'                  0.069 m/s   (>8.0) MV lateral e'          0.08 m/s MV medial e'           0.06 m/s MV A Dur:              109.00 msec E/e' Ratio:            10.91       (<8.0)  MITRAL VALVE:          Normal Ranges: MV DT:        211 msec (150-240msec)  AORTIC VALVE:                     Normal Ranges: AoV Vmax:                1.46 m/s (<=1.7m/s) AoV Peak P.5 mmHg (<20mmHg) AoV Mean P.0 mmHg (1.7-11.5mmHg) LVOT Max Ayad:            1.09 m/s (<=1.1m/s) AoV VTI:                 29.40 cm (18-25cm) LVOT VTI:                21.60 cm LVOT Diameter:           2.20 cm  (1.8-2.4cm) AoV Area, VTI:           2.79 cm2 (2.5-5.5cm2) AoV Area,Vmax:           2.84 cm2 (2.5-4.5cm2) AoV Dimensionless Index: 0.73  RIGHT VENTRICLE: TAPSE: 19.1 mm  TRICUSPID VALVE/RVSP:          Normal Ranges: Peak TR Velocity:     2.00 m/s RV Syst Pressure:     19 mmHg  (< 30mmHg) IVC Diam:             1.60 cm  [id number] Britney Rush MD Electronically signed on 2024 at 1:49:12 PM  ** Final **     ECG 12 lead    Result Date: 2024  Sinus rhythm Probable left atrial enlargement Borderline repolarization abnormality ST elevation, consider anterior injury Borderline prolonged QT interval See ED provider note for full interpretation and clinical correlation Confirmed by Zulema Stephen ([id number]) on 2024 11:07:14 AM    CT angio aorta and bilateral iliofemoral runoff including without contrast if performed    Result Date: 2024  Interpreted By:   Applied Finkelstein, Evan, STUDY: CT ANGIO AORTA AND BILATERAL ILIOFEMORAL RUN OFF INCLUDING WITHOUT CONTRAST IF PERFORMED;  11/19/2024 4:42 am   INDICATION: Signs/Symptoms:no pulses left leg.     COMPARISON: CT runoff 09/13/2024   ACCESSION NUMBER(S): [id number]   ORDERING CLINICIAN: ACE VALENZUELA   TECHNIQUE: Contiguous axial CTA images were acquired through the abdomen and pelvis and bilateral lower extremities following the administration of 100 mL Omnipaque 350 intravenous contrast. Sagittal and coronal images were reconstructed. Maximum intensity projection and three dimensional images were constructed at a separate workstation.   FINDINGS: VASCULAR FINDINGS:   ABDOMINAL AORTA & ILIAC ARTERIES: No evidence of abdominal aortic aneurysm or dissection.   Moderate atherosclerotic calcifications throughout the abdominal aorta and major proximal vasculature. Celiac axis, superior mesenteric artery, and inferior mesenteric artery are patent without any significant narrowing. Bilateral renal arteries are patent without any significant.   Right common iliac and external iliac arteries are patent without any significant narrowing. Patent right common/external iliac stent. Moderate to severe narrowing of the internal iliac arteries bilaterally. Left common and external iliac arteries are patent.     RIGHT LOWER EXTREMITY: Redemonstrated occlusion of the right femoropopliteal bypass graft stent. Common femoral artery is patent.  Native superficial femoral artery is patent. There is a stent within the mid to lower aspect of the native right superficial femoral artery which is patent. Profunda femoral artery is patent.  Popliteal artery is patent. Tibioperoneal trunk, posterior tibial artery, peroneal artery, and anterior tibial artery are patent. Three-vessel runoff at the ankle.   LEFT LOWER EXTREMITY: Common femoral artery is patent. Persistent occlusion of the left femoropopliteal bypass graft. Redemonstrated occlusion  of the native superficial femoral artery, similar compared to prior imaging. There is reconstitution of the popliteal artery. Poor opacification of the trifurcation of vessels just below the knee with poor opacification of the vessels throughout the entirety of the calf, ankle and foot, new/worsened compared to prior imaging..   ---------------------------------------------------------------   NON-VASCULAR FINDINGS:   LIVER: Within normal limits.   BILE DUCTS: Nondilated.   GALLBLADDER: No evidence of calcified gallstones or wall thickening.   PANCREAS: Within normal limits.   SPLEEN: A splenule is present. The spleen is otherwise unremarkable..   ADRENALS: Within normal limits.   KIDNEYS, URETERS, URINARY BLADDER: Unremarkable. No evidence of hydronephrosis.   BOWEL: No evidence of abnormal dilatation or obstruction of the small or large bowel. No evidence of pneumoperitoneum.   REPRODUCTIVE ORGANS:  Unremarkable   PERITONEUM / RETROPERITONEUM / LYMPH NODES: No evidence of any free fluid. No evidence of any significantly enlarged intra-abdominal or pelvic lymph nodes.   ABDOMINAL WALL: Fat containing inguinal hernias.   LOWER CHEST: Please refer to separately dictated CT chest report.   BONES: No evidence of suspicious lytic or blastic osseous lesions.       Persistent occlusion of the left femoral/popliteal bypass graft and native left superficial femoral artery. There is also new occlusion versus severe stenosis involving the vessels below the left knee beginning at the level of the trifurcation with the anterior tibial, posterior tibial and peroneal arteries not well seen through the calf, ankle and foot.   No acute abnormality of the abdomen or pelvis.   Redemonstrated occlusion of the right femoropopliteal bypass graft stent. There is good opacification of the native right superficial femoral artery with three-vessel runoff to the right ankle.   MACRO: None.   Signed by: Evan Finkelstein 11/19/2024 5:20 AM  Dictation workstation:   [id number]    CT angio chest for pulmonary embolism    Result Date: 11/19/2024  Interpreted By:  Finkelstein, Evan, STUDY: CT ANGIO CHEST FOR PULMONARY EMBOLISM;  11/19/2024 4:27 am   INDICATION: Signs/Symptoms:sob wih hypoxia.     COMPARISON: CT chest 06/23/2021   ACCESSION NUMBER(S): [id number]   ORDERING CLINICIAN: ACE VALENZUELA   TECHNIQUE: Axial CTA images of the chest after intravenous administration of 50 mL Omnipaque 350 using CT angiographic technique. Coronal and sagittal images are reconstructed. MIP images were created and reviewed.   FINDINGS: CHEST WALL AND LOWER NECK: Within normal limits. ABDOMEN: No acute abnormality of the partially visualized abdomen.   VASCULAR: AORTA: No aortic aneurysm. Mild atherosclerotic disease. PULMONARY ARTERY: Normal caliber. No pulmonary embolus to the segmental level.   CHEST:   HEART: Normal size. No pericardial effusion. MEDIASTINUM AND REJI: No pathologically enlarged thoracic lymph nodes. LUNG, PLEURA, LARGE AIRWAYS: Trace left pleural effusion. Thickened interlobular septal markings. Ground-glass opacities scattered throughout the lungs bilaterally with an upper lung zone predominance.   BONES: No acute osseous abnormality. Median sternotomy wires are present.       No acute pulmonary embolus to the segmental level.   Thickened interlobular septal markings with scattered ground-glass opacities with an upper lung zone predominance. Findings may be seen with mixed interstitial/alveolar pulmonary edema. Other superimposed infectious or inflammatory processes are also not excluded.   Trace left pleural effusion   MACRO: None.   Signed by: Evan Finkelstein 11/19/2024 4:36 AM Dictation workstation:   [id number]      Assessment/Plan   Assessment & Plan  Peripheral artery occlusion (CMS-HCC)    Critical limb ischemia of left lower extremity (Multi)    Critical limb ischemia of left lower extremity with reocclusion of SFA and popliteal  s/p left femoral-below knee bypass with graft  - Presented on 12/17/24 with complaints of left leg pain, redness, and ice cold to touch x3 days  -History of severe lower extremity PAD with multiple revascularizations. Most recently left femoral and tibial thrombectomy with 4 compartment fasciotomy on 11/19/24.  - CT angio of the lower extremities revealed reocclusion of SFA and popliteal of LLE   - Underwent left femoral-below knee bypass with graft on 12/18/24 with Dr. Peace  - Follow CBC, BMP, magnesium, and phosphorus  - Monitor incision sites for signs of bleeding  - Neurovascular checks and pulse checks  -Continuous telemetry monitoring  - Continue aspirin and brilinta  - Continue heparin infusion post revascularization  -Continue to hold home Eliquis- will defer timing of transition back to [facility] to vascular surgery service.  -Continue phenylephrine infusion and titrate to maintain SBP goal of 100-160mmHg x24 hours post left femoral-below knee bypass with graft  - Hold home coreg and hydralazine, resume as appropriate  - Hydrate orally (IV fluid shortage supply)  -Consider IV fluids if unable to tolerate oral intake  -Continue oxycodone for pain control  -Strict bedrest overnight with left lower extremity straight  -Maintain Douglass catheter postprocedure  -Monitor intake and output  -Wound care per orders  -Wound care consult, input appreciated  -Vascular surgery following, input appreciated.    SAMEERA  - BUN 39>>36  - Creatinine 1.53>>1.41, baseline ~1-1.2  - Hydrate orally (IV fluid shortage supply)  -Follow BMP, magnesium, and phosphorus  - Replete electrolytes as necessary  -Consider IV fluids if unable to tolerate oral intake  -Hold home farxiga and entresto in setting of SAMEERA, resume as appropriate  -Monitor intake and output  -Avoid nephrotoxic agents as able.     IDDM-II  -Continue lantus 24 units nightly  -Continue Humalog 14 units TID before meals and sliding scale  -POCT glucose checks before meals and at  bedtime  -Hypoglycemia protocol as needed  -Endocrinology following, input appreciated.     Hypertension  - Hold home coreg and hydralazine, resume as appropriate  -Continue phenylephrine infusion and titrate to maintain SBP goal of 100-160mmHg x24 hours post left femoral-below knee bypass with graft    Hx of CAD s/p CABG  - Continue home Lipitor and Zetia  -Continue home aspirin and brilinta  -Continue home ranexa  -Hold home coreg, resume as appopriate  -Continuous telemetry monitoring  -ECG as needed for ACS symptoms    HFrEF  -TTE 11/19/24 revealed LV systolic function moderately decreased with a Salinas's biplane calculated EF of 38%, abnormal wall motion, spectral doppler shows an abnormal pattern of left ventricular diastolic filling, akinetic inferolateral segment and basal inferior wall.   -Lung sounds clear to air in all fields. No edema appreciated  -SPO2 in mid to high 90s on room air.   -Daily weights  -Monitor intake and output  -Hold home farxiga and entresto in setting of SAMEERA, resume as appropriate.     History of multiple CVA  - History of R PCA stroke in 2019, L MCA stroke in 2021, bihemispheric small infarcts in 2022, and L MCA scattered embolic infarcts in 2023  - Residual right sided weaknesses to upper and lower extremity from prior stroke  - Continue home lipitor and zetia  - Continue heparin infusion post revascularization  -Continue to hold home Eliquis- will defer timing of transition back to [facility] to vascular surgery service.  - Continue home aspirin  - Encourage smoking cessation    COPD without acute exacerbation  -Cigarette smoker x48 years, continues to smoke but cut back to 3 cigarettes per day  -Lung sounds clear to air in all lung fields. SPO2 in mid to high 90s on room air.   - Denies any cough, wheezes, or shortness of breath  - Albuterol MDI as needed for shortness of breath/wheezing  -Congratulated patient on reduction of cigarette use and attempting to quit.   -Counseled on  smoking cessation  -Continue NRT    BRUNA  - Has home CPAP but reports that he doesn't use it.   -Recommended adherence to CPAP therapy and patient refused CPAP while in hospital.     Tobacco use disorder  - Cigarette smoker x48 years.   - Reports that he has cut back to 3 cigarettes per day and has been trying very hard to quit.   -Congratulated patient on reduction of cigarette use and attempting to quit.   -Counseled on smoking cessation  -Continue NRT    DVT prophylaxis  -Continue heparin infusion post revascularization   -Continue to hold home Eliquis- will defer timing of transition back to [facility] to vascular surgery service.      -Critical care medicine will continue to follow overnight and likely will sign off in the morning pending clinical course.     I spent 50 minutes of cumulative critical care time with the patient.  Greater than 50% of that time was spent in the direct collaboration and or coordination of care of the patient.     Dragon dictation software was used to dictate this note and thus there may be minor errors in translation/transcription including garbled speech or misspellings. Please contact for clarification if needed.

## 2024-12-19 NOTE — PROGRESS NOTE ADULT - PROBLEM SELECTOR PROBLEM 7
Hpi Title: Evaluation of a Skin Lesion
How Severe Are Your Spot(S)?: mild
Have Your Spot(S) Been Treated In The Past?: has not been treated
Coronary artery disease, angina presence unspecified, unspecified vessel or lesion type, unspecified whether native or transplanted heart
Essential hypertension
Type 2 diabetes mellitus without complication, without long-term current use of insulin
Coronary artery disease, angina presence unspecified, unspecified vessel or lesion type, unspecified whether native or transplanted heart

## 2025-01-20 NOTE — PROGRESS NOTE ADULT - ATTENDING COMMENTS
Diltiazem 240 mg daily, metoprolol 100 mg daily, olmesartan 5 mg daily, continue   87 yo M with PMHx CAD (s/p PCI x6, recent 2013), AFib (eliquis), HTN, HLD, CVA (2008, c/b residual R-sided weakness, wheelchair-bound), dementia (AOx2), hx DVT (s/p IVC filter), recent CCU admission for STEMI (cath deferred d/t high risk, 10/9-10/13) BIBEMS from home following syncopal episode at home admitted to cardiac telemetry. Medicine consulted for COVID+ PCR.      # Sepsis 2/2 COVID ( mild)   - special droplets and contact precautions ( COVID+ 11/3)   - no need for Dexamethaxone since no O2 requirement   - Pt is at high risk for developing severe COVID  - Remdesivir 200mg iv x1 @ 4am 11/4.  - On 11/4, met with pt's family to discuss pro and cons of Remdesir which is still recommended by CDC and hospital COVID guideline to offer high risk COVID pt for developing severe COVID. Daughter declined further Remdesivir concerning it's side effect on the kidney.  - discontinued further Remdesivir as requested by family   - Will continue monitoring pulse ox and clinical course     # complicated UTI  - started on Ceftriaxone 1gm iv q24hr ( 11/4-) Day 2   - f/u UCx   - f/u BCX( 11/3): ngtd     # HTN  # HLD  # hx CAD/STEMI   - active care per cardiology services for medical management   - clopidogrel Tablet 75 milliGRAM(s) Oral daily  - apixaban 5 milliGRAM(s) Oral two times a day  - metoprolol tartrate 12.5 milliGRAM(s) Oral two times a day  - atorvastatin 80 milliGRAM(s) Oral at bedtime  - hydrALAZINE 25 milliGRAM(s) Oral three times a day  - isosorbide   dinitrate Tablet (ISORDIL) 10 milliGRAM(s) Oral three times a day    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